# Patient Record
Sex: FEMALE | Race: WHITE | NOT HISPANIC OR LATINO | Employment: OTHER | URBAN - METROPOLITAN AREA
[De-identification: names, ages, dates, MRNs, and addresses within clinical notes are randomized per-mention and may not be internally consistent; named-entity substitution may affect disease eponyms.]

---

## 2017-03-16 ENCOUNTER — OFFICE VISIT (OUTPATIENT)
Dept: INTERNAL MEDICINE | Facility: CLINIC | Age: 57
End: 2017-03-16

## 2017-03-16 VITALS
OXYGEN SATURATION: 97 % | BODY MASS INDEX: 29.81 KG/M2 | HEART RATE: 77 BPM | DIASTOLIC BLOOD PRESSURE: 71 MMHG | SYSTOLIC BLOOD PRESSURE: 120 MMHG | WEIGHT: 162 LBS | HEIGHT: 62 IN | TEMPERATURE: 97.7 F

## 2017-03-16 DIAGNOSIS — I10 ESSENTIAL HYPERTENSION: ICD-10-CM

## 2017-03-16 DIAGNOSIS — R07.89 CHEST TIGHTNESS OR PRESSURE: ICD-10-CM

## 2017-03-16 DIAGNOSIS — J30.1 NON-SEASONAL ALLERGIC RHINITIS DUE TO POLLEN: ICD-10-CM

## 2017-03-16 DIAGNOSIS — E78.49 OTHER HYPERLIPIDEMIA: ICD-10-CM

## 2017-03-16 DIAGNOSIS — R07.89 OTHER CHEST PAIN: Primary | ICD-10-CM

## 2017-03-16 DIAGNOSIS — Z82.49 FAMILY HISTORY OF EARLY CAD: ICD-10-CM

## 2017-03-16 DIAGNOSIS — R94.31 EKG, ABNORMAL: ICD-10-CM

## 2017-03-16 DIAGNOSIS — M48.061 DEGENERATIVE LUMBAR SPINAL STENOSIS: ICD-10-CM

## 2017-03-16 PROCEDURE — 99214 OFFICE O/P EST MOD 30 MIN: CPT | Performed by: INTERNAL MEDICINE

## 2017-03-16 PROCEDURE — 93000 ELECTROCARDIOGRAM COMPLETE: CPT | Performed by: INTERNAL MEDICINE

## 2017-03-16 NOTE — PROGRESS NOTES
Procedure     ECG 12 Lead  Date/Time: 3/16/2017 5:39 PM  Performed by: MATTHEW BURNS  Authorized by: MATTHEW BURNS   Comparison: not compared with previous ECG   Previous ECG: no previous ECG available  Rhythm: sinus rhythm  Rate: normal  Conduction: conduction normal  ST Segments: ST segments normal  T depression: V5 and V6  QRS axis: normal  Other: no other findings  Clinical impression: abnormal ECG

## 2017-03-17 LAB
ALBUMIN SERPL-MCNC: 4.4 G/DL (ref 3.5–5.2)
ALBUMIN/GLOB SERPL: 1.5 G/DL
ALP SERPL-CCNC: 75 U/L (ref 39–117)
ALT SERPL-CCNC: 10 U/L (ref 1–33)
APPEARANCE UR: CLEAR
AST SERPL-CCNC: 13 U/L (ref 1–32)
BACTERIA #/AREA URNS HPF: NORMAL /HPF
BASOPHILS # BLD AUTO: 0.02 10*3/MM3 (ref 0–0.2)
BASOPHILS NFR BLD AUTO: 0.2 % (ref 0–1.5)
BILIRUB SERPL-MCNC: 0.3 MG/DL (ref 0.1–1.2)
BILIRUB UR QL STRIP: NEGATIVE
BUN SERPL-MCNC: 15 MG/DL (ref 6–20)
BUN/CREAT SERPL: 20.8 (ref 7–25)
CALCIUM SERPL-MCNC: 9.4 MG/DL (ref 8.6–10.5)
CASTS URNS MICRO: NORMAL
CHLORIDE SERPL-SCNC: 99 MMOL/L (ref 98–107)
CHOLEST SERPL-MCNC: 257 MG/DL (ref 0–200)
CO2 SERPL-SCNC: 29.1 MMOL/L (ref 22–29)
COLOR UR: YELLOW
CREAT SERPL-MCNC: 0.72 MG/DL (ref 0.57–1)
EOSINOPHIL # BLD AUTO: 0.24 10*3/MM3 (ref 0–0.7)
EOSINOPHIL NFR BLD AUTO: 2.8 % (ref 0.3–6.2)
EPI CELLS #/AREA URNS HPF: NORMAL /HPF
ERYTHROCYTE [DISTWIDTH] IN BLOOD BY AUTOMATED COUNT: 13.5 % (ref 11.7–13)
GLOBULIN SER CALC-MCNC: 3 GM/DL
GLUCOSE SERPL-MCNC: 108 MG/DL (ref 65–99)
GLUCOSE UR QL: NEGATIVE
HCT VFR BLD AUTO: 37.9 % (ref 35.6–45.5)
HDLC SERPL-MCNC: 48 MG/DL (ref 40–60)
HGB BLD-MCNC: 12.5 G/DL (ref 11.9–15.5)
HGB UR QL STRIP: NEGATIVE
IMM GRANULOCYTES # BLD: 0.04 10*3/MM3 (ref 0–0.03)
IMM GRANULOCYTES NFR BLD: 0.5 % (ref 0–0.5)
KETONES UR QL STRIP: NEGATIVE
LDLC SERPL CALC-MCNC: 179 MG/DL (ref 0–100)
LDLC/HDLC SERPL: 3.73 {RATIO}
LEUKOCYTE ESTERASE UR QL STRIP: NEGATIVE
LYMPHOCYTES # BLD AUTO: 2.61 10*3/MM3 (ref 0.9–4.8)
LYMPHOCYTES NFR BLD AUTO: 30.4 % (ref 19.6–45.3)
MCH RBC QN AUTO: 29.4 PG (ref 26.9–32)
MCHC RBC AUTO-ENTMCNC: 33 G/DL (ref 32.4–36.3)
MCV RBC AUTO: 89.2 FL (ref 80.5–98.2)
MONOCYTES # BLD AUTO: 0.4 10*3/MM3 (ref 0.2–1.2)
MONOCYTES NFR BLD AUTO: 4.7 % (ref 5–12)
NEUTROPHILS # BLD AUTO: 5.27 10*3/MM3 (ref 1.9–8.1)
NEUTROPHILS NFR BLD AUTO: 61.4 % (ref 42.7–76)
NITRITE UR QL STRIP: NEGATIVE
PH UR STRIP: 6.5 [PH] (ref 5–8)
PLATELET # BLD AUTO: 321 10*3/MM3 (ref 140–500)
POTASSIUM SERPL-SCNC: 4.2 MMOL/L (ref 3.5–5.2)
PROT SERPL-MCNC: 7.4 G/DL (ref 6–8.5)
PROT UR QL STRIP: NEGATIVE
RBC # BLD AUTO: 4.25 10*6/MM3 (ref 3.9–5.2)
RBC #/AREA URNS HPF: NORMAL /HPF
SODIUM SERPL-SCNC: 142 MMOL/L (ref 136–145)
SP GR UR: 1.02 (ref 1–1.03)
T4 FREE SERPL-MCNC: 1.04 NG/DL (ref 0.93–1.7)
TRIGL SERPL-MCNC: 151 MG/DL (ref 0–150)
TSH SERPL DL<=0.005 MIU/L-ACNC: 0.75 MIU/ML (ref 0.27–4.2)
UROBILINOGEN UR STRIP-MCNC: (no result) MG/DL
VLDLC SERPL CALC-MCNC: 30.2 MG/DL (ref 5–40)
WBC # BLD AUTO: 8.58 10*3/MM3 (ref 4.5–10.7)
WBC #/AREA URNS HPF: NORMAL /HPF

## 2017-03-22 ENCOUNTER — TELEPHONE (OUTPATIENT)
Dept: INTERNAL MEDICINE | Facility: CLINIC | Age: 57
End: 2017-03-22

## 2017-03-22 NOTE — TELEPHONE ENCOUNTER
SPOKE WITH SHE AND GAVE HER LAB RESULTS. I TOLD PT THAT DR BURNS WOULD LIKE HER TO START THE GENERIC LIPITOR 40MG. PT STATED SHE DID NOT WANT TO TAKE LIPITOR BECAUSE SHE HAS KNOWN SEVERAL PEOPLE WHO HAVE HAD COMPLICATIONS.  PT IS ASKING IF YOU WOULD BE WILLING TO SEND IN A DIFFERENT RX.  PLEASE ADVISE THANKS

## 2017-03-22 NOTE — TELEPHONE ENCOUNTER
----- Message from Ru Granger MD sent at 3/21/2017  8:27 PM EDT -----  Please call the patient regarding her abnormal result.  Notify patient her LDL cholesterol is back up at 179 and I strongly recommend she start on 40 mg of generic Lipitor daily as this will help prevent strokes and heart attacks

## 2017-03-26 NOTE — PROGRESS NOTES
Elizabeth Sheldon is a 57 y.o. female.   She is here today for chest pain along with hyperlipidemia hypertension allergic rhinitis lumbar DDD chest tightness family history of early CAD and abnormal EKG today as well  History of Present Illness   She is here today for chest pain along with hyper lipidemia hypertension allergic rhinitis lumbar DDD chest tightness family history of early CAD and abnormal EKG today as well  The following portions of the patient's history were reviewed and updated as appropriate: allergies, current medications, past family history, past medical history, past social history, past surgical history and problem list.    Review of Systems   Cardiovascular: Positive for chest pain.   All other systems reviewed and are negative.      Objective   Physical Exam   Constitutional: She is oriented to person, place, and time. She appears well-developed and well-nourished. She is cooperative.   HENT:   Head: Normocephalic and atraumatic.   Right Ear: Hearing, tympanic membrane, external ear and ear canal normal.   Left Ear: Hearing, tympanic membrane, external ear and ear canal normal.   Nose: Nose normal.   Mouth/Throat: Uvula is midline, oropharynx is clear and moist and mucous membranes are normal.   Eyes: Conjunctivae, EOM and lids are normal. Pupils are equal, round, and reactive to light.   Neck: Phonation normal. Neck supple. Carotid bruit is not present.   Cardiovascular: Normal rate, regular rhythm and normal heart sounds.  Exam reveals no gallop and no friction rub.    No murmur heard.  Pulmonary/Chest: Effort normal and breath sounds normal. No respiratory distress.   Abdominal: Soft. Bowel sounds are normal. She exhibits no distension and no mass. There is no hepatosplenomegaly. There is no tenderness. There is no rebound and no guarding. No hernia.   Musculoskeletal: She exhibits no edema.   Neurological: She is alert and oriented to person, place, and time. Coordination and  gait normal.   Skin: Skin is warm and dry.   Psychiatric: She has a normal mood and affect. Her speech is normal and behavior is normal. Judgment and thought content normal.   Nursing note and vitals reviewed.      Assessment/Plan   Diagnoses and all orders for this visit:    Other chest pain  -     Lipid Panel With LDL / HDL Ratio  -     Comprehensive Metabolic Panel  -     CBC & Differential  -     T4, Free  -     TSH  -     Urinalysis With Microscopic  -     ECG 12 Lead    Other hyperlipidemia  -     Lipid Panel With LDL / HDL Ratio  -     Comprehensive Metabolic Panel  -     CBC & Differential  -     T4, Free  -     TSH  -     Urinalysis With Microscopic    Essential hypertension  -     Lipid Panel With LDL / HDL Ratio  -     Comprehensive Metabolic Panel  -     CBC & Differential  -     T4, Free  -     TSH  -     Urinalysis With Microscopic    Non-seasonal allergic rhinitis due to pollen  -     Lipid Panel With LDL / HDL Ratio  -     Comprehensive Metabolic Panel  -     CBC & Differential  -     T4, Free  -     TSH  -     Urinalysis With Microscopic    Degenerative lumbar spinal stenosis  -     Lipid Panel With LDL / HDL Ratio  -     Comprehensive Metabolic Panel  -     CBC & Differential  -     T4, Free  -     TSH  -     Urinalysis With Microscopic    Chest tightness or pressure    Family history of early CAD  -     Stress Test With Myocardial Perfusion - One Day    EKG, abnormal  -     Stress Test With Myocardial Perfusion - One Day    Other orders  -     Microscopic Examination      Chest pain and chest tightness we will get myocardial perfusion stress test  Lumbar spinal stenosis physical therapy  Chest tightness or pressure noted above  Family history early CAD cardiac stress test  Abnormal EKG cardiac stress test  Allergic rhinitis supportive meds for this and do not use decongestions  Hypertension well-controlled on current medication  Hyper lipidemia keep LDL less than 70 with proper diet exercise  medication

## 2017-03-29 RX ORDER — PRAVASTATIN SODIUM 40 MG
40 TABLET ORAL DAILY
Qty: 90 TABLET | Refills: 0 | Status: SHIPPED | OUTPATIENT
Start: 2017-03-29 | End: 2017-12-20 | Stop reason: SINTOL

## 2017-04-10 ENCOUNTER — HOSPITAL ENCOUNTER (OUTPATIENT)
Dept: CARDIOLOGY | Facility: HOSPITAL | Age: 57
Discharge: HOME | End: 2017-04-10
Attending: INTERNAL MEDICINE

## 2017-04-10 VITALS — SYSTOLIC BLOOD PRESSURE: 121 MMHG | DIASTOLIC BLOOD PRESSURE: 70 MMHG | HEART RATE: 66 BPM

## 2017-04-10 PROCEDURE — 0 TECHNETIUM SESTAMIBI: Performed by: INTERNAL MEDICINE

## 2017-04-10 PROCEDURE — 93017 CV STRESS TEST TRACING ONLY: CPT

## 2017-04-10 PROCEDURE — 78452 HT MUSCLE IMAGE SPECT MULT: CPT | Performed by: INTERNAL MEDICINE

## 2017-04-10 PROCEDURE — 93018 CV STRESS TEST I&R ONLY: CPT | Performed by: INTERNAL MEDICINE

## 2017-04-10 PROCEDURE — 78452 HT MUSCLE IMAGE SPECT MULT: CPT

## 2017-04-10 PROCEDURE — A9500 TC99M SESTAMIBI: HCPCS | Performed by: INTERNAL MEDICINE

## 2017-04-10 PROCEDURE — 93016 CV STRESS TEST SUPVJ ONLY: CPT | Performed by: INTERNAL MEDICINE

## 2017-04-10 RX ADMIN — Medication 1 DOSE: at 07:53

## 2017-04-10 RX ADMIN — Medication 1 DOSE: at 10:15

## 2017-04-12 LAB
BH CV STRESS BP STAGE 1: NORMAL
BH CV STRESS BP STAGE 2: NORMAL
BH CV STRESS DURATION MIN STAGE 1: 3
BH CV STRESS DURATION MIN STAGE 2: 3
BH CV STRESS DURATION SEC STAGE 1: 0
BH CV STRESS DURATION SEC STAGE 2: 0
BH CV STRESS GRADE STAGE 1: 10
BH CV STRESS GRADE STAGE 2: 12
BH CV STRESS HR STAGE 1: 126
BH CV STRESS HR STAGE 2: 159
BH CV STRESS METS STAGE 1: 4.6
BH CV STRESS METS STAGE 2: 7.1
BH CV STRESS PROTOCOL 1: NORMAL
BH CV STRESS RECOVERY BP: NORMAL MMHG
BH CV STRESS RECOVERY HR: 92 BPM
BH CV STRESS SPEED STAGE 1: 1.7
BH CV STRESS SPEED STAGE 2: 2.5
BH CV STRESS STAGE 1: 1
BH CV STRESS STAGE 2: 2
LV EF NUC BP: 65 %
MAXIMAL PREDICTED HEART RATE: 164 BPM
PERCENT MAX PREDICTED HR: 96.95 %
STRESS BASELINE BP: NORMAL MMHG
STRESS BASELINE HR: 76 BPM
STRESS PERCENT HR: 114 %
STRESS POST ESTIMATED WORKLOAD: 7.1 METS
STRESS POST EXERCISE DUR MIN: 6 MIN
STRESS POST EXERCISE DUR SEC: 0 SEC
STRESS POST PEAK BP: NORMAL MMHG
STRESS POST PEAK HR: 159 BPM
STRESS TARGET HR: 139 BPM

## 2017-04-13 ENCOUNTER — TELEPHONE (OUTPATIENT)
Dept: INTERNAL MEDICINE | Facility: CLINIC | Age: 57
End: 2017-04-13

## 2017-08-07 ENCOUNTER — APPOINTMENT (OUTPATIENT)
Dept: WOMENS IMAGING | Facility: HOSPITAL | Age: 57
End: 2017-08-07

## 2017-08-07 PROCEDURE — G0202 SCR MAMMO BI INCL CAD: HCPCS | Performed by: RADIOLOGY

## 2017-08-07 PROCEDURE — 77067 SCR MAMMO BI INCL CAD: CPT | Performed by: RADIOLOGY

## 2017-08-14 RX ORDER — VALSARTAN AND HYDROCHLOROTHIAZIDE 320; 25 MG/1; MG/1
TABLET, FILM COATED ORAL
Qty: 90 TABLET | Refills: 0 | Status: SHIPPED | OUTPATIENT
Start: 2017-08-14 | End: 2017-12-20 | Stop reason: SDUPTHER

## 2017-12-20 ENCOUNTER — OFFICE VISIT (OUTPATIENT)
Dept: FAMILY MEDICINE CLINIC | Facility: CLINIC | Age: 57
End: 2017-12-20

## 2017-12-20 VITALS
WEIGHT: 164.1 LBS | RESPIRATION RATE: 18 BRPM | HEIGHT: 62 IN | SYSTOLIC BLOOD PRESSURE: 124 MMHG | DIASTOLIC BLOOD PRESSURE: 78 MMHG | BODY MASS INDEX: 30.2 KG/M2 | OXYGEN SATURATION: 98 % | TEMPERATURE: 98.3 F | HEART RATE: 70 BPM

## 2017-12-20 DIAGNOSIS — I34.0 MITRAL VALVE INSUFFICIENCY, UNSPECIFIED ETIOLOGY: ICD-10-CM

## 2017-12-20 DIAGNOSIS — I49.3 PVC (PREMATURE VENTRICULAR CONTRACTION): ICD-10-CM

## 2017-12-20 DIAGNOSIS — I10 ESSENTIAL HYPERTENSION: ICD-10-CM

## 2017-12-20 DIAGNOSIS — R73.01 ABNORMAL FASTING GLUCOSE: ICD-10-CM

## 2017-12-20 DIAGNOSIS — E78.49 OTHER HYPERLIPIDEMIA: Primary | ICD-10-CM

## 2017-12-20 PROCEDURE — 93000 ELECTROCARDIOGRAM COMPLETE: CPT | Performed by: INTERNAL MEDICINE

## 2017-12-20 PROCEDURE — 99214 OFFICE O/P EST MOD 30 MIN: CPT | Performed by: INTERNAL MEDICINE

## 2017-12-20 RX ORDER — VALSARTAN AND HYDROCHLOROTHIAZIDE 320; 25 MG/1; MG/1
1 TABLET, FILM COATED ORAL DAILY
Qty: 90 TABLET | Refills: 1 | Status: SHIPPED | OUTPATIENT
Start: 2017-12-20 | End: 2018-08-02 | Stop reason: ALTCHOICE

## 2017-12-20 RX ORDER — MELOXICAM 15 MG/1
15 TABLET ORAL DAILY
COMMUNITY
End: 2020-04-27

## 2017-12-20 NOTE — PROGRESS NOTES
Subjective   Re Sheldon is a 57 y.o. female who comes in today for   Chief Complaint   Patient presents with   • NEW PATIENT     GET ESTABLISHED    .    History of Present Illness   HERE AS A TRANSFER FROM DR CHO'S OFFICE.  WAS TOLD SHE HAS HL AND TRIED PRAVACHOL BUT MADE HER LEGS FEEL LIKE CEMENT.  WANTING FASTING LABS TO RECHECK TODAY.  NEEDS REFILL ON VALSARTAN;HCTZ THAT SHE TAKES FOR HTN.  RECENTLY HER ORTHO DR HU  PRESCRIBED MOBIC AND IT IS HELPING HER HIP ISSUE BUT ALSO HELPING HER PAIN FROM SPINAL STENOSIS IN HER BACK.  WONDERING IF CAN STAY ON IT LONG TERM.  JUST HAD CARPAL TUNNEL SURGERY BOTH HANDS IN November 2017 WITH DR. JAMISON.  ALSO SEES CARDIOLOGY AT [facility] TO FOLLOW A LEAKY VALVE.  LAST ECHO WAS THIS YEAR AND STABLE.  ALSO HAD NEG STRESS TEST.  H/O MYXOMATOUS MITRAL VALVE WITH THICKENING AND MR.  F/U WITH REPEAT ECHO IN 1 YEAR.  GOES EVERY 2 YEARS.      The following portions of the patient's history were reviewed and updated as appropriate: allergies, current medications, past family history, past medical history, past social history, past surgical history and problem list.    Review of Systems   Constitutional: Negative.    Respiratory:        [place] WITH STEPS AFTER A LOT OF STEPS   Cardiovascular: Positive for palpitations. Negative for chest pain and leg swelling.   Musculoskeletal: Positive for back pain (OFF AND ON DUE TO SPINAL STENOSIS. HAS SEEN 3 DIFFERENT BACK DOCTORS AND LAST ONE DIDN'T RECOMMEND SURGERY.  HAS DONE EPIDURALS).   All other systems reviewed and are negative.      Vitals:    12/20/17 1105   BP: 124/78   Pulse: 70   Resp: 18   Temp: 98.3 °F (36.8 °C)   SpO2: 98%       Objective   Physical Exam   Constitutional: She is oriented to person, place, and time. She appears well-developed and well-nourished.   HENT:   Head: Normocephalic and atraumatic.   Right Ear: External ear normal.   Left Ear: External ear normal.   Mouth/Throat: Oropharynx is clear and moist.   Eyes:  Conjunctivae are normal.   Neck: Neck supple.   Cardiovascular: Normal rate, regular rhythm and normal heart sounds.    No bruits   Pulmonary/Chest: Effort normal and breath sounds normal. No respiratory distress. She has no wheezes. She has no rales.   Abdominal: Soft. Bowel sounds are normal. She exhibits no distension and no mass. There is no tenderness.   Lymphadenopathy:     She has no cervical adenopathy.   Neurological: She is alert and oriented to person, place, and time.   Skin: Skin is warm.   Psychiatric: She has a normal mood and affect. Her behavior is normal. Judgment and thought content normal.   Nursing note and vitals reviewed.      Assessment/Plan   Re was seen today for new patient.    Diagnoses and all orders for this visit:    Other hyperlipidemia  -     Comprehensive Metabolic Panel  -     Lipid Panel With LDL / HDL Ratio  -     Hemoglobin A1c    Essential hypertension  -     Comprehensive Metabolic Panel  -     Lipid Panel With LDL / HDL Ratio  -     Hemoglobin A1c    Abnormal fasting glucose  -     Comprehensive Metabolic Panel  -     Lipid Panel With LDL / HDL Ratio  -     Hemoglobin A1c    PVC (premature ventricular contraction)  -     Ambulatory Referral to Cardiology    Mitral valve insufficiency, unspecified etiology  -     Ambulatory Referral to Cardiology    Other orders  -     valsartan-hydrochlorothiazide (DIOVAN-HCT) 320-25 MG per tablet; Take 1 tablet by mouth Daily.    I've reviewed previous notes and don't see a murmur mentioned, however, her echo shows MR and myxomatous valve.  Presume her murmur is from this.  Today she has a lot of PVC and EKG c/w with that. I have made a referral for her to see [facility] for further management.   She is drinking a lot of coffee and i've recommended that she stop caffeine and increase her water /day.  Also advised to start Mg++ which should help reduce the pvc's.  Refill her erin/hctz and get labs to reassess her cholesterol and a1c/glucose.     Weight loss and exercise discussed and recommended. For her chronic spinal stenosis, i've rec rehabilitative exercises and turmeric.           I have asked for the patient to return to clinic in 6month(s).

## 2017-12-20 NOTE — PROGRESS NOTES
Procedure     ECG 12 Lead  Date/Time: 12/20/2017 12:12 PM  Performed by: RENETTA CLARK.  Authorized by: RENETTA CLARK   Comparison: compared with previous ECG   Rhythm: sinus rhythm  Ectopy: frequent PVCs  Rate: normal  Conduction: conduction normal  Clinical impression: non-specific ECG  Comments: Non specific t wave changes.  Not new for her

## 2017-12-21 LAB
ALBUMIN SERPL-MCNC: 4.4 G/DL (ref 3.5–5.2)
ALBUMIN/GLOB SERPL: 1.5 G/DL
ALP SERPL-CCNC: 89 U/L (ref 39–117)
ALT SERPL-CCNC: 12 U/L (ref 1–33)
AST SERPL-CCNC: 11 U/L (ref 1–32)
BILIRUB SERPL-MCNC: 0.3 MG/DL (ref 0.1–1.2)
BUN SERPL-MCNC: 20 MG/DL (ref 6–20)
BUN/CREAT SERPL: 32.3 (ref 7–25)
CALCIUM SERPL-MCNC: 9.2 MG/DL (ref 8.6–10.5)
CHLORIDE SERPL-SCNC: 103 MMOL/L (ref 98–107)
CHOLEST SERPL-MCNC: 232 MG/DL (ref 0–200)
CO2 SERPL-SCNC: 27 MMOL/L (ref 22–29)
CREAT SERPL-MCNC: 0.62 MG/DL (ref 0.57–1)
GLOBULIN SER CALC-MCNC: 2.9 GM/DL
GLUCOSE SERPL-MCNC: 105 MG/DL (ref 65–99)
HBA1C MFR BLD: 5.8 % (ref 4.8–5.6)
HDLC SERPL-MCNC: 51 MG/DL (ref 40–60)
LDLC SERPL CALC-MCNC: 159 MG/DL (ref 0–100)
LDLC/HDLC SERPL: 3.11 {RATIO}
POTASSIUM SERPL-SCNC: 4.7 MMOL/L (ref 3.5–5.2)
PROT SERPL-MCNC: 7.3 G/DL (ref 6–8.5)
SODIUM SERPL-SCNC: 143 MMOL/L (ref 136–145)
TRIGL SERPL-MCNC: 112 MG/DL (ref 0–150)
VLDLC SERPL CALC-MCNC: 22.4 MG/DL (ref 5–40)

## 2017-12-22 RX ORDER — ROSUVASTATIN CALCIUM 10 MG/1
10 TABLET, COATED ORAL DAILY
Qty: 30 TABLET | Refills: 1 | Status: SHIPPED | OUTPATIENT
Start: 2017-12-22 | End: 2018-02-22 | Stop reason: SDUPTHER

## 2018-01-25 ENCOUNTER — OFFICE VISIT (OUTPATIENT)
Dept: CARDIOLOGY | Facility: CLINIC | Age: 58
End: 2018-01-25

## 2018-01-25 VITALS
SYSTOLIC BLOOD PRESSURE: 118 MMHG | WEIGHT: 159 LBS | HEIGHT: 63 IN | HEART RATE: 66 BPM | BODY MASS INDEX: 28.17 KG/M2 | DIASTOLIC BLOOD PRESSURE: 70 MMHG

## 2018-01-25 DIAGNOSIS — R01.1 SYSTOLIC MURMUR: ICD-10-CM

## 2018-01-25 DIAGNOSIS — I10 ESSENTIAL HYPERTENSION: ICD-10-CM

## 2018-01-25 DIAGNOSIS — R94.31 ABNORMAL EKG: ICD-10-CM

## 2018-01-25 DIAGNOSIS — I34.0 NON-RHEUMATIC MITRAL REGURGITATION: Primary | ICD-10-CM

## 2018-01-25 DIAGNOSIS — I49.1 PREMATURE ATRIAL CONTRACTIONS: ICD-10-CM

## 2018-01-25 PROCEDURE — 99204 OFFICE O/P NEW MOD 45 MIN: CPT | Performed by: INTERNAL MEDICINE

## 2018-01-25 PROCEDURE — 93000 ELECTROCARDIOGRAM COMPLETE: CPT | Performed by: INTERNAL MEDICINE

## 2018-01-25 NOTE — PROGRESS NOTES
Date of Office Visit: 2018  Encounter Provider: Callum Benedict MD  Place of Service: [facility] CARDIOLOGY  Patient Name: eR Sheldon  :1960    Chief complaint: Mitral regurgitation, systolic murmur, abnormal EKG.    History of Present Illness:    Dear Angelita:    I had the pleasure of seeing your patient in cardiology office on 2018.  As you   well know, she is a very pleasant 57 year-old white female with a past medical   history significant for mitral regurgitation, and abnormal EKG, and a systolic   murmur who presents for evaluation.  The patient has formerly seen Dr. Pena   in the [facility] system, last in .  She has seen him in the past for   hypertension, or murmur, and mitral regurgitation.  She has had an abnormal   EKG for years, and has inferolateral ST and T-wave changes at baseline.  She   has had multiple stress tests in the past, including an exercise Myoview stress   test on 4/10/2017 which was normal.  She last had an echocardiogram on   2016 which showed myxomatous-appearing changes of the mitral valve   and only mild mitral regurgitation.  She also has a prominent systolic murmur,   which she states has been present for years.  She was recently diagnosed   with prediabetes, and has lost 5 pounds in 3 weeks with diet changes.  She   does state that she has had issues with taking pravastatin previously which   made her legs feel heavy, although she is currently taking Crestor without   difficulty.  She denies any chest pain, shortness of breath, or other exertional   symptoms.  She is able to work out routinely without any difficulty.    Past Medical History:   Diagnosis Date   • Abnormal ECG     Inferolateral ST-T changes at baseline   • Hyperlipidemia    • Hypertension    • Mitral regurgitation    • Palpitations    • Premature atrial contractions    • Spinal stenosis    • Systolic murmur    • Tonsillitis        Past  Surgical History:   Procedure Laterality Date   • CARPAL TUNNEL RELEASE Bilateral    • COLONOSCOPY  2014    repeat 10 years , Dr. Karen Spear   • FOOT SURGERY     • HAND SURGERY     • TONSILLECTOMY AND ADENOIDECTOMY     • TUBAL ABDOMINAL LIGATION         Current Outpatient Prescriptions on File Prior to Visit   Medication Sig Dispense Refill   • fluticasone (FLONASE) 50 MCG/ACT nasal spray into each nostril.     • meloxicam (MOBIC) 15 MG tablet Take 15 mg by mouth Daily.     • rosuvastatin (CRESTOR) 10 MG tablet Take 1 tablet by mouth Daily. 30 tablet 1   • valsartan-hydrochlorothiazide (DIOVAN-HCT) 320-25 MG per tablet Take 1 tablet by mouth Daily. 90 tablet 1     No current facility-administered medications on file prior to visit.      Allergies as of 2018 - Emilio as Reviewed 2018   Allergen Reaction Noted   • Augmentin [amoxicillin-pot clavulanate] Diarrhea and Other (See Comments) 2016   • Bactrim [sulfamethoxazole-trimethoprim] Diarrhea and Other (See Comments) 2016   • Sulfa antibiotics  2016     Social History     Social History   • Marital status: Single     Spouse name: Kedar   • Number of children: 2   • Years of education: N/A     Occupational History   • cook/baker      Social History Main Topics   • Smoking status: Never Smoker   • Smokeless tobacco: Never Used   • Alcohol use No      Comment: Caffeine use 3-4 cups daily   • Drug use: No   • Sexual activity: Not on file     Other Topics Concern   • Not on file     Social History Narrative    .  RETIRED A YEAR AGO FROM [employer]     Family History   Problem Relation Age of Onset   • Heart attack Other    • Breast cancer Other    • Heart disease Other    • Heart disease Mother      Mother with MI at 63, and later  from CHF   • Heart disease Father      Father  from complications after valve replacement at 73   • Stroke Father    • Breast cancer Sister        Review of Systems  "  Constitution: Positive for weight loss.   All other systems reviewed and are negative.    Objective:     Vitals:    01/25/18 1018   BP: 118/70   BP Location: Left arm   Pulse: 66   Weight: 72.1 kg (159 lb)   Height: 160 cm (63\")     Body mass index is 28.17 kg/(m^2).    Physical Exam   Constitutional: She is oriented to person, place, and time. She appears well-developed and well-nourished.   HENT:   Head: Normocephalic and atraumatic.   Eyes: Conjunctivae are normal.   Neck: Neck supple.   Cardiovascular: Normal rate and regular rhythm.  Exam reveals no gallop and no friction rub.    Murmur heard.   Harsh systolic murmur is present with a grade of 2/6  at the upper right sternal border, upper left sternal border  Pulmonary/Chest: Effort normal and breath sounds normal.   Abdominal: Soft. There is no tenderness.   Musculoskeletal: She exhibits no edema.   Neurological: She is alert and oriented to person, place, and time.   Skin: Skin is warm.   Psychiatric: She has a normal mood and affect. Her behavior is normal.     Lab Review:     ECG 12 Lead  Date/Time: 1/25/2018 10:55 AM  Performed by: ORACIO TAYLOR  Authorized by: ORACIO TAYLOR   Comparison: compared with previous ECG from 12/20/2017  Similar to previous ECG  Rhythm: sinus rhythm  Ectopy: atrial premature contractions  Rate: normal  BPM: 66  Clinical impression: abnormal ECG  Comments: Inferolateral ST-T changes           Cardiac Procedures:  1.  Exercise Myoview stress test on 4/10/2017: There was no evidence of ischemia or   infarction.  2.  Echocardiogram on 12/21/2016: Ejection fraction was 69%.  There was moderate left   atrial enlargement by my read.  There was mild mitral regurgitation.  The mitral valve   leaflets were thickened and myxomatous.    Assessment:       Diagnosis Plan   1. Non-rheumatic mitral regurgitation  Adult Transthoracic Echo Complete W/ Cont if Necessary Per Protocol   2. Systolic murmur  Adult Transthoracic Echo " Complete W/ Cont if Necessary Per Protocol   3. Abnormal EKG  Adult Transthoracic Echo Complete W/ Cont if Necessary Per Protocol   4. Premature atrial contractions     5. Essential hypertension       Plan:       Again, the patient is completely asymptomatic at this point.  She is able to exert herself   and workout without any difficulty.  She has not had any chest pain.  She has an abnormal   baseline EKG with inferolateral ST and T-wave changes, although her stress test from   4/10/2017 was normal.  She has had multiple stress tests in the past which she states   were normal as well.  I reviewed her echocardiogram in detail as her murmur is   prominent.  I cannot visualize her aortic valve well, and this is the area where I heard the   murmur best.  I also felt that her left atrium was moderately dilated, although her mitral   regurgitation was only mild.  I am going to recheck an echocardiogram at this point   given the prominence of the murmur.  I would like to specifically concentrate on both   the mitral valve and the aortic valve.  She does have PACs on her EKG, although she   is asymptomatic with regards to these.  She does not require further ischemic testing   at this point.  I did advise her to start taking an aspirin 81 mg per day given her family   history of coronary artery disease.  She is taking Crestor without difficulty, although   she has had issues with pravastatin in the past.  Further plans will be made pending   the results the echocardiogram.  Otherwise, I will see her back in one year.

## 2018-01-29 ENCOUNTER — HOSPITAL ENCOUNTER (OUTPATIENT)
Dept: CARDIOLOGY | Facility: HOSPITAL | Age: 58
Discharge: HOME | End: 2018-01-29
Attending: INTERNAL MEDICINE | Admitting: INTERNAL MEDICINE

## 2018-01-29 VITALS
BODY MASS INDEX: 28.17 KG/M2 | HEART RATE: 67 BPM | OXYGEN SATURATION: 98 % | DIASTOLIC BLOOD PRESSURE: 78 MMHG | SYSTOLIC BLOOD PRESSURE: 116 MMHG | WEIGHT: 159 LBS | HEIGHT: 63 IN

## 2018-01-29 DIAGNOSIS — I34.0 NON-RHEUMATIC MITRAL REGURGITATION: ICD-10-CM

## 2018-01-29 DIAGNOSIS — R94.31 ABNORMAL EKG: ICD-10-CM

## 2018-01-29 DIAGNOSIS — R01.1 SYSTOLIC MURMUR: ICD-10-CM

## 2018-01-29 LAB
ASCENDING AORTA: 2.7 CM
BH CV ECHO MEAS - ACS: 1.6 CM
BH CV ECHO MEAS - AO MAX PG (FULL): 17.9 MMHG
BH CV ECHO MEAS - AO MAX PG: 23.8 MMHG
BH CV ECHO MEAS - AO MEAN PG (FULL): 11.3 MMHG
BH CV ECHO MEAS - AO MEAN PG: 14 MMHG
BH CV ECHO MEAS - AO ROOT AREA (BSA CORRECTED): 1.3
BH CV ECHO MEAS - AO ROOT AREA: 3.9 CM^2
BH CV ECHO MEAS - AO ROOT DIAM: 2.2 CM
BH CV ECHO MEAS - AO V2 MAX: 243.9 CM/SEC
BH CV ECHO MEAS - AO V2 MEAN: 179 CM/SEC
BH CV ECHO MEAS - AO V2 VTI: 56.7 CM
BH CV ECHO MEAS - AVA(I,A): 1.2 CM^2
BH CV ECHO MEAS - AVA(I,D): 1.2 CM^2
BH CV ECHO MEAS - AVA(V,A): 1.3 CM^2
BH CV ECHO MEAS - AVA(V,D): 1.3 CM^2
BH CV ECHO MEAS - BSA(HAYCOCK): 1.8 M^2
BH CV ECHO MEAS - BSA: 1.8 M^2
BH CV ECHO MEAS - BZI_BMI: 28.2 KILOGRAMS/M^2
BH CV ECHO MEAS - BZI_METRIC_HEIGHT: 160 CM
BH CV ECHO MEAS - BZI_METRIC_WEIGHT: 72.1 KG
BH CV ECHO MEAS - EDV(TEICH): 95.2 ML
BH CV ECHO MEAS - EF(CUBED): 83 %
BH CV ECHO MEAS - EF(TEICH): 76 %
BH CV ECHO MEAS - ESV(TEICH): 22.8 ML
BH CV ECHO MEAS - FS: 44.6 %
BH CV ECHO MEAS - IVS/LVPW: 1.1
BH CV ECHO MEAS - IVSD: 0.94 CM
BH CV ECHO MEAS - LAT PEAK E' VEL: 15 CM/SEC
BH CV ECHO MEAS - LV MASS(C)D: 134.6 GRAMS
BH CV ECHO MEAS - LV MASS(C)DI: 76.7 GRAMS/M^2
BH CV ECHO MEAS - LV MAX PG: 5.8 MMHG
BH CV ECHO MEAS - LV MEAN PG: 2.7 MMHG
BH CV ECHO MEAS - LV V1 MAX: 120.8 CM/SEC
BH CV ECHO MEAS - LV V1 MEAN: 74.3 CM/SEC
BH CV ECHO MEAS - LV V1 VTI: 26.4 CM
BH CV ECHO MEAS - LVIDD: 4.6 CM
BH CV ECHO MEAS - LVIDS: 2.5 CM
BH CV ECHO MEAS - LVOT AREA (M): 2.5 CM^2
BH CV ECHO MEAS - LVOT AREA: 2.7 CM^2
BH CV ECHO MEAS - LVOT DIAM: 1.8 CM
BH CV ECHO MEAS - LVPWD: 0.85 CM
BH CV ECHO MEAS - MED PEAK E' VEL: 13 CM/SEC
BH CV ECHO MEAS - MR MAX PG: 90.7 MMHG
BH CV ECHO MEAS - MR MAX VEL: 476.3 CM/SEC
BH CV ECHO MEAS - MV A DUR: 0.13 SEC
BH CV ECHO MEAS - MV A MAX VEL: 76.4 CM/SEC
BH CV ECHO MEAS - MV DEC SLOPE: 541.5 CM/SEC^2
BH CV ECHO MEAS - MV DEC TIME: 0.17 SEC
BH CV ECHO MEAS - MV E MAX VEL: 106 CM/SEC
BH CV ECHO MEAS - MV E/A: 1.4
BH CV ECHO MEAS - MV MAX PG: 3.8 MMHG
BH CV ECHO MEAS - MV MEAN PG: 1.6 MMHG
BH CV ECHO MEAS - MV P1/2T MAX VEL: 106.9 CM/SEC
BH CV ECHO MEAS - MV P1/2T: 57.8 MSEC
BH CV ECHO MEAS - MV V2 MAX: 97.7 CM/SEC
BH CV ECHO MEAS - MV V2 MEAN: 57.9 CM/SEC
BH CV ECHO MEAS - MV V2 VTI: 36.1 CM
BH CV ECHO MEAS - MVA P1/2T LCG: 2.1 CM^2
BH CV ECHO MEAS - MVA(P1/2T): 3.8 CM^2
BH CV ECHO MEAS - MVA(VTI): 1.9 CM^2
BH CV ECHO MEAS - PA ACC TIME: 0.11 SEC
BH CV ECHO MEAS - PA MAX PG (FULL): 2.5 MMHG
BH CV ECHO MEAS - PA MAX PG: 4.1 MMHG
BH CV ECHO MEAS - PA PR(ACCEL): 28.3 MMHG
BH CV ECHO MEAS - PA V2 MAX: 101.8 CM/SEC
BH CV ECHO MEAS - PULM A REVS DUR: 0.13 SEC
BH CV ECHO MEAS - PULM A REVS VEL: 24.8 CM/SEC
BH CV ECHO MEAS - PULM DIAS VEL: 57.4 CM/SEC
BH CV ECHO MEAS - PULM S/D: 0.93
BH CV ECHO MEAS - PULM SYS VEL: 53.3 CM/SEC
BH CV ECHO MEAS - PVA(V,A): 1.9 CM^2
BH CV ECHO MEAS - PVA(V,D): 1.9 CM^2
BH CV ECHO MEAS - QP/QS: 0.56
BH CV ECHO MEAS - RAP SYSTOLE: 3 MMHG
BH CV ECHO MEAS - RV MAX PG: 1.6 MMHG
BH CV ECHO MEAS - RV MEAN PG: 1 MMHG
BH CV ECHO MEAS - RV V1 MAX: 63.6 CM/SEC
BH CV ECHO MEAS - RV V1 MEAN: 45.5 CM/SEC
BH CV ECHO MEAS - RV V1 VTI: 12.6 CM
BH CV ECHO MEAS - RVOT AREA: 3.1 CM^2
BH CV ECHO MEAS - RVOT DIAM: 2 CM
BH CV ECHO MEAS - RVSP: 21 MMHG
BH CV ECHO MEAS - SI(AO): 127 ML/M^2
BH CV ECHO MEAS - SI(CUBED): 44.8 ML/M^2
BH CV ECHO MEAS - SI(LVOT): 40 ML/M^2
BH CV ECHO MEAS - SI(TEICH): 41.3 ML/M^2
BH CV ECHO MEAS - SUP REN AO DIAM: 1.7 CM
BH CV ECHO MEAS - SV(AO): 222.8 ML
BH CV ECHO MEAS - SV(CUBED): 78.6 ML
BH CV ECHO MEAS - SV(LVOT): 70.1 ML
BH CV ECHO MEAS - SV(RVOT): 39.1 ML
BH CV ECHO MEAS - SV(TEICH): 72.4 ML
BH CV ECHO MEAS - TAPSE (>1.6): 1.8 CM2
BH CV ECHO MEAS - TR MAX VEL: 214.2 CM/SEC
BH CV XLRA - RV BASE: 3 CM
BH CV XLRA - TDI S': 11 CM/SEC
E/E' RATIO: 14
LEFT ATRIUM VOLUME INDEX: 33 ML/M2
MAXIMAL PREDICTED HEART RATE: 164 BPM
SINUS: 2.7 CM
STJ: 2 CM
STRESS TARGET HR: 139 BPM

## 2018-01-29 PROCEDURE — 93306 TTE W/DOPPLER COMPLETE: CPT

## 2018-01-29 PROCEDURE — 93306 TTE W/DOPPLER COMPLETE: CPT | Performed by: INTERNAL MEDICINE

## 2018-01-29 PROCEDURE — 25010000002 PERFLUTREN (DEFINITY) 8.476 MG IN SODIUM CHLORIDE 0.9 % 10 ML INJECTION: Performed by: INTERNAL MEDICINE

## 2018-01-29 RX ADMIN — PERFLUTREN 1.5 ML: 6.52 INJECTION, SUSPENSION INTRAVENOUS at 10:24

## 2018-01-30 ENCOUNTER — TELEPHONE (OUTPATIENT)
Dept: CARDIOLOGY | Facility: CLINIC | Age: 58
End: 2018-01-30

## 2018-01-30 ENCOUNTER — OFFICE VISIT (OUTPATIENT)
Dept: RETAIL CLINIC | Facility: CLINIC | Age: 58
End: 2018-01-30

## 2018-01-30 DIAGNOSIS — Z23 NEED FOR VACCINATION: Primary | ICD-10-CM

## 2018-01-30 NOTE — PROGRESS NOTES
See scanned documents.   Adverse reactions reviewed with patient. Consent signed. Vaccine administered by John Beltrán CMA. Patient tolerated well.

## 2018-01-31 ENCOUNTER — TELEPHONE (OUTPATIENT)
Dept: CARDIOLOGY | Facility: CLINIC | Age: 58
End: 2018-01-31

## 2018-02-23 DIAGNOSIS — Z00.00 ANNUAL PHYSICAL EXAM: Primary | ICD-10-CM

## 2018-02-23 RX ORDER — ROSUVASTATIN CALCIUM 10 MG/1
TABLET, COATED ORAL
Qty: 30 TABLET | Refills: 4 | Status: SHIPPED | OUTPATIENT
Start: 2018-02-23 | End: 2018-09-12 | Stop reason: SDDI

## 2018-02-28 ENCOUNTER — LAB (OUTPATIENT)
Dept: FAMILY MEDICINE CLINIC | Facility: CLINIC | Age: 58
End: 2018-02-28

## 2018-03-01 LAB
25(OH)D3+25(OH)D2 SERPL-MCNC: 25.8 NG/ML (ref 30–100)
ALBUMIN SERPL-MCNC: 4.5 G/DL (ref 3.5–5.2)
ALBUMIN/GLOB SERPL: 1.6 G/DL
ALP SERPL-CCNC: 103 U/L (ref 39–117)
ALT SERPL-CCNC: 19 U/L (ref 1–33)
APPEARANCE UR: CLEAR
AST SERPL-CCNC: 15 U/L (ref 1–32)
BASOPHILS # BLD AUTO: 0.03 10*3/MM3 (ref 0–0.2)
BASOPHILS NFR BLD AUTO: 0.4 % (ref 0–1.5)
BILIRUB SERPL-MCNC: 0.4 MG/DL (ref 0.1–1.2)
BILIRUB UR QL STRIP: NEGATIVE
BUN SERPL-MCNC: 20 MG/DL (ref 6–20)
BUN/CREAT SERPL: 30.8 (ref 7–25)
CALCIUM SERPL-MCNC: 9.7 MG/DL (ref 8.6–10.5)
CHLORIDE SERPL-SCNC: 98 MMOL/L (ref 98–107)
CHOLEST SERPL-MCNC: 188 MG/DL (ref 0–200)
CO2 SERPL-SCNC: 29.6 MMOL/L (ref 22–29)
COLOR UR: YELLOW
CREAT SERPL-MCNC: 0.65 MG/DL (ref 0.57–1)
EOSINOPHIL # BLD AUTO: 0.27 10*3/MM3 (ref 0–0.7)
EOSINOPHIL NFR BLD AUTO: 3.6 % (ref 0.3–6.2)
ERYTHROCYTE [DISTWIDTH] IN BLOOD BY AUTOMATED COUNT: 13.3 % (ref 11.7–13)
GFR SERPLBLD CREATININE-BSD FMLA CKD-EPI: 114 ML/MIN/1.73
GFR SERPLBLD CREATININE-BSD FMLA CKD-EPI: 94 ML/MIN/1.73
GLOBULIN SER CALC-MCNC: 2.9 GM/DL
GLUCOSE SERPL-MCNC: 97 MG/DL (ref 65–99)
GLUCOSE UR QL: NEGATIVE
HBA1C MFR BLD: 5.58 % (ref 4.8–5.6)
HCT VFR BLD AUTO: 38.7 % (ref 35.6–45.5)
HDLC SERPL-MCNC: 53 MG/DL (ref 40–60)
HGB BLD-MCNC: 12 G/DL (ref 11.9–15.5)
HGB UR QL STRIP: NEGATIVE
IMM GRANULOCYTES # BLD: 0.02 10*3/MM3 (ref 0–0.03)
IMM GRANULOCYTES NFR BLD: 0.3 % (ref 0–0.5)
KETONES UR QL STRIP: NEGATIVE
LDLC SERPL CALC-MCNC: 111 MG/DL (ref 0–100)
LDLC/HDLC SERPL: 2.09 {RATIO}
LEUKOCYTE ESTERASE UR QL STRIP: NEGATIVE
LYMPHOCYTES # BLD AUTO: 2.24 10*3/MM3 (ref 0.9–4.8)
LYMPHOCYTES NFR BLD AUTO: 29.5 % (ref 19.6–45.3)
Lab: NORMAL
MCH RBC QN AUTO: 27.8 PG (ref 26.9–32)
MCHC RBC AUTO-ENTMCNC: 31 G/DL (ref 32.4–36.3)
MCV RBC AUTO: 89.6 FL (ref 80.5–98.2)
MONOCYTES # BLD AUTO: 0.46 10*3/MM3 (ref 0.2–1.2)
MONOCYTES NFR BLD AUTO: 6.1 % (ref 5–12)
NEUTROPHILS # BLD AUTO: 4.57 10*3/MM3 (ref 1.9–8.1)
NEUTROPHILS NFR BLD AUTO: 60.1 % (ref 42.7–76)
NITRITE UR QL STRIP: NEGATIVE
PH UR STRIP: 6 [PH] (ref 5–8)
PLATELET # BLD AUTO: 307 10*3/MM3 (ref 140–500)
POTASSIUM SERPL-SCNC: 4.2 MMOL/L (ref 3.5–5.2)
PROT SERPL-MCNC: 7.4 G/DL (ref 6–8.5)
PROT UR QL STRIP: NEGATIVE
RBC # BLD AUTO: 4.32 10*6/MM3 (ref 3.9–5.2)
SODIUM SERPL-SCNC: 141 MMOL/L (ref 136–145)
SP GR UR: 1.01 (ref 1–1.03)
T4 FREE SERPL-MCNC: 0.91 NG/DL (ref 0.93–1.7)
TRIGL SERPL-MCNC: 120 MG/DL (ref 0–150)
TSH SERPL DL<=0.005 MIU/L-ACNC: 1.1 MIU/ML (ref 0.27–4.2)
UROBILINOGEN UR STRIP-MCNC: NORMAL MG/DL
VLDLC SERPL CALC-MCNC: 24 MG/DL (ref 5–40)
WBC # BLD AUTO: 7.59 10*3/MM3 (ref 4.5–10.7)
WRITTEN AUTHORIZATION: NORMAL

## 2018-05-23 ENCOUNTER — OFFICE VISIT (OUTPATIENT)
Dept: FAMILY MEDICINE CLINIC | Facility: CLINIC | Age: 58
End: 2018-05-23

## 2018-05-23 VITALS
SYSTOLIC BLOOD PRESSURE: 112 MMHG | BODY MASS INDEX: 29.11 KG/M2 | TEMPERATURE: 98.3 F | WEIGHT: 164.3 LBS | HEIGHT: 63 IN | HEART RATE: 72 BPM | DIASTOLIC BLOOD PRESSURE: 66 MMHG | OXYGEN SATURATION: 98 %

## 2018-05-23 DIAGNOSIS — Z00.00 WELL ADULT EXAM: ICD-10-CM

## 2018-05-23 DIAGNOSIS — I10 ESSENTIAL HYPERTENSION: ICD-10-CM

## 2018-05-23 DIAGNOSIS — E55.9 VITAMIN D DEFICIENCY: ICD-10-CM

## 2018-05-23 DIAGNOSIS — E78.49 OTHER HYPERLIPIDEMIA: ICD-10-CM

## 2018-05-23 DIAGNOSIS — H61.22 IMPACTED CERUMEN OF LEFT EAR: ICD-10-CM

## 2018-05-23 DIAGNOSIS — R73.01 ABNORMAL FASTING GLUCOSE: Primary | ICD-10-CM

## 2018-05-23 PROCEDURE — 99386 PREV VISIT NEW AGE 40-64: CPT | Performed by: INTERNAL MEDICINE

## 2018-05-23 NOTE — PATIENT INSTRUCTIONS
MyPlate from [employer]  The general, healthful diet is based on the 2010 Dietary Guidelines for Americans. The amount of food you need to eat from each food group depends on your age, sex, and level of physical activity and can be individualized by a dietitian. Go to ChooseMyPlate.gov for more information.  What do I need to know about the MyPlate plan?  · Enjoy your food, but eat less.  · Avoid oversized portions.  ¨ ½ of your plate should include fruits and vegetables.  ¨ ¼ of your plate should be grains.  ¨ ¼ of your plate should be protein.  Grains   · Make at least half of your grains whole grains.  · For a 2,000 calorie daily food plan, eat 6 oz every day.  · 1 oz is about 1 slice bread, 1 cup cereal, or ½ cup cooked rice, cereal, or pasta.  Vegetables   · Make half your plate fruits and vegetables.  · For a 2,000 calorie daily food plan, eat 2½ cups every day.  · 1 cup is about 1 cup raw or cooked vegetables or vegetable juice or 2 cups raw leafy greens.  Fruits   · Make half your plate fruits and vegetables.  · For a 2,000 calorie daily food plan, eat 2 cups every day.  · 1 cup is about 1 cup fruit or 100% fruit juice or ½ cup dried fruit.  Protein   · For a 2,000 calorie daily food plan, eat 5½ oz every day.  · 1 oz is about 1 oz meat, poultry, or fish, ¼ cup cooked beans, 1 egg, 1 Tbsp peanut butter, or ½ oz nuts or seeds.  Dairy   · Switch to fat-free or low-fat (1%) milk.  · For a 2,000 calorie daily food plan, eat 3 cups every day.  · 1 cup is about 1 cup milk or yogurt or soy milk (soy beverage), 1½ oz natural cheese, or 2 oz processed cheese.  Fats, Oils, and Empty Calories   · Only small amounts of oils are recommended.  · Empty calories are calories from solid fats or added sugars.  · Compare sodium in foods like soup, bread, and frozen meals. Choose the foods with lower numbers.  · Drink water instead of sugary drinks.  What foods can I eat?  Grains   Whole grains such as whole wheat, quinoa,  millet, and bulgur. Bread, rolls, and pasta made from whole grains. Brown or wild rice. Hot or cold cereals made from whole grains and without added sugar.  Vegetables   All fresh vegetables, especially fresh red, dark green, or orange vegetables. Peas and beans. Low-sodium frozen or canned vegetables prepared without added salt. Low-sodium vegetable juices.  Fruits   All fresh, frozen, and dried fruits. Canned fruit packed in water or fruit juice without added sugar. Fruit juices without added sugar.  Meats and Other Protein Sources   Boiled, baked, or grilled lean meat trimmed of fat. Skinless poultry. Fresh seafood and shellfish. Canned seafood packed in water. Unsalted nuts and unsalted nut butters. Tofu. Dried beans and pea. Eggs.  Dairy   Low-fat or fat-free milk, yogurt, and cheeses.  Sweets and Desserts   Frozen desserts made from low-fat milk.  Fats and Oils   Olive, peanut, and canola oils and margarine. Salad dressing and mayonnaise made from these oils.  Other   Soups and casseroles made from allowed ingredients and without added fat or salt.  The items listed above may not be a complete list of recommended foods or beverages. Contact your dietitian for more options.   What foods are not recommended?  Grains   Sweetened, low-fiber cereals. Packaged baked goods. Snack crackers and chips. Cheese crackers, butter crackers, and biscuits. Frozen waffles, sweet breads, doughnuts, pastries, packaged baking mixes, pancakes, cakes, and cookies.  Vegetables   Regular canned or frozen vegetables or vegetables prepared with salt. Canned tomatoes. Canned tomato sauce. Fried vegetables. Vegetables in cream sauce or cheese sauce.  Fruits   Fruits packed in syrup or made with added sugar.  Meats and Other Protein Sources   Marbled or fatty meats such as ribs. Poultry with skin. Fried meats, poultry, eggs, or fish. Sausages, hot dogs, and deli meats such as pastrami, bologna, or salami.  Dairy   Whole milk, cream,  cheeses made from whole milk, sour cream. Ice cream or yogurt made from whole milk or with added sugar.  Beverages   For adults, no more than one alcoholic drink per day. Regular soft drinks or other sugary beverages. Juice drinks.  Sweets and Desserts   Sugary or fatty desserts, candy, and other sweets.  Fats and Oils   Solid shortening or partially hydrogenated oils. Solid margarine. Margarine that contains trans fats. Butter.  The items listed above may not be a complete list of foods and beverages to avoid. Contact your dietitian for more information.   This information is not intended to replace advice given to you by your health care provider. Make sure you discuss any questions you have with your health care provider.  Document Released: 01/06/2009 Document Revised: 05/25/2017 Document Reviewed: 11/26/2014  [employer] Interactive Patient Education © 2017 [employer]

## 2018-05-23 NOTE — PROGRESS NOTES
Subjective   Re Sheldon is a 58 y.o. female who comes in today for   Chief Complaint   Patient presents with   • Annual Exam   .    History of Present Illness   Here for CPE without pap.  Wanting hep A if insurance covers it.  She has HTN and HL and VDD.  She is concerned about vit d constipating her.  She has gained 5 pounds.  Stopped exercising and feeling fatigued.  Pulled her left groin muscle 3 weeks ago somehow and it is better with mobic and ice and heat and a TENS unit.  She is babying her leg and therefore not being active at this time.  Saw Dr. Leblanc for the murmur and he is seeing her in a few months for f/u.  No syncope.  No cp and no ROWLAND unless she climbs a large flight of steps and that is only on occasion. Stress test was 3/2017 and neg.  Has palpitations that are her baseline and on occasion.      The following portions of the patient's history were reviewed and updated as appropriate: allergies, current medications, past family history, past medical history, past social history, past surgical history and problem list.    Review of Systems   Constitutional: Positive for fatigue.   Respiratory: Negative.    Cardiovascular: Negative.    Musculoskeletal: Positive for myalgias.   Psychiatric/Behavioral: Negative.  Negative for dysphoric mood and sleep disturbance. The patient is not nervous/anxious.        Vitals:    05/23/18 1307   BP: 112/66   Pulse: 72   Temp: 98.3 °F (36.8 °C)   SpO2: 98%       Objective   Physical Exam   Constitutional: She is oriented to person, place, and time. She appears well-developed and well-nourished. No distress.   HENT:   Head: Normocephalic and atraumatic. Hair is normal.   Right Ear: Hearing, tympanic membrane, external ear and ear canal normal. No drainage. No decreased hearing is noted.   Left Ear: Hearing, tympanic membrane, external ear and ear canal normal. No decreased hearing is noted.   Nose: No nasal deformity.   Mouth/Throat: Oropharynx is clear and  moist.   Eyes: Conjunctivae, EOM and lids are normal. Pupils are equal, round, and reactive to light. Lids are everted and swept, no foreign bodies found. Right eye exhibits no discharge. Left eye exhibits no discharge.   Fundoscopic exam:       The right eye shows red reflex.        The left eye shows red reflex.   Neck: Normal range of motion. Neck supple. No JVD present. No tracheal deviation present. No thyromegaly present.   Cardiovascular: Normal rate, regular rhythm, normal heart sounds, intact distal pulses and normal pulses.  Exam reveals no gallop and no friction rub.    No murmur heard.  Pulmonary/Chest: Effort normal and breath sounds normal. No respiratory distress. She has no wheezes. She has no rales. She exhibits no tenderness.   Abdominal: Soft. Bowel sounds are normal. She exhibits no distension and no mass. There is no tenderness. There is no rebound and no guarding. No hernia.   Musculoskeletal: Normal range of motion. She exhibits no edema, tenderness or deformity.   Lymphadenopathy:     She has no cervical adenopathy.        Right: No inguinal adenopathy present.        Left: No inguinal adenopathy present.   Neurological: She is alert and oriented to person, place, and time. She has normal reflexes. She displays normal reflexes. No cranial nerve deficit. She exhibits normal muscle tone. Coordination normal.   Skin: Skin is warm and dry. No rash noted. She is not diaphoretic. No erythema.   Psychiatric: She has a normal mood and affect. Her behavior is normal. Judgment and thought content normal.   Nursing note and vitals reviewed.      Assessment/Plan   Re was seen today for annual exam.    Diagnoses and all orders for this visit:    Abnormal fasting glucose  -     Lipid Panel With LDL / HDL Ratio; Future  -     Vitamin D 25 Hydroxy; Future  -     Comprehensive Metabolic Panel; Future  -     Hemoglobin A1c; Future    Other hyperlipidemia  -     Lipid Panel With LDL / HDL Ratio; Future  -      Vitamin D 25 Hydroxy; Future  -     Comprehensive Metabolic Panel; Future  -     Hemoglobin A1c; Future    Essential hypertension  -     Lipid Panel With LDL / HDL Ratio; Future  -     Vitamin D 25 Hydroxy; Future  -     Comprehensive Metabolic Panel; Future  -     Hemoglobin A1c; Future    Vitamin D deficiency  -     Lipid Panel With LDL / HDL Ratio; Future  -     Vitamin D 25 Hydroxy; Future  -     Comprehensive Metabolic Panel; Future  -     Hemoglobin A1c; Future    Well adult exam    can get the hep A once she clears it with insurance  Labs ordered for sept.   up to date  Gyn does her mammo and pap.                  I have asked for the patient to return to clinic in 6month(s).

## 2018-07-16 ENCOUNTER — OFFICE VISIT (OUTPATIENT)
Dept: FAMILY MEDICINE CLINIC | Facility: CLINIC | Age: 58
End: 2018-07-16

## 2018-07-16 VITALS
SYSTOLIC BLOOD PRESSURE: 118 MMHG | TEMPERATURE: 98.2 F | OXYGEN SATURATION: 97 % | DIASTOLIC BLOOD PRESSURE: 70 MMHG | HEART RATE: 64 BPM | HEIGHT: 63 IN | WEIGHT: 167 LBS | BODY MASS INDEX: 29.59 KG/M2

## 2018-07-16 DIAGNOSIS — M43.6 TORTICOLLIS, ACUTE: Primary | ICD-10-CM

## 2018-07-16 PROCEDURE — 99214 OFFICE O/P EST MOD 30 MIN: CPT | Performed by: NURSE PRACTITIONER

## 2018-07-16 RX ORDER — CHLORZOXAZONE 500 MG/1
TABLET ORAL
Qty: 30 TABLET | Refills: 1 | Status: SHIPPED | OUTPATIENT
Start: 2018-07-16 | End: 2018-09-12

## 2018-07-16 NOTE — PATIENT INSTRUCTIONS
Discharge instructions    Heat  Gentle massage  Gentle range of motion exercises stretching as tolerated  Muscle relaxers as needed with caution possible sedation  Do not drive immediately after taking a new muscle relaxer  Or if sedation  If not improved in one week  X-rays and physical therapy  Please call or return office    If weakness  Severe headache emergency room  Or difficulty swallowing    Thank You,      James Epley,  NP

## 2018-07-16 NOTE — PROGRESS NOTES
Elizabeth Sheldon is a 58 y.o. female.     Patient complains waking up this morning  Stiffness left sided neck  No paresthesias no arm pain  No cervical pain no headache dizziness  No painful swallowing  Wants to make sure there is no infection or whenever on her neck causing the stiffness  No nausea vomiting no chills  No night sweats feels good otherwise  Symptoms moderate  Discomfort with muscle spasm no severe pain    better with gentle massage      Past medical history  Carpal tunnel surgery last year better      Knee problem getting ready to take the steroid will hold anti-inflammatory     no known injury                   The following portions of the patient's history were reviewed and updated as appropriate: allergies, current medications, past family history, past medical history, past surgical history and problem list.    Review of Systems   Musculoskeletal: Positive for arthralgias and neck stiffness.   All other systems reviewed and are negative.      Objective   Physical Exam   Constitutional: She is oriented to person, place, and time. She appears well-developed and well-nourished. No distress.   HENT:   Head: Normocephalic.   TMs clear turbinates and explain her  Neck without mass  Normal swallowing speech clear   Eyes: Conjunctivae are normal. Pupils are equal, round, and reactive to light.   Neck: Neck supple. No thyromegaly present.   Cardiovascular: Exam reveals no friction rub.    No murmur heard.  Pulmonary/Chest: Effort normal. No respiratory distress. She has no wheezes.   Musculoskeletal: She exhibits tenderness. She exhibits no edema.   No cervical point tenderness  No lymphadenopathy no neck masses  Sternocleidomastoid spasm apparent  Left-sided left sided leaning neck consistent with torticollis  Muscle stiff spasms  Right side normal  Trapezoid upper extremities normal neurovascular intact   Lymphadenopathy:     She has no cervical adenopathy.   Neurological: She is alert and  oriented to person, place, and time. She displays normal reflexes. No cranial nerve deficit.   Skin: Skin is warm and dry.   Psychiatric: She has a normal mood and affect. Her behavior is normal. Judgment and thought content normal.   Vitals reviewed.        Assessment/Plan   Re was seen today for left side of neck is swollen.    Diagnoses and all orders for this visit:    Torticollis, acute    Other orders  -     chlorzoxazone (PARAFON FORTE DSC) 500 MG tablet; 1/2 to 1 tab three times day as needed muscle spasm                  Recommend massage  Discharge instructions    Heat  Gentle massage  Gentle range of motion exercises stretching as tolerated  Muscle relaxers as needed with caution possible sedation  Do not drive immediately after taking a new muscle relaxer  Or if sedation  If not improved in one week  X-rays and physical therapy  Please call or return office    If weakness  Severe headache emergency room  Or difficulty swallowing    Thank You,      James Epley, NP

## 2018-08-01 NOTE — TELEPHONE ENCOUNTER
Valsartan is on recall . Is it ok to change to losartan hctz and if ok, where does she want it sent?

## 2018-08-02 RX ORDER — VALSARTAN AND HYDROCHLOROTHIAZIDE 320; 25 MG/1; MG/1
1 TABLET, FILM COATED ORAL DAILY
Qty: 90 TABLET | Refills: 0 | OUTPATIENT
Start: 2018-08-02

## 2018-08-02 RX ORDER — LOSARTAN POTASSIUM AND HYDROCHLOROTHIAZIDE 25; 100 MG/1; MG/1
1 TABLET ORAL DAILY
Qty: 90 TABLET | Refills: 1 | Status: SHIPPED | OUTPATIENT
Start: 2018-08-02 | End: 2019-02-17 | Stop reason: SDUPTHER

## 2018-08-14 ENCOUNTER — APPOINTMENT (OUTPATIENT)
Dept: WOMENS IMAGING | Facility: HOSPITAL | Age: 58
End: 2018-08-14

## 2018-08-14 PROCEDURE — 77067 SCR MAMMO BI INCL CAD: CPT | Performed by: RADIOLOGY

## 2018-09-12 ENCOUNTER — OFFICE VISIT (OUTPATIENT)
Dept: FAMILY MEDICINE CLINIC | Facility: CLINIC | Age: 58
End: 2018-09-12

## 2018-09-12 VITALS
TEMPERATURE: 97.7 F | BODY MASS INDEX: 30 KG/M2 | RESPIRATION RATE: 16 BRPM | WEIGHT: 169.3 LBS | DIASTOLIC BLOOD PRESSURE: 68 MMHG | HEIGHT: 63 IN | SYSTOLIC BLOOD PRESSURE: 128 MMHG | HEART RATE: 64 BPM | OXYGEN SATURATION: 98 %

## 2018-09-12 DIAGNOSIS — E78.5 HYPERLIPIDEMIA, UNSPECIFIED HYPERLIPIDEMIA TYPE: Primary | ICD-10-CM

## 2018-09-12 DIAGNOSIS — Z23 ENCOUNTER FOR IMMUNIZATION: ICD-10-CM

## 2018-09-12 PROCEDURE — 99213 OFFICE O/P EST LOW 20 MIN: CPT | Performed by: INTERNAL MEDICINE

## 2018-09-12 PROCEDURE — 90632 HEPA VACCINE ADULT IM: CPT | Performed by: INTERNAL MEDICINE

## 2018-09-12 PROCEDURE — 90471 IMMUNIZATION ADMIN: CPT | Performed by: INTERNAL MEDICINE

## 2018-09-12 RX ORDER — EZETIMIBE 10 MG/1
10 TABLET ORAL DAILY
Qty: 30 TABLET | Refills: 5 | Status: SHIPPED | OUTPATIENT
Start: 2018-09-12 | End: 2019-04-22 | Stop reason: SDUPTHER

## 2018-09-12 NOTE — PROGRESS NOTES
Subjective   Re Sheldon is a 58 y.o. female who comes in today for   Chief Complaint   Patient presents with   • Hyperlipidemia     routine visit no labs prior    .    History of Present Illness   She is here for a pulled muscle in her leg/groin on the left side.  Saw ortho and he has her on PT and mobic.  Pain was behind her buttock and in groin.  Hurt to walk, ride on mower, exercise, even sit on toilet.  She is better.  Around the same time she had started crestor for HL.  She is menopausal and has had some stress with loss of father in law.  A lot of fatigue.  Her leg is improved and thinks she can now walk agtgain on TM.  Her question is that she knows LDL is bad 190 but crestor made her legs hurt too much and won't take lipitor b/c she is scared of it.  pravachol also made her legs hurt as well. TG were 167.  Does eat poorly with a lot of cheese and chick gabriela a      The following portions of the patient's history were reviewed and updated as appropriate: allergies, current medications, past family history, past medical history, past social history, past surgical history and problem list.    Review of Systems   Constitutional: Positive for unexpected weight change.   Musculoskeletal: Positive for myalgias.       Vitals:    09/12/18 0917   BP: 128/68   Pulse: 64   Resp: 16   Temp: 97.7 °F (36.5 °C)   SpO2: 98%       Objective   Physical Exam   Constitutional: She is oriented to person, place, and time. She appears well-developed and well-nourished.   HENT:   Head: Normocephalic and atraumatic.   Right Ear: External ear normal.   Left Ear: External ear normal.   Mouth/Throat: Oropharynx is clear and moist.   Eyes: Conjunctivae are normal.   Neck: Neck supple.   Cardiovascular: Normal rate, regular rhythm and normal heart sounds.    No bruits   Pulmonary/Chest: Effort normal and breath sounds normal. No respiratory distress. She has no wheezes. She has no rales.   Abdominal: Soft. Bowel sounds are normal. She  exhibits no distension and no mass. There is no tenderness.   Lymphadenopathy:     She has no cervical adenopathy.   Neurological: She is alert and oriented to person, place, and time.   Skin: Skin is warm.   Psychiatric: She has a normal mood and affect. Her behavior is normal. Judgment and thought content normal.   Nursing note and vitals reviewed.      Assessment/Plan   Re was seen today for hyperlipidemia.    Diagnoses and all orders for this visit:    Hyperlipidemia, unspecified hyperlipidemia type  -     Comprehensive Metabolic Panel  -     Lipid Panel With LDL / HDL Ratio    Encounter for immunization  -     Hepatitis A Vaccine Adult IM    Other orders  -     ezetimibe (ZETIA) 10 MG tablet; Take 1 tablet by mouth Daily.      Start zetia for HL and recheck cmp and FLP in 8 weeks  Hep A #1  Get fu shot in 2 months               I have asked for the patient to return to clinic in 6month(s).

## 2018-10-17 ENCOUNTER — OFFICE VISIT (OUTPATIENT)
Dept: FAMILY MEDICINE CLINIC | Facility: CLINIC | Age: 58
End: 2018-10-17

## 2018-10-17 VITALS
OXYGEN SATURATION: 98 % | SYSTOLIC BLOOD PRESSURE: 126 MMHG | RESPIRATION RATE: 18 BRPM | BODY MASS INDEX: 29.8 KG/M2 | HEIGHT: 63 IN | TEMPERATURE: 98 F | DIASTOLIC BLOOD PRESSURE: 80 MMHG | HEART RATE: 81 BPM | WEIGHT: 168.2 LBS

## 2018-10-17 DIAGNOSIS — J06.9 VIRAL URI WITH COUGH: Primary | ICD-10-CM

## 2018-10-17 PROCEDURE — 99213 OFFICE O/P EST LOW 20 MIN: CPT | Performed by: FAMILY MEDICINE

## 2018-10-17 RX ORDER — CETIRIZINE HYDROCHLORIDE 10 MG/1
TABLET ORAL
Start: 2018-10-17

## 2018-10-17 RX ORDER — BENZONATATE 200 MG/1
200 CAPSULE ORAL 3 TIMES DAILY PRN
Qty: 30 CAPSULE | Refills: 1 | Status: SHIPPED | OUTPATIENT
Start: 2018-10-17 | End: 2019-01-25

## 2018-10-17 NOTE — PROGRESS NOTES
Subjective   Re Sheldon is a 58 y.o. female.     Her developing problems with a dry tickly cough about 1014 following attending an outdoor bonfire the night before.  Cough has continued and worsened somewhat.  Has developed head and sinus congestion with mild postnasal drainage.  No fever.  Uses Zyrtec for chronic allergies which is not helping this.  GI or  symptoms.      Headache    Associated symptoms include coughing and sinus pressure. Pertinent negatives include no abdominal pain, fever, nausea, numbness, rhinorrhea, sore throat, vomiting or weakness.        The following portions of the patient's history were reviewed and updated as appropriate: allergies, current medications, past family history, past medical history, past social history, past surgical history and problem list.    Review of Systems   Constitutional: Negative for chills and fever.   HENT: Positive for congestion, postnasal drip and sinus pressure. Negative for rhinorrhea and sore throat.    Eyes: Negative for discharge and visual disturbance.   Respiratory: Positive for cough. Negative for shortness of breath.    Cardiovascular: Negative for chest pain.   Gastrointestinal: Negative for abdominal pain, constipation, diarrhea, nausea and vomiting.   Endocrine: Negative for polydipsia.   Genitourinary: Negative for dysuria and hematuria.   Musculoskeletal: Negative for arthralgias and myalgias.   Skin: Negative for rash.   Allergic/Immunologic: Negative.    Neurological: Negative for weakness, numbness and headaches.   Hematological: Does not bruise/bleed easily.   Psychiatric/Behavioral: Negative for dysphoric mood. The patient is not nervous/anxious.    All other systems reviewed and are negative.      Objective   Physical Exam   Constitutional: She is oriented to person, place, and time. She appears well-developed and well-nourished.  Non-toxic appearance.   HENT:   Head: Normocephalic and atraumatic.   Right Ear: Tympanic membrane and  external ear normal.   Left Ear: Tympanic membrane and external ear normal.   Nose: Mucosal edema present.   Mouth/Throat: Posterior oropharyngeal erythema present. No oropharyngeal exudate.   Sounds congested   Eyes: Pupils are equal, round, and reactive to light. Conjunctivae, EOM and lids are normal. Right eye exhibits no discharge. Left eye exhibits no discharge. No scleral icterus.   Neck: Trachea normal, normal range of motion and phonation normal. Neck supple. No thyromegaly present.   Cardiovascular: Normal rate, regular rhythm and normal heart sounds.  Exam reveals no gallop and no friction rub.    No murmur heard.  Pulmonary/Chest: Effort normal and breath sounds normal. No stridor. She has no wheezes. She has no rales.   Abdominal: Soft. She exhibits no distension. There is no tenderness.   Musculoskeletal: Normal range of motion. She exhibits no edema.   Lymphadenopathy:     She has no cervical adenopathy.   Neurological: She is alert and oriented to person, place, and time. She has normal strength. No cranial nerve deficit.   Skin: Skin is warm, dry and intact. No petechiae and no rash noted. No cyanosis. Nails show no clubbing.   Psychiatric: She has a normal mood and affect. Her speech is normal and behavior is normal. Judgment and thought content normal. Cognition and memory are normal.   Nursing note and vitals reviewed.    Viral +/- allergic component    Assessment/Plan   Re was seen today for cough and headache.    Diagnoses and all orders for this visit:    Viral URI with cough    Other orders  -     cetirizine (zyrTEC) 10 MG tablet; 1 OTC tab po QDay for allergies  -     benzonatate (TESSALON) 200 MG capsule; Take 1 capsule by mouth 3 (Three) Times a Day As Needed for Cough.      Patient Instructions   Vaporizer/humidifier to provide enough humidity in sleeping room that you can awaken in the morning without having dry mouth or nose.  Continue this until the air conditioning comes on in the  spring.  Will need to keep clean to avoid mold.  Ultrasonic cool mist vaporizers are very effective and inexpensive.    You may use BOTH acetaminophen/Tylenol (650 mg every 4 hours - maximum 4000 mg/24 hours) AND ibuprofen/Advil/Motrin (600 mg every 6 hours).  These may be taken at the same time.     You may use EITHER OTC Dimetapp Childrens Cold & Allergy (for drainage and congestion) OR Dimetapp Childrens Cold & Cough (for drainage, congestion and cough).  Store/generic brand is as good as brand name product.     Cough prescription as needed    Recheck for persistence or for worsening, especially after initial improvement                EMR Dragon/Transcription disclaimer:   Much of this encounter note is an electronic transcription/translation of spoken language to printed text. The electronic translation of spoken language may permit erroneous, or at times, nonsensical words or phrases to be inadvertently transcribed; Although I have reviewed the note for such errors, some may still exist. Please contact me with any questions or concerns about the conduct of this encounter note.

## 2019-01-25 ENCOUNTER — OFFICE VISIT (OUTPATIENT)
Dept: CARDIOLOGY | Facility: CLINIC | Age: 59
End: 2019-01-25

## 2019-01-25 VITALS
OXYGEN SATURATION: 99 % | SYSTOLIC BLOOD PRESSURE: 122 MMHG | HEART RATE: 77 BPM | DIASTOLIC BLOOD PRESSURE: 82 MMHG | WEIGHT: 168 LBS | BODY MASS INDEX: 30.91 KG/M2 | HEIGHT: 62 IN

## 2019-01-25 DIAGNOSIS — R00.0 TACHYCARDIA: ICD-10-CM

## 2019-01-25 DIAGNOSIS — I10 ESSENTIAL HYPERTENSION: ICD-10-CM

## 2019-01-25 DIAGNOSIS — I34.0 MITRAL VALVE INSUFFICIENCY, UNSPECIFIED ETIOLOGY: Primary | ICD-10-CM

## 2019-01-25 DIAGNOSIS — E78.49 OTHER HYPERLIPIDEMIA: ICD-10-CM

## 2019-01-25 PROCEDURE — 99214 OFFICE O/P EST MOD 30 MIN: CPT | Performed by: NURSE PRACTITIONER

## 2019-01-25 PROCEDURE — 93000 ELECTROCARDIOGRAM COMPLETE: CPT | Performed by: NURSE PRACTITIONER

## 2019-01-25 NOTE — PROGRESS NOTES
Patient Name: Re Sheldon  :1960  Age: 58 y.o.  Primary Cardiologist: Bola Benedict MD  Encounter Provider:  JONATHAN Suarez      Chief Complaint:   Chief Complaint   Patient presents with   • Follow-up     1 year         HPI  Re Sheldon is a 58 y.o. female with a history significant for hyperlipidemia, hypertension, PVCs, mitral valve insufficiency.  Patient presents today for annual follow-up.  Patient is new to me but I have reviewed the prior medical records.  Patient reports that overall she is done well over the past year.  She does report that she is going through menopause and has gained 5 pounds because of not eating properly.  She also notes that her cholesterol medicine has been changed from a statin to use that he has secondary to myalgias.  Patient reports fatigue secondary to weight gain.  Patient does report one episode of tachycardia at 2 weeks ago that lasted approximately 30 minutes.  She has not had any further episodes.  Patient denies any episodes of chest pain, shortness of breath, palpitations, lightheadedness, swelling.    The following portions of the patient's history were reviewed and updated as appropriate: allergies, current medications, past family history, past medical history, past social history, past surgical history and problem list.    Current Outpatient Medications on File Prior to Visit   Medication Sig   • cetirizine (zyrTEC) 10 MG tablet 1 OTC tab po QDay for allergies   • ezetimibe (ZETIA) 10 MG tablet Take 1 tablet by mouth Daily.   • fluticasone (FLONASE) 50 MCG/ACT nasal spray into each nostril.   • losartan-hydrochlorothiazide (HYZAAR) 100-25 MG per tablet Take 1 tablet by mouth Daily.   • meloxicam (MOBIC) 15 MG tablet Take 15 mg by mouth Daily.   • [DISCONTINUED] benzonatate (TESSALON) 200 MG capsule Take 1 capsule by mouth 3 (Three) Times a Day As Needed for Cough.     No current facility-administered medications on file prior to visit.   "        Review of Systems   Constitution: Positive for malaise/fatigue and weight gain.   Cardiovascular: Positive for palpitations. Negative for chest pain and leg swelling.   Respiratory: Negative for shortness of breath.    Neurological: Negative for light-headedness.   All other systems reviewed and are negative.      OBJECTIVE:   Vital Signs  Vitals:    01/25/19 1027   BP: 122/82   Pulse: 77   SpO2: 99%     Estimated body mass index is 30.73 kg/m² as calculated from the following:    Height as of this encounter: 157.5 cm (62\").    Weight as of this encounter: 76.2 kg (168 lb).    Physical Exam   Constitutional: She is oriented to person, place, and time. Vital signs are normal. She appears well-developed and well-nourished. She is active.   Eyes: Conjunctivae are normal.   Neck: Carotid bruit is not present.   Cardiovascular: Normal rate, regular rhythm and normal heart sounds.   Pulmonary/Chest: Breath sounds normal.   Abdominal: Normal appearance.   Musculoskeletal:   No cyanosis, clubbing, edema  Normal ROM   Neurological: She is alert and oriented to person, place, and time. GCS eye subscore is 4. GCS verbal subscore is 5. GCS motor subscore is 6.   Skin: Skin is warm, dry and intact.   Psychiatric: She has a normal mood and affect. Her speech is normal and behavior is normal. Judgment and thought content normal. Cognition and memory are normal.         ECG 12 Lead  Date/Time: 1/25/2019 10:39 AM  Performed by: Yuliana Blair APRN  Authorized by: Yuliana Blair APRN   Comparison: compared with previous ECG from 1/25/2018  Similar to previous ECG  Rhythm: sinus rhythm  Ectopy: atrial premature contractions  Rate: normal  BPM: 67  Conduction: conduction normal  QRS axis: normal  Clinical impression: non-specific ECG  Comments: Nonspecific ST T wave changes            Cardiac Procedures:  1. Exercise Myoview stress test 4/10/17: No evidence of ischemia or infarction.  2. Echocardiogram 12/21/16: EF 69%. "  Moderate left atrial enlargement.  Mild mitral regurgitation.  Mitral valve leaflets were thickened and myxomatous.  3. Echocardiogram 1/29/18: EF 56-60 percent.  All left ventricular wall segments contract normally.  Left ventricular diastolic function is normal.  Left atrial cavity size is mildly dilated.  Mild to moderate mitral valve regurgitation.        ASSESSMENT:      Diagnosis Plan   1. Mitral valve insufficiency, unspecified etiology     2. Essential hypertension  ECG 12 Lead   3. Other hyperlipidemia     4. Tachycardia           PLAN OF CARE:     1. Mitral valve insufficiency: Patient had echocardiogram one year ago which revealed mild to moderate mitral valve regurgitation.  Patient states that she has felt well and denies any shortness of breath, swelling.    2. Hypertension: Patient reports that blood pressure has been stable at home.  Blood pressure is 122/82 in the office.  Continue losartan hydrochlorothiazide 100/25 mg daily.  3. Hyperlipidemia: Managed by primary care physician.  Patient is taking study a.  She reports that she stopped statins secondary to myalgias.  4. Tachycardia: Patient reports that 2 weeks ago she had a 30 minute episode of tachycardia.  She reports that she felt mildly short of breath during the episode.  States that episodes then resolved.  I informed patient that if she had any further episodes to please notify the office so that we could discuss possible monitor placement.  5. Follow-up with Dr. Benedict in one year.  Sooner with any problems.          Thank you for allowing me to participate in the care of your patient,      Sincerely,   Yuliana Blair APRDOMONIQUE  [facility]  01/25/19  11:02 AM    **Ivon Disclaimer:**  Much of this encounter note is an electronic transcription/translation of spoken language to printed text. The electronic translation of spoken language may permit erroneous, or at times, nonsensical words or phrases to be inadvertently  transcribed. Although I have reviewed the note for such errors, some may still exist.

## 2019-02-18 RX ORDER — LOSARTAN POTASSIUM AND HYDROCHLOROTHIAZIDE 25; 100 MG/1; MG/1
1 TABLET ORAL DAILY
Qty: 90 TABLET | Refills: 0 | Status: SHIPPED | OUTPATIENT
Start: 2019-02-18 | End: 2019-04-30 | Stop reason: SDUPTHER

## 2019-02-27 ENCOUNTER — OFFICE VISIT (OUTPATIENT)
Dept: FAMILY MEDICINE CLINIC | Facility: CLINIC | Age: 59
End: 2019-02-27

## 2019-02-27 VITALS
OXYGEN SATURATION: 96 % | BODY MASS INDEX: 30 KG/M2 | SYSTOLIC BLOOD PRESSURE: 110 MMHG | HEART RATE: 82 BPM | HEIGHT: 62 IN | WEIGHT: 163 LBS | DIASTOLIC BLOOD PRESSURE: 74 MMHG | TEMPERATURE: 98.5 F

## 2019-02-27 DIAGNOSIS — J01.90 ACUTE SINUSITIS, RECURRENCE NOT SPECIFIED, UNSPECIFIED LOCATION: Primary | ICD-10-CM

## 2019-02-27 PROCEDURE — 99213 OFFICE O/P EST LOW 20 MIN: CPT | Performed by: INTERNAL MEDICINE

## 2019-02-27 RX ORDER — AMOXICILLIN 875 MG/1
875 TABLET, COATED ORAL 2 TIMES DAILY
Qty: 20 TABLET | Refills: 0 | Status: SHIPPED | OUTPATIENT
Start: 2019-02-27 | End: 2019-10-16

## 2019-02-27 NOTE — PROGRESS NOTES
Elizabeth Sheldon is a 58 y.o. female who comes in today for   Chief Complaint   Patient presents with   • Earache     Started sat.    • URI   • Cough   • Swollen Glands   .    History of Present Illness   Sinus congestion and st, ha and ears clogged since Saturday.  Left ear and left neck hurting and swollen since yesterday.  ST resolved a few days ago.  No fever.  RN is copious and clear to yellow.  Not much sneezing.  Coughing started yesterday and is dry.  BP runs great at home  Usually 110/63.    The following portions of the patient's history were reviewed and updated as appropriate: allergies, current medications, past family history, past medical history, past social history, past surgical history and problem list.    Review of Systems   Constitutional: Negative for fever.   HENT: Positive for rhinorrhea, sinus pressure and sinus pain.    Respiratory: Positive for cough.        Vitals:    02/27/19 1009   BP: 110/74   Pulse: 82   Temp: 98.5 °F (36.9 °C)   SpO2: 96%       Objective   Physical Exam   Constitutional: She is oriented to person, place, and time. She appears well-developed and well-nourished.   HENT:   Head: Normocephalic and atraumatic.   Right Ear: External ear normal.   Left Ear: External ear normal.   Mouth/Throat: Oropharynx is clear and moist.   Eyes: Conjunctivae are normal.   Neck: Neck supple.   Cardiovascular: Normal rate, regular rhythm and normal heart sounds.   No bruits   Pulmonary/Chest: Effort normal and breath sounds normal. No respiratory distress. She has no wheezes. She has no rales.   Lymphadenopathy:     She has no cervical adenopathy.   Neurological: She is alert and oriented to person, place, and time.   Skin: Skin is warm.   Psychiatric: She has a normal mood and affect. Her behavior is normal. Judgment and thought content normal.   Nursing note and vitals reviewed.        Current Outpatient Medications:   •  cetirizine (zyrTEC) 10 MG tablet, 1 OTC tab po QDay for  allergies, Disp: , Rfl:   •  ezetimibe (ZETIA) 10 MG tablet, Take 1 tablet by mouth Daily., Disp: 30 tablet, Rfl: 5  •  fluticasone (FLONASE) 50 MCG/ACT nasal spray, into each nostril., Disp: , Rfl:   •  losartan-hydrochlorothiazide (HYZAAR) 100-25 MG per tablet, TAKE 1 TABLET BY MOUTH DAILY, Disp: 90 tablet, Rfl: 0  •  amoxicillin (AMOXIL) 875 MG tablet, Take 1 tablet by mouth 2 (Two) Times a Day., Disp: 20 tablet, Rfl: 0  •  meloxicam (MOBIC) 15 MG tablet, Take 15 mg by mouth Daily., Disp: , Rfl:     Assessment/Plan   Re was seen today for earache, uri, cough and swollen glands.    Diagnoses and all orders for this visit:    Acute sinusitis, recurrence not specified, unspecified location    Other orders  -     amoxicillin (AMOXIL) 875 MG tablet; Take 1 tablet by mouth 2 (Two) Times a Day.      Continue flonase  Start amoxil for 10 days (no PCN allergy--she has GI side effects from augmentin)  mucinex D 12 hr once in am only for 2 days             I have asked for the patient to return to clinic in 6month(s).

## 2019-03-12 ENCOUNTER — CLINICAL SUPPORT (OUTPATIENT)
Dept: FAMILY MEDICINE CLINIC | Facility: CLINIC | Age: 59
End: 2019-03-12

## 2019-03-12 PROCEDURE — 90471 IMMUNIZATION ADMIN: CPT | Performed by: INTERNAL MEDICINE

## 2019-03-12 PROCEDURE — 90632 HEPA VACCINE ADULT IM: CPT | Performed by: INTERNAL MEDICINE

## 2019-03-13 LAB
ALBUMIN SERPL-MCNC: 4.4 G/DL (ref 3.5–5.2)
ALBUMIN/GLOB SERPL: 1.6 G/DL
ALP SERPL-CCNC: 78 U/L (ref 39–117)
ALT SERPL-CCNC: 15 U/L (ref 1–33)
AST SERPL-CCNC: 12 U/L (ref 1–32)
BILIRUB SERPL-MCNC: 0.3 MG/DL (ref 0.1–1.2)
BUN SERPL-MCNC: 19 MG/DL (ref 6–20)
BUN/CREAT SERPL: 27.9 (ref 7–25)
CALCIUM SERPL-MCNC: 9.8 MG/DL (ref 8.6–10.5)
CHLORIDE SERPL-SCNC: 103 MMOL/L (ref 98–107)
CHOLEST SERPL-MCNC: 199 MG/DL (ref 0–200)
CO2 SERPL-SCNC: 30.4 MMOL/L (ref 22–29)
CREAT SERPL-MCNC: 0.68 MG/DL (ref 0.57–1)
GLOBULIN SER CALC-MCNC: 2.7 GM/DL
GLUCOSE SERPL-MCNC: 100 MG/DL (ref 65–99)
HDLC SERPL-MCNC: 50 MG/DL (ref 40–60)
LDLC SERPL CALC-MCNC: 121 MG/DL (ref 0–100)
LDLC/HDLC SERPL: 2.42 {RATIO}
POTASSIUM SERPL-SCNC: 4.5 MMOL/L (ref 3.5–5.2)
PROT SERPL-MCNC: 7.1 G/DL (ref 6–8.5)
SODIUM SERPL-SCNC: 144 MMOL/L (ref 136–145)
TRIGL SERPL-MCNC: 139 MG/DL (ref 0–150)
VLDLC SERPL CALC-MCNC: 27.8 MG/DL (ref 5–40)

## 2019-04-23 RX ORDER — EZETIMIBE 10 MG/1
10 TABLET ORAL DAILY
Qty: 30 TABLET | Refills: 0 | Status: SHIPPED | OUTPATIENT
Start: 2019-04-23 | End: 2019-04-30 | Stop reason: SDUPTHER

## 2019-04-30 RX ORDER — EZETIMIBE 10 MG/1
10 TABLET ORAL DAILY
Qty: 30 TABLET | Refills: 0 | Status: SHIPPED | OUTPATIENT
Start: 2019-04-30 | End: 2019-05-06 | Stop reason: SDUPTHER

## 2019-04-30 RX ORDER — LOSARTAN POTASSIUM AND HYDROCHLOROTHIAZIDE 25; 100 MG/1; MG/1
1 TABLET ORAL DAILY
Qty: 90 TABLET | Refills: 0 | Status: SHIPPED | OUTPATIENT
Start: 2019-04-30 | End: 2019-07-27 | Stop reason: SDUPTHER

## 2019-05-06 RX ORDER — EZETIMIBE 10 MG/1
10 TABLET ORAL DAILY
Qty: 90 TABLET | Refills: 0 | Status: SHIPPED | OUTPATIENT
Start: 2019-05-06 | End: 2019-08-17 | Stop reason: SDUPTHER

## 2019-07-16 ENCOUNTER — TELEPHONE (OUTPATIENT)
Dept: FAMILY MEDICINE CLINIC | Facility: CLINIC | Age: 59
End: 2019-07-16

## 2019-07-29 RX ORDER — LOSARTAN POTASSIUM AND HYDROCHLOROTHIAZIDE 25; 100 MG/1; MG/1
TABLET ORAL
Qty: 90 TABLET | Refills: 0 | Status: SHIPPED | OUTPATIENT
Start: 2019-07-29 | End: 2019-10-25 | Stop reason: SDUPTHER

## 2019-08-15 ENCOUNTER — APPOINTMENT (OUTPATIENT)
Dept: WOMENS IMAGING | Facility: HOSPITAL | Age: 59
End: 2019-08-15

## 2019-08-15 PROCEDURE — 77067 SCR MAMMO BI INCL CAD: CPT | Performed by: RADIOLOGY

## 2019-08-19 RX ORDER — EZETIMIBE 10 MG/1
TABLET ORAL
Qty: 90 TABLET | Refills: 0 | Status: SHIPPED | OUTPATIENT
Start: 2019-08-19 | End: 2019-10-16

## 2019-10-09 ENCOUNTER — TELEPHONE (OUTPATIENT)
Dept: FAMILY MEDICINE CLINIC | Facility: CLINIC | Age: 59
End: 2019-10-09

## 2019-10-10 DIAGNOSIS — E78.49 OTHER HYPERLIPIDEMIA: Primary | ICD-10-CM

## 2019-10-10 DIAGNOSIS — Z00.00 WELL ADULT EXAM: ICD-10-CM

## 2019-10-10 DIAGNOSIS — E55.9 VITAMIN D DEFICIENCY: ICD-10-CM

## 2019-10-14 LAB
25(OH)D3+25(OH)D2 SERPL-MCNC: 25.6 NG/ML (ref 30–100)
ALBUMIN SERPL-MCNC: 4.4 G/DL (ref 3.5–5.2)
ALBUMIN/GLOB SERPL: 1.6 G/DL
ALP SERPL-CCNC: 98 U/L (ref 39–117)
ALT SERPL-CCNC: 14 U/L (ref 1–33)
AST SERPL-CCNC: 12 U/L (ref 1–32)
BASOPHILS # BLD AUTO: 0.02 10*3/MM3 (ref 0–0.2)
BASOPHILS NFR BLD AUTO: 0.3 % (ref 0–1.5)
BILIRUB SERPL-MCNC: 0.3 MG/DL (ref 0.2–1.2)
BUN SERPL-MCNC: 20 MG/DL (ref 6–20)
BUN/CREAT SERPL: 29.4 (ref 7–25)
CALCIUM SERPL-MCNC: 9.1 MG/DL (ref 8.6–10.5)
CHLORIDE SERPL-SCNC: 99 MMOL/L (ref 98–107)
CHOLEST SERPL-MCNC: 243 MG/DL (ref 0–200)
CO2 SERPL-SCNC: 30 MMOL/L (ref 22–29)
CREAT SERPL-MCNC: 0.68 MG/DL (ref 0.57–1)
EOSINOPHIL # BLD AUTO: 0.19 10*3/MM3 (ref 0–0.4)
EOSINOPHIL NFR BLD AUTO: 3.1 % (ref 0.3–6.2)
ERYTHROCYTE [DISTWIDTH] IN BLOOD BY AUTOMATED COUNT: 13.2 % (ref 12.3–15.4)
GLOBULIN SER CALC-MCNC: 2.7 GM/DL
GLUCOSE SERPL-MCNC: 96 MG/DL (ref 65–99)
HBA1C MFR BLD: 5.7 % (ref 4.8–5.6)
HCT VFR BLD AUTO: 36 % (ref 34–46.6)
HDLC SERPL-MCNC: 53 MG/DL (ref 40–60)
HGB BLD-MCNC: 11.8 G/DL (ref 12–15.9)
IMM GRANULOCYTES # BLD AUTO: 0.03 10*3/MM3 (ref 0–0.05)
IMM GRANULOCYTES NFR BLD AUTO: 0.5 % (ref 0–0.5)
LDLC SERPL CALC-MCNC: 161 MG/DL (ref 0–100)
LDLC/HDLC SERPL: 3.03 {RATIO}
LYMPHOCYTES # BLD AUTO: 1.63 10*3/MM3 (ref 0.7–3.1)
LYMPHOCYTES NFR BLD AUTO: 26.2 % (ref 19.6–45.3)
MCH RBC QN AUTO: 28.3 PG (ref 26.6–33)
MCHC RBC AUTO-ENTMCNC: 32.8 G/DL (ref 31.5–35.7)
MCV RBC AUTO: 86.3 FL (ref 79–97)
MONOCYTES # BLD AUTO: 0.38 10*3/MM3 (ref 0.1–0.9)
MONOCYTES NFR BLD AUTO: 6.1 % (ref 5–12)
NEUTROPHILS # BLD AUTO: 3.96 10*3/MM3 (ref 1.7–7)
NEUTROPHILS NFR BLD AUTO: 63.8 % (ref 42.7–76)
NRBC BLD AUTO-RTO: 0 /100 WBC (ref 0–0.2)
PLATELET # BLD AUTO: 295 10*3/MM3 (ref 140–450)
POTASSIUM SERPL-SCNC: 4.1 MMOL/L (ref 3.5–5.2)
PROT SERPL-MCNC: 7.1 G/DL (ref 6–8.5)
RBC # BLD AUTO: 4.17 10*6/MM3 (ref 3.77–5.28)
SODIUM SERPL-SCNC: 142 MMOL/L (ref 136–145)
T4 FREE SERPL-MCNC: 1.12 NG/DL (ref 0.93–1.7)
TRIGL SERPL-MCNC: 146 MG/DL (ref 0–150)
TSH SERPL DL<=0.005 MIU/L-ACNC: 1.83 UIU/ML (ref 0.27–4.2)
VLDLC SERPL CALC-MCNC: 29.2 MG/DL
WBC # BLD AUTO: 6.21 10*3/MM3 (ref 3.4–10.8)

## 2019-10-15 ENCOUNTER — RESULTS ENCOUNTER (OUTPATIENT)
Dept: FAMILY MEDICINE CLINIC | Facility: CLINIC | Age: 59
End: 2019-10-15

## 2019-10-15 DIAGNOSIS — D64.9 ANEMIA, UNSPECIFIED TYPE: Primary | ICD-10-CM

## 2019-10-15 DIAGNOSIS — Z00.00 WELL ADULT EXAM: ICD-10-CM

## 2019-10-16 ENCOUNTER — OFFICE VISIT (OUTPATIENT)
Dept: FAMILY MEDICINE CLINIC | Facility: CLINIC | Age: 59
End: 2019-10-16

## 2019-10-16 VITALS
HEIGHT: 62 IN | SYSTOLIC BLOOD PRESSURE: 122 MMHG | BODY MASS INDEX: 31.04 KG/M2 | DIASTOLIC BLOOD PRESSURE: 70 MMHG | HEART RATE: 69 BPM | WEIGHT: 168.7 LBS | OXYGEN SATURATION: 97 %

## 2019-10-16 DIAGNOSIS — E78.49 OTHER HYPERLIPIDEMIA: ICD-10-CM

## 2019-10-16 DIAGNOSIS — I10 ESSENTIAL HYPERTENSION: Primary | ICD-10-CM

## 2019-10-16 DIAGNOSIS — R73.01 ABNORMAL FASTING GLUCOSE: ICD-10-CM

## 2019-10-16 DIAGNOSIS — Z00.00 WELL ADULT EXAM: ICD-10-CM

## 2019-10-16 DIAGNOSIS — E55.9 VITAMIN D DEFICIENCY: ICD-10-CM

## 2019-10-16 DIAGNOSIS — Z12.11 COLON CANCER SCREENING: ICD-10-CM

## 2019-10-16 DIAGNOSIS — Z23 NEED FOR IMMUNIZATION AGAINST INFLUENZA: ICD-10-CM

## 2019-10-16 LAB
FERRITIN SERPL-MCNC: 124 NG/ML (ref 13–150)
IRON SERPL-MCNC: 72 MCG/DL (ref 37–145)
Lab: NORMAL
WRITTEN AUTHORIZATION: NORMAL

## 2019-10-16 PROCEDURE — 90471 IMMUNIZATION ADMIN: CPT | Performed by: INTERNAL MEDICINE

## 2019-10-16 PROCEDURE — 90674 CCIIV4 VAC NO PRSV 0.5 ML IM: CPT | Performed by: INTERNAL MEDICINE

## 2019-10-16 PROCEDURE — 99396 PREV VISIT EST AGE 40-64: CPT | Performed by: INTERNAL MEDICINE

## 2019-10-16 NOTE — PATIENT INSTRUCTIONS
"Fat and Cholesterol Restricted Eating Plan  Getting too much fat and cholesterol in your diet may cause health problems. Choosing the right foods helps keep your fat and cholesterol at normal levels. This can keep you from getting certain diseases.  Your doctor may recommend an eating plan that includes:  · Total fat: ______% or less of total calories a day.  · Saturated fat: ______% or less of total calories a day.  · Cholesterol: less than _________mg a day.  · Fiber: ______g a day.  What are tips for following this plan?  General tips    · Work with your doctor to lose weight if you need to.  · Avoid:  ? Foods with added sugar.  ? Fried foods.  ? Foods with partially hydrogenated oils.  · Limit alcohol intake to no more than 1 drink a day for nonpregnant women and 2 drinks a day for men. One drink equals 12 oz of beer, 5 oz of wine, or 1½ oz of hard liquor.  Reading food labels  · Check food labels for:  ? Trans fats.  ? Partially hydrogenated oils.  ? Saturated fat (g) in each serving.  ? Cholesterol (mg) in each serving.  ? Fiber (g) in each serving.  · Choose foods with healthy fats, such as:  ? Monounsaturated fats.  ? Polyunsaturated fats.  ? Omega-3 fats.  · Choose grain products that have whole grains. Look for the word \"whole\" as the first word in the ingredient list.  Cooking  · Cook foods using low-fat methods. These include baking, boiling, grilling, and broiling.  · Eat more home-cooked foods. Eat at restaurants and buffets less often.  · Avoid cooking using saturated fats, such as butter, cream, palm oil, palm kernel oil, and coconut oil.  Meal planning    · At meals, divide your plate into four equal parts:  ? Fill one-half of your plate with vegetables and green salads.  ? Fill one-fourth of your plate with whole grains.  ? Fill one-fourth of your plate with low-fat (lean) protein foods.  · Eat fish that is high in omega-3 fats at least two times a week. This includes mackerel, tuna, sardines, and " salmon.  · Eat foods that are high in fiber, such as whole grains, beans, apples, broccoli, carrots, peas, and barley.  Recommended foods  Grains  · Whole grains, such as whole wheat or whole grain breads, crackers, cereals, and pasta. Unsweetened oatmeal, bulgur, barley, quinoa, or brown rice. Corn or whole wheat flour tortillas.  Vegetables  · Fresh or frozen vegetables (raw, steamed, roasted, or grilled). Green salads.  Fruits  · All fresh, canned (in natural juice), or frozen fruits.  Meats and other protein foods  · Ground beef (85% or leaner), grass-fed beef, or beef trimmed of fat. Skinless chicken or turkey. Ground chicken or turkey. Pork trimmed of fat. All fish and seafood. Egg whites. Dried beans, peas, or lentils. Unsalted nuts or seeds. Unsalted canned beans. Nut butters without added sugar or oil.  Dairy  · Low-fat or nonfat dairy products, such as skim or 1% milk, 2% or reduced-fat cheeses, low-fat and fat-free ricotta or cottage cheese, or plain low-fat and nonfat yogurt.  Fats and oils  · Tub margarine without trans fats. Light or reduced-fat mayonnaise and salad dressings. Avocado. Olive, canola, sesame, or safflower oils.  The items listed above may not be a complete list of recommended foods or beverages. Contact your dietitian for more options.  The items listed above may not be a complete list of foods and beverages [you/your child] can eat. Contact a dietitian for more information.  Foods to avoid  Grains  · White bread. White pasta. White rice. Cornbread. Bagels, pastries, and croissants. Crackers and snack foods that contain trans fat and hydrogenated oils.  Vegetables  · Vegetables cooked in cheese, cream, or butter sauce. Fried vegetables.  Fruits  · Canned fruit in heavy syrup. Fruit in cream or butter sauce. Fried fruit.  Meats and other protein foods  · Fatty cuts of meat. Ribs, chicken wings, nagel, sausage, bologna, salami, chitterlings, fatback, hot dogs, bratwurst, and packaged  lunch meats. Liver and organ meats. Whole eggs and egg yolks. Chicken and turkey with skin. Fried meat.  Dairy  · Whole or 2% milk, cream, half-and-half, and cream cheese. Whole milk cheeses. Whole-fat or sweetened yogurt. Full-fat cheeses. Nondairy creamers and whipped toppings. Processed cheese, cheese spreads, and cheese curds.  Beverages  · Alcohol. Sugar-sweetened drinks such as sodas, lemonade, and fruit drinks.  Fats and oils  · Butter, stick margarine, lard, shortening, ghee, or nagel fat. Coconut, palm kernel, and palm oils.  Sweets and desserts  · Corn syrup, sugars, honey, and molasses. Candy. Jam and jelly. Syrup. Sweetened cereals. Cookies, pies, cakes, donuts, muffins, and ice cream.  The items listed above may not be a complete list of foods and beverages to avoid. Contact your dietitian for more information.  The items listed above may not be a complete list of foods and beverages [you/your child] should avoid. Contact a dietitian for more information.  Summary  · Choosing the right foods helps keep your fat and cholesterol at normal levels. This can keep you from getting certain diseases.  · At meals, fill one-half of your plate with vegetables and green salads.  · Eat high-fiber foods, like whole grains, beans, apples, carrots, peas, and barley.  · Limit added sugar, saturated fats, alcohol, and fried foods.  This information is not intended to replace advice given to you by your health care provider. Make sure you discuss any questions you have with your health care provider.  Document Released: 06/18/2013 Document Revised: 09/04/2018 Document Reviewed: 09/04/2018  [employer] Interactive Patient Education © 2019 [employer]    Hyperglycemia  Hyperglycemia is when the sugar (glucose) level in your blood is too high. It may not cause symptoms. If you do have symptoms, they may include warning signs, such as:  · Feeling more thirsty than normal.  · Hunger.  · Feeling tired.  · Needing to pee  (urinate) more than normal.  · Blurry eyesight (vision).  You may get other symptoms as it gets worse, such as:  · Dry mouth.  · Not being hungry (loss of appetite).  · Fruity-smelling breath.  · Weakness.  · Weight gain or loss that is not planned. Weight loss may be fast.  · A tingling or numb feeling in your hands or feet.  · Headache.  · Skin that does not bounce back quickly when it is lightly pinched and released (poor skin turgor).  · Pain in your belly (abdomen).  · Cuts or bruises that heal slowly.  High blood sugar can happen to people who do or do not have diabetes. High blood sugar can happen slowly or quickly, and it can be an emergency.  Follow these instructions at home:  General instructions  · Take over-the-counter and prescription medicines only as told by your doctor.  · Do not use products that contain nicotine or tobacco, such as cigarettes and e-cigarettes. If you need help quitting, ask your doctor.  · Limit alcohol intake to no more than 1 drink per day for nonpregnant women and 2 drinks per day for men. One drink equals 12 oz of beer, 5 oz of wine, or 1½ oz of hard liquor.  · Manage stress. If you need help with this, ask your doctor.  · Keep all follow-up visits as told by your doctor. This is important.  Eating and drinking    · Stay at a healthy weight.  · Exercise regularly, as told by your doctor.  · Drink enough fluid, especially when you:  ? Exercise.  ? Get sick.  ? Are in hot temperatures.  · Eat healthy foods, such as:  ? Low-fat (lean) proteins.  ? Complex carbs (complex carbohydrates), such as whole wheat bread or brown rice.  ? Fresh fruits and vegetables.  ? Low-fat dairy products.  ? Healthy fats.  · Drink enough fluid to keep your pee (urine) clear or pale yellow.  If you have diabetes:    · Make sure you know the symptoms of hyperglycemia.  · Follow your diabetes management plan, as told by your doctor. Make sure you:  ? Take insulin and medicines as told.  ? Follow your  exercise plan.  ? Follow your meal plan. Eat on time. Do not skip meals.  ? Check your blood sugar as often as told. Make sure to check before and after exercise. If you exercise longer or in a different way than you normally do, check your blood sugar more often.  ? Follow your sick day plan whenever you cannot eat or drink normally. Make this plan ahead of time with your doctor.  · Share your diabetes management plan with people in your workplace, school, and household.  · Check your urine for ketones when you are ill and as told by your doctor.  · Carry a card or wear jewelry that says that you have diabetes.  Contact a doctor if:  · Your blood sugar level is higher than 240 mg/dL (13.3 mmol/L) for 2 days in a row.  · You have problems keeping your blood sugar in your target range.  · High blood sugar happens often for you.  Get help right away if:  · You have trouble breathing.  · You have a change in how you think, feel, or act (mental status).  · You feel sick to your stomach (nauseous), and that feeling does not go away.  · You cannot stop throwing up (vomiting).  These symptoms may be an emergency. Do not wait to see if the symptoms will go away. Get medical help right away. Call your local emergency services (911 in the [place] Do not drive yourself to the hospital.  Summary  · Hyperglycemia is when the sugar (glucose) level in your blood is too high.  · High blood sugar can happen to people who do or do not have diabetes.  · Make sure you drink enough fluids, eat healthy foods, and exercise regularly.  · Contact your doctor if you have problems keeping your blood sugar in your target range.  This information is not intended to replace advice given to you by your health care provider. Make sure you discuss any questions you have with your health care provider.  Document Released: 10/15/2010 Document Revised: 09/04/2017 Document Reviewed: 09/04/2017  [employer] Interactive Patient Education © 2019 [employer]    Prediabetes  Prediabetes is the condition of having a blood sugar (blood glucose) level that is higher than it should be, but not high enough for you to be diagnosed with type 2 diabetes. Having prediabetes puts you at risk for developing type 2 diabetes (type 2 diabetes mellitus). Prediabetes may be called impaired glucose tolerance or impaired fasting glucose.  Prediabetes usually does not cause symptoms. Your health care provider can diagnose this condition with blood tests. You may be tested for prediabetes if you are overweight and if you have at least one other risk factor for prediabetes.  What is blood glucose, and how is it measured?  Blood glucose refers to the amount of glucose in your bloodstream. Glucose comes from eating foods that contain sugars and starches (carbohydrates), which the body breaks down into glucose. Your blood glucose level may be measured in mg/dL (milligrams per deciliter) or mmol/L (millimoles per liter). Your blood glucose may be checked with one or more of the following blood tests:  · A fasting blood glucose (FBG) test. You will not be allowed to eat (you will fast) for 8 hours or longer before a blood sample is taken.  ? A normal range for FBG is  mg/dl (3.9-5.6 mmol/L).  · An A1c (hemoglobin A1c) blood test. This test provides information about blood glucose control over the previous 2?3 months.  · An oral glucose tolerance test (OGTT). This test measures your blood glucose at two times:  ? After fasting. This is your baseline level.  ? Two hours after you drink a beverage that contains glucose.  You may be diagnosed with prediabetes:  · If your FBG is 100?125 mg/dL (5.6-6.9 mmol/L).  · If your A1c level is 5.7?6.4%.  · If your OGTT result is 140?199 mg/dL (7.8-11 mmol/L).  These blood tests may be repeated to confirm your diagnosis.  How can this condition affect me?  The pancreas produces a hormone (insulin) that helps to move glucose from the bloodstream into  cells. When cells in the body do not respond properly to insulin that the body makes (insulin resistance), excess glucose builds up in the blood instead of going into cells. As a result, high blood glucose (hyperglycemia) can develop, which can cause many complications. Hyperglycemia is a symptom of prediabetes.  Having high blood glucose for a long time is dangerous. Too much glucose in your blood can damage your nerves and blood vessels. Long-term damage can lead to complications from diabetes, which may include:  · Heart disease.  · Stroke.  · Blindness.  · Kidney disease.  · Depression.  · Poor circulation in the feet and legs, which could lead to surgical removal (amputation) in severe cases.  What can increase my risk?  Risk factors for prediabetes include:  · Having a family member with type 2 diabetes.  · Being overweight or obese.  · Being older than age 45.  · Being of , -American, /, or / descent.  · Having an inactive (sedentary) lifestyle.  · Having a history of heart disease.  · History of gestational diabetes or polycystic ovary syndrome (PCOS), in women.  · Having low levels of good cholesterol (HDL-C) or high levels of blood fats (triglycerides).  · Having high blood pressure.  What actions can I take to prevent diabetes?    · Be physically active.  ? Do moderate-intensity physical activity for 30 or more minutes on 5 or more days of the week, or as much as told by your health care provider. This could be brisk walking, biking, or water aerobics.  ? Ask your health care provider what activities are safe for you. A mix of physical activities may be best, such as walking, swimming, cycling, and strength training.  · Lose weight as told by your health care provider.  ? Losing 5-7% of your body weight can reverse insulin resistance.  ? Your health care provider can determine how much weight loss is best for you and can help you lose weight  safely.  · Follow a healthy meal plan. This includes eating lean proteins, complex carbohydrates, fresh fruits and vegetables, low-fat dairy products, and healthy fats.  ? Follow instructions from your health care provider about eating or drinking restrictions.  ? Make an appointment to see a diet and nutrition specialist (registered dietitian) to help you create a healthy eating plan that is right for you.  · Do not smoke or use any tobacco products, such as cigarettes, chewing tobacco, and e-cigarettes. If you need help quitting, ask your health care provider.  · Take over-the-counter and prescription medicines as told by your health care provider. You may be prescribed medicines that help lower the risk of type 2 diabetes.  · Keep all follow-up visits as told by your health care provider. This is important.  Summary  · Prediabetes is the condition of having a blood sugar (blood glucose) level that is higher than it should be, but not high enough for you to be diagnosed with type 2 diabetes.  · Having prediabetes puts you at risk for developing type 2 diabetes (type 2 diabetes mellitus).  · To help prevent type 2 diabetes, make lifestyle changes such as being physically active and eating a healthy diet. Lose weight as told by your health care provider.  This information is not intended to replace advice given to you by your health care provider. Make sure you discuss any questions you have with your health care provider.  Document Released: 04/10/2017 Document Revised: 08/07/2018 Document Reviewed: 02/07/2017  [employer] Interactive Patient Education © 2019 [employer]

## 2019-10-16 NOTE — PROGRESS NOTES
Subjective   Re Sheldon is a 59 y.o. female who comes in today for   Chief Complaint   Patient presents with   • Annual Exam     Pt had labs   .    History of Present Illness   Here for CPE without pap or MMG.  She sees gyn and MMG was in the summer and pap is coming up.  Thinks her CN was 8/2015 and was done here at [facility] with Dr. Spear.  Quit taking statin in July 2019 b/c of pain in her muscles and hands.  Has h/o HL.  Was taking zetia and had also failed with crestor and pravachol.  Also was very fatigued and thought it was related to zetia.  Once she was off the zetia, her energy level is better.  She is menopausal and since 2016, she has gained 18 pounds.  Has hand OA and seeing Dr. Lopez for this and is planning to get steroid injections and eventually surgery in a year or two.  Has  A TM in her basement but isn't exercising at the time.  Wanting to lose weight and then repeat FLP to see what her numbers.  Seeing Dr. Benedict in Jan 2020 to f/u on her valvular insufficiency (MVR)    The following portions of the patient's history were reviewed and updated as appropriate: allergies, current medications, past family history, past medical history, past social history, past surgical history and problem list.    Review of Systems   Respiratory: Negative.    Cardiovascular: Negative.    Gastrointestinal: Negative.  Negative for abdominal pain, anal bleeding, blood in stool, constipation and vomiting.       Vitals:    10/16/19 1208   BP: 122/70   Pulse: 69   SpO2: 97%       STEADI Fall Risk Assessment has not been completed.    PHQ-2/PHQ-9 Depression Screening 10/17/2018   Little interest or pleasure in doing things 0   Feeling down, depressed, or hopeless 0   Trouble falling or staying asleep, or sleeping too much -   Feeling tired or having little energy -   Poor appetite or overeating -   Feeling bad about yourself - or that you are a failure or have let yourself or your family down -   Trouble  concentrating on things, such as reading the newspaper or watching television -   Moving or speaking so slowly that other people could have noticed. Or the opposite - being so fidgety or restless that you have been moving around a lot more than usual -   Thoughts that you would be better off dead, or of hurting yourself in some way -   Total Score 0   If you checked off any problems, how difficult have these problems made it for you to do your work, take care of things at home, or get along with other people? -       Objective   Physical Exam   Constitutional: She is oriented to person, place, and time. She appears well-developed and well-nourished.   HENT:   Head: Normocephalic and atraumatic.   Right Ear: External ear normal.   Left Ear: External ear normal.   Mouth/Throat: Oropharynx is clear and moist.   Eyes: Conjunctivae are normal.   Neck: Neck supple.   Cardiovascular: Normal rate, regular rhythm, normal heart sounds and intact distal pulses.   No bruits   Pulmonary/Chest: Effort normal and breath sounds normal. No respiratory distress. She has no wheezes. She has no rales.   Abdominal: Soft. Bowel sounds are normal. She exhibits no distension and no mass. There is no tenderness.   Lymphadenopathy:     She has no cervical adenopathy.   Neurological: She is alert and oriented to person, place, and time.   Skin: Skin is warm.   Psychiatric: She has a normal mood and affect. Her behavior is normal. Judgment and thought content normal.   Nursing note and vitals reviewed.        Current Outpatient Medications:   •  cetirizine (zyrTEC) 10 MG tablet, 1 OTC tab po QDay for allergies, Disp: , Rfl:   •  fluticasone (FLONASE) 50 MCG/ACT nasal spray, into each nostril., Disp: , Rfl:   •  losartan-hydrochlorothiazide (HYZAAR) 100-25 MG per tablet, TAKE 1 TABLET DAILY, Disp: 90 tablet, Rfl: 0  •  meloxicam (MOBIC) 15 MG tablet, Take 15 mg by mouth Daily., Disp: , Rfl:     Assessment/Plan   Re was seen today for annual  exam.    Diagnoses and all orders for this visit:    Essential hypertension    Other hyperlipidemia    Vitamin D deficiency    Abnormal fasting glucose    Well adult exam    Colon cancer screening  -     Ambulatory Referral to Gastroenterology    Need for immunization against influenza  -     Flucelvax Quad=>4Years (PFS); Standing  -     Flucelvax Quad=>4Years (PFS)      Start 2000 iu/day vit d3 for VDD  Borderline anemia without iron def.  She is overdue for CN and will set this appt up for her with [facility]  Labs reviewed with patient  HL discussed and she wants to avoid statins at this time and work on diet.  Handouts have been given and discussed with her at length.   Continue losartan hyzaar for HTN.  Well controlled BP  OA--change to prn mobic and not daily to decrease risk of NSAID side effects  Preventive counseling performed  Flu shot today             I have asked for the patient to return to clinic in 6month(s).

## 2019-10-25 RX ORDER — LOSARTAN POTASSIUM AND HYDROCHLOROTHIAZIDE 25; 100 MG/1; MG/1
TABLET ORAL
Qty: 90 TABLET | Refills: 0 | Status: SHIPPED | OUTPATIENT
Start: 2019-10-25 | End: 2020-01-23

## 2019-11-08 ENCOUNTER — TELEPHONE (OUTPATIENT)
Dept: CARDIOLOGY | Facility: CLINIC | Age: 59
End: 2019-11-08

## 2019-11-08 DIAGNOSIS — I34.0 NONRHEUMATIC MITRAL VALVE REGURGITATION: Primary | ICD-10-CM

## 2019-11-08 DIAGNOSIS — R06.02 SHORTNESS OF BREATH: ICD-10-CM

## 2019-11-08 NOTE — TELEPHONE ENCOUNTER
I am going to order an echo on her.  Can you please have her see Yue on 11/15/2019?  I already put a PATRICA on for the 13th.    Yue,    Can you schedule the echo on the same day as the appointment?  Thanks     GM

## 2019-11-08 NOTE — TELEPHONE ENCOUNTER
Called patient and informed her that the office would be calling to schedule her an ECHO.  Scheduled her appt with Yue on 11/15    Did not see order for ECHO or PATRICA though ?    Thanks  Ximena

## 2019-11-08 NOTE — TELEPHONE ENCOUNTER
11/08/19  1:46 PM  Re Sheldon  1960  Home Phone [phone]   Mobile [phone]       Re Sheldon is a patient of Dr Benedict who was last seen in the office 1/25/2019. Patient with history of MV insuffiencey/PVC's/HTN. Patient called office today to get an earlier appt to see Dr Benedict or NP for noted increased SOA with minimal exertion that has been happening for past 2 weeks. Patient denies any chest pain/lightheadedness/chest pain/dizziness/diphoresis.  States that her last ECHOwas back on 1/2018 and didn't know if she may need one to follow up.   Patient BP last week at PCP annual physical was 116/68. Patient only medication is Hyzaar 100-25mg daily.    Yue has a 9:30 on 11/15 and then you have Same day appt available 11/13    Please advise on how to proceed    Thank you  Ximena Hamilton , RN  [facility] Triage Nurse

## 2019-11-15 ENCOUNTER — OFFICE VISIT (OUTPATIENT)
Dept: CARDIOLOGY | Facility: CLINIC | Age: 59
End: 2019-11-15

## 2019-11-15 ENCOUNTER — HOSPITAL ENCOUNTER (OUTPATIENT)
Dept: CARDIOLOGY | Facility: HOSPITAL | Age: 59
Discharge: HOME | End: 2019-11-15
Admitting: INTERNAL MEDICINE

## 2019-11-15 VITALS
HEIGHT: 62 IN | WEIGHT: 168 LBS | OXYGEN SATURATION: 99 % | SYSTOLIC BLOOD PRESSURE: 140 MMHG | DIASTOLIC BLOOD PRESSURE: 80 MMHG | HEART RATE: 67 BPM | BODY MASS INDEX: 30.91 KG/M2

## 2019-11-15 VITALS
HEART RATE: 64 BPM | HEIGHT: 62 IN | OXYGEN SATURATION: 98 % | WEIGHT: 168 LBS | BODY MASS INDEX: 30.91 KG/M2 | DIASTOLIC BLOOD PRESSURE: 80 MMHG | SYSTOLIC BLOOD PRESSURE: 140 MMHG

## 2019-11-15 DIAGNOSIS — I34.0 MITRAL VALVE INSUFFICIENCY, UNSPECIFIED ETIOLOGY: ICD-10-CM

## 2019-11-15 DIAGNOSIS — I34.0 NONRHEUMATIC MITRAL VALVE REGURGITATION: ICD-10-CM

## 2019-11-15 DIAGNOSIS — R53.83 FATIGUE, UNSPECIFIED TYPE: ICD-10-CM

## 2019-11-15 DIAGNOSIS — R06.02 SHORTNESS OF BREATH: ICD-10-CM

## 2019-11-15 DIAGNOSIS — R06.09 DOE (DYSPNEA ON EXERTION): ICD-10-CM

## 2019-11-15 DIAGNOSIS — I10 ESSENTIAL HYPERTENSION: Primary | ICD-10-CM

## 2019-11-15 PROCEDURE — 93306 TTE W/DOPPLER COMPLETE: CPT

## 2019-11-15 PROCEDURE — 93306 TTE W/DOPPLER COMPLETE: CPT | Performed by: INTERNAL MEDICINE

## 2019-11-15 PROCEDURE — 25010000002 PERFLUTREN (DEFINITY) 8.476 MG IN SODIUM CHLORIDE 0.9 % 10 ML INJECTION: Performed by: INTERNAL MEDICINE

## 2019-11-15 PROCEDURE — 99214 OFFICE O/P EST MOD 30 MIN: CPT | Performed by: NURSE PRACTITIONER

## 2019-11-15 RX ORDER — EZETIMIBE 10 MG/1
TABLET ORAL
Qty: 90 TABLET | Refills: 0 | Status: SHIPPED | OUTPATIENT
Start: 2019-11-15 | End: 2020-01-27

## 2019-11-15 RX ORDER — POTASSIUM CHLORIDE 750 MG/1
10 TABLET, FILM COATED, EXTENDED RELEASE ORAL DAILY
Qty: 7 TABLET | Refills: 0 | Status: SHIPPED | OUTPATIENT
Start: 2019-11-15 | End: 2020-04-10

## 2019-11-15 RX ORDER — ASPIRIN 81 MG/1
81 TABLET, CHEWABLE ORAL DAILY
COMMUNITY
End: 2020-04-27

## 2019-11-15 RX ORDER — MELATONIN
1000 2 TIMES DAILY
COMMUNITY

## 2019-11-15 RX ORDER — FUROSEMIDE 20 MG/1
20 TABLET ORAL DAILY
Qty: 7 TABLET | Refills: 0 | Status: SHIPPED | OUTPATIENT
Start: 2019-11-15 | End: 2020-04-10

## 2019-11-15 RX ADMIN — PERFLUTREN 1.5 ML: 6.52 INJECTION, SUSPENSION INTRAVENOUS at 09:41

## 2019-11-15 NOTE — PROGRESS NOTES
[facility] CARDIOLOGY  [address]  [facility] [address]  Phone: [phone]      Patient Name: Re Sheldon  :1960  Age: 59 y.o.  Primary Cardiologist: Bola Benedict MD  Encounter Provider:  JONATHAN Suarez      Chief Complaint:   Chief Complaint   Patient presents with   • Follow-up     SOA         HPI  Re Sheldon is a 59 y.o. female who presents today for evaluation of shortness of breath.     Pt has a  history significant for hyperlipidemia, hypertension, PVCs, mitral valve insufficiency.    Patient called the office on  stating that she was having worsening shortness of breath with minimal exertion for the past 2 weeks.  Patient had echocardiogram prior to office appointment today but results are not yet available.    Patient reports that over the past few weeks especially the past week she has had increased shortness of breath with exertion.  Reports that last year when she was walking to her car she became short of breath.  She states that she has approximately 16 steps in her home and walking up the steps has caused fatigue and shortness of breath.  She does note that over the past several years she has gained weight and does wonder if the symptoms could be from weight gain.  She also noted swelling to the bilateral hands that is been ongoing for 1 year and she had attributed to her carpal tunnel syndrome.  She denies any chest pain, palpitations, lightheadedness, lower extremity edema.    The following portions of the patient's history were reviewed and updated as appropriate: allergies, current medications, past family history, past medical history, past social history, past surgical history and problem list.      Review of Systems   Constitution: Positive for malaise/fatigue.   Cardiovascular: Positive for dyspnea on exertion. Negative for chest pain, leg swelling and orthopnea.   Respiratory: Positive for shortness of breath.   "  Neurological: Negative for light-headedness.   All other systems reviewed and are negative.      OBJECTIVE:     Vitals:    11/15/19 0940   BP: 140/80   BP Location: Right arm   Patient Position: Sitting   Pulse: 67   SpO2: 99%   Weight: 76.2 kg (168 lb)   Height: 157.5 cm (62\")     Body mass index is 30.73 kg/m².    Physical Exam   Constitutional: She is oriented to person, place, and time. Vital signs are normal. She appears well-developed and well-nourished. She is active.   Eyes: Conjunctivae are normal.   Neck: Carotid bruit is not present.   Cardiovascular: Normal rate, regular rhythm and normal heart sounds.   Pulmonary/Chest: Breath sounds normal.   Abdominal: Normal appearance.   Musculoskeletal:   Swelling to the bilateral hand   Neurological: She is alert and oriented to person, place, and time. GCS eye subscore is 4. GCS verbal subscore is 5. GCS motor subscore is 6.   Skin: Skin is warm, dry and intact.   Psychiatric: She has a normal mood and affect. Her speech is normal and behavior is normal. Judgment and thought content normal. Cognition and memory are normal.       Procedures    Cardiac Procedures:  1. Exercise Myoview stress test 4/10/17: No evidence of ischemia or infarction.  2. Echocardiogram 12/21/16: EF 69%.  Moderate left atrial enlargement.  Mild mitral regurgitation.  Mitral valve leaflets were thickened and myxomatous.  3. Echocardiogram 1/29/18: EF 56-60 percent.  All left ventricular wall segments contract normally.  Left ventricular diastolic function is normal.  Left atrial cavity size is mildly dilated.  Mild to moderate mitral valve regurgitation.        ASSESSMENT:      Diagnosis Plan   1. Essential hypertension  Basic Metabolic Panel   2. ROWLAND (dyspnea on exertion)  furosemide (LASIX) 20 MG tablet    potassium chloride (K-DUR) 10 MEQ CR tablet         PLAN OF CARE:     1. Dyspnea with exertion.  As noted above patient having increased dyspnea and fatigue with minimal to moderate " exertion.  She has been weight over the past 4 years but still stays active and exercises routinely.  She does have some swelling of bilateral hands.  She had echocardiogram performed today but results are not available yet for interpretation.  I would like to try patient on Lasix 20 mg daily for 7 days to see if this improves her symptoms.  She will also be prescribed potassium limitation.  Creatinine 0.68 on 10/14/2019.  2. Fatigue.  As above plan.  3. Hypertension.  Patient's blood pressure elevated today at 140/80.  She reports that at home it was 116/65.  We will continue to monitor blood pressure readings.  4. History of mitral valve insufficiency.  Echocardiogram performed today.  Results are available for interpretation.  5. Follow-up with me in 3-4 weeks.  Will review patient's symptoms with her when I call BMP results that will be done in 1 week.             Discharge Medications           Accurate as of 11/15/19 12:34 PM. If you have any questions, ask your nurse or doctor.               New Medications      Instructions Start Date   furosemide 20 MG tablet  Commonly known as:  LASIX  Started by:  Yuliana Blair APRN   20 mg, Oral, Daily      potassium chloride 10 MEQ CR tablet  Commonly known as:  K-DUR  Started by:  Yuliana Blair APRN   10 mEq, Oral, Daily         Continue These Medications      Instructions Start Date   aspirin 81 MG chewable tablet   81 mg, Oral, Daily      cetirizine 10 MG tablet  Commonly known as:  zyrTEC   1 OTC tab po QDay for allergies      cholecalciferol 25 MCG (1000 UT) tablet  Commonly known as:  VITAMIN D3   1,000 Units, Oral, Daily      fluticasone 50 MCG/ACT nasal spray  Commonly known as:  FLONASE   Nasal      losartan-hydrochlorothiazide 100-25 MG per tablet  Commonly known as:  HYZAAR   TAKE 1 TABLET DAILY      meloxicam 15 MG tablet  Commonly known as:  MOBIC   15 mg, Oral, Daily             Thank you for allowing me to participate in the care of your  patient,         Yuliana CARRINGTON JONATHAN Blair  [facility]  11/15/19  12:34 PM    **Ivon Disclaimer:**  Much of this encounter note is an electronic transcription/translation of spoken language to printed text. The electronic translation of spoken language may permit erroneous, or at times, nonsensical words or phrases to be inadvertently transcribed. Although I have reviewed the note for such errors, some may still exist.

## 2019-11-18 LAB
AORTIC DIMENSIONLESS INDEX: 0.6 (DI)
AORTIC ROOT ANNULUS: 2 CM
ASCENDING AORTA: 3.1 CM
BH CV ECHO MEAS - ACS: 1.1 CM
BH CV ECHO MEAS - AO MAX PG (FULL): 13.8 MMHG
BH CV ECHO MEAS - AO MAX PG: 19.7 MMHG
BH CV ECHO MEAS - AO MEAN PG (FULL): 9.8 MMHG
BH CV ECHO MEAS - AO MEAN PG: 13 MMHG
BH CV ECHO MEAS - AO V2 MAX: 221.8 CM/SEC
BH CV ECHO MEAS - AO V2 MEAN: 173.1 CM/SEC
BH CV ECHO MEAS - AO V2 VTI: 46.9 CM
BH CV ECHO MEAS - ASC AORTA: 3.1 CM
BH CV ECHO MEAS - AVA(I,A): 1.5 CM^2
BH CV ECHO MEAS - AVA(I,D): 1.5 CM^2
BH CV ECHO MEAS - AVA(V,A): 1.4 CM^2
BH CV ECHO MEAS - AVA(V,D): 1.4 CM^2
BH CV ECHO MEAS - BSA(HAYCOCK): 1.9 M^2
BH CV ECHO MEAS - BSA: 1.8 M^2
BH CV ECHO MEAS - BZI_BMI: 30.7 KILOGRAMS/M^2
BH CV ECHO MEAS - BZI_METRIC_HEIGHT: 157.5 CM
BH CV ECHO MEAS - BZI_METRIC_WEIGHT: 76.2 KG
BH CV ECHO MEAS - EDV(MOD-SP2): 91 ML
BH CV ECHO MEAS - EDV(MOD-SP4): 82 ML
BH CV ECHO MEAS - EDV(TEICH): 89.7 ML
BH CV ECHO MEAS - EF(CUBED): 91.6 %
BH CV ECHO MEAS - EF(MOD-BP): 66 %
BH CV ECHO MEAS - EF(MOD-SP2): 67 %
BH CV ECHO MEAS - EF(MOD-SP4): 63.4 %
BH CV ECHO MEAS - EF(TEICH): 86.8 %
BH CV ECHO MEAS - ESV(MOD-SP2): 30 ML
BH CV ECHO MEAS - ESV(MOD-SP4): 30 ML
BH CV ECHO MEAS - ESV(TEICH): 11.8 ML
BH CV ECHO MEAS - FS: 56.3 %
BH CV ECHO MEAS - IVS/LVPW: 1.1
BH CV ECHO MEAS - IVSD: 1.2 CM
BH CV ECHO MEAS - LAT PEAK E' VEL: 8 CM/SEC
BH CV ECHO MEAS - LV DIASTOLIC VOL/BSA (35-75): 46.2 ML/M^2
BH CV ECHO MEAS - LV MASS(C)D: 195.4 GRAMS
BH CV ECHO MEAS - LV MASS(C)DI: 110.1 GRAMS/M^2
BH CV ECHO MEAS - LV MAX PG: 5.8 MMHG
BH CV ECHO MEAS - LV MEAN PG: 3.3 MMHG
BH CV ECHO MEAS - LV SYSTOLIC VOL/BSA (12-30): 16.9 ML/M^2
BH CV ECHO MEAS - LV V1 MAX: 120.8 CM/SEC
BH CV ECHO MEAS - LV V1 MEAN: 84.2 CM/SEC
BH CV ECHO MEAS - LV V1 VTI: 28.2 CM
BH CV ECHO MEAS - LVIDD: 4.4 CM
BH CV ECHO MEAS - LVIDS: 1.9 CM
BH CV ECHO MEAS - LVLD AP2: 7.1 CM
BH CV ECHO MEAS - LVLD AP4: 7.2 CM
BH CV ECHO MEAS - LVLS AP2: 5.8 CM
BH CV ECHO MEAS - LVLS AP4: 6.1 CM
BH CV ECHO MEAS - LVOT AREA (M): 2.5 CM^2
BH CV ECHO MEAS - LVOT AREA: 2.5 CM^2
BH CV ECHO MEAS - LVOT DIAM: 1.8 CM
BH CV ECHO MEAS - LVPWD: 1.2 CM
BH CV ECHO MEAS - MED PEAK E' VEL: 9 CM/SEC
BH CV ECHO MEAS - MR MAX PG: 120.4 MMHG
BH CV ECHO MEAS - MR MAX VEL: 548.7 CM/SEC
BH CV ECHO MEAS - MV A DUR: 0.13 SEC
BH CV ECHO MEAS - MV A MAX VEL: 67.9 CM/SEC
BH CV ECHO MEAS - MV DEC SLOPE: 500.7 CM/SEC^2
BH CV ECHO MEAS - MV DEC TIME: 0.23 SEC
BH CV ECHO MEAS - MV E MAX VEL: 112.5 CM/SEC
BH CV ECHO MEAS - MV E/A: 1.7
BH CV ECHO MEAS - MV MAX PG: 10.2 MMHG
BH CV ECHO MEAS - MV MEAN PG: 2.7 MMHG
BH CV ECHO MEAS - MV P1/2T MAX VEL: 113.5 CM/SEC
BH CV ECHO MEAS - MV P1/2T: 66.4 MSEC
BH CV ECHO MEAS - MV V2 MAX: 159.6 CM/SEC
BH CV ECHO MEAS - MV V2 MEAN: 73.2 CM/SEC
BH CV ECHO MEAS - MV V2 VTI: 36.6 CM
BH CV ECHO MEAS - MVA P1/2T LCG: 1.9 CM^2
BH CV ECHO MEAS - MVA(P1/2T): 3.3 CM^2
BH CV ECHO MEAS - MVA(VTI): 1.9 CM^2
BH CV ECHO MEAS - PA ACC TIME: 0.15 SEC
BH CV ECHO MEAS - PA MAX PG (FULL): 2.3 MMHG
BH CV ECHO MEAS - PA MAX PG: 3.9 MMHG
BH CV ECHO MEAS - PA PR(ACCEL): 9.3 MMHG
BH CV ECHO MEAS - PA V2 MAX: 99 CM/SEC
BH CV ECHO MEAS - PULM A REVS DUR: 0.13 SEC
BH CV ECHO MEAS - PULM A REVS VEL: 30.1 CM/SEC
BH CV ECHO MEAS - PULM DIAS VEL: 71.8 CM/SEC
BH CV ECHO MEAS - PULM S/D: 0.8
BH CV ECHO MEAS - PULM SYS VEL: 57.2 CM/SEC
BH CV ECHO MEAS - PVA(V,A): 2.1 CM^2
BH CV ECHO MEAS - PVA(V,D): 2.1 CM^2
BH CV ECHO MEAS - QP/QS: 0.63
BH CV ECHO MEAS - RAP SYSTOLE: 8 MMHG
BH CV ECHO MEAS - RV MAX PG: 1.6 MMHG
BH CV ECHO MEAS - RV MEAN PG: 0.99 MMHG
BH CV ECHO MEAS - RV V1 MAX: 64 CM/SEC
BH CV ECHO MEAS - RV V1 MEAN: 47 CM/SEC
BH CV ECHO MEAS - RV V1 VTI: 13.4 CM
BH CV ECHO MEAS - RVOT AREA: 3.3 CM^2
BH CV ECHO MEAS - RVOT DIAM: 2.1 CM
BH CV ECHO MEAS - RVSP: 41 MMHG
BH CV ECHO MEAS - SI(CUBED): 45.2 ML/M^2
BH CV ECHO MEAS - SI(LVOT): 39.5 ML/M^2
BH CV ECHO MEAS - SI(MOD-SP2): 34.4 ML/M^2
BH CV ECHO MEAS - SI(MOD-SP4): 29.3 ML/M^2
BH CV ECHO MEAS - SI(TEICH): 43.8 ML/M^2
BH CV ECHO MEAS - SUP REN AO DIAM: 2.2 CM
BH CV ECHO MEAS - SV(CUBED): 80.3 ML
BH CV ECHO MEAS - SV(LVOT): 70 ML
BH CV ECHO MEAS - SV(MOD-SP2): 61 ML
BH CV ECHO MEAS - SV(MOD-SP4): 52 ML
BH CV ECHO MEAS - SV(RVOT): 44.3 ML
BH CV ECHO MEAS - SV(TEICH): 77.8 ML
BH CV ECHO MEAS - TAPSE (>1.6): 2.4 CM2
BH CV ECHO MEAS - TR MAX VEL: 288.1 CM/SEC
BH CV ECHO MEASUREMENTS AVERAGE E/E' RATIO: 13.24
BH CV XLRA - RV BASE: 2.6 CM
BH CV XLRA - RV LENGTH: 7.4 CM
BH CV XLRA - RV MID: 2 CM
BH CV XLRA - TDI S': 13 CM/SEC
LEFT ATRIUM VOLUME INDEX: 38 ML/M2
MAXIMAL PREDICTED HEART RATE: 162 BPM
SINUS: 3 CM
STJ: 2.2 CM
STRESS TARGET HR: 138 BPM

## 2019-11-20 ENCOUNTER — RESULTS ENCOUNTER (OUTPATIENT)
Dept: CARDIOLOGY | Facility: CLINIC | Age: 59
End: 2019-11-20

## 2019-11-20 DIAGNOSIS — I10 ESSENTIAL HYPERTENSION: ICD-10-CM

## 2020-01-23 RX ORDER — LOSARTAN POTASSIUM AND HYDROCHLOROTHIAZIDE 25; 100 MG/1; MG/1
TABLET ORAL
Qty: 90 TABLET | Refills: 1 | Status: SHIPPED | OUTPATIENT
Start: 2020-01-23 | End: 2020-04-27 | Stop reason: ALTCHOICE

## 2020-01-27 ENCOUNTER — OFFICE VISIT (OUTPATIENT)
Dept: CARDIOLOGY | Facility: CLINIC | Age: 60
End: 2020-01-27

## 2020-01-27 VITALS
BODY MASS INDEX: 29.63 KG/M2 | HEIGHT: 62 IN | HEART RATE: 75 BPM | SYSTOLIC BLOOD PRESSURE: 148 MMHG | WEIGHT: 161 LBS | OXYGEN SATURATION: 99 % | DIASTOLIC BLOOD PRESSURE: 88 MMHG

## 2020-01-27 DIAGNOSIS — I10 ESSENTIAL HYPERTENSION: ICD-10-CM

## 2020-01-27 DIAGNOSIS — R06.09 DOE (DYSPNEA ON EXERTION): Primary | ICD-10-CM

## 2020-01-27 DIAGNOSIS — I34.0 MITRAL VALVE INSUFFICIENCY, UNSPECIFIED ETIOLOGY: ICD-10-CM

## 2020-01-27 DIAGNOSIS — R53.83 FATIGUE, UNSPECIFIED TYPE: ICD-10-CM

## 2020-01-27 PROCEDURE — 99214 OFFICE O/P EST MOD 30 MIN: CPT | Performed by: NURSE PRACTITIONER

## 2020-01-27 PROCEDURE — 93000 ELECTROCARDIOGRAM COMPLETE: CPT | Performed by: NURSE PRACTITIONER

## 2020-01-27 NOTE — PROGRESS NOTES
"      [facility] CARDIOLOGY  [address]  [facility] [address]  Phone: [phone]      Patient Name: Re Sheldon  :1960  Age: 59 y.o.  Primary Cardiologist: Bola Benedict MD  Encounter Provider:  JONATHAN Suarez      Chief Complaint:   Chief Complaint   Patient presents with   • Follow-up     2 months         HPI  Re Sheldon is a 59 y.o. female who presents today for routine reevaluation    Pt has a  history significant for hyperlipidemia, hypertension, PVCs, mitral valve insufficiency.    Patient reports that she has done well over the past several months.  She reports that she has made several lifestyle changes and is currently down approximately 7 pounds since last visit.  She reports that she is much more conscious of the foods that she is eating and has eliminated artificial sugars and sweeteners.  She states that she is also cutting out refined sugar.  She states that she is exercising routinely.  Reports that symptoms of shortness of breath with exertion as well as fatigue have completely resolved with the weight loss.  She is currently asymptomatic and denies any chest pain, shortness of breath, palpitations, lightheadedness, lower extremity edema, fatigue.        The following portions of the patient's history were reviewed and updated as appropriate: allergies, current medications, past family history, past medical history, past social history, past surgical history and problem list.      Review of Systems   Constitution: Negative for malaise/fatigue.   Cardiovascular: Negative for chest pain and leg swelling.   Respiratory: Negative for shortness of breath.    Neurological: Negative for light-headedness.   All other systems reviewed and are negative.      OBJECTIVE:     Vitals:    20 1133   BP: 148/88   BP Location: Right arm   Patient Position: Sitting   Pulse: 75   SpO2: 99%   Weight: 73 kg (161 lb)   Height: 157.5 cm (62\")     "     Body mass index is 29.45 kg/m².    Physical Exam   Constitutional: She is oriented to person, place, and time. Vital signs are normal. She appears well-developed and well-nourished. She is active.   Eyes: Conjunctivae are normal.   Neck: Carotid bruit is not present.   Cardiovascular: Normal rate, regular rhythm and normal heart sounds.   Pulmonary/Chest: Breath sounds normal.   Abdominal: Normal appearance.   Musculoskeletal:   Swelling to the bilateral hand   Neurological: She is alert and oriented to person, place, and time. GCS eye subscore is 4. GCS verbal subscore is 5. GCS motor subscore is 6.   Skin: Skin is warm, dry and intact.   Psychiatric: She has a normal mood and affect. Her speech is normal and behavior is normal. Judgment and thought content normal. Cognition and memory are normal.         ECG 12 Lead  Date/Time: 1/27/2020 11:47 AM  Performed by: Yuliana Blair APRN  Authorized by: Yuliana Blair APRN   Comparison: compared with previous ECG from 1/25/2019  Rhythm: sinus rhythm  Rate: normal  BPM: 74  Conduction: conduction normal  T inversion: all  QRS axis: normal    Clinical impression: abnormal EKG            Cardiac Procedures:  1. Exercise Myoview stress test 4/10/17: No evidence of ischemia or infarction.  2. Echocardiogram 12/21/16: EF 69%.  Moderate left atrial enlargement.  Mild mitral regurgitation.  Mitral valve leaflets were thickened and myxomatous.  3. Echocardiogram 1/29/18: EF 56-60 percent.  All left ventricular wall segments contract normally.  Left ventricular diastolic function is normal.  Left atrial cavity size is mildly dilated.  Mild to moderate mitral valve regurgitation.  4. Echocardiogram 11/15/2019.  LVEF 66%.  Mild mitral valve regurgitation.  Mild tricuspid valve regurgitation.  Calculated RVSP 41 mmHg.        ASSESSMENT:      Diagnosis Plan   1. ROWLAND (dyspnea on exertion)     2. Fatigue, unspecified type     3. Essential hypertension     4. Mitral valve  insufficiency, unspecified etiology           PLAN OF CARE:     1. Dyspnea with exertion.  Completely resolved with dietary and lifestyle changes.  2. Fatigue.  Resolved with dietary and lifestyle changes.  3. Hypertension.  Patient's blood pressure elevated today at 148/88.  She reports that at home it was 116/65.  We will continue to monitor blood pressure readings.  4. History of mitral valve insufficiency.  Mild on echocardiogram 11/2019.  5. Follow-up with Dr. Benedict in 1 year.  Sooner for problems or complications.           Discharge Medications           Accurate as of January 27, 2020 11:52 AM. If you have any questions, ask your nurse or doctor.               Continue These Medications      Instructions Start Date   aspirin 81 MG chewable tablet   81 mg, Oral, Daily      cetirizine 10 MG tablet  Commonly known as:  zyrTEC   1 OTC tab po QDay for allergies      cholecalciferol 25 MCG (1000 UT) tablet  Commonly known as:  VITAMIN D3   1,000 Units, Oral, Daily      fluticasone 50 MCG/ACT nasal spray  Commonly known as:  FLONASE   Nasal      furosemide 20 MG tablet  Commonly known as:  LASIX   20 mg, Oral, Daily      losartan-hydrochlorothiazide 100-25 MG per tablet  Commonly known as:  HYZAAR   TAKE 1 TABLET DAILY      meloxicam 15 MG tablet  Commonly known as:  MOBIC   15 mg, Oral, Daily      potassium chloride 10 MEQ CR tablet  Commonly known as:  K-DUR   10 mEq, Oral, Daily         Stop These Medications    ezetimibe 10 MG tablet  Commonly known as:  ZETIA  Stopped by:  JONATHAN Suarez            Thank you for allowing me to participate in the care of your patient,         JONATHAN Suarez  [facility]  01/27/20  11:52 AM    **Dragon Disclaimer:**  Much of this encounter note is an electronic transcription/translation of spoken language to printed text. The electronic translation of spoken language may permit erroneous, or at times, nonsensical words or phrases to be  inadvertently transcribed. Although I have reviewed the note for such errors, some may still exist.

## 2020-04-10 ENCOUNTER — OFFICE VISIT (OUTPATIENT)
Dept: FAMILY MEDICINE CLINIC | Facility: CLINIC | Age: 60
End: 2020-04-10

## 2020-04-10 VITALS — HEART RATE: 84 BPM | DIASTOLIC BLOOD PRESSURE: 70 MMHG | SYSTOLIC BLOOD PRESSURE: 135 MMHG

## 2020-04-10 DIAGNOSIS — D64.9 ANEMIA, UNSPECIFIED TYPE: ICD-10-CM

## 2020-04-10 DIAGNOSIS — E78.49 OTHER HYPERLIPIDEMIA: Primary | ICD-10-CM

## 2020-04-10 DIAGNOSIS — E55.9 VITAMIN D DEFICIENCY: ICD-10-CM

## 2020-04-10 DIAGNOSIS — I10 ESSENTIAL HYPERTENSION: ICD-10-CM

## 2020-04-10 DIAGNOSIS — R73.01 ABNORMAL FASTING GLUCOSE: ICD-10-CM

## 2020-04-10 PROCEDURE — 99423 OL DIG E/M SVC 21+ MIN: CPT | Performed by: INTERNAL MEDICINE

## 2020-04-10 NOTE — PROGRESS NOTES
Subjective   Re Sheldon is a 60 y.o. female who comes in today for   Chief Complaint   Patient presents with   • Hypertension   • Hyperlipidemia   .    History of Present Illness   You have chosen to receive care through a telephone visit today. Do you consent to use a telephone visit for your medical care today? Yes  In Oct. 2019 she weighed 169 pounds and she has lost 20 pounds with changing her eating by decreasing carbs and avoiding fried foods and no fast foods.  She is walking 5 miles/day and rides a bike.  She went off her cholesterol medication in Oct and wanted to do dietary changes.  Her HR runs 58-60 until last week and then last week while sitting HR was around 48 on her fitbit.    No dizziness.    In mid Feb, she stopped her BP medication for 2 weeks and noticed that her BP spiked back upward.  148/78/142/72/145/80 without med.  With the med 126/62.  Is taking losartan hctz 100/25 mg qd.    Nov had resting echo with her cardiologist due to ROWLAND. Took 7 days of lasix and KCL due to extra fluid per cardiologist.   Now that she has lost weight and exercised , her energy is excellent and she is able to walk without ROWLAND.  No CP.  No edema.    In Oct, she received paperwork for CN due to mild anemia and she was due for CN anyway.  She lost paperwork for a few months.  Then when she found the paperwork, she decided she wanted to switch to Dr. Hairston's group and now his office called and postponed CN due to Covid 19.  She hasn't had CN and knows that she needs it.  Her iron studies were normal.  No obvious blood loss and no stomach pains or problems.  Moving her bowels daily and no blood in it.    The following portions of the patient's history were reviewed and updated as appropriate: allergies, current medications, past family history, past medical history, past social history, past surgical history and problem list.    Review of Systems   Constitutional:        Intentional weight loss.   Respiratory:  Negative.    Cardiovascular: Negative.    Gastrointestinal: Negative.        /70   Pulse 84     STEADI Fall Risk Assessment has not been completed.    PHQ-2/PHQ-9 Depression Screening 10/17/2018   Little interest or pleasure in doing things 0   Feeling down, depressed, or hopeless 0   Trouble falling or staying asleep, or sleeping too much -   Feeling tired or having little energy -   Poor appetite or overeating -   Feeling bad about yourself - or that you are a failure or have let yourself or your family down -   Trouble concentrating on things, such as reading the newspaper or watching television -   Moving or speaking so slowly that other people could have noticed. Or the opposite - being so fidgety or restless that you have been moving around a lot more than usual -   Thoughts that you would be better off dead, or of hurting yourself in some way -   Total Score 0   If you checked off any problems, how difficult have these problems made it for you to do your work, take care of things at home, or get along with other people? -       Objective   Physical Exam   Constitutional: She is oriented to person, place, and time.   Neurological: She is alert and oriented to person, place, and time.   Psychiatric: She has a normal mood and affect. Her behavior is normal. Judgment and thought content normal.         Current Outpatient Medications:   •  aspirin 81 MG chewable tablet, Chew 81 mg Daily., Disp: , Rfl:   •  cetirizine (zyrTEC) 10 MG tablet, 1 OTC tab po QDay for allergies, Disp: , Rfl:   •  cholecalciferol (VITAMIN D3) 25 MCG (1000 UT) tablet, Take 1,000 Units by mouth Daily., Disp: , Rfl:   •  fluticasone (FLONASE) 50 MCG/ACT nasal spray, into each nostril., Disp: , Rfl:   •  losartan-hydrochlorothiazide (HYZAAR) 100-25 MG per tablet, TAKE 1 TABLET DAILY, Disp: 90 tablet, Rfl: 1  •  meloxicam (MOBIC) 15 MG tablet, Take 15 mg by mouth Daily., Disp: , Rfl:     Assessment/Plan   Re was seen today for  hypertension and hyperlipidemia.    Diagnoses and all orders for this visit:    Other hyperlipidemia  -     Comprehensive Metabolic Panel; Future  -     T4, Free; Future  -     TSH; Future  -     Lipid Panel With LDL / HDL Ratio; Future    Essential hypertension  -     Comprehensive Metabolic Panel; Future  -     Lipid Panel With LDL / HDL Ratio; Future    Vitamin D deficiency  -     Vitamin D 25 Hydroxy; Future    Abnormal fasting glucose  -     Hemoglobin A1c; Future    Anemia, unspecified type  -     CBC & Differential; Future  -     Iron; Future  -     Ferritin; Future      VDD--continue Vit D 1000 iu/day  Mild anemia--f/u endoscopy with Dr Hairston and pt will contact his office for rescheduling purposes  abnl glucose-check a1c  H/o HL--check FLP and CMP once covid restrictions lift  Continue dietary changes and exercise.  Anxious to see what her readings show since the dietary changes  Continue losartan hctz 100/25 mg qd for HTN   Consider stopping asa/day if ok'd by cardiology.  Has fhx cad and personal h/o PVC and mitral valve insufficiency.    Total time CC 25 min                  I have asked for the patient to return to clinic in 6month(s).

## 2020-04-25 ENCOUNTER — HOSPITAL ENCOUNTER (EMERGENCY)
Facility: HOSPITAL | Age: 60
Discharge: HOME | End: 2020-04-25
Attending: EMERGENCY MEDICINE | Admitting: EMERGENCY MEDICINE

## 2020-04-25 ENCOUNTER — APPOINTMENT (OUTPATIENT)
Dept: GENERAL RADIOLOGY | Facility: HOSPITAL | Age: 60
End: 2020-04-25

## 2020-04-25 VITALS
HEIGHT: 63 IN | SYSTOLIC BLOOD PRESSURE: 147 MMHG | TEMPERATURE: 98.1 F | DIASTOLIC BLOOD PRESSURE: 80 MMHG | RESPIRATION RATE: 18 BRPM | WEIGHT: 155 LBS | HEART RATE: 98 BPM | BODY MASS INDEX: 27.46 KG/M2 | OXYGEN SATURATION: 98 %

## 2020-04-25 DIAGNOSIS — I48.91 ATRIAL FIBRILLATION WITH RAPID VENTRICULAR RESPONSE (HCC): Primary | ICD-10-CM

## 2020-04-25 LAB
ALBUMIN SERPL-MCNC: 4.6 G/DL (ref 3.5–5.2)
ALBUMIN/GLOB SERPL: 1.4 G/DL
ALP SERPL-CCNC: 90 U/L (ref 39–117)
ALT SERPL W P-5'-P-CCNC: 15 U/L (ref 1–33)
ANION GAP SERPL CALCULATED.3IONS-SCNC: 14.6 MMOL/L (ref 5–15)
AST SERPL-CCNC: 15 U/L (ref 1–32)
BASOPHILS # BLD AUTO: 0.04 10*3/MM3 (ref 0–0.2)
BASOPHILS NFR BLD AUTO: 0.4 % (ref 0–1.5)
BILIRUB SERPL-MCNC: 0.2 MG/DL (ref 0.2–1.2)
BUN BLD-MCNC: 16 MG/DL (ref 6–20)
BUN/CREAT SERPL: 21.9 (ref 7–25)
CALCIUM SPEC-SCNC: 9.3 MG/DL (ref 8.6–10.5)
CHLORIDE SERPL-SCNC: 100 MMOL/L (ref 98–107)
CO2 SERPL-SCNC: 27.4 MMOL/L (ref 22–29)
CREAT BLD-MCNC: 0.73 MG/DL (ref 0.57–1)
DEPRECATED RDW RBC AUTO: 43.3 FL (ref 37–54)
EOSINOPHIL # BLD AUTO: 0.23 10*3/MM3 (ref 0–0.4)
EOSINOPHIL NFR BLD AUTO: 2.3 % (ref 0.3–6.2)
ERYTHROCYTE [DISTWIDTH] IN BLOOD BY AUTOMATED COUNT: 13.4 % (ref 12.3–15.4)
GFR SERPL CREATININE-BSD FRML MDRD: 82 ML/MIN/1.73
GLOBULIN UR ELPH-MCNC: 3.3 GM/DL
GLUCOSE BLD-MCNC: 101 MG/DL (ref 65–99)
HCT VFR BLD AUTO: 36.1 % (ref 34–46.6)
HGB BLD-MCNC: 11.7 G/DL (ref 12–15.9)
IMM GRANULOCYTES # BLD AUTO: 0.04 10*3/MM3 (ref 0–0.05)
IMM GRANULOCYTES NFR BLD AUTO: 0.4 % (ref 0–0.5)
LYMPHOCYTES # BLD AUTO: 2.88 10*3/MM3 (ref 0.7–3.1)
LYMPHOCYTES NFR BLD AUTO: 29.3 % (ref 19.6–45.3)
MAGNESIUM SERPL-MCNC: 2.3 MG/DL (ref 1.6–2.6)
MCH RBC QN AUTO: 28.3 PG (ref 26.6–33)
MCHC RBC AUTO-ENTMCNC: 32.4 G/DL (ref 31.5–35.7)
MCV RBC AUTO: 87.2 FL (ref 79–97)
MONOCYTES # BLD AUTO: 0.55 10*3/MM3 (ref 0.1–0.9)
MONOCYTES NFR BLD AUTO: 5.6 % (ref 5–12)
NEUTROPHILS # BLD AUTO: 6.1 10*3/MM3 (ref 1.7–7)
NEUTROPHILS NFR BLD AUTO: 62 % (ref 42.7–76)
NRBC BLD AUTO-RTO: 0 /100 WBC (ref 0–0.2)
PLATELET # BLD AUTO: 301 10*3/MM3 (ref 140–450)
PMV BLD AUTO: 10.2 FL (ref 6–12)
POTASSIUM BLD-SCNC: 3.3 MMOL/L (ref 3.5–5.2)
PROT SERPL-MCNC: 7.9 G/DL (ref 6–8.5)
RBC # BLD AUTO: 4.14 10*6/MM3 (ref 3.77–5.28)
SODIUM BLD-SCNC: 142 MMOL/L (ref 136–145)
TROPONIN T SERPL-MCNC: <0.01 NG/ML (ref 0–0.03)
TSH SERPL DL<=0.05 MIU/L-ACNC: 1.31 UIU/ML (ref 0.27–4.2)
WBC NRBC COR # BLD: 9.84 10*3/MM3 (ref 3.4–10.8)

## 2020-04-25 PROCEDURE — 93005 ELECTROCARDIOGRAM TRACING: CPT | Performed by: EMERGENCY MEDICINE

## 2020-04-25 PROCEDURE — 80053 COMPREHEN METABOLIC PANEL: CPT | Performed by: EMERGENCY MEDICINE

## 2020-04-25 PROCEDURE — 71045 X-RAY EXAM CHEST 1 VIEW: CPT

## 2020-04-25 PROCEDURE — 83735 ASSAY OF MAGNESIUM: CPT | Performed by: EMERGENCY MEDICINE

## 2020-04-25 PROCEDURE — 93010 ELECTROCARDIOGRAM REPORT: CPT | Performed by: INTERNAL MEDICINE

## 2020-04-25 PROCEDURE — 96374 THER/PROPH/DIAG INJ IV PUSH: CPT

## 2020-04-25 PROCEDURE — 84443 ASSAY THYROID STIM HORMONE: CPT | Performed by: EMERGENCY MEDICINE

## 2020-04-25 PROCEDURE — 84484 ASSAY OF TROPONIN QUANT: CPT | Performed by: EMERGENCY MEDICINE

## 2020-04-25 PROCEDURE — 85025 COMPLETE CBC W/AUTO DIFF WBC: CPT | Performed by: EMERGENCY MEDICINE

## 2020-04-25 PROCEDURE — 99284 EMERGENCY DEPT VISIT MOD MDM: CPT

## 2020-04-25 RX ORDER — METOPROLOL TARTRATE 50 MG/1
50 TABLET, FILM COATED ORAL 2 TIMES DAILY
Qty: 60 TABLET | Refills: 0 | Status: SHIPPED | OUTPATIENT
Start: 2020-04-25 | End: 2020-04-27 | Stop reason: SDUPTHER

## 2020-04-25 RX ORDER — SODIUM CHLORIDE 0.9 % (FLUSH) 0.9 %
10 SYRINGE (ML) INJECTION AS NEEDED
Status: DISCONTINUED | OUTPATIENT
Start: 2020-04-25 | End: 2020-04-26 | Stop reason: HOSPADM

## 2020-04-25 RX ADMIN — APIXABAN 5 MG: 5 TABLET, FILM COATED ORAL at 21:45

## 2020-04-25 RX ADMIN — METOPROLOL TARTRATE 25 MG: 25 TABLET ORAL at 21:38

## 2020-04-25 RX ADMIN — METOPROLOL TARTRATE 5 MG: 5 INJECTION, SOLUTION INTRAVENOUS at 21:39

## 2020-04-26 NOTE — ED PROVIDER NOTES
" EMERGENCY DEPARTMENT ENCOUNTER    Room Number:  14/14  Date of encounter:  4/25/2020  PCP: Angelita Vital MD  Historian: Patient      HPI:  Chief Complaint: Palpitations and irregular heart rate  A complete HPI/ROS/PMH/PSH/SH/FH are unobtainable due to: Nothing    Context: Re Sheldon is a 60 y.o. female who presents to the ED c/o frequent palpitations, irregular heart rate with sometimes her heart really \"racing and pounding\".  She said she is noticed it certainly throughout the course of the day today, but would not doubt if it is been going on longer than that.  She has no real chest pain, but when her heart rates up she does feel somewhat short of breath, dizzy and lightheaded.    Patient has no history of coronary artery disease, and she seen Dr. Benedict in the past to evaluate what she describes as a \"abnormal heart valve\".  To her knowledge she is not ever had atrial fibrillation or any other arrhythmia, however her sister does have atrial fibrillation.      PAST MEDICAL HISTORY  Active Ambulatory Problems     Diagnosis Date Noted   • Abdominal pain 07/29/2016   • Acute bronchitis 07/29/2016   • Lumbar radiculopathy 07/29/2016   • Leg pain 07/29/2016   • Acute antritis 07/29/2016   • Bulging of lumbar intervertebral disc 07/29/2016   • Chest pain 07/29/2016   • Chronic left-sided low back pain with left-sided sciatica 07/29/2016   • DDD (degenerative disc disease), lumbar 07/29/2016   • Dysfunction of eustachian tube 07/29/2016   • Fatigue 07/29/2016   • Hyperlipidemia 07/29/2016   • BP (high blood pressure) 07/29/2016   • Abnormal fasting glucose 07/29/2016   • Gonalgia 07/29/2016   • Maxillary antritis 07/29/2016   • Neck swelling 07/29/2016   • Health care maintenance 07/29/2016   • Allergic rhinitis 07/29/2016   • Bilateral carpal tunnel syndrome 12/15/2016   • Degenerative lumbar spinal stenosis 12/15/2016   • Valvular insufficiency 12/15/2016   • Chest tightness or pressure 03/16/2017   • " Family history of early CAD 2017   • EKG, abnormal 2017   • PVC (premature ventricular contraction) 2017   • Mitral valve insufficiency 2017   • Vitamin D deficiency 2018     Resolved Ambulatory Problems     Diagnosis Date Noted   • No Resolved Ambulatory Problems     Past Medical History:   Diagnosis Date   • Abnormal ECG    • Hypertension    • Mitral regurgitation    • Palpitations    • Premature atrial contractions    • Spinal stenosis    • Systolic murmur    • Tonsillitis          PAST SURGICAL HISTORY  Past Surgical History:   Procedure Laterality Date   • ADENOIDECTOMY     • CARPAL TUNNEL RELEASE Bilateral    • COLONOSCOPY  2014    repeat 10 years , Dr. Karen Spear   • FOOT SURGERY     • HAND SURGERY     • TONSILLECTOMY     • TONSILLECTOMY AND ADENOIDECTOMY     • TUBAL ABDOMINAL LIGATION           FAMILY HISTORY  Family History   Problem Relation Age of Onset   • Heart attack Other    • Breast cancer Other    • Heart disease Other    • Heart disease Mother         Mother with MI at 63, and later  from CHF   • Heart disease Father         Father  from complications after valve replacement at 73   • Stroke Father    • Breast cancer Sister          SOCIAL HISTORY  Social History     Socioeconomic History   • Marital status:      Spouse name: Kedar   • Number of children: 2   • Years of education: Not on file   • Highest education level: Not on file   Occupational History   • Occupation: cook/baker   Tobacco Use   • Smoking status: Never Smoker   • Smokeless tobacco: Never Used   Substance and Sexual Activity   • Alcohol use: No     Comment: Caffeine use 3-4 cups daily   • Drug use: No   Social History Narrative    .  RETIRED A YEAR AGO FROM [employer]         ALLERGIES  Augmentin [amoxicillin-pot clavulanate]; Bactrim [sulfamethoxazole-trimethoprim]; and Sulfa antibiotics        REVIEW OF SYSTEMS  Review of Systems     All systems  reviewed and negative except for those discussed in HPI.       PHYSICAL EXAM    I have reviewed the triage vital signs and nursing notes.    ED Triage Vitals [04/25/20 2016]   Temp Heart Rate Resp BP SpO2   -- 89 -- -- 90 %      Temp src Heart Rate Source Patient Position BP Location FiO2 (%)   -- -- -- -- --       Physical Exam  GENERAL: Awake and alert  HENT: nares patent  EYES: no scleral icterus  CV: Irregularly irregular, with episodes of sinus rhythm with PACs as well as some episodes of narrow complex tachycardia with rates as high as 150.  RESPIRATORY: normal effort  ABDOMEN: soft  MUSCULOSKELETAL: no deformity, no calf tenderness pedal edema  NEURO: alert, moves all extremities, follows commands  SKIN: warm, dry        LAB RESULTS  Recent Results (from the past 24 hour(s))   Comprehensive Metabolic Panel    Collection Time: 04/25/20  8:51 PM   Result Value Ref Range    Glucose 101 (H) 65 - 99 mg/dL    BUN 16 6 - 20 mg/dL    Creatinine 0.73 0.57 - 1.00 mg/dL    Sodium 142 136 - 145 mmol/L    Potassium 3.3 (L) 3.5 - 5.2 mmol/L    Chloride 100 98 - 107 mmol/L    CO2 27.4 22.0 - 29.0 mmol/L    Calcium 9.3 8.6 - 10.5 mg/dL    Total Protein 7.9 6.0 - 8.5 g/dL    Albumin 4.60 3.50 - 5.20 g/dL    ALT (SGPT) 15 1 - 33 U/L    AST (SGOT) 15 1 - 32 U/L    Alkaline Phosphatase 90 39 - 117 U/L    Total Bilirubin 0.2 0.2 - 1.2 mg/dL    eGFR Non African Amer 82 >60 mL/min/1.73    Globulin 3.3 gm/dL    A/G Ratio 1.4 g/dL    BUN/Creatinine Ratio 21.9 7.0 - 25.0    Anion Gap 14.6 5.0 - 15.0 mmol/L   Troponin    Collection Time: 04/25/20  8:51 PM   Result Value Ref Range    Troponin T <0.010 0.000 - 0.030 ng/mL   Magnesium    Collection Time: 04/25/20  8:51 PM   Result Value Ref Range    Magnesium 2.3 1.6 - 2.6 mg/dL   CBC Auto Differential    Collection Time: 04/25/20  8:51 PM   Result Value Ref Range    WBC 9.84 3.40 - 10.80 10*3/mm3    RBC 4.14 3.77 - 5.28 10*6/mm3    Hemoglobin 11.7 (L) 12.0 - 15.9 g/dL    Hematocrit 36.1  34.0 - 46.6 %    MCV 87.2 79.0 - 97.0 fL    MCH 28.3 26.6 - 33.0 pg    MCHC 32.4 31.5 - 35.7 g/dL    RDW 13.4 12.3 - 15.4 %    RDW-SD 43.3 37.0 - 54.0 fl    MPV 10.2 6.0 - 12.0 fL    Platelets 301 140 - 450 10*3/mm3    Neutrophil % 62.0 42.7 - 76.0 %    Lymphocyte % 29.3 19.6 - 45.3 %    Monocyte % 5.6 5.0 - 12.0 %    Eosinophil % 2.3 0.3 - 6.2 %    Basophil % 0.4 0.0 - 1.5 %    Immature Grans % 0.4 0.0 - 0.5 %    Neutrophils, Absolute 6.10 1.70 - 7.00 10*3/mm3    Lymphocytes, Absolute 2.88 0.70 - 3.10 10*3/mm3    Monocytes, Absolute 0.55 0.10 - 0.90 10*3/mm3    Eosinophils, Absolute 0.23 0.00 - 0.40 10*3/mm3    Basophils, Absolute 0.04 0.00 - 0.20 10*3/mm3    Immature Grans, Absolute 0.04 0.00 - 0.05 10*3/mm3    nRBC 0.0 0.0 - 0.2 /100 WBC   TSH    Collection Time: 04/25/20  8:51 PM   Result Value Ref Range    TSH 1.310 0.270 - 4.200 uIU/mL       Ordered the above labs and independently reviewed the results.        RADIOLOGY  Xr Chest 1 View    Result Date: 4/25/2020  PORTABLE CHEST 04/25/2020 AT 9:01 AM  CLINICAL HISTORY: Palpitations  The lungs are well-expanded and appear free of focal infiltrates. There are no pleural effusions. The cardiomediastinal silhouette is unremarkable. The pulmonary vasculature appears within normal limits.  IMPRESSIONS: Normal portable chest x-ray.  This report was finalized on 4/25/2020 9:34 PM by Dr. Nestor Cabrajal M.D.        I ordered the above noted radiological studies. Reviewed by me and discussed with radiologist.  See dictation for official radiology interpretation.      PROCEDURES    Procedures      MEDICATIONS GIVEN IN ER    Medications   sodium chloride 0.9 % flush 10 mL (has no administration in time range)   metoprolol tartrate (LOPRESSOR) injection 5 mg (5 mg Intravenous Given 4/25/20 2139)   metoprolol tartrate (LOPRESSOR) tablet 25 mg (25 mg Oral Given 4/25/20 2138)   apixaban (ELIQUIS) tablet 5 mg (5 mg Oral Given 4/25/20 2145)         PROGRESS, DATA ANALYSIS,  CONSULTS, AND MEDICAL DECISION MAKING    All labs have been independently reviewed by me.  All radiology studies have been reviewed by me and discussed with radiologist dictating the report.   EKG's independently viewed and interpreted by me.  Discussion below represents my analysis of pertinent findings related to patient's condition, differential diagnosis, treatment plan and final disposition.        ED Course as of Apr 25 2333   Sat Apr 25, 2020 2327 EKG at 2022  Sinus rhythm at 74 with frequent PACs  Normal [place], QRS and QT  Nonspecific ST segment abnormalities which are consistent with an EKG from January 27, 2020    [DP]   2328 CBC, chemistry, mag, troponin and TSH are all unremarkable except for potassium of 3.3.    [DP]   2328 While she was here, I did notice that her heart rate would often go up into the 140s and low 150s.  When I was there it was narrow complex and irregular most consistent with atrial fib.    [DP]   2328 She responded nicely to a dose of IV Lopressor, and she was also given a dose of p.o. Lopressor along with Eliquis.    [DP]   2328 Discussed case with Dr. Peterson.  Patient has a Chadds-Vasc score of 2 due to hypertension and being female.    From review of her echocardiogram from last year, she has mild to moderate MR but otherwise unremarkable.  This is not a contraindication to novel anticoagulation, and Dr. Wade agrees with being discharged home on p.o. Lopressor and Eliquis until she has a chance to see Dr. Benedict in the outpatient setting.    [DP]      ED Course User Index  [DP] Elian Contreras MD               AS OF 23:33 VITALS:    BP - 147/80  HR - 98  TEMP - 98.1 °F (36.7 °C) (Oral)  O2 SATS - 98%        DIAGNOSIS  Final diagnoses:   Atrial fibrillation with rapid ventricular response (CMS/HCC)         DISPOSITION  Discharge           Elian Contreras MD  04/25/20 9208

## 2020-04-26 NOTE — ED NOTES
Pt reports that doctor Bola fletcher is her cardiologist. Pt reports that he has already called to informed that she is coming. Pt reports that she started having irregular HR around 1850. Pt reports having HA and dizziness but denies CP. Pt reports that her HR will increase and then go back down. Pt states she has been spring cleaning and up and down.     Abiola Dwyer RN  04/25/20 2018

## 2020-04-26 NOTE — ED NOTES
PIV ATTEMPT PER ANISHA SCHWARTZ THAT WAS UNSUCCESSFUL. ABLE TO DRAW BLOOD BUT INFILTRATED WITH FLUSHING.     Aaliyah Azul RN  04/25/20 2034

## 2020-04-27 ENCOUNTER — TELEMEDICINE (OUTPATIENT)
Dept: CARDIOLOGY | Facility: CLINIC | Age: 60
End: 2020-04-27

## 2020-04-27 ENCOUNTER — TELEPHONE (OUTPATIENT)
Dept: CARDIOLOGY | Facility: CLINIC | Age: 60
End: 2020-04-27

## 2020-04-27 VITALS
WEIGHT: 149 LBS | HEART RATE: 52 BPM | SYSTOLIC BLOOD PRESSURE: 105 MMHG | BODY MASS INDEX: 26.4 KG/M2 | DIASTOLIC BLOOD PRESSURE: 54 MMHG | HEIGHT: 63 IN

## 2020-04-27 DIAGNOSIS — I48.0 PAROXYSMAL ATRIAL FIBRILLATION (HCC): Primary | ICD-10-CM

## 2020-04-27 DIAGNOSIS — I10 ESSENTIAL HYPERTENSION: ICD-10-CM

## 2020-04-27 DIAGNOSIS — I34.0 MITRAL VALVE INSUFFICIENCY, UNSPECIFIED ETIOLOGY: ICD-10-CM

## 2020-04-27 PROCEDURE — 99214 OFFICE O/P EST MOD 30 MIN: CPT | Performed by: NURSE PRACTITIONER

## 2020-04-27 RX ORDER — METOPROLOL TARTRATE 50 MG/1
50 TABLET, FILM COATED ORAL 2 TIMES DAILY
Qty: 60 TABLET | Refills: 11 | Status: SHIPPED | OUTPATIENT
Start: 2020-04-27 | End: 2020-05-22 | Stop reason: SDUPTHER

## 2020-04-27 NOTE — PROGRESS NOTES
[facility] CARDIOLOGY  [address]  [facility] [address]  Phone: [phone]      Patient Name: eR Sheldon  :1960  Age: 60 y.o.  Primary Cardiologist: Bola Benedict MD  Encounter Provider:  JONATHAN Suarez      Chief Complaint:   Chief Complaint   Patient presents with   • Atrial Fibrillation     Video visit         HPI  Re Sheldon is a 60 y.o. female who presents today via video visit secondary to COVID pandemic. Patient presents today for emergency department reevaluation.     Pt has a  history significant for hyperlipidemia, hypertension, PVCs, mitral valve insufficiency.    Patient was seen in the emergency department on 2020 for racing and irregular heartbeat.  During emergency department evaluation patient's ECG revealed normal sinus rhythm with frequent PACs.  While on the heart monitor it was noticed that patient's heart rate would often go up into the 140s-low 150s.  It was thought that this rhythm was most consistent with atrial fibrillation.  Patient responded well to IV Lopressor.  She was given p.o. Lopressor as well as apixaban during the emergency department.  Patient was discharged with oral Lopressor as well as apixaban.    Patient reports that on the night of  she experienced episodes of heart racing and shortness of breath.  She was seen in the ER and diagnosed with PAF.  She was started on Lopressor and apixiban.  She had not started the Lopressor yet because of not being able to get it from the pharmacy. She then had another episode and was advised to take the lopressor, but to monitor her BP with the new medication.   She has been holding her losartan since BP has been controlled.  She has taken the lopressor 50mg BID.  Last night she had an episode where HR elevated about 2 hours before her next scheduled dose, she had elevated HR and took the dose early.  Since that time patient's heart rate has been more  "controlled.  She is concerned because she is unsure if she should be exercising.  She also has questions about whether she should continue her aspirin regimen.    The following portions of the patient's history were reviewed and updated as appropriate: allergies, current medications, past family history, past medical history, past social history, past surgical history and problem list.      Review of Systems   Constitution: Negative for malaise/fatigue.   Cardiovascular: Positive for irregular heartbeat. Negative for chest pain and leg swelling.   Respiratory: Negative for shortness of breath.    Neurological: Negative for light-headedness.   All other systems reviewed and are negative.      OBJECTIVE:     Vitals:    04/27/20 1456   BP: 105/54   Pulse: 52   Weight: 67.6 kg (149 lb)   Height: 160 cm (63\")     Body mass index is 26.39 kg/m².    Physical Exam   Constitutional: She is oriented to person, place, and time. She appears well-developed.   HENT:   Head: Normocephalic and atraumatic.   Eyes: Conjunctivae are normal.   Pulmonary/Chest: No respiratory distress.   Neurological: She is alert and oriented to person, place, and time. GCS eye subscore is 4. GCS verbal subscore is 5. GCS motor subscore is 6.   Psychiatric: She has a normal mood and affect. Her speech is normal and behavior is normal. Judgment and thought content normal. Cognition and memory are normal.         Procedures    Cardiac Procedures:  1. Exercise Myoview stress test 4/10/17: No evidence of ischemia or infarction.  2. Echocardiogram 12/21/16: EF 69%.  Moderate left atrial enlargement.  Mild mitral regurgitation.  Mitral valve leaflets were thickened and myxomatous.  3. Echocardiogram 1/29/18: EF 56-60 percent.  All left ventricular wall segments contract normally.  Left ventricular diastolic function is normal.  Left atrial cavity size is mildly dilated.  Mild to moderate mitral valve regurgitation.  4. Echocardiogram 11/15/2019.  LVEF 66%.  " Mild mitral valve regurgitation.  Mild tricuspid valve regurgitation.  Calculated RVSP 41 mmHg.    ASSESSMENT:      Diagnosis Plan   1. Paroxysmal atrial fibrillation (CMS/HCC)     2. Essential hypertension     3. Mitral valve insufficiency, unspecified etiology           PLAN OF CARE:     1. Paroxysmal atrial fibrillation.  Patient with newly diagnosed atrial fibrillation from emergency department visit.  She was started on metoprolol tartrate 50 mg twice daily and apixaban 5 mg twice daily.  She has not had any bleeding complications with apixaban.  I did recommend that we stop patient's aspirin which she was taking for preventative secondary to history of heart disease.  Patient's blood pressure has been averaging in the low 100s with addition of metoprolol tartrate and this is with holding losartan HCTZ.  I recommended that we stop losartan HCTZ as her blood pressure is well controlled with metoprolol.  Patient was advised that alcohol consumption and obstructive sleep apnea are risk factors for A. fib.  She states that she used to snore and thought she had sleep apnea, however she has lost a significant amount of weight and no longer snores.  If she decides that she would like to do a home sleep study she will call the office so that we can make arrangements for this.  2. HTN.  Blood pressure controlled.  Stop losartan HCTZ and continue metoprolol tartrate.  3. History of mitral valve insufficiency.  Last assessed on echocardiogram 11/2019.  4. Follow-up with Dr. Benedict in 4 months.  Sooner for problems or complications.    This patient has consented to a telehealth visit via video. I spent 36 minutes in total including but not limited to the direct conversation with this patient. All vitals recorded within this visit are reported by the patient.         Discharge Medications           Accurate as of April 27, 2020  3:28 PM. If you have any questions, ask your nurse or doctor.               Changes to  Medications      Instructions Start Date   apixaban 5 MG tablet tablet  Commonly known as:  ELIQUIS  What changed:  when to take this  Changed by:  JONATHAN Suarez   5 mg, Oral, Every 12 Hours Scheduled         Continue These Medications      Instructions Start Date   cetirizine 10 MG tablet  Commonly known as:  zyrTEC   1 OTC tab po QDay for allergies      cholecalciferol 25 MCG (1000 UT) tablet  Commonly known as:  VITAMIN D3   1,000 Units, Oral, Daily      fluticasone 50 MCG/ACT nasal spray  Commonly known as:  FLONASE   Nasal      metoprolol tartrate 50 MG tablet  Commonly known as:  LOPRESSOR   50 mg, Oral, 2 Times Daily         Stop These Medications    aspirin 81 MG chewable tablet  Stopped by:  JONATHAN Suarez     losartan-hydrochlorothiazide 100-25 MG per tablet  Commonly known as:  HYZAAR  Stopped by:  JONATHAN Suarez     meloxicam 15 MG tablet  Commonly known as:  MOBIC  Stopped by:  JONATHAN Suarez            Thank you for allowing me to participate in the care of your patient,         JONATHAN Suarez  [facility]  04/27/20  3:02 PM      **Ivon Disclaimer:**  Much of this encounter note is an electronic transcription/translation of spoken language to printed text. The electronic translation of spoken language may permit erroneous, or at times, nonsensical words or phrases to be inadvertently transcribed. Although I have reviewed the note for such errors, some may still exist.

## 2020-04-27 NOTE — TELEPHONE ENCOUNTER
Can she does be added on for a video visit?  Tomorrow would be best of but if I need to add her on today I can.

## 2020-04-27 NOTE — TELEPHONE ENCOUNTER
04/27/2020@1:34PM  Pt was seen in ER for A.fib.   She was started on Metoprolol and Eliquis.   She has lots of questions about medications.   She is having break through A.Fib before her next dose of Metoprolol.   She states she has not taken her regular b/p medication because concerned if b/p is too low.   She states she has lost about 20 lbs since seen in November and didn't know what medication to take.   She would like to speak to you about this .   She is aware you are doing phone and video visits and that Dr Benedict is out of office.   Pt's call back # [phone]   Thanks John ASENCIO

## 2020-05-17 ENCOUNTER — HOSPITAL ENCOUNTER (EMERGENCY)
Facility: HOSPITAL | Age: 60
Discharge: HOME | End: 2020-05-18
Attending: EMERGENCY MEDICINE | Admitting: EMERGENCY MEDICINE

## 2020-05-17 ENCOUNTER — APPOINTMENT (OUTPATIENT)
Dept: CT IMAGING | Facility: HOSPITAL | Age: 60
End: 2020-05-17

## 2020-05-17 VITALS
WEIGHT: 156.6 LBS | HEART RATE: 64 BPM | SYSTOLIC BLOOD PRESSURE: 163 MMHG | RESPIRATION RATE: 16 BRPM | TEMPERATURE: 98.6 F | DIASTOLIC BLOOD PRESSURE: 86 MMHG | OXYGEN SATURATION: 99 % | HEIGHT: 63 IN | BODY MASS INDEX: 27.75 KG/M2

## 2020-05-17 DIAGNOSIS — G89.29 ACUTE EXACERBATION OF CHRONIC LOW BACK PAIN: ICD-10-CM

## 2020-05-17 DIAGNOSIS — M54.50 ACUTE EXACERBATION OF CHRONIC LOW BACK PAIN: ICD-10-CM

## 2020-05-17 DIAGNOSIS — M54.16 LUMBAR BACK PAIN WITH RADICULOPATHY AFFECTING RIGHT LOWER EXTREMITY: Primary | ICD-10-CM

## 2020-05-17 DIAGNOSIS — M51.36 BULGING LUMBAR DISC: ICD-10-CM

## 2020-05-17 PROCEDURE — 25010000002 MORPHINE PER 10 MG: Performed by: NURSE PRACTITIONER

## 2020-05-17 PROCEDURE — 96372 THER/PROPH/DIAG INJ SC/IM: CPT

## 2020-05-17 PROCEDURE — 72131 CT LUMBAR SPINE W/O DYE: CPT

## 2020-05-17 PROCEDURE — 63710000001 ONDANSETRON ODT 4 MG TABLET DISPERSIBLE: Performed by: NURSE PRACTITIONER

## 2020-05-17 PROCEDURE — 99283 EMERGENCY DEPT VISIT LOW MDM: CPT

## 2020-05-17 RX ORDER — METHYLPREDNISOLONE 4 MG/1
TABLET ORAL
Qty: 1 EACH | Refills: 0 | Status: SHIPPED | OUTPATIENT
Start: 2020-05-17 | End: 2020-06-25

## 2020-05-17 RX ORDER — HYDROCODONE BITARTRATE AND ACETAMINOPHEN 5; 325 MG/1; MG/1
1 TABLET ORAL EVERY 6 HOURS PRN
Qty: 12 TABLET | Refills: 0 | Status: SHIPPED | OUTPATIENT
Start: 2020-05-17 | End: 2020-06-25

## 2020-05-17 RX ORDER — MORPHINE SULFATE 2 MG/ML
4 INJECTION, SOLUTION INTRAMUSCULAR; INTRAVENOUS ONCE
Status: COMPLETED | OUTPATIENT
Start: 2020-05-17 | End: 2020-05-17

## 2020-05-17 RX ORDER — HYDROCODONE BITARTRATE AND ACETAMINOPHEN 5; 325 MG/1; MG/1
1 TABLET ORAL ONCE
Status: DISCONTINUED | OUTPATIENT
Start: 2020-05-17 | End: 2020-05-17

## 2020-05-17 RX ORDER — ONDANSETRON 4 MG/1
4 TABLET, ORALLY DISINTEGRATING ORAL ONCE
Status: COMPLETED | OUTPATIENT
Start: 2020-05-17 | End: 2020-05-17

## 2020-05-17 RX ADMIN — ONDANSETRON 4 MG: 4 TABLET, ORALLY DISINTEGRATING ORAL at 22:50

## 2020-05-17 RX ADMIN — MORPHINE SULFATE 4 MG: 2 INJECTION, SOLUTION INTRAMUSCULAR; INTRAVENOUS at 22:49

## 2020-05-18 NOTE — ED PROVIDER NOTES
I supervised care provided by the midlevel provider.    We have discussed this patient's history, physical exam, and treatment plan.   I have reviewed the note and personally saw and examined the patient and agree with the plan of care.  See attached attending note.  My personal findings are below:    Patient complains of low back pain for the past 2 days.  She denies any injury.  Pain occasionally radiates down into the right thigh and she is also had some intermittent tingling in her toes.  She denies saddle anesthesia or loss of bowel/bladder control.  She denies any prior history of back surgery.    On exam: Patient is awake and alert.  Heart is regular.  Lungs are clear.  Abdomen is soft and nontender.  There is mild tenderness over the lumbar spine.  There is also tenderness over the right lumbar paraspinal muscle.  There is normal strength and sensation in both lower extremities and feet.  Straight leg raises are negative bilaterally.  No saddle anesthesia.    Plan is to treat the patient's pain and to obtain a CT of the lumbar spine.     Miguelito Huang MD  05/17/20 8731

## 2020-05-18 NOTE — ED NOTES
Pt reports low back pain radiating down right leg numbness and tingling reported that comes and goes. Hx of arthritis in low back, o/w no hx, no acute injury.     Dari Benton, RN  05/17/20 2122

## 2020-05-18 NOTE — ED PROVIDER NOTES
EMERGENCY DEPARTMENT ENCOUNTER    Room Number:  01/01  Date of encounter:  5/17/2020  PCP: Angelita Vital MD  Historian: patient   Full history not obtainable due to: none     HPI:  Chief Complaint: Back pain    Context: Re Sheldon is a 60 y.o. female who presents to the ED c/o low back pain onset Friday morning upon waking. Reports constant sharp pain in the low back now radiating to the anterior RLE abetting at the toes. Walking and long periods of sitting make it worse. She denies hx of back problems but had some old muscle relaxers in her medicine cabinet and took them several times without any relief. No incontinence. No saddle anesthesia. No fever. No fall or injury or exertional activity to precipitate the pain.     Reports she had an MRI of spine and saw Dr Chaudhry in 2017 for spinal stenosis and arthritis of the spine.   Anticoagulated Eliquis secondary to atrial fibrillation.     MEDICAL RECORD REVIEW: Reviewed Dr Chaudhry OV note from 2017. He saw her for osteoarthritis. He noted MRI results in 2015 as below:   IMAGING STUDIES: .The lumbar MRI scan performed in 2015 reveals evidence of disc herniation minor dics bulges at L4-5 moder formainal stenosis at L4-5 on the right I personally and independently reviewed these films along with the radiologist report and discussed them in detail with the patient.      PAST MEDICAL HISTORY    Active Ambulatory Problems     Diagnosis Date Noted   • Abdominal pain 07/29/2016   • Acute bronchitis 07/29/2016   • Lumbar radiculopathy 07/29/2016   • Leg pain 07/29/2016   • Acute antritis 07/29/2016   • Bulging of lumbar intervertebral disc 07/29/2016   • Chest pain 07/29/2016   • Chronic left-sided low back pain with left-sided sciatica 07/29/2016   • DDD (degenerative disc disease), lumbar 07/29/2016   • Dysfunction of eustachian tube 07/29/2016   • Fatigue 07/29/2016   • Hyperlipidemia 07/29/2016   • BP (high blood pressure) 07/29/2016   • Abnormal fasting  glucose 2016   • Gonalgia 2016   • Maxillary antritis 2016   • Neck swelling 2016   • Health care maintenance 2016   • Allergic rhinitis 2016   • Bilateral carpal tunnel syndrome 12/15/2016   • Degenerative lumbar spinal stenosis 12/15/2016   • Valvular insufficiency 12/15/2016   • Chest tightness or pressure 2017   • Family history of early CAD 2017   • EKG, abnormal 2017   • PVC (premature ventricular contraction) 2017   • Mitral valve insufficiency 2017   • Vitamin D deficiency 2018     Resolved Ambulatory Problems     Diagnosis Date Noted   • No Resolved Ambulatory Problems     Past Medical History:   Diagnosis Date   • Abnormal ECG    • Atrial fibrillation (CMS/HCC)    • Hypertension    • Mitral regurgitation    • Palpitations    • Premature atrial contractions    • Spinal stenosis    • Systolic murmur    • Tonsillitis          PAST SURGICAL HISTORY  Past Surgical History:   Procedure Laterality Date   • ADENOIDECTOMY     • CARPAL TUNNEL RELEASE Bilateral    • COLONOSCOPY  2014    repeat 10 years , Dr. Karen Spear   • FOOT SURGERY     • HAND SURGERY     • TONSILLECTOMY     • TONSILLECTOMY AND ADENOIDECTOMY     • TUBAL ABDOMINAL LIGATION           FAMILY HISTORY  Family History   Problem Relation Age of Onset   • Heart attack Other    • Breast cancer Other    • Heart disease Other    • Heart disease Mother         Mother with MI at 63, and later  from CHF   • Heart disease Father         Father  from complications after valve replacement at 73   • Stroke Father    • Breast cancer Sister          SOCIAL HISTORY  Social History     Socioeconomic History   • Marital status:      Spouse name: Kedar   • Number of children: 2   • Years of education: Not on file   • Highest education level: Not on file   Occupational History   • Occupation: cook/baker   Tobacco Use   • Smoking status: Never Smoker   • Smokeless tobacco:  Never Used   Substance and Sexual Activity   • Alcohol use: No     Comment: Caffeine use 3-4 cups daily   • Drug use: No   Social History Narrative    .  RETIRED A YEAR AGO FROM [employer]         ALLERGIES  Augmentin [amoxicillin-pot clavulanate]; Bactrim [sulfamethoxazole-trimethoprim]; and Sulfa antibiotics        REVIEW OF SYSTEMS  Review of Systems   All systems reviewed and marked as negative except as listed in HPI       PHYSICAL EXAM    I have reviewed the triage vital signs and nursing notes.    ED Triage Vitals   Temp Heart Rate Resp BP SpO2   05/17/20 2118 05/17/20 2118 05/17/20 2118 05/17/20 2133 05/17/20 2118   98.7 °F (37.1 °C) 72 16 165/77 97 %      Temp src Heart Rate Source Patient Position BP Location FiO2 (%)   05/17/20 2118 05/17/20 2118 05/17/20 2133 05/17/20 2133 --   Tympanic Monitor Lying Right arm        GENERAL: alert well developed, well nourished in no distress  HENT: NCAT, neck supple, trachea midline  EYES: no scleral icterus, PERRL, normal conjunctiva  CV: regular rhythm, regular rate, no murmur  RESPIRATORY: unlabored effort, CTAB  ABDOMEN: soft, non-tender, non-distended, bowel sounds present  MUSCULOSKELETAL: no gross deformity. No lower extremity weakness. No foot drop. Negative SLR bilaterally. Tenderness of L4-L5 vertebral body without stepoff and tenderness of the right SI joint. No rash noted.   NEURO: alert,  sensory and motor function of extremities grossly intact, speech clear, mental status normal/baseline  SKIN: warm, dry, no rash  PSYCH:  Appropriate mood and affect    Vital signs and nursing notes reviewed.          RADIOLOGY  Ct Lumbar Spine Without Contrast    Result Date: 5/17/2020  CT OF THE LUMBAR SPINE WITHOUT CONTRAST  HISTORY: Low back pain. This radiates into the right lower extremity.  COMPARISON: None available.  TECHNIQUE: Axial CT imaging was obtained through the lumbar spine. Coronal and sagittal reformatted images were obtained.   FINDINGS: No acute fracture or subluxation of the lumbar spine is seen. There is mild anterolisthesis of L4 on L5.  T12-L1: There is no canal stenosis or neural foraminal narrowing. L1-L2: There is no canal stenosis or neural foraminal narrowing. L2-L3: There is broad-based disc bulge. This results in some flattening the thecal sac. There is no neural foraminal narrowing. L3-L4: Broad-based disc bulge, associated with some hypertrophy of the ligamentum flavum does result in canal narrowing. There is bilateral neural foraminal narrowing, right greater than left, secondary to disc bulge and facet hypertrophy. L4-L5: Asymmetric disc bulge is present, more significant on the right. There is also some hypertrophy of the ligamentum flavum. This results in canal at the stenosis. There is bilateral neural foraminal narrowing. This appears to be more significant on the right. L5-S1: There is no significant canal stenosis. There does appear to be some narrowing of the lateral recess on the right.  The patient has a retroaortic left renal vein. There is some calcification of the aorta. There is colonic diverticulosis. There is a somewhat prominent appearance to the left ovary. This is incompletely assessed on this examination. This would be better assessed with pelvic ultrasound on a nonemergent outpatient basis.       1. Degenerative changes, as noted above, most significant at L4-L5. 2. Somewhat prominent appearance to the left ovary, incompletely assessed on this exam. Further evaluation with pelvic ultrasound on the nonemergent basis to exclude underlying lesion is recommended.  Radiation dose reduction techniques were utilized, including automated exposure control and exposure modulation based on body size.  This report was finalized on 5/17/2020 11:22 PM by Dr. Daija Inman M.D.        I ordered the above noted radiological studies. Independently reviewed by me and discussed with radiologist.  See dictation above  for official radiology interpretation.      PROCEDURES    Procedures        MEDICATIONS GIVEN IN ER    Medications   ondansetron ODT (ZOFRAN-ODT) disintegrating tablet 4 mg (4 mg Oral Given 5/17/20 2250)   morphine injection 4 mg (4 mg Intramuscular Given 5/17/20 2249)         PROGRESS, DATA ANALYSIS, CONSULTS, AND MEDICAL DECISION MAKING    All radiology studies have been reviewed by me.  Discussion below represents my analysis of pertinent findings related to patient's condition, differential diagnosis, treatment plan and final disposition.    DIFFERENTIAL DIAGNOSIS INCLUDE BUT NOT LIMITED TO: Lumbar go, lumbar radiculopathy, hematoma, epidural abscess, AAA, herniated disc, vertebral fracture, osteomyelitis, cancer, kidney stone, renal infarct, renal colic    ED Course as of May 17 2345   Sun May 17, 2020   2245 I viewed the CT of the lumbar spine on PACS system.  My findings are no fracture    [JS]   2336 Reviewed [facility] report. #[id number]. No encounters found in report.     [JS]   2337 Patient CT scan demonstrates a disc bulge at L4-L5 and some neuroforaminal narrowing which is greater on the right.  This is consistent with her symptoms of right lower extremity radiculopathy.  I will plan to send her home with a short course of oral steroids in addition to some narcotic pain medication and she has been given explicit warnings in regards to addiction, drowsiness, falls and avoiding operating heavy machinery.  She can see Dr. Smith and follow-up as she has seen in the past.  We discussed she may be a candidate for nonsurgical therapies such as physical therapy, dry needling etc.  She would need to discuss this with Dr. Smith after his evaluation.  She is agreeable as planned.  We discussed return precautions including fever, loss of bowel or bladder control, leg weakness, saddle anesthesia or severe pain.    [JS]      ED Course User Index  [JS] Mirian Dwyer, APRN       AS OF 23:45 VITALS:    BP -  165/77  HR - 72  TEMP - 98.7 °F (37.1 °C) (Tympanic)  02 SATS - 97%        DIAGNOSIS  Final diagnoses:   Lumbar back pain with radiculopathy affecting right lower extremity   Acute exacerbation of chronic low back pain   Bulging lumbar disc       DISPOSITION  Discharge        Mirian Dwyer, JONATHAN  05/17/20 0306       Mirian Dwyer, JONATHAN  05/17/20 0089

## 2020-05-18 NOTE — ED NOTES
Pt here for low back pain R>L that started Friday while pt was in [place]. Pain exacerbated during 12+hr drive back to [place].  Pt states pain is not getting better, despite muscle relaxers and heat/cold alternating. Pt placed in comfort position with knees elevated on stretcher. Pt masked, staff masked while in room.      Jennifer Mack RN  05/17/20 0055

## 2020-05-22 RX ORDER — METOPROLOL TARTRATE 50 MG/1
50 TABLET, FILM COATED ORAL 2 TIMES DAILY
Qty: 60 TABLET | Refills: 4 | Status: SHIPPED | OUTPATIENT
Start: 2020-05-22 | End: 2020-09-22

## 2020-06-02 ENCOUNTER — TELEPHONE (OUTPATIENT)
Dept: CARDIOLOGY | Facility: CLINIC | Age: 60
End: 2020-06-02

## 2020-06-02 NOTE — TELEPHONE ENCOUNTER
The patient called and said she had a question to ask I've tried to call her back 3 times and she has not answered.    Pt # [phone]    Thank you  Krystyna

## 2020-06-09 ENCOUNTER — RESULTS ENCOUNTER (OUTPATIENT)
Dept: FAMILY MEDICINE CLINIC | Facility: CLINIC | Age: 60
End: 2020-06-09

## 2020-06-09 DIAGNOSIS — E55.9 VITAMIN D DEFICIENCY: ICD-10-CM

## 2020-06-09 DIAGNOSIS — D64.9 ANEMIA, UNSPECIFIED TYPE: ICD-10-CM

## 2020-06-09 DIAGNOSIS — E78.49 OTHER HYPERLIPIDEMIA: ICD-10-CM

## 2020-06-09 DIAGNOSIS — R73.01 ABNORMAL FASTING GLUCOSE: ICD-10-CM

## 2020-06-09 DIAGNOSIS — I10 ESSENTIAL HYPERTENSION: ICD-10-CM

## 2020-06-25 ENCOUNTER — TELEMEDICINE (OUTPATIENT)
Dept: CARDIOLOGY | Facility: CLINIC | Age: 60
End: 2020-06-25

## 2020-06-25 VITALS
WEIGHT: 153 LBS | BODY MASS INDEX: 27.11 KG/M2 | HEIGHT: 63 IN | DIASTOLIC BLOOD PRESSURE: 66 MMHG | SYSTOLIC BLOOD PRESSURE: 121 MMHG

## 2020-06-25 DIAGNOSIS — I34.0 MITRAL VALVE INSUFFICIENCY, UNSPECIFIED ETIOLOGY: ICD-10-CM

## 2020-06-25 DIAGNOSIS — I10 ESSENTIAL HYPERTENSION: ICD-10-CM

## 2020-06-25 DIAGNOSIS — I48.0 PAROXYSMAL ATRIAL FIBRILLATION (HCC): Primary | ICD-10-CM

## 2020-06-25 PROCEDURE — 99214 OFFICE O/P EST MOD 30 MIN: CPT | Performed by: NURSE PRACTITIONER

## 2020-06-25 NOTE — PROGRESS NOTES
"    [facility] CARDIOLOGY  [address]  [facility] [address]  Phone: [phone]      Patient Name: Re Sheldon  :1960  Age: 60 y.o.  Primary Cardiologist: Bola Benedict MD  Encounter Provider:  JONATHAN Suarez      Chief Complaint:   Chief Complaint   Patient presents with   • Follow-up     video         HPI    Re Sheldon is a 60 y.o. female who presents today via video visit secondary to COVID pandemic. Patient presents today for reevaluation.     Pt has a  history significant for hyperlipidemia, hypertension, PVCs, mitral valve insufficiency.    Patient states that 1 week ago she experienced an episode of PAF.  She states that she could tell the episode was coming on.  Her a-fib went into rapid rate of 160s, she took her evening pills early and HR dropped to the 110s then elevated to the 160s again.  She then took an extra dose of metoprolol tartrate and HR eventually resolved after 3 hours.  She did feel short of breath with the episode.  She has not had any further episodes.  She is compliant with Eliquis.  She is asking if we should re-visit wearing a heart monitor.          The following portions of the patient's history were reviewed and updated as appropriate: allergies, current medications, past family history, past medical history, past social history, past surgical history and problem list.      Review of Systems   Constitution: Negative for malaise/fatigue.   Cardiovascular: Positive for irregular heartbeat. Negative for chest pain and leg swelling.   Respiratory: Negative for shortness of breath.    Neurological: Negative for light-headedness.   All other systems reviewed and are negative.      OBJECTIVE:     Vitals:    20 1050   BP: 121/66   Weight: 69.4 kg (153 lb)   Height: 160 cm (63\")     Body mass index is 27.1 kg/m².    Physical Exam   Constitutional: She is oriented to person, place, and time. She appears " well-developed.   HENT:   Head: Normocephalic and atraumatic.   Eyes: Conjunctivae are normal.   Pulmonary/Chest: No respiratory distress.   Neurological: She is alert and oriented to person, place, and time. GCS eye subscore is 4. GCS verbal subscore is 5. GCS motor subscore is 6.   Psychiatric: She has a normal mood and affect. Her speech is normal and behavior is normal. Judgment and thought content normal. Cognition and memory are normal.         Procedures      Cardiac Procedures:  1. Exercise Myoview stress test 4/10/17: No evidence of ischemia or infarction.  2. Echocardiogram 12/21/16: EF 69%.  Moderate left atrial enlargement.  Mild mitral regurgitation.  Mitral valve leaflets were thickened and myxomatous.  3. Echocardiogram 1/29/18: EF 56-60 percent.  All left ventricular wall segments contract normally.  Left ventricular diastolic function is normal.  Left atrial cavity size is mildly dilated.  Mild to moderate mitral valve regurgitation.  4. Echocardiogram 11/15/2019.  LVEF 66%.  Mild mitral valve regurgitation.  Mild tricuspid valve regurgitation.  Calculated RVSP 41 mmHg.    ASSESSMENT:      Diagnosis Plan   1. Paroxysmal atrial fibrillation (CMS/HCC)  Holter Monitor - 72 Hour Up To 21 Days   2. Essential hypertension     3. Mitral valve insufficiency, unspecified etiology           PLAN OF CARE:     1. Paroxysmal atrial fibrillation.  As noted above, patient had a episode of PAF with RVR last week.  She was able to control HR with an extra dose of metoprolol tartrate.  I advised her that she can continue this practice, but if the extra dose does not control HR that she should call our office.  She is compliant with Eliquis.  Will place ZIO to assess frequency of PAF.  Patient was advised that alcohol consumption and obstructive sleep apnea are risk factors for A. fib.  She states that she used to snore and thought she had sleep apnea, however she has lost a significant amount of weight and no longer  snores.  If she decides that she would like to do a home sleep study she will call the office so that we can make arrangements for this.  2. HTN.  Blood pressure controlled.  Stop losartan HCTZ and continue metoprolol tartrate.  3. History of mitral valve insufficiency.  Last assessed on echocardiogram 11/2019.  4. Follow-up with Dr. Benedict in August.  Sooner for problems or complications.    This patient has consented to a telehealth visit via video. I spent 17 minutes in total including but not limited to the direct conversation with this patient. All vitals recorded within this visit are reported by the patient.         Discharge Medications           Accurate as of June 25, 2020 11:20 AM. If you have any questions, ask your nurse or doctor.               Continue These Medications      Instructions Start Date   apixaban 5 MG tablet tablet  Commonly known as:  ELIQUIS   5 mg, Oral, Every 12 Hours Scheduled      cetirizine 10 MG tablet  Commonly known as:  zyrTEC   1 OTC tab po QDay for allergies      cholecalciferol 25 MCG (1000 UT) tablet  Commonly known as:  VITAMIN D3   1,000 Units, Oral, Daily      fluticasone 50 MCG/ACT nasal spray  Commonly known as:  FLONASE   Nasal      metoprolol tartrate 50 MG tablet  Commonly known as:  LOPRESSOR   50 mg, Oral, 2 Times Daily         Stop These Medications    HYDROcodone-acetaminophen 5-325 MG per tablet  Commonly known as:  NORCO  Stopped by:  JONATHAN Suarez     methylPREDNISolone 4 MG tablet  Commonly known as:  MEDROL (GINGER)  Stopped by:  JONATHAN Suarez            Thank you for allowing me to participate in the care of your patient,         JONATHAN Suarez  [facility]  06/25/20  3:02 PM      **Ivon Disclaimer:**  Much of this encounter note is an electronic transcription/translation of spoken language to printed text. The electronic translation of spoken language may permit erroneous, or at times, nonsensical words or  phrases to be inadvertently transcribed. Although I have reviewed the note for such errors, some may still exist.

## 2020-08-20 ENCOUNTER — OFFICE VISIT (OUTPATIENT)
Dept: CARDIOLOGY | Facility: CLINIC | Age: 60
End: 2020-08-20

## 2020-08-20 VITALS
HEART RATE: 54 BPM | BODY MASS INDEX: 28.49 KG/M2 | DIASTOLIC BLOOD PRESSURE: 78 MMHG | OXYGEN SATURATION: 98 % | HEIGHT: 63 IN | SYSTOLIC BLOOD PRESSURE: 128 MMHG | WEIGHT: 160.8 LBS

## 2020-08-20 DIAGNOSIS — R01.1 SYSTOLIC MURMUR: ICD-10-CM

## 2020-08-20 DIAGNOSIS — I48.0 PAROXYSMAL ATRIAL FIBRILLATION (HCC): Primary | ICD-10-CM

## 2020-08-20 DIAGNOSIS — R94.31 ABNORMAL EKG: ICD-10-CM

## 2020-08-20 DIAGNOSIS — I34.0 NONRHEUMATIC MITRAL VALVE REGURGITATION: ICD-10-CM

## 2020-08-20 PROCEDURE — 99215 OFFICE O/P EST HI 40 MIN: CPT | Performed by: INTERNAL MEDICINE

## 2020-08-21 ENCOUNTER — APPOINTMENT (OUTPATIENT)
Dept: WOMENS IMAGING | Facility: HOSPITAL | Age: 60
End: 2020-08-21

## 2020-08-21 PROCEDURE — 77063 BREAST TOMOSYNTHESIS BI: CPT | Performed by: RADIOLOGY

## 2020-08-21 PROCEDURE — 77067 SCR MAMMO BI INCL CAD: CPT | Performed by: RADIOLOGY

## 2020-08-22 NOTE — PROGRESS NOTES
Date of Office Visit: 2020  Encounter Provider: Callum Benedict MD  Place of Service: [facility] CARDIOLOGY  Patient Name: Re Sheldon  :1960    Chief complaint: Follow-up for paroxysmal atrial fibrillation, mitral regurgitation, systolic murmur, abnormal EKG.    History of Present Illness:    I again had the pleasure of seeing the patient in cardiology office on 2020.  She is a very pleasant 60 year-old female with a history of mitral regurgitation, paroxysmal atrial fibrillation, and a systolic murmur who presents for follow-up.  The patient had formerly seen Dr. Pnea in the [facility] system, but switched care to me on 2018.    She has a history of an abnormal EKG for years with inferolateral ST and T wave changes at baseline.  She had multiple stress test in the past because of this, and these were normal.  She also has a history of a thickened and myxomatous mitral valve with mitral regurgitation.  She experienced paroxysmal atrial fibrillation with rapid response on 2020.  She was very symptomatic at the time, and felt her heart racing.  She was started on metoprolol and Eliquis afterwards.  She also has a longstanding history of a harsh systolic murmur.  She did have an echocardiogram on 11/15/2019 which showed an ejection fraction of 66%.  The mitral regurgitation was read as mild, although on my review, this was highly eccentric and posteriorly directed, and it was more likely moderate.  She also appeared to have significant turbulence at her aortic valve, consistent with either a subaortic membrane or true aortic stenosis.  Her gradients across the aortic valve showed a peak of 20 mmHg and a mean of 13 mmHg.    The patient presents today for follow-up.  She has had only 3 episodes of atrial fibrillation since starting the metoprolol.  The metoprolol was increased to 50 mg twice a day, and her baseline heart rate typically runs in the  50s.  She does have fatigue which was worse initially after starting the metoprolol.  This does seem to be getting better in her opinion.  She has not had any chest pain or shortness of breath.  She has had no difficulty taking the Eliquis.    Past Medical History:   Diagnosis Date   • Abnormal ECG     Inferolateral ST-T changes at baseline   • Anemia    • Atrial fibrillation (CMS/HCC)    • Hyperlipidemia    • Hypertension    • Mitral regurgitation    • PAF (paroxysmal atrial fibrillation) (CMS/HCC)    • Palpitations    • Premature atrial contractions    • Spinal stenosis    • Systolic murmur    • Tonsillitis    • Vitamin D deficiency        Past Surgical History:   Procedure Laterality Date   • ADENOIDECTOMY     • CARPAL TUNNEL RELEASE Bilateral    • COLONOSCOPY  08/04/2014    repeat 10 years , Dr. Karen Spear   • FOOT SURGERY     • HAND SURGERY     • TONSILLECTOMY     • TONSILLECTOMY AND ADENOIDECTOMY     • TUBAL ABDOMINAL LIGATION         Current Outpatient Medications on File Prior to Visit   Medication Sig Dispense Refill   • apixaban (ELIQUIS) 5 MG tablet tablet Take 1 tablet by mouth Every 12 (Twelve) Hours. 60 tablet 4   • cetirizine (zyrTEC) 10 MG tablet 1 OTC tab po QDay for allergies     • cholecalciferol (VITAMIN D3) 25 MCG (1000 UT) tablet Take 1,000 Units by mouth Daily.     • fluticasone (FLONASE) 50 MCG/ACT nasal spray into each nostril.     • metoprolol tartrate (LOPRESSOR) 50 MG tablet Take 1 tablet by mouth 2 (Two) Times a Day. 60 tablet 4     No current facility-administered medications on file prior to visit.      Allergies as of 08/20/2020 - Reviewed 06/25/2020   Allergen Reaction Noted   • Augmentin [amoxicillin-pot clavulanate] Diarrhea and Other (See Comments) 07/25/2016   • Bactrim [sulfamethoxazole-trimethoprim] Diarrhea and Other (See Comments) 07/25/2016   • Sulfa antibiotics Diarrhea 07/25/2016     Social History     Socioeconomic History   • Marital status:      Spouse name:  "Kedar   • Number of children: 2   • Years of education: Not on file   • Highest education level: Not on file   Occupational History   • Occupation: cook/baker   Tobacco Use   • Smoking status: Never Smoker   • Smokeless tobacco: Never Used   Substance and Sexual Activity   • Alcohol use: No     Comment: Caffeine use 3-4 cups daily   • Drug use: No   Social History Narrative    .  RETIRED A YEAR AGO FROM [employer]     Family History   Problem Relation Age of Onset   • Heart attack Other    • Breast cancer Other    • Heart disease Other    • Heart disease Mother         Mother with MI at 63, and later  from CHF   • Heart disease Father         Father  from complications after valve replacement at 73   • Stroke Father    • Breast cancer Sister        Review of Systems   Constitution: Positive for malaise/fatigue.   Cardiovascular: Positive for palpitations.   All other systems reviewed and are negative.     Objective:     Vitals:    20 1507   BP: 128/78   Pulse: 54   SpO2: 98%   Weight: 72.9 kg (160 lb 12.8 oz)   Height: 160 cm (62.99\")     Body mass index is 28.49 kg/m².    Physical Exam   Constitutional: She is oriented to person, place, and time. She appears well-developed and well-nourished.   HENT:   Head: Normocephalic and atraumatic.   Eyes: Conjunctivae are normal.   Neck: Neck supple.   Cardiovascular: Normal rate and regular rhythm. Exam reveals no gallop and no friction rub.   Murmur heard.   Harsh systolic murmur is present with a grade of 3/6 at the upper right sternal border and upper left sternal border.  Pulmonary/Chest: Effort normal and breath sounds normal.   Abdominal: Soft. There is no tenderness.   Musculoskeletal: She exhibits no edema.   Neurological: She is alert and oriented to person, place, and time.   Skin: Skin is warm.   Psychiatric: She has a normal mood and affect. Her behavior is normal.     Lab Review:   Procedures      Assessment:       " Diagnosis Plan   1. Paroxysmal atrial fibrillation (CMS/[facility])     2. Nonrheumatic mitral valve regurgitation     3. Systolic murmur     4. Abnormal EKG       Plan:       With regards to the atrial fibrillation, I had a long talk with her today.  She is fatigued, although it seems to be getting slightly better.  I did tell her that we could decrease the metoprolol dose and adjust her medications to see if this would help.  However, she prefers to stay on metoprolol at 50 mg twice a day as she states that it seems to be working with regards to the atrial fibrillation.  She has had only 3 known episodes since she was originally diagnosed on 4/25/2020.  She has had no issues taking the Eliquis, and she will continue on 5 mg twice a day.  She does not want to get a sleep study at this point as she has a high deductible, and states that it would be very expensive.    With regards to her murmur, it is very prominent, harsh, and really consistent with aortic stenosis.  I reviewed her echocardiogram images from 11/15/2019.  She does have turbulence of the aortic valve, and does have some degree of stenosis as well.  Her peak gradient was 20 mmHg and her mean gradient was 13 mmHg.  I could not see the valve itself very well, and there was a questionable subaortic membrane as well.  I am going to recommend that she get a PATRICA in the future as I do feel it is important to see if she has a bicuspid valve versus a subaortic membrane.  I also felt that her mitral regurgitation was worse than what it was called on the echocardiogram.  It is highly eccentric and posteriorly directed.  I would also like to reevaluate this as well during the PATRICA.  She is not having any symptoms where she would need any type of valve surgery at this point.    I will see her back in the office in 4 months.  I reviewed her echocardiogram after she left.  I am going to call her to discuss the PATRICA and potentially setting this up in the future.    I spent 45  minutes in the care of the patient, including review of her old echocardiogram images.  Greater than 50% of this time was spent face-to-face counseling with the patient regarding her paroxysmal atrial fibrillation, systolic murmur, and mitral regurgitation management.

## 2020-08-24 DIAGNOSIS — I35.0 NONRHEUMATIC AORTIC VALVE STENOSIS: Primary | ICD-10-CM

## 2020-08-25 ENCOUNTER — TRANSCRIBE ORDERS (OUTPATIENT)
Dept: CARDIOLOGY | Facility: CLINIC | Age: 60
End: 2020-08-25

## 2020-08-25 DIAGNOSIS — Z13.6 SCREENING FOR CARDIOVASCULAR CONDITION: Primary | ICD-10-CM

## 2020-08-25 DIAGNOSIS — Z01.810 PREPROCEDURAL CARDIOVASCULAR EXAMINATION: ICD-10-CM

## 2020-08-26 ENCOUNTER — TRANSCRIBE ORDERS (OUTPATIENT)
Dept: SLEEP MEDICINE | Facility: HOSPITAL | Age: 60
End: 2020-08-26

## 2020-08-26 DIAGNOSIS — Z01.818 OTHER SPECIFIED PRE-OPERATIVE EXAMINATION: Primary | ICD-10-CM

## 2020-08-28 ENCOUNTER — LAB (OUTPATIENT)
Dept: LAB | Facility: HOSPITAL | Age: 60
End: 2020-08-28

## 2020-08-28 DIAGNOSIS — Z13.6 SCREENING FOR CARDIOVASCULAR CONDITION: ICD-10-CM

## 2020-08-28 DIAGNOSIS — Z01.818 OTHER SPECIFIED PRE-OPERATIVE EXAMINATION: ICD-10-CM

## 2020-08-28 DIAGNOSIS — Z01.810 PREPROCEDURAL CARDIOVASCULAR EXAMINATION: ICD-10-CM

## 2020-08-28 LAB
ANION GAP SERPL CALCULATED.3IONS-SCNC: 10.2 MMOL/L (ref 5–15)
BASOPHILS # BLD AUTO: 0.05 10*3/MM3 (ref 0–0.2)
BASOPHILS NFR BLD AUTO: 0.7 % (ref 0–1.5)
BUN SERPL-MCNC: 16 MG/DL (ref 6–20)
BUN/CREAT SERPL: 22.5 (ref 7–25)
CALCIUM SPEC-SCNC: 9.6 MG/DL (ref 8.6–10.5)
CHLORIDE SERPL-SCNC: 103 MMOL/L (ref 98–107)
CO2 SERPL-SCNC: 27.8 MMOL/L (ref 22–29)
CREAT SERPL-MCNC: 0.71 MG/DL (ref 0.57–1)
DEPRECATED RDW RBC AUTO: 42.5 FL (ref 37–54)
EOSINOPHIL # BLD AUTO: 0.24 10*3/MM3 (ref 0–0.4)
EOSINOPHIL NFR BLD AUTO: 3.6 % (ref 0.3–6.2)
ERYTHROCYTE [DISTWIDTH] IN BLOOD BY AUTOMATED COUNT: 14.2 % (ref 12.3–15.4)
GFR SERPL CREATININE-BSD FRML MDRD: 84 ML/MIN/1.73
GLUCOSE SERPL-MCNC: 89 MG/DL (ref 65–99)
HCT VFR BLD AUTO: 37.1 % (ref 34–46.6)
HGB BLD-MCNC: 12.1 G/DL (ref 12–15.9)
IMM GRANULOCYTES # BLD AUTO: 0.04 10*3/MM3 (ref 0–0.05)
IMM GRANULOCYTES NFR BLD AUTO: 0.6 % (ref 0–0.5)
LYMPHOCYTES # BLD AUTO: 1.75 10*3/MM3 (ref 0.7–3.1)
LYMPHOCYTES NFR BLD AUTO: 25.9 % (ref 19.6–45.3)
MCH RBC QN AUTO: 27 PG (ref 26.6–33)
MCHC RBC AUTO-ENTMCNC: 32.6 G/DL (ref 31.5–35.7)
MCV RBC AUTO: 82.8 FL (ref 79–97)
MONOCYTES # BLD AUTO: 0.58 10*3/MM3 (ref 0.1–0.9)
MONOCYTES NFR BLD AUTO: 8.6 % (ref 5–12)
NEUTROPHILS NFR BLD AUTO: 4.09 10*3/MM3 (ref 1.7–7)
NEUTROPHILS NFR BLD AUTO: 60.6 % (ref 42.7–76)
NRBC BLD AUTO-RTO: 0 /100 WBC (ref 0–0.2)
PLATELET # BLD AUTO: 258 10*3/MM3 (ref 140–450)
PMV BLD AUTO: 9.8 FL (ref 6–12)
POTASSIUM SERPL-SCNC: 4.4 MMOL/L (ref 3.5–5.2)
RBC # BLD AUTO: 4.48 10*6/MM3 (ref 3.77–5.28)
SODIUM SERPL-SCNC: 141 MMOL/L (ref 136–145)
WBC # BLD AUTO: 6.75 10*3/MM3 (ref 3.4–10.8)

## 2020-08-28 PROCEDURE — 36415 COLL VENOUS BLD VENIPUNCTURE: CPT

## 2020-08-28 PROCEDURE — C9803 HOPD COVID-19 SPEC COLLECT: HCPCS

## 2020-08-28 PROCEDURE — 80048 BASIC METABOLIC PNL TOTAL CA: CPT

## 2020-08-28 PROCEDURE — U0004 COV-19 TEST NON-CDC HGH THRU: HCPCS

## 2020-08-28 PROCEDURE — 85025 COMPLETE CBC W/AUTO DIFF WBC: CPT

## 2020-08-29 LAB
REF LAB TEST METHOD: NORMAL
SARS-COV-2 RNA RESP QL NAA+PROBE: NOT DETECTED

## 2020-08-31 ENCOUNTER — HOSPITAL ENCOUNTER (OUTPATIENT)
Dept: CARDIOLOGY | Facility: HOSPITAL | Age: 60
Discharge: HOME | End: 2020-08-31
Admitting: INTERNAL MEDICINE

## 2020-08-31 VITALS
DIASTOLIC BLOOD PRESSURE: 57 MMHG | OXYGEN SATURATION: 96 % | HEART RATE: 61 BPM | SYSTOLIC BLOOD PRESSURE: 124 MMHG | RESPIRATION RATE: 16 BRPM

## 2020-08-31 DIAGNOSIS — I35.0 NONRHEUMATIC AORTIC VALVE STENOSIS: ICD-10-CM

## 2020-08-31 LAB
BH CV ECHO MEAS - AO MAX PG (FULL): 18.5 MMHG
BH CV ECHO MEAS - AO MAX PG: 25.2 MMHG
BH CV ECHO MEAS - AO MEAN PG (FULL): 10 MMHG
BH CV ECHO MEAS - AO MEAN PG: 14 MMHG
BH CV ECHO MEAS - AO V2 MAX: 251 CM/SEC
BH CV ECHO MEAS - AO V2 MEAN: 173 CM/SEC
BH CV ECHO MEAS - AO V2 VTI: 58.1 CM
BH CV ECHO MEAS - AVA(I,A): 1.5 CM^2
BH CV ECHO MEAS - AVA(I,D): 1.5 CM^2
BH CV ECHO MEAS - AVA(V,A): 1.3 CM^2
BH CV ECHO MEAS - AVA(V,D): 1.3 CM^2
BH CV ECHO MEAS - BSA(HAYCOCK): 1.8 M^2
BH CV ECHO MEAS - BSA: 1.8 M^2
BH CV ECHO MEAS - BZI_BMI: 28.3 KILOGRAMS/M^2
BH CV ECHO MEAS - BZI_METRIC_HEIGHT: 160 CM
BH CV ECHO MEAS - BZI_METRIC_WEIGHT: 72.6 KG
BH CV ECHO MEAS - LV MAX PG: 6.7 MMHG
BH CV ECHO MEAS - LV MEAN PG: 4 MMHG
BH CV ECHO MEAS - LV V1 MAX: 129 CM/SEC
BH CV ECHO MEAS - LV V1 MEAN: 98.1 CM/SEC
BH CV ECHO MEAS - LV V1 VTI: 33.4 CM
BH CV ECHO MEAS - LVOT AREA (M): 2.5 CM^2
BH CV ECHO MEAS - LVOT AREA: 2.5 CM^2
BH CV ECHO MEAS - LVOT DIAM: 1.8 CM
BH CV ECHO MEAS - MV A DUR: 0.17 SEC
BH CV ECHO MEAS - MV A MAX VEL: 46.9 CM/SEC
BH CV ECHO MEAS - MV DEC TIME: 0.17 SEC
BH CV ECHO MEAS - MV E MAX VEL: 125 CM/SEC
BH CV ECHO MEAS - MV E/A: 2.7
BH CV ECHO MEAS - RAP SYSTOLE: 3 MMHG
BH CV ECHO MEAS - RVSP: 57 MMHG
BH CV ECHO MEAS - SI(LVOT): 48.3 ML/M^2
BH CV ECHO MEAS - SV(LVOT): 85 ML
LV EF 2D ECHO EST: 60 %

## 2020-08-31 PROCEDURE — 25010000002 MIDAZOLAM PER 1 MG: Performed by: INTERNAL MEDICINE

## 2020-08-31 PROCEDURE — 25010000002 HYDRALAZINE PER 20 MG: Performed by: INTERNAL MEDICINE

## 2020-08-31 PROCEDURE — 93312 ECHO TRANSESOPHAGEAL: CPT

## 2020-08-31 PROCEDURE — 99152 MOD SED SAME PHYS/QHP 5/>YRS: CPT

## 2020-08-31 PROCEDURE — 93320 DOPPLER ECHO COMPLETE: CPT | Performed by: INTERNAL MEDICINE

## 2020-08-31 PROCEDURE — 25010000002 FENTANYL CITRATE (PF) 100 MCG/2ML SOLUTION: Performed by: INTERNAL MEDICINE

## 2020-08-31 PROCEDURE — 93325 DOPPLER ECHO COLOR FLOW MAPG: CPT

## 2020-08-31 PROCEDURE — 99153 MOD SED SAME PHYS/QHP EA: CPT

## 2020-08-31 PROCEDURE — 93320 DOPPLER ECHO COMPLETE: CPT

## 2020-08-31 PROCEDURE — 93325 DOPPLER ECHO COLOR FLOW MAPG: CPT | Performed by: INTERNAL MEDICINE

## 2020-08-31 PROCEDURE — 93312 ECHO TRANSESOPHAGEAL: CPT | Performed by: INTERNAL MEDICINE

## 2020-08-31 RX ORDER — HYDRALAZINE HYDROCHLORIDE 20 MG/ML
INJECTION INTRAMUSCULAR; INTRAVENOUS
Status: COMPLETED | OUTPATIENT
Start: 2020-08-31 | End: 2020-08-31

## 2020-08-31 RX ORDER — FENTANYL CITRATE 50 UG/ML
INJECTION, SOLUTION INTRAMUSCULAR; INTRAVENOUS
Status: COMPLETED | OUTPATIENT
Start: 2020-08-31 | End: 2020-08-31

## 2020-08-31 RX ORDER — MIDAZOLAM HYDROCHLORIDE 1 MG/ML
INJECTION INTRAMUSCULAR; INTRAVENOUS
Status: COMPLETED | OUTPATIENT
Start: 2020-08-31 | End: 2020-08-31

## 2020-08-31 RX ORDER — LIDOCAINE HYDROCHLORIDE 20 MG/ML
SOLUTION OROPHARYNGEAL
Status: COMPLETED | OUTPATIENT
Start: 2020-08-31 | End: 2020-08-31

## 2020-08-31 RX ORDER — SODIUM CHLORIDE 9 MG/ML
INJECTION, SOLUTION INTRAVENOUS
Status: COMPLETED | OUTPATIENT
Start: 2020-08-31 | End: 2020-08-31

## 2020-08-31 RX ADMIN — MIDAZOLAM HYDROCHLORIDE 2 MG: 1 INJECTION, SOLUTION INTRAMUSCULAR; INTRAVENOUS at 08:14

## 2020-08-31 RX ADMIN — FENTANYL CITRATE 25 MCG: 50 INJECTION, SOLUTION INTRAMUSCULAR; INTRAVENOUS at 08:14

## 2020-08-31 RX ADMIN — MIDAZOLAM HYDROCHLORIDE 1 MG: 1 INJECTION, SOLUTION INTRAMUSCULAR; INTRAVENOUS at 08:19

## 2020-08-31 RX ADMIN — MIDAZOLAM HYDROCHLORIDE 1 MG: 1 INJECTION, SOLUTION INTRAMUSCULAR; INTRAVENOUS at 08:17

## 2020-08-31 RX ADMIN — LIDOCAINE HYDROCHLORIDE 10 ML: 20 SOLUTION ORAL; TOPICAL at 08:03

## 2020-08-31 RX ADMIN — FENTANYL CITRATE 25 MCG: 50 INJECTION, SOLUTION INTRAMUSCULAR; INTRAVENOUS at 08:21

## 2020-08-31 RX ADMIN — SODIUM CHLORIDE 50 ML/HR: 9 INJECTION, SOLUTION INTRAVENOUS at 08:01

## 2020-08-31 RX ADMIN — HYDRALAZINE HYDROCHLORIDE 10 MG: 20 INJECTION INTRAMUSCULAR; INTRAVENOUS at 08:36

## 2020-09-01 DIAGNOSIS — I70.0 ATHEROSCLEROSIS OF AORTA (HCC): Primary | ICD-10-CM

## 2020-09-01 DIAGNOSIS — Z82.49 FAMILY HISTORY OF CORONARY ARTERY DISEASE: ICD-10-CM

## 2020-09-01 NOTE — PROGRESS NOTES
I called and went over the results with her.  She has valvular disease which neither of us were expecting.  She is going to need another echocardiogram in 6 months to reevaluate, mainly the mitral regurgitation.  The aortic valve is tricuspid, which is somewhat surprising at her age that it is calcified.  She did have a significant amount of plaque in her aorta.  I have also ordered a coronary calcium score via CT scan.  Further plans will be made pending the results of this.  I did tell her that she can decrease her metoprolol to 25 mg twice a day and watch her blood pressure.  The 50 mg twice a day dose was causing excessive fatigue.  However, I did also tell her that with her left atrium being significantly enlarged, recurrent atrial fibrillation may be a possibility.    GM English

## 2020-09-14 ENCOUNTER — HOSPITAL ENCOUNTER (OUTPATIENT)
Dept: CT IMAGING | Facility: HOSPITAL | Age: 60
Discharge: HOME | End: 2020-09-14
Admitting: INTERNAL MEDICINE

## 2020-09-14 DIAGNOSIS — I70.0 ATHEROSCLEROSIS OF AORTA (HCC): ICD-10-CM

## 2020-09-14 DIAGNOSIS — Z82.49 FAMILY HISTORY OF CORONARY ARTERY DISEASE: ICD-10-CM

## 2020-09-14 PROCEDURE — 75571 CT HRT W/O DYE W/CA TEST: CPT

## 2020-09-18 NOTE — PROGRESS NOTES
I called and spoke with the patient. Likelihood of CAD moderately high.  She has not tolerated statins, including severe issues with Crestor and Pravachol.  Not sure she is a good candidate for statins.  Other options include Praluent or Repatha.  I am not going to start ASA as she is already on Eliquis.    GM

## 2020-09-21 ENCOUNTER — TELEPHONE (OUTPATIENT)
Dept: CARDIOLOGY | Facility: CLINIC | Age: 60
End: 2020-09-21

## 2020-09-21 NOTE — TELEPHONE ENCOUNTER
Pt left msg saying she spoke to you last week and you said she needed to be started on a statin and was going to give her samples of something but she hasn't heard anything.    Please advise.    Thanks,  Santa    Pt #[phone]                 Signed         09/18/20    I called and spoke with the patient. Likelihood of CAD moderately high.  She has not tolerated statins, including severe issues with Crestor and Pravachol.  Not sure she is a good candidate for statins.  Other options include Praluent or Repatha.  I am not going to start ASA as she is already on Eliquis.     GM

## 2020-09-21 NOTE — TELEPHONE ENCOUNTER
Santa,    There are samples of Livalo for her at the .  Can you please let her know?  Thanks     GM

## 2020-09-22 ENCOUNTER — TELEPHONE (OUTPATIENT)
Dept: NEUROSURGERY | Facility: CLINIC | Age: 60
End: 2020-09-22

## 2020-09-22 RX ORDER — METOPROLOL TARTRATE 50 MG/1
TABLET, FILM COATED ORAL
Qty: 180 TABLET | Refills: 1 | Status: SHIPPED | OUTPATIENT
Start: 2020-09-22 | End: 2021-04-14

## 2020-09-23 NOTE — TELEPHONE ENCOUNTER
Dr Benedict,  Will you please place lab order for pt to have labs drawn after she starts Livalo.   Thanks John ASENCIO

## 2020-09-23 NOTE — TELEPHONE ENCOUNTER
Spoke with pt and she has picked up Livalo.   She was wanting to know about labs and where and when to get them drawn. I gave her instructions on this and I let her know I would have Dr Benedict place order for labs.   John ASENCIO

## 2020-09-25 ENCOUNTER — TELEPHONE (OUTPATIENT)
Dept: NEUROSURGERY | Facility: CLINIC | Age: 60
End: 2020-09-25

## 2020-09-25 NOTE — TELEPHONE ENCOUNTER
Patient self referring, can you look at her lumbar CT report at [facility] and tell me how soon she needs to be seen ?

## 2020-10-06 ENCOUNTER — TELEPHONE (OUTPATIENT)
Dept: GASTROENTEROLOGY | Facility: CLINIC | Age: 60
End: 2020-10-06

## 2020-10-06 NOTE — TELEPHONE ENCOUNTER
Spoke with patient and informed her that Dr Hairston reviewed her OA paperwork and previous c/s report.  He said that unless she is having new GI issues he did not think that she needs a repeat c/s until 2024.  Dr Hairston said that if she would like to discuss this she can make an appt to see him in the office.

## 2020-10-07 NOTE — PROGRESS NOTES
Subjective   History of Present Illness: Re Sheldon is a 60 y.o. female is being seen for consultation today at the request of Self Referring for 2nd opinion for low back pain.  Patient had CT scan at [facility] 5.17.20    Patient saw Dr Renny Chaudhry 7.20.17 for low back pain and was released prn.    Patient states that she is has intermittent severe left leg pain. She states that it is in the thigh area and it feels like someone is stabbing her. She says that this pain has been intermittent for the past 20 years, but in the last 4 weeks it has become severe. Patient states that if she's weight bearing  for a long period of time, the pain will come.    Patient takes Tylenol OTC for the pain.    The patient is here for my opinion. She has had chronic low back pain for 20-odd years, mainly on the right side. A few months ago when she was jet skiing in [place] she had a severe flareup that sent her to the emergency room here where she got a CT scan which did show some facet disease and some mild to moderate spinal stenosis at L4-L5 with a mild retrolisthesis. That settled down, so she is back at her baseline in terms of the low back pain. She has had intermittent episodes of numbness and tingling and pain in the distribution of the lateral femoral cutaneous nerve on the left consistent with meralgia paresthetica. She has a sensory exam consistent with that diagnosis. She said this has been going on intermittently for the last 20 or so years. She even looked up on [facility] and diagnosed herself and I told her she was correct about this. In the past anti-inflammatories have helped her, but she has recently been diagnosed with atrial fibrillation and is now on Eliquis and her cardiologist told her not to take anti-inflammatories. She says this happens at least once a day and has been flared up over the last few months as well. We talked about gabapentin which can be used for this, but she was reluctant to take it because of  known side effects. Injections to the lateral femoral cutaneous nerve can be considered, but frankly she is not actually having pain at this moment and we decided to just keep an eye on her. Will do a televisit in 4 months and if it flares up in the left leg she will let us know. The low back pain on the right side is manageable. She does not want to go therapy because of the large copays, but I encouraged her to do the exercises that she already knows how to do. Will do the televisit in 4 months.         Back Pain  The pain is present in the lumbar spine. The pain is at a severity of 0/10. The patient is experiencing no pain. The symptoms are aggravated by bending, position and standing. Stiffness is present in the morning. Associated symptoms include leg pain, numbness, tingling and weakness. Pertinent negatives include no bladder incontinence, bowel incontinence, dysuria or fever.   Leg Pain   The pain is present in the left thigh. The quality of the pain is described as stabbing and burning. The pain is at a severity of 0/10. The pain has been intermittent since onset. Associated symptoms include numbness and tingling. The symptoms are aggravated by movement, palpation and weight bearing.       The following portions of the patient's history were reviewed and updated as appropriate: allergies, current medications, past family history, past medical history, past social history, past surgical history and problem list.    Review of Systems   Constitutional: Negative for chills and fever.   HENT: Negative for congestion.    Gastrointestinal: Negative for bowel incontinence.   Genitourinary: Negative for bladder incontinence, difficulty urinating and dysuria.   Musculoskeletal: Positive for back pain. Negative for gait problem and joint swelling.   Neurological: Positive for tingling, weakness and numbness.   All other systems reviewed and are negative.          Objective     Vitals:    10/26/20 1025   BP: 144/74  "  Cuff Size: Adult   Pulse: 59   Temp: 97.5 °F (36.4 °C)   Weight: 72.6 kg (160 lb)   Height: 160 cm (62.99\")     Body mass index is 28.35 kg/m².      Physical Exam  Constitutional:       Appearance: She is well-developed.   HENT:      Head: Normocephalic and atraumatic.   Eyes:      Extraocular Movements: EOM normal.      Conjunctiva/sclera: Conjunctivae normal.      Pupils: Pupils are equal, round, and reactive to light.      Funduscopic exam:     Right eye: No papilledema.         Left eye: No papilledema.   Neck:      Vascular: No carotid bruit.   Neurological:      Mental Status: She is oriented to person, place, and time.      Coordination: Finger-Nose-Finger Test and Heel to Shin Test normal.      Gait: Gait is intact.      Deep Tendon Reflexes:      Reflex Scores:       Tricep reflexes are 2+ on the right side and 2+ on the left side.       Bicep reflexes are 2+ on the right side and 2+ on the left side.       Brachioradialis reflexes are 2+ on the right side and 2+ on the left side.       Patellar reflexes are 2+ on the right side and 2+ on the left side.       Achilles reflexes are 2+ on the right side and 2+ on the left side.  Psychiatric:         Speech: Speech normal.       Neurologic Exam     Mental Status   Oriented to person, place, and time.   Registration of memory: Good recent and remote memory.   Attention: normal. Concentration: normal.   Speech: speech is normal   Level of consciousness: alert  Knowledge: consistent with education.     Cranial Nerves     CN II   Visual fields full to confrontation.   Visual acuity: normal    CN III, IV, VI   Pupils are equal, round, and reactive to light.  Extraocular motions are normal.     CN V   Facial sensation intact.   Right corneal reflex: normal  Left corneal reflex: normal    CN VII   Facial expression full, symmetric.   Right facial weakness: none  Left facial weakness: none    CN VIII   Hearing: intact    CN IX, X   Palate: symmetric    CN XI   Right " sternocleidomastoid strength: normal  Left sternocleidomastoid strength: normal    CN XII   Tongue: not atrophic  Tongue deviation: none    Motor Exam   Muscle bulk: normal  Right arm tone: normal  Left arm tone: normal  Right leg tone: normal  Left leg tone: normal    Strength   Strength 5/5 except as noted.     Sensory Exam   Light touch normal.   Diminished sensation in the distribution of the lateral femoral cutaneous nerve     Gait, Coordination, and Reflexes     Gait  Gait: normal    Coordination   Finger to nose coordination: normal  Heel to shin coordination: normal    Reflexes   Right brachioradialis: 2+  Left brachioradialis: 2+  Right biceps: 2+  Left biceps: 2+  Right triceps: 2+  Left triceps: 2+  Right patellar: 2+  Left patellar: 2+  Right achilles: 2+  Left achilles: 2+  Right : 2+  Left : 2+          Assessment/Plan   Independent Review of Radiographic Studies:      I personally reviewed the images from the following studies.    I reviewed the CT scan done on 5/17/2020 which shows mild retrolisthesis at L4-L5 with some mild spinal stenosis and quite a bit of facet disease.  Agree with the report.        Medical Decision Making:      Again, the back pain is from the degenerative disc disease and the mild retrolisthesis.  The leg pain is from the meralgia paresthetica.  We will keep an eye on her.  She does not want to take gabapentin.  We will do a televisit in 4 months.  If the meralgia paresthetica ask and does not subside on its own, we can consider sending her to pain management for a lateral femoral cutaneous nerve block on the left.      Diagnoses and all orders for this visit:    1. DDD (degenerative disc disease), lumbar (Primary)    2. Meralgia paresthetica of left side    3. Chronic right-sided low back pain without sciatica      Return in about 4 months (around 2/26/2021) for As a televisit.

## 2020-10-21 ENCOUNTER — TELEPHONE (OUTPATIENT)
Dept: CARDIOLOGY | Facility: CLINIC | Age: 60
End: 2020-10-21

## 2020-10-21 DIAGNOSIS — I25.10 CORONARY ARTERY DISEASE INVOLVING NATIVE CORONARY ARTERY OF NATIVE HEART WITHOUT ANGINA PECTORIS: Primary | ICD-10-CM

## 2020-10-21 NOTE — TELEPHONE ENCOUNTER
Dr Benedict,  Pt is calling to let you know that she has  About 8 days of her Livalo samples left. She said she has tolerated it with out any adverse effects.    She is asking for a rx to be sent to her pharmacy, but it may need a PA.  I can provide her with samples if needed until we get it approved for her, if you let me know that dose she is on.  Alethea Cortez RN  Triage nurse

## 2020-10-21 NOTE — TELEPHONE ENCOUNTER
She is taking livalo 2mg per day she would like the rx to go to [facility]    She is asking about lab work you wanted done, I don't seen an order.  She would like to have it done at [facility]    I have pulled 4 boxes of samples for her  Lot: [id number]  Exp: 10/2022    Pt notified and will be by Monday to pick them up.  Thanks  Alethea Cortez RN  Triage nurse

## 2020-10-21 NOTE — TELEPHONE ENCOUNTER
Sure, this sounds good.  Can you please verify with her what dose she is currently taking, and I will send the prescription to her pharmacy?  If you could also get her samples, that would be great.  Thanks.

## 2020-10-21 NOTE — TELEPHONE ENCOUNTER
Alethea,    I sent the prescription in for the Livalo at 2 mg/day.  I will see if a prior authorization gets placed to me.  [facility].  She can go anytime convenient, although she will need to be fasting.  Can you please let her know?  Thanks.    AHSAN

## 2020-10-22 RX ORDER — APIXABAN 5 MG/1
TABLET, FILM COATED ORAL
Qty: 60 TABLET | Refills: 6 | Status: SHIPPED | OUTPATIENT
Start: 2020-10-22 | End: 2021-05-26

## 2020-10-22 RX ORDER — APIXABAN 5 MG/1
TABLET, FILM COATED ORAL
Qty: 60 TABLET | Refills: 6 | Status: SHIPPED | OUTPATIENT
Start: 2020-10-22 | End: 2020-12-16 | Stop reason: SDUPTHER

## 2020-10-26 ENCOUNTER — RESULTS ENCOUNTER (OUTPATIENT)
Dept: CARDIOLOGY | Facility: CLINIC | Age: 60
End: 2020-10-26

## 2020-10-26 ENCOUNTER — OFFICE VISIT (OUTPATIENT)
Dept: NEUROSURGERY | Facility: CLINIC | Age: 60
End: 2020-10-26

## 2020-10-26 VITALS
HEART RATE: 59 BPM | DIASTOLIC BLOOD PRESSURE: 74 MMHG | TEMPERATURE: 97.5 F | BODY MASS INDEX: 28.35 KG/M2 | SYSTOLIC BLOOD PRESSURE: 144 MMHG | WEIGHT: 160 LBS | HEIGHT: 63 IN

## 2020-10-26 DIAGNOSIS — G89.29 CHRONIC RIGHT-SIDED LOW BACK PAIN WITHOUT SCIATICA: ICD-10-CM

## 2020-10-26 DIAGNOSIS — G57.12 MERALGIA PARESTHETICA OF LEFT SIDE: ICD-10-CM

## 2020-10-26 DIAGNOSIS — M51.36 DDD (DEGENERATIVE DISC DISEASE), LUMBAR: Primary | ICD-10-CM

## 2020-10-26 DIAGNOSIS — M54.50 CHRONIC RIGHT-SIDED LOW BACK PAIN WITHOUT SCIATICA: ICD-10-CM

## 2020-10-26 DIAGNOSIS — I25.10 CORONARY ARTERY DISEASE INVOLVING NATIVE CORONARY ARTERY OF NATIVE HEART WITHOUT ANGINA PECTORIS: ICD-10-CM

## 2020-10-26 PROCEDURE — 99204 OFFICE O/P NEW MOD 45 MIN: CPT | Performed by: NEUROLOGICAL SURGERY

## 2020-10-27 ENCOUNTER — TELEPHONE (OUTPATIENT)
Dept: NEUROSURGERY | Facility: CLINIC | Age: 60
End: 2020-10-27

## 2020-10-27 DIAGNOSIS — M54.16 LUMBAR RADICULOPATHY: Primary | ICD-10-CM

## 2020-10-27 NOTE — TELEPHONE ENCOUNTER
Caller: PT    Relationship: SELF    Best call back number: [phone]    What orders are you requesting (i.e. lab or imaging): PHYSICAL THERAPY     In what timeframe would the patient need to come in: ASAP    Where will you receive your lab/imaging services: [facility] IN [place] [id number]  [phone]    Additional notes: PT STATES THAT HER DEDUCTIBLE WAS MET AND SHE WOULD LIKE TO GO AHEAD WITH PHYSICAL THERAPY NOW.     PLEASE ADVISE  THANK YOU

## 2020-10-27 NOTE — TELEPHONE ENCOUNTER
She told me she did not want to go to physical therapy because of the large co-pays.  If there is Dr. Santa Dwyer and we can asked him to renew it, that is lumbar PT, 2 times a week x6 weeks focusing on core strengthening and flexibility.  I think she has a follow-up visit in a few months.  Keep it that way.

## 2020-10-29 LAB
ALBUMIN SERPL-MCNC: 4.6 G/DL (ref 3.8–4.9)
ALP SERPL-CCNC: 99 IU/L (ref 39–117)
ALT SERPL-CCNC: 16 IU/L (ref 0–32)
AST SERPL-CCNC: 14 IU/L (ref 0–40)
BILIRUB DIRECT SERPL-MCNC: 0.1 MG/DL (ref 0–0.4)
BILIRUB SERPL-MCNC: 0.3 MG/DL (ref 0–1.2)
CHOLEST SERPL-MCNC: 205 MG/DL (ref 100–199)
HDLC SERPL-MCNC: 54 MG/DL
LDLC SERPL CALC-MCNC: 125 MG/DL (ref 0–99)
PROT SERPL-MCNC: 7.3 G/DL (ref 6–8.5)
TRIGL SERPL-MCNC: 147 MG/DL (ref 0–149)
VLDLC SERPL CALC-MCNC: 26 MG/DL (ref 5–40)

## 2020-11-19 ENCOUNTER — TELEPHONE (OUTPATIENT)
Dept: CARDIOLOGY | Facility: CLINIC | Age: 60
End: 2020-11-19

## 2020-11-19 NOTE — TELEPHONE ENCOUNTER
I called spoke with pt. I have samples she can  at .   I have not received a PA for Livalo. I let her now that I may have to submit the RX back to Pharmacy to get PA to be sent to me.   I have 4 boxes of Livalo 2mg   Lot # [id number] exp 10 /2022  And I have given her a copay card as well to get RX for $18.   She will call back if more samples are needed until PA is complete  John ASENCIO

## 2020-11-19 NOTE — TELEPHONE ENCOUNTER
Pt called and wanted to know if she was approved for Livalo and if the RX can be sent in.  She further stated that she is down to 6 tablets and if she was not approved will need samples.

## 2020-12-12 LAB
25(OH)D3+25(OH)D2 SERPL-MCNC: 26.9 NG/ML (ref 30–100)
ALBUMIN SERPL-MCNC: 4.4 G/DL (ref 3.8–4.9)
ALBUMIN/GLOB SERPL: 1.7 {RATIO} (ref 1.2–2.2)
ALP SERPL-CCNC: 87 IU/L (ref 39–117)
ALT SERPL-CCNC: 14 IU/L (ref 0–32)
AST SERPL-CCNC: 14 IU/L (ref 0–40)
BASOPHILS # BLD AUTO: 0 X10E3/UL (ref 0–0.2)
BASOPHILS NFR BLD AUTO: 1 %
BILIRUB SERPL-MCNC: 0.4 MG/DL (ref 0–1.2)
BUN SERPL-MCNC: 17 MG/DL (ref 6–24)
BUN/CREAT SERPL: 22 (ref 9–23)
CALCIUM SERPL-MCNC: 9.3 MG/DL (ref 8.7–10.2)
CHLORIDE SERPL-SCNC: 103 MMOL/L (ref 96–106)
CHOLEST SERPL-MCNC: 210 MG/DL (ref 100–199)
CO2 SERPL-SCNC: 27 MMOL/L (ref 20–29)
CREAT SERPL-MCNC: 0.79 MG/DL (ref 0.57–1)
EOSINOPHIL # BLD AUTO: 0.1 X10E3/UL (ref 0–0.4)
EOSINOPHIL NFR BLD AUTO: 2 %
ERYTHROCYTE [DISTWIDTH] IN BLOOD BY AUTOMATED COUNT: 13.5 % (ref 11.7–15.4)
FERRITIN SERPL-MCNC: 93 NG/ML (ref 15–150)
GLOBULIN SER CALC-MCNC: 2.6 G/DL (ref 1.5–4.5)
GLUCOSE SERPL-MCNC: 93 MG/DL (ref 65–99)
HBA1C MFR BLD: 5.7 % (ref 4.8–5.6)
HCT VFR BLD AUTO: 40 % (ref 34–46.6)
HDLC SERPL-MCNC: 71 MG/DL
HGB BLD-MCNC: 12.8 G/DL (ref 11.1–15.9)
IMM GRANULOCYTES # BLD AUTO: 0 X10E3/UL (ref 0–0.1)
IMM GRANULOCYTES NFR BLD AUTO: 0 %
IRON SERPL-MCNC: 68 UG/DL (ref 27–159)
LDLC SERPL CALC-MCNC: 125 MG/DL (ref 0–99)
LDLC/HDLC SERPL: 1.8 RATIO (ref 0–3.2)
LYMPHOCYTES # BLD AUTO: 1.9 X10E3/UL (ref 0.7–3.1)
LYMPHOCYTES NFR BLD AUTO: 29 %
MCH RBC QN AUTO: 27.6 PG (ref 26.6–33)
MCHC RBC AUTO-ENTMCNC: 32 G/DL (ref 31.5–35.7)
MCV RBC AUTO: 86 FL (ref 79–97)
MONOCYTES # BLD AUTO: 0.5 X10E3/UL (ref 0.1–0.9)
MONOCYTES NFR BLD AUTO: 7 %
NEUTROPHILS # BLD AUTO: 3.9 X10E3/UL (ref 1.4–7)
NEUTROPHILS NFR BLD AUTO: 61 %
PLATELET # BLD AUTO: 280 X10E3/UL (ref 150–450)
POTASSIUM SERPL-SCNC: 5 MMOL/L (ref 3.5–5.2)
PROT SERPL-MCNC: 7 G/DL (ref 6–8.5)
RBC # BLD AUTO: 4.64 X10E6/UL (ref 3.77–5.28)
SODIUM SERPL-SCNC: 143 MMOL/L (ref 134–144)
T4 FREE SERPL-MCNC: 1.08 NG/DL (ref 0.82–1.77)
TRIGL SERPL-MCNC: 78 MG/DL (ref 0–149)
TSH SERPL DL<=0.005 MIU/L-ACNC: 2.1 UIU/ML (ref 0.45–4.5)
VLDLC SERPL CALC-MCNC: 14 MG/DL (ref 5–40)
WBC # BLD AUTO: 6.4 X10E3/UL (ref 3.4–10.8)

## 2020-12-16 ENCOUNTER — OFFICE VISIT (OUTPATIENT)
Dept: FAMILY MEDICINE CLINIC | Facility: CLINIC | Age: 60
End: 2020-12-16

## 2020-12-16 VITALS
BODY MASS INDEX: 28.77 KG/M2 | SYSTOLIC BLOOD PRESSURE: 134 MMHG | HEART RATE: 62 BPM | HEIGHT: 63 IN | RESPIRATION RATE: 16 BRPM | WEIGHT: 162.4 LBS | OXYGEN SATURATION: 96 % | DIASTOLIC BLOOD PRESSURE: 78 MMHG | TEMPERATURE: 97.5 F

## 2020-12-16 DIAGNOSIS — I38 VALVULAR INSUFFICIENCY: ICD-10-CM

## 2020-12-16 DIAGNOSIS — E55.9 VITAMIN D DEFICIENCY: ICD-10-CM

## 2020-12-16 DIAGNOSIS — I25.10 CORONARY ARTERY DISEASE INVOLVING NATIVE HEART WITHOUT ANGINA PECTORIS, UNSPECIFIED VESSEL OR LESION TYPE: ICD-10-CM

## 2020-12-16 DIAGNOSIS — R73.01 ABNORMAL FASTING GLUCOSE: ICD-10-CM

## 2020-12-16 DIAGNOSIS — E78.49 OTHER HYPERLIPIDEMIA: Primary | ICD-10-CM

## 2020-12-16 PROCEDURE — 99396 PREV VISIT EST AGE 40-64: CPT | Performed by: INTERNAL MEDICINE

## 2020-12-16 NOTE — PROGRESS NOTES
Subjective   Re Sheldon is a 60 y.o. female who comes in today for   Chief Complaint   Patient presents with   • Annual Exam   .    History of Present Illness   Here for CPE. In mid April, started with rapid heart rate and was dx with AF.  HR was 164 at home and therefore went to ER.  Was started on eliquis 5 mg bid.  Mid summer hurt her back jet skiing and was relieved by muscle relaxers. Back is normal now.  Later saw her heart doctor, Dr. Benedict.   he wanted her to do PATRICA which showed that She has insufficient valves and aortic plaque /calcifications.  CT Calcium score was then done and was  high and she was dx with CAD.  That is when livalo 2 mg daily was started a few months ago and so far she is tolerating it.  Was having ha's and went to eye doctor and was dx with acute angle glaucoma.  She had to have bilateral procedures on her eyes which improved the pressure but she is still having ha's at times.  She is also asking about dietary changes to lower her risk of CAD.  Some constipation over the weekend which started after she limited her fiber intake.  No blood in stool  Stomach hurting off and on since being constipated.  Was suppose to get screening CN this year but got postponed due to covid and then Dr. Hairston was able to locate her last CN which was in 2014 and said her next is due in 2024.  However, she recently has had the change in stools and more importantly her niece was dx with CRC last week.    The following portions of the patient's history were reviewed and updated as appropriate: allergies, current medications, past family history, past medical history, past social history, past surgical history and problem list.    Review of Systems   Constitutional: Negative for unexpected weight change.   Gastrointestinal:        Got constipated over the weekend and her stomach then got upset for a few days.  Had loose stool for a day.  No blood in stool.  Niece just dx with CRC and that is worrying  "her.     Neurological: Positive for headaches.       /78 (BP Location: Left arm, Patient Position: Sitting, Cuff Size: Adult)   Pulse 62   Temp 97.5 °F (36.4 °C)   Resp 16   Ht 160 cm (63\")   Wt 73.7 kg (162 lb 6.4 oz)   SpO2 96%   BMI 28.77 kg/m²     STEADI Fall Risk Assessment has not been completed.    PHQ-2/PHQ-9 Depression Screening 10/17/2018   Little interest or pleasure in doing things 0   Feeling down, depressed, or hopeless 0   Trouble falling or staying asleep, or sleeping too much -   Feeling tired or having little energy -   Poor appetite or overeating -   Feeling bad about yourself - or that you are a failure or have let yourself or your family down -   Trouble concentrating on things, such as reading the newspaper or watching television -   Moving or speaking so slowly that other people could have noticed. Or the opposite - being so fidgety or restless that you have been moving around a lot more than usual -   Thoughts that you would be better off dead, or of hurting yourself in some way -   Total Score 0   If you checked off any problems, how difficult have these problems made it for you to do your work, take care of things at home, or get along with other people? -       Objective   Physical Exam  Vitals signs and nursing note reviewed.   Constitutional:       Appearance: Normal appearance. She is well-developed.   HENT:      Head: Normocephalic and atraumatic.      Right Ear: External ear normal.      Left Ear: External ear normal.   Eyes:      Extraocular Movements: Extraocular movements intact.      Conjunctiva/sclera: Conjunctivae normal.   Neck:      Musculoskeletal: Neck supple.      Vascular: No carotid bruit.   Cardiovascular:      Rate and Rhythm: Normal rate and regular rhythm.      Heart sounds: Normal heart sounds.      Comments: No bruits  Pulmonary:      Effort: Pulmonary effort is normal. No respiratory distress.      Breath sounds: Normal breath sounds. No wheezing or " rales.   Abdominal:      General: Bowel sounds are normal. There is no distension.      Palpations: Abdomen is soft. There is no mass.      Tenderness: There is no abdominal tenderness.   Lymphadenopathy:      Cervical: No cervical adenopathy.   Skin:     General: Skin is warm.   Neurological:      General: No focal deficit present.      Mental Status: She is alert and oriented to person, place, and time.   Psychiatric:         Mood and Affect: Mood normal.         Behavior: Behavior normal.         Thought Content: Thought content normal.         Judgment: Judgment normal.           Current Outpatient Medications:   •  cetirizine (zyrTEC) 10 MG tablet, 1 OTC tab po QDay for allergies, Disp: , Rfl:   •  cholecalciferol (VITAMIN D3) 25 MCG (1000 UT) tablet, Take 1,000 Units by mouth Daily., Disp: , Rfl:   •  Eliquis 5 MG tablet tablet, TAKE 1 TABLET BY MOUTH EVERY 12 HOURS, Disp: 60 tablet, Rfl: 6  •  fluticasone (FLONASE) 50 MCG/ACT nasal spray, into each nostril., Disp: , Rfl:   •  metoprolol tartrate (LOPRESSOR) 50 MG tablet, TAKE 1 TABLET BY MOUTH TWICE A DAY, Disp: 180 tablet, Rfl: 1  •  pitavastatin calcium (Livalo) 2 MG tablet tablet, Take 1 tablet by mouth Every Night., Disp: 90 tablet, Rfl: 3    Assessment/Plan   Diagnoses and all orders for this visit:    1. Other hyperlipidemia (Primary)  -     Ambulatory Referral to Nutrition Services    2. Valvular insufficiency    3. Vitamin D deficiency    4. Abnormal fasting glucose  -     Ambulatory Referral to Nutrition Services    5. Coronary artery disease involving native heart without angina pectoris, unspecified vessel or lesion type  -     Ambulatory Referral to Nutrition Services    increase  Vit D supplementation  Start psyllium for fiber to help with constipation and it may benefit her cholesterol too  Sent a note to Dr. Hairston about getting screening CN in 2021 due to +fhx of CRC  Will increase livalo to 4 mg qd and recheck FLP and CMP in 4 weeks  Reviewed  package insert on livalo and ha's weren't listed as a side effect.  Most likely livalo isn't the cause of her ha's    Will refer to nutritionist due to difficulty losing weight and keeping cholesterol in good control.  Also would be good to learn how to lower sugars and starches in her diet.                  I have asked for the patient to return to clinic in 12month(s).

## 2020-12-17 DIAGNOSIS — E78.49 OTHER HYPERLIPIDEMIA: Primary | ICD-10-CM

## 2020-12-20 DIAGNOSIS — Z80.0 FAMILY HISTORY OF COLON CANCER: Primary | ICD-10-CM

## 2020-12-22 ENCOUNTER — TELEPHONE (OUTPATIENT)
Dept: GASTROENTEROLOGY | Facility: CLINIC | Age: 60
End: 2020-12-22

## 2020-12-22 ENCOUNTER — PREP FOR SURGERY (OUTPATIENT)
Dept: OTHER | Facility: HOSPITAL | Age: 60
End: 2020-12-22

## 2020-12-22 DIAGNOSIS — Z80.0 FAMILY HISTORY OF COLON CANCER: Primary | ICD-10-CM

## 2020-12-30 ENCOUNTER — TELEPHONE (OUTPATIENT)
Dept: FAMILY MEDICINE CLINIC | Facility: CLINIC | Age: 60
End: 2020-12-30

## 2020-12-31 ENCOUNTER — HOSPITAL ENCOUNTER (OUTPATIENT)
Dept: DIABETES SERVICES | Facility: HOSPITAL | Age: 60
Discharge: HOME | End: 2020-12-31
Admitting: INTERNAL MEDICINE

## 2020-12-31 PROCEDURE — 97802 MEDICAL NUTRITION INDIV IN: CPT | Performed by: DIETITIAN, REGISTERED

## 2021-01-13 ENCOUNTER — TRANSCRIBE ORDERS (OUTPATIENT)
Dept: LAB | Facility: HOSPITAL | Age: 61
End: 2021-01-13

## 2021-01-13 DIAGNOSIS — Z01.818 OTHER SPECIFIED PRE-OPERATIVE EXAMINATION: Primary | ICD-10-CM

## 2021-01-18 ENCOUNTER — TELEPHONE (OUTPATIENT)
Dept: CARDIOLOGY | Facility: CLINIC | Age: 61
End: 2021-01-18

## 2021-01-18 NOTE — TELEPHONE ENCOUNTER
"Pt left msg saying she is having a colonoscopy 01/22/21 and has been asked to hold her Eliquis for 72 hrs prior.  Is this ok?  She also asked if it's ok for her to be put in a \"light sleep\" for this procedure.  Is this ok?    Pt # [phone]      Thanks,  Santa"

## 2021-01-18 NOTE — TELEPHONE ENCOUNTER
Spoke to pt who verbalized understanding of the msg.  She also asked for samples of Livalo 4 MG and I told her I would leave them at the ./prt    Livalo 4 MG   # 28    LOT # [id number]    EXP 03/2023

## 2021-01-18 NOTE — TELEPHONE ENCOUNTER
I am fine with her holding the Eliquis for 72 hours prior to the colonoscopy.  I am also okay with her getting light sedation for the procedure.  Can you please let her know?  Thanks.    GM

## 2021-01-20 ENCOUNTER — LAB (OUTPATIENT)
Dept: LAB | Facility: HOSPITAL | Age: 61
End: 2021-01-20

## 2021-01-20 DIAGNOSIS — Z01.818 OTHER SPECIFIED PRE-OPERATIVE EXAMINATION: ICD-10-CM

## 2021-01-21 ENCOUNTER — TRANSCRIBE ORDERS (OUTPATIENT)
Dept: SLEEP MEDICINE | Facility: HOSPITAL | Age: 61
End: 2021-01-21

## 2021-01-21 ENCOUNTER — LAB (OUTPATIENT)
Dept: LAB | Facility: HOSPITAL | Age: 61
End: 2021-01-21

## 2021-01-21 DIAGNOSIS — Z01.818 OTHER SPECIFIED PRE-OPERATIVE EXAMINATION: Primary | ICD-10-CM

## 2021-01-21 LAB — SARS-COV-2 ORF1AB RESP QL NAA+PROBE: NOT DETECTED

## 2021-01-21 PROCEDURE — C9803 HOPD COVID-19 SPEC COLLECT: HCPCS | Performed by: INTERNAL MEDICINE

## 2021-01-21 PROCEDURE — U0004 COV-19 TEST NON-CDC HGH THRU: HCPCS | Performed by: INTERNAL MEDICINE

## 2021-01-22 ENCOUNTER — ANESTHESIA EVENT (OUTPATIENT)
Dept: GASTROENTEROLOGY | Facility: HOSPITAL | Age: 61
End: 2021-01-22

## 2021-01-22 ENCOUNTER — ANESTHESIA (OUTPATIENT)
Dept: GASTROENTEROLOGY | Facility: HOSPITAL | Age: 61
End: 2021-01-22

## 2021-01-22 ENCOUNTER — HOSPITAL ENCOUNTER (OUTPATIENT)
Facility: HOSPITAL | Age: 61
Setting detail: HOSPITAL OUTPATIENT SURGERY
Discharge: HOME | End: 2021-01-22
Attending: INTERNAL MEDICINE | Admitting: INTERNAL MEDICINE

## 2021-01-22 VITALS
TEMPERATURE: 98.4 F | RESPIRATION RATE: 16 BRPM | WEIGHT: 163.19 LBS | SYSTOLIC BLOOD PRESSURE: 156 MMHG | HEIGHT: 63 IN | OXYGEN SATURATION: 99 % | BODY MASS INDEX: 28.91 KG/M2 | HEART RATE: 61 BPM | DIASTOLIC BLOOD PRESSURE: 78 MMHG

## 2021-01-22 DIAGNOSIS — Z80.0 FAMILY HISTORY OF COLON CANCER: ICD-10-CM

## 2021-01-22 PROCEDURE — 25010000002 PROPOFOL 10 MG/ML EMULSION: Performed by: REGISTERED NURSE

## 2021-01-22 PROCEDURE — 45380 COLONOSCOPY AND BIOPSY: CPT | Performed by: INTERNAL MEDICINE

## 2021-01-22 PROCEDURE — 88305 TISSUE EXAM BY PATHOLOGIST: CPT | Performed by: INTERNAL MEDICINE

## 2021-01-22 RX ORDER — PROPOFOL 10 MG/ML
VIAL (ML) INTRAVENOUS AS NEEDED
Status: DISCONTINUED | OUTPATIENT
Start: 2021-01-22 | End: 2021-01-22 | Stop reason: SURG

## 2021-01-22 RX ORDER — PROPOFOL 10 MG/ML
VIAL (ML) INTRAVENOUS CONTINUOUS PRN
Status: DISCONTINUED | OUTPATIENT
Start: 2021-01-22 | End: 2021-01-22 | Stop reason: SURG

## 2021-01-22 RX ORDER — SODIUM CHLORIDE, SODIUM LACTATE, POTASSIUM CHLORIDE, CALCIUM CHLORIDE 600; 310; 30; 20 MG/100ML; MG/100ML; MG/100ML; MG/100ML
1000 INJECTION, SOLUTION INTRAVENOUS CONTINUOUS
Status: DISCONTINUED | OUTPATIENT
Start: 2021-01-22 | End: 2021-01-22 | Stop reason: HOSPADM

## 2021-01-22 RX ORDER — SODIUM CHLORIDE 0.9 % (FLUSH) 0.9 %
10 SYRINGE (ML) INJECTION AS NEEDED
Status: DISCONTINUED | OUTPATIENT
Start: 2021-01-22 | End: 2021-01-22 | Stop reason: HOSPADM

## 2021-01-22 RX ORDER — LIDOCAINE HYDROCHLORIDE 10 MG/ML
0.5 INJECTION, SOLUTION INFILTRATION; PERINEURAL ONCE AS NEEDED
Status: DISCONTINUED | OUTPATIENT
Start: 2021-01-22 | End: 2021-01-22 | Stop reason: HOSPADM

## 2021-01-22 RX ORDER — LIDOCAINE HYDROCHLORIDE 20 MG/ML
INJECTION, SOLUTION INFILTRATION; PERINEURAL AS NEEDED
Status: DISCONTINUED | OUTPATIENT
Start: 2021-01-22 | End: 2021-01-22 | Stop reason: SURG

## 2021-01-22 RX ADMIN — LIDOCAINE HYDROCHLORIDE 60 MG: 20 INJECTION, SOLUTION INFILTRATION; PERINEURAL at 15:00

## 2021-01-22 RX ADMIN — PROPOFOL 60 MG: 10 INJECTION, EMULSION INTRAVENOUS at 15:00

## 2021-01-22 RX ADMIN — SODIUM CHLORIDE, POTASSIUM CHLORIDE, SODIUM LACTATE AND CALCIUM CHLORIDE 1000 ML: 600; 310; 30; 20 INJECTION, SOLUTION INTRAVENOUS at 14:50

## 2021-01-22 RX ADMIN — PROPOFOL 160 MCG/KG/MIN: 10 INJECTION, EMULSION INTRAVENOUS at 15:00

## 2021-01-22 NOTE — ANESTHESIA POSTPROCEDURE EVALUATION
"Patient: Re Burrelllps    Procedure Summary     Date: 01/22/21 [facility]    Anesthesia Start: 1457 Anesthesia Stop: 1536    Procedure: COLONOSCOPY to cecum with cold biopsy polypectomy (N/A ) Diagnosis:       Family history of colon cancer      (Family history of colon cancer [Z80.0])    Surgeon: Ray Hairston MD Provider: Jennifer Olson MD    Anesthesia Type: MAC ASA Status: 3          Anesthesia Type: MAC    Vitals  Vitals Value Taken Time   /76 01/22/21 1533   Temp     Pulse 64 01/22/21 1533   Resp 16 01/22/21 1533   SpO2 100 % 01/22/21 1533           Post Anesthesia Care and Evaluation    Patient location during evaluation: PHASE II  Patient participation: complete - patient participated  Level of consciousness: awake and alert  Pain management: adequate  Airway patency: patent  Anesthetic complications: No anesthetic complications    Cardiovascular status: acceptable  Respiratory status: acceptable  Hydration status: acceptable    Comments: /76 (BP Location: Left arm, Patient Position: Lying)   Pulse 64   Temp 36.9 °C (98.4 °F) (Oral)   Resp 16   Ht 160 cm (63\")   Wt 74 kg (163 lb 3 oz)   SpO2 100%   BMI 28.91 kg/m²         "

## 2021-01-22 NOTE — ANESTHESIA PREPROCEDURE EVALUATION
Anesthesia Evaluation     Patient summary reviewed and Nursing notes reviewed   NPO Solid Status: > 8 hours  NPO Liquid Status: > 2 hours           Airway   Mallampati: I  TM distance: >3 FB  Neck ROM: full  No difficulty expected  Dental - normal exam     Pulmonary - negative pulmonary ROS and normal exam   Cardiovascular - normal exam    (+) hypertension, valvular problems/murmurs MR, CAD, dysrhythmias Atrial Fib, hyperlipidemia,     ROS comment: Small pfo    Neuro/Psych  (+) numbness,     GI/Hepatic/Renal/Endo - negative ROS     Musculoskeletal     Abdominal  - normal exam    Bowel sounds: normal.   Substance History - negative use     OB/GYN negative ob/gyn ROS         Other   arthritis,                    Anesthesia Plan    ASA 3     MAC       Anesthetic plan, all risks, benefits, and alternatives have been provided, discussed and informed consent has been obtained with: patient.

## 2021-01-22 NOTE — DISCHARGE INSTRUCTIONS

## 2021-01-22 NOTE — H&P
[facility]  Pre Procedure History & Physical    Chief Complaint:   Time for my colonoscopy    Subjective     HPI:   60 y.o. female who is here for a screening colonoscopy. Her niece had colon cancer. She last had a colonoscopy in . She has no GI c/o. She has been off of Eliquis since this past Monday (today is Friday). She is on Eliquis for a fib.     Past Medical History:   Past Medical History:   Diagnosis Date   • Abnormal ECG     Inferolateral ST-T changes at baseline   • Anemia    • Atrial fibrillation (CMS/HCC)    • Calcification of aortic valve 2020   • Coronary artery disease    • Hyperlipidemia    • Hypertension    • Low back pain    • Mitral regurgitation    • PAF (paroxysmal atrial fibrillation) (CMS/HCC)    • Palpitations    • Premature atrial contractions    • Spinal stenosis    • Systolic murmur    • Tonsillitis    • Vitamin D deficiency        Family History:  Family History   Problem Relation Age of Onset   • Heart attack Other    • Breast cancer Other    • Heart disease Other    • Heart disease Mother         Mother with MI at 63, and later  from CHF   • Heart disease Father         Father  from complications after valve replacement at 73   • Stroke Father    • Breast cancer Sister    • Malig Hyperthermia Neg Hx        Social History:   reports that she has never smoked. She has never used smokeless tobacco. She reports that she does not drink alcohol or use drugs.    Medications:   Medications Prior to Admission   Medication Sig Dispense Refill Last Dose   • cetirizine (zyrTEC) 10 MG tablet 1 OTC tab po QDay for allergies   2021 at Unknown time   • cholecalciferol (VITAMIN D3) 25 MCG (1000 UT) tablet Take 1,000 Units by mouth Daily.   2021 at Unknown time   • Eliquis 5 MG tablet tablet TAKE 1 TABLET BY MOUTH EVERY 12 HOURS (Patient taking differently: 5 mg Every 12 (Twelve) Hours. HOLDING FOR SCOPE) 60 tablet 6 2021   • fluticasone (FLONASE)  "50 MCG/ACT nasal spray into each nostril.   1/21/2021 at Unknown time   • metoprolol tartrate (LOPRESSOR) 50 MG tablet TAKE 1 TABLET BY MOUTH TWICE A  tablet 1 1/22/2021 at Unknown time   • Pitavastatin Calcium 4 MG tablet Take 1 tablet by mouth Every Night. 30 tablet 4 1/21/2021 at Unknown time       Allergies:  Augmentin [amoxicillin-pot clavulanate], Bactrim [sulfamethoxazole-trimethoprim], and Sulfa antibiotics    ROS:    Pertinent items are noted in HPI     Objective     Blood pressure 151/69, pulse 70, temperature 98.4 °F (36.9 °C), temperature source Oral, resp. rate 16, height 160 cm (63\"), weight 74 kg (163 lb 3 oz), SpO2 98 %.    Physical Exam   Constitutional: Pt is oriented to person, place, and time and well-developed, well-nourished, and in no distress.   HENT:   Mouth/Throat: Oropharynx is clear and moist.   Neck: Normal range of motion. Neck supple.   Cardiovascular: Normal rate, regular rhythm and normal heart sounds.    Pulmonary/Chest: Effort normal and breath sounds normal. No respiratory distress. No  wheezes.   Abdominal: Soft. Bowel sounds are normal.   Skin: Skin is warm and dry.   Psychiatric: Mood, memory, affect and judgment normal.     Assessment/Plan     Diagnosis:  60 y.o. female who is here for a screening colonoscopy. Her niece had colon cancer. She last had a colonoscopy in 2014. She has no GI c/o. She has been off of Eliquis since this past Monday (today is Friday). She is on Eliquis for a fib.     Anticipated Surgical Procedure:  Colonoscopy    The risks, benefits, and alternatives of this procedure have been discussed with the patient or the responsible party- the patient understands and agrees to proceed.    Ray Hairston M.D.  "

## 2021-01-25 LAB
CYTO UR: NORMAL
LAB AP CASE REPORT: NORMAL
PATH REPORT.FINAL DX SPEC: NORMAL
PATH REPORT.GROSS SPEC: NORMAL

## 2021-02-09 ENCOUNTER — TELEPHONE (OUTPATIENT)
Dept: CARDIOLOGY | Facility: CLINIC | Age: 61
End: 2021-02-09

## 2021-02-09 NOTE — TELEPHONE ENCOUNTER
Patient is scheduled for Foot Surgery 2/19 at [facility] and needs cardiac clearance. She will need to discontinue her Eliquis as well prior to Surgery and would like recommendations on when to stop/start back.     She was last seen in office 8/20/2020 for PAF, Systolic Murmur and Nonrheumatic Aortic valve stenosis, pt has a family h/o CAD

## 2021-02-09 NOTE — TELEPHONE ENCOUNTER
She is clear at low to moderate risk.  Can you please send a clearance letter?.  I would request that she hold her Eliquis for 3 days prior to the surgery.  She can resume the Eliquis when okay with the surgeon afterwards.  This is really more up to the surgeon in terms of bleeding safety.  Can you please let her know?  Thanks     GM

## 2021-02-17 ENCOUNTER — TELEPHONE (OUTPATIENT)
Dept: GASTROENTEROLOGY | Facility: CLINIC | Age: 61
End: 2021-02-17

## 2021-02-17 NOTE — TELEPHONE ENCOUNTER
----- Message from Ray Hairston MD sent at 2/7/2021  4:55 PM EST -----  02/07/21  Tell her that the colon polyp that was removed was not cancerous and not precancerous, which is good.  I recommend a repeat colonoscopy in 5 years.  Please fax a copy of this report to her PCP.  Thx. kjh

## 2021-02-17 NOTE — TELEPHONE ENCOUNTER
Call to pt.  Advise per DR Hairston note.  Verb understanding.     C/s for 1/22/26 placed in recall and HM.     Update to Dr Angelita Vital.

## 2021-02-25 ENCOUNTER — OFFICE VISIT (OUTPATIENT)
Dept: CARDIOLOGY | Facility: CLINIC | Age: 61
End: 2021-02-25

## 2021-02-25 VITALS
WEIGHT: 166.4 LBS | SYSTOLIC BLOOD PRESSURE: 120 MMHG | OXYGEN SATURATION: 98 % | HEIGHT: 63 IN | DIASTOLIC BLOOD PRESSURE: 74 MMHG | BODY MASS INDEX: 29.48 KG/M2 | HEART RATE: 74 BPM

## 2021-02-25 DIAGNOSIS — I48.0 PAROXYSMAL ATRIAL FIBRILLATION (HCC): ICD-10-CM

## 2021-02-25 DIAGNOSIS — I36.1 NONRHEUMATIC TRICUSPID VALVE REGURGITATION: ICD-10-CM

## 2021-02-25 DIAGNOSIS — I25.10 CORONARY ARTERY CALCIFICATION: ICD-10-CM

## 2021-02-25 DIAGNOSIS — I25.84 CORONARY ARTERY CALCIFICATION: ICD-10-CM

## 2021-02-25 DIAGNOSIS — I34.0 NONRHEUMATIC MITRAL VALVE REGURGITATION: ICD-10-CM

## 2021-02-25 DIAGNOSIS — I35.0 NONRHEUMATIC AORTIC VALVE STENOSIS: Primary | ICD-10-CM

## 2021-02-25 PROCEDURE — 99215 OFFICE O/P EST HI 40 MIN: CPT | Performed by: INTERNAL MEDICINE

## 2021-03-04 NOTE — PATIENT INSTRUCTIONS
Vaporizer/humidifier to provide enough humidity in sleeping room that you can awaken in the morning without having dry mouth or nose.  Continue this until the air conditioning comes on in the spring.  Will need to keep clean to avoid mold.  Ultrasonic cool mist vaporizers are very effective and inexpensive.    You may use BOTH acetaminophen/Tylenol (650 mg every 4 hours - maximum 4000 mg/24 hours) AND ibuprofen/Advil/Motrin (600 mg every 6 hours).  These may be taken at the same time.     You may use EITHER OTC Dimetapp Childrens Cold & Allergy (for drainage and congestion) OR Dimetapp Childrens Cold & Cough (for drainage, congestion and cough).  Store/generic brand is as good as brand name product.     Cough prescription as needed    Recheck for persistence or for worsening, especially after initial improvement            
No

## 2021-03-21 NOTE — PROGRESS NOTES
Date of Office Visit: 2021  Encounter Provider: Callum Benedict MD  Place of Service: [facility] CARDIOLOGY  Patient Name: Re Sheldon  :1960    Chief complaint: Follow-up for paroxysmal atrial fibrillation, coronary artery calcification, PFO, atherosclerosis of the aorta, aortic stenosis, mitral regurgitation, and tricuspid regurgitation.    History of Present Illness:    I again had the pleasure of seeing the patient in cardiology office on 2021.  She is a very pleasant 60 year-old female with a history of paroxysmal atrial fibrillation, coronary artery calcification, and valvular heart disease who presents for follow-up.  The patient had formerly seen Dr. Pena in the [facility] system, but switched care to me on 2018.    The patient has a history of an abnormal EKG for years with inferolateral ST and T wave changes at baseline.  She experienced paroxysmal atrial fibrillation with rapid response on 2020.  She was very symptomatic at the time, and felt her heart racing.  She was started on metoprolol and Eliquis afterwards.    She also has a longstanding history of a harsh systolic murmur, which was initially unclear in etiology.  A PATRICA was performed on 2020, which showed a heavily calcified and thickened aortic valve with mild aortic stenosis (peak gradient 25 mmHg and mean gradient 14 mmHg).  She also had a restricted posterior mitral valve leaflet, moderate to severe mitral regurgitation, and moderate tricuspid regurgitation.  Her ejection fraction was 60%, although she also had a moderate to severely enlarged left atrium.  The etiology for her valvular heart disease was not clear as she had not had prior radiation, and she did not have a bicuspid aortic valve.  She also had moderate plaques in the proximal descending aorta and severe plaques in the aortic arch on the PATRICA.  She did undergo a coronary calcium CT scan on 2020 which  showed a total score of 269, as well as diffuse calcification in all 3 major vessels.  She was initiated on Livalo afterwards as she had not tolerated Crestor and pravastatin in the past.    The patient presents today for follow-up.  She did have left foot surgery 6 days ago, and she is recovering from this.  She has stopped all red meat and fried foods.  She was tolerating Livalo at 2 mg/day, although she has developed significant myalgias since going up on the dose to 4 mg/day.  She has not had any bleeding issues thus far.  She is taking the Eliquis without any difficulty.  She has not felt any atrial fibrillation.    Past Medical History:   Diagnosis Date   • Abnormal ECG     Inferolateral ST-T changes at baseline   • Anemia    • Atrial fibrillation (CMS/HCC)    • Calcification of aortic valve 2020   • Coronary artery disease    • Hyperlipidemia    • Hypertension    • Low back pain    • Mitral regurgitation    • PAF (paroxysmal atrial fibrillation) (CMS/HCC)    • Palpitations    • Premature atrial contractions    • Spinal stenosis    • Systolic murmur    • Tonsillitis    • Vitamin D deficiency        Past Surgical History:   Procedure Laterality Date   • ADENOIDECTOMY     • CARPAL TUNNEL RELEASE Bilateral    • COLONOSCOPY  08/04/2014    repeat 10 years , Dr. Karen Spear   • COLONOSCOPY N/A 1/22/2021    Procedure: COLONOSCOPY to cecum with cold biopsy polypectomy;  Surgeon: Ray Hairston MD;  Location: [facility] ENDOSCOPY;  Service: Gastroenterology;  Laterality: N/A;  pre: screening  post: diverticulosis, polyp, internal hemorrhoids   • FOOT SURGERY     • HAND SURGERY     • TONSILLECTOMY     • TONSILLECTOMY AND ADENOIDECTOMY     • TUBAL ABDOMINAL LIGATION         Current Outpatient Medications on File Prior to Visit   Medication Sig Dispense Refill   • cetirizine (zyrTEC) 10 MG tablet 1 OTC tab po QDay for allergies     • cholecalciferol (VITAMIN D3) 25 MCG (1000 UT) tablet Take 1,000 Units by mouth Daily.    "  • Eliquis 5 MG tablet tablet TAKE 1 TABLET BY MOUTH EVERY 12 HOURS (Patient taking differently: 5 mg Every 12 (Twelve) Hours. HOLDING FOR SCOPE) 60 tablet 6   • fluticasone (FLONASE) 50 MCG/ACT nasal spray into each nostril.     • metoprolol tartrate (LOPRESSOR) 50 MG tablet TAKE 1 TABLET BY MOUTH TWICE A  tablet 1   • Pitavastatin Calcium 4 MG tablet Take 1 tablet by mouth Every Night. 30 tablet 4     No current facility-administered medications on file prior to visit.     Allergies as of 2021 - Reviewed 2021   Allergen Reaction Noted   • Augmentin [amoxicillin-pot clavulanate] Diarrhea and Other (See Comments) 2016   • Bactrim [sulfamethoxazole-trimethoprim] Diarrhea and Other (See Comments) 2016   • Sulfa antibiotics Diarrhea 2016     Social History     Socioeconomic History   • Marital status:      Spouse name: Kedar   • Number of children: 2   • Years of education: Not on file   • Highest education level: Not on file   Tobacco Use   • Smoking status: Never Smoker   • Smokeless tobacco: Never Used   Substance and Sexual Activity   • Alcohol use: No     Comment: Caffeine use 3-4 cups daily   • Drug use: No     Family History   Problem Relation Age of Onset   • Heart attack Other    • Breast cancer Other    • Heart disease Other    • Heart disease Mother         Mother with MI at 63, and later  from CHF   • Heart disease Father         Father  from complications after valve replacement at 73   • Stroke Father    • Breast cancer Sister    • Malig Hyperthermia Neg Hx        Review of Systems   Constitutional: Positive for malaise/fatigue.   Cardiovascular: Positive for leg swelling.   Musculoskeletal: Positive for myalgias.   All other systems reviewed and are negative.     Objective:     Vitals:    21 1009 21 1017   BP:  120/74   Pulse:  74   SpO2:  98%   Weight: 75.5 kg (166 lb 6.4 oz) 75.5 kg (166 lb 6.4 oz)   Height: 160 cm (62.99\") 160 cm " "(62.99\")     Body mass index is 29.48 kg/m².    Constitutional:       Appearance: Healthy appearance. Well-developed.   Eyes:      Conjunctiva/sclera: Conjunctivae normal.   HENT:      Head: Normocephalic and atraumatic.   Pulmonary:      Effort: Pulmonary effort is normal.      Breath sounds: Normal breath sounds.   Cardiovascular:      Normal rate. Regular rhythm.      Murmurs: There is a grade 3/6 harsh systolic murmur at the URSB and ULSB.      No gallop.   Edema:     Peripheral edema absent.   Abdominal:      Palpations: Abdomen is soft.      Tenderness: There is no abdominal tenderness.   Musculoskeletal:      Cervical back: Neck supple. Skin:     General: Skin is warm.   Neurological:      Mental Status: Alert and oriented to person, place, and time.   Psychiatric:         Behavior: Behavior normal.       Lab Review:   Procedures    Lipid Panel    Lipid Panel 10/28/20 12/11/20   Total Cholesterol 205 (A) 210 (A)   Triglycerides 147 78   HDL Cholesterol 54 71   VLDL Cholesterol 26 14   LDL Cholesterol  125 (A) 125 (A)   LDL/HDL Ratio  1.8   (A) Abnormal value       Comments are available for some flowsheets but are not being displayed.              Cardiac Procedures:  1.  PATRICA on 8/13/2020: Ejection fraction was 60%.  There was normal right ventricular function.  The left atrium was moderately to severely enlarged.  The right atrium was moderately enlarged.  There was a small PFO with left to right shunting on color flow only.  There was mild aortic stenosis with a peak gradient of 25 mmHg and a mean gradient of 14 mmHg.  The aortic valve leaflets were heavily calcified and thickened.  There was moderate to severe mitral regurgitation with a restricted posterior leaflet.  There was moderate tricuspid regurgitation.  The calculated RVSP was 57 mmHg.  There were moderate plaques in the proximal descending aorta.  There were severe plaques in the aortic arch.  2.  Coronary calcium CT scan on 9/14/2020: The total " score was 269.  The left main was 36.  The RCA was 71.  The LAD was 68.  The left circumflex was 94.    Assessment:       Diagnosis Plan   1. Nonrheumatic aortic valve stenosis  Adult Transthoracic Echo Complete w/ Color, Spectral and Contrast if Necessary Per Protocol   2. Nonrheumatic mitral valve regurgitation  Adult Transthoracic Echo Complete w/ Color, Spectral and Contrast if Necessary Per Protocol   3. Paroxysmal atrial fibrillation (CMS/HCC)     4. Coronary artery calcification     5. Nonrheumatic tricuspid valve regurgitation       Plan:       Again, she has multiple issues which are being addressed.  With regards to the atrial fibrillation, she has not had any atrial fibrillation recently, and she was highly symptomatic when this occurred in the past.  She is going to continue on the metoprolol at 50 mg twice a day, as well as the Eliquis at 5 mg twice a day.  Her left atrium was moderately to severely enlarged on the echocardiogram, and she is likely to have atrial fibrillation again in the future.    The etiology of her valvular heart disease is not entirely known.  She has not had radiation therapy in the past.  Her aortic valve is tricuspid.  However, it is heavily calcified and thickened.  She also has a restricted posterior mitral valve leaflet with moderate to severe mitral regurgitation.  I am going to recheck an echocardiogram at this time to reevaluate mainly the mitral regurgitation and her left ventricular size.    She does have coronary artery calcification and also has significant atherosclerosis in her descending aorta and aortic arch.  I am going to have her start on aspirin every other day in addition to the Eliquis to see if she can tolerate this.  I am going to decrease her Livalo dose back to 2 mg/day.  She tolerated this well, although she has developed myalgias with the 4 mg dose.  Obviously, any statin therapy is beneficial for her.  If her LDL does not meet goal in the future, she  would be a good candidate for the addition of Repatha or Praluent.    I will have her follow-up with me in 6 months.    I spent 45 minutes in the care of the patient.  Greater than 50% of this time was spent in face-to-face counseling with the patient regarding her coronary artery calcification, valvular heart disease, and atrial fibrillation management.

## 2021-03-23 ENCOUNTER — HOSPITAL ENCOUNTER (OUTPATIENT)
Dept: CARDIOLOGY | Facility: HOSPITAL | Age: 61
Discharge: HOME | End: 2021-03-23
Admitting: INTERNAL MEDICINE

## 2021-03-23 VITALS
HEART RATE: 59 BPM | DIASTOLIC BLOOD PRESSURE: 80 MMHG | SYSTOLIC BLOOD PRESSURE: 130 MMHG | HEIGHT: 63 IN | BODY MASS INDEX: 29.41 KG/M2 | WEIGHT: 166 LBS

## 2021-03-23 DIAGNOSIS — I34.0 NONRHEUMATIC MITRAL VALVE REGURGITATION: ICD-10-CM

## 2021-03-23 DIAGNOSIS — I35.0 NONRHEUMATIC AORTIC VALVE STENOSIS: ICD-10-CM

## 2021-03-23 PROCEDURE — 93306 TTE W/DOPPLER COMPLETE: CPT | Performed by: INTERNAL MEDICINE

## 2021-03-23 PROCEDURE — 25010000002 PERFLUTREN (DEFINITY) 8.476 MG IN SODIUM CHLORIDE (PF) 0.9 % 10 ML INJECTION: Performed by: INTERNAL MEDICINE

## 2021-03-23 PROCEDURE — 93306 TTE W/DOPPLER COMPLETE: CPT

## 2021-03-23 RX ADMIN — PERFLUTREN 2 ML: 6.52 INJECTION, SUSPENSION INTRAVENOUS at 11:48

## 2021-03-23 NOTE — PROGRESS NOTES
I called and gave her the results.  The aortic stenosis has gotten slightly worse.  This will continue to be monitored.  I do not think that the mitral regurgitation can be seen well, although the left ventricle is still normal in size.  She will need another echocardiogram in 6 to 8 months.  AHSAN

## 2021-03-24 LAB
AORTIC DIMENSIONLESS INDEX: 0.8 (DI)
ASCENDING AORTA: 3.1 CM
BH CV ECHO MEAS - ACS: 0.87 CM
BH CV ECHO MEAS - AO MAX PG (FULL): 30.7 MMHG
BH CV ECHO MEAS - AO MAX PG: 41 MMHG
BH CV ECHO MEAS - AO MEAN PG (FULL): 13.1 MMHG
BH CV ECHO MEAS - AO MEAN PG: 19 MMHG
BH CV ECHO MEAS - AO ROOT AREA (BSA CORRECTED): 1.7
BH CV ECHO MEAS - AO ROOT AREA: 7.4 CM^2
BH CV ECHO MEAS - AO ROOT DIAM: 3.1 CM
BH CV ECHO MEAS - AO V2 MAX: 321.1 CM/SEC
BH CV ECHO MEAS - AO V2 MEAN: 205.4 CM/SEC
BH CV ECHO MEAS - AO V2 VTI: 74.6 CM
BH CV ECHO MEAS - ASC AORTA: 3.1 CM
BH CV ECHO MEAS - AVA(I,A): 1.4 CM^2
BH CV ECHO MEAS - AVA(I,D): 1.4 CM^2
BH CV ECHO MEAS - AVA(V,A): 1.3 CM^2
BH CV ECHO MEAS - AVA(V,D): 1.3 CM^2
BH CV ECHO MEAS - BSA(HAYCOCK): 1.8 M^2
BH CV ECHO MEAS - BSA: 1.8 M^2
BH CV ECHO MEAS - BZI_BMI: 30.4 KILOGRAMS/M^2
BH CV ECHO MEAS - BZI_METRIC_HEIGHT: 157.5 CM
BH CV ECHO MEAS - BZI_METRIC_WEIGHT: 75.3 KG
BH CV ECHO MEAS - EDV(MOD-SP2): 110 ML
BH CV ECHO MEAS - EDV(MOD-SP4): 103 ML
BH CV ECHO MEAS - EDV(TEICH): 103 ML
BH CV ECHO MEAS - EF(CUBED): 74.7 %
BH CV ECHO MEAS - EF(MOD-BP): 67.1 %
BH CV ECHO MEAS - EF(MOD-SP2): 67.3 %
BH CV ECHO MEAS - EF(MOD-SP4): 67 %
BH CV ECHO MEAS - EF(TEICH): 66.5 %
BH CV ECHO MEAS - ESV(MOD-SP2): 36 ML
BH CV ECHO MEAS - ESV(MOD-SP4): 34 ML
BH CV ECHO MEAS - ESV(TEICH): 34.5 ML
BH CV ECHO MEAS - FS: 36.7 %
BH CV ECHO MEAS - IVS/LVPW: 1
BH CV ECHO MEAS - IVSD: 1.2 CM
BH CV ECHO MEAS - LAT PEAK E' VEL: 9.4 CM/SEC
BH CV ECHO MEAS - LV DIASTOLIC VOL/BSA (35-75): 58.3 ML/M^2
BH CV ECHO MEAS - LV MASS(C)D: 215.6 GRAMS
BH CV ECHO MEAS - LV MASS(C)DI: 122.1 GRAMS/M^2
BH CV ECHO MEAS - LV MAX PG: 10.5 MMHG
BH CV ECHO MEAS - LV MEAN PG: 5.5 MMHG
BH CV ECHO MEAS - LV SYSTOLIC VOL/BSA (12-30): 19.3 ML/M^2
BH CV ECHO MEAS - LV V1 MAX: 162 CM/SEC
BH CV ECHO MEAS - LV V1 MEAN: 108.6 CM/SEC
BH CV ECHO MEAS - LV V1 VTI: 41.3 CM
BH CV ECHO MEAS - LVIDD: 4.7 CM
BH CV ECHO MEAS - LVIDS: 3 CM
BH CV ECHO MEAS - LVLD AP2: 7.8 CM
BH CV ECHO MEAS - LVLD AP4: 7.8 CM
BH CV ECHO MEAS - LVLS AP2: 6.2 CM
BH CV ECHO MEAS - LVLS AP4: 6.3 CM
BH CV ECHO MEAS - LVOT AREA (M): 2.5 CM^2
BH CV ECHO MEAS - LVOT AREA: 2.5 CM^2
BH CV ECHO MEAS - LVOT DIAM: 1.8 CM
BH CV ECHO MEAS - LVPWD: 1.2 CM
BH CV ECHO MEAS - MED PEAK E' VEL: 7.6 CM/SEC
BH CV ECHO MEAS - MR MAX PG: 120.8 MMHG
BH CV ECHO MEAS - MR MAX VEL: 549.6 CM/SEC
BH CV ECHO MEAS - MV A DUR: 0.15 SEC
BH CV ECHO MEAS - MV A MAX VEL: 55.8 CM/SEC
BH CV ECHO MEAS - MV DEC SLOPE: 672.2 CM/SEC^2
BH CV ECHO MEAS - MV DEC TIME: 0.23 SEC
BH CV ECHO MEAS - MV E MAX VEL: 132 CM/SEC
BH CV ECHO MEAS - MV E/A: 2.4
BH CV ECHO MEAS - MV MAX PG: 6.8 MMHG
BH CV ECHO MEAS - MV MEAN PG: 2.4 MMHG
BH CV ECHO MEAS - MV P1/2T MAX VEL: 138.5 CM/SEC
BH CV ECHO MEAS - MV P1/2T: 60.3 MSEC
BH CV ECHO MEAS - MV V2 MAX: 130.3 CM/SEC
BH CV ECHO MEAS - MV V2 MEAN: 70.8 CM/SEC
BH CV ECHO MEAS - MV V2 VTI: 39.3 CM
BH CV ECHO MEAS - MVA P1/2T LCG: 1.6 CM^2
BH CV ECHO MEAS - MVA(P1/2T): 3.6 CM^2
BH CV ECHO MEAS - MVA(VTI): 2.6 CM^2
BH CV ECHO MEAS - PA ACC TIME: 0.21 SEC
BH CV ECHO MEAS - PA MAX PG (FULL): 0.81 MMHG
BH CV ECHO MEAS - PA MAX PG: 3.7 MMHG
BH CV ECHO MEAS - PA PR(ACCEL): -14.9 MMHG
BH CV ECHO MEAS - PA V2 MAX: 96.4 CM/SEC
BH CV ECHO MEAS - PULM A REVS DUR: 0.13 SEC
BH CV ECHO MEAS - PULM A REVS VEL: 24.8 CM/SEC
BH CV ECHO MEAS - PULM DIAS VEL: 79 CM/SEC
BH CV ECHO MEAS - PULM S/D: 0.56
BH CV ECHO MEAS - PULM SYS VEL: 44.1 CM/SEC
BH CV ECHO MEAS - PVA(V,A): 2.5 CM^2
BH CV ECHO MEAS - PVA(V,D): 2.5 CM^2
BH CV ECHO MEAS - QP/QS: 0.54
BH CV ECHO MEAS - RAP SYSTOLE: 3 MMHG
BH CV ECHO MEAS - RV MAX PG: 2.9 MMHG
BH CV ECHO MEAS - RV MEAN PG: 1.7 MMHG
BH CV ECHO MEAS - RV V1 MAX: 85.3 CM/SEC
BH CV ECHO MEAS - RV V1 MEAN: 61 CM/SEC
BH CV ECHO MEAS - RV V1 VTI: 19.5 CM
BH CV ECHO MEAS - RVOT AREA: 2.9 CM^2
BH CV ECHO MEAS - RVOT DIAM: 1.9 CM
BH CV ECHO MEAS - RVSP: 20 MMHG
BH CV ECHO MEAS - SI(AO): 310.8 ML/M^2
BH CV ECHO MEAS - SI(CUBED): 44.3 ML/M^2
BH CV ECHO MEAS - SI(LVOT): 58 ML/M^2
BH CV ECHO MEAS - SI(MOD-SP2): 41.9 ML/M^2
BH CV ECHO MEAS - SI(MOD-SP4): 39.1 ML/M^2
BH CV ECHO MEAS - SI(TEICH): 38.8 ML/M^2
BH CV ECHO MEAS - SUP REN AO DIAM: 1.7 CM
BH CV ECHO MEAS - SV(AO): 548.8 ML
BH CV ECHO MEAS - SV(CUBED): 78.2 ML
BH CV ECHO MEAS - SV(LVOT): 102.4 ML
BH CV ECHO MEAS - SV(MOD-SP2): 74 ML
BH CV ECHO MEAS - SV(MOD-SP4): 69 ML
BH CV ECHO MEAS - SV(RVOT): 55.7 ML
BH CV ECHO MEAS - SV(TEICH): 68.5 ML
BH CV ECHO MEAS - TAPSE (>1.6): 2.1 CM
BH CV ECHO MEAS - TR MAX VEL: 211.8 CM/SEC
BH CV ECHO MEASUREMENTS AVERAGE E/E' RATIO: 15.53
BH CV XLRA - RV BASE: 3.3 CM
BH CV XLRA - RV LENGTH: 6.6 CM
BH CV XLRA - RV MID: 2.3 CM
BH CV XLRA - TDI S': 8.9 CM/SEC
LEFT ATRIUM VOLUME INDEX: 32 ML/M2
MAXIMAL PREDICTED HEART RATE: 160 BPM
SINUS: 2.6 CM
STJ: 2 CM
STRESS TARGET HR: 136 BPM

## 2021-04-14 RX ORDER — METOPROLOL TARTRATE 50 MG/1
TABLET, FILM COATED ORAL
Qty: 180 TABLET | Refills: 1 | Status: SHIPPED | OUTPATIENT
Start: 2021-04-14 | End: 2021-12-29

## 2021-04-14 NOTE — TELEPHONE ENCOUNTER
Metoprolol Tartrate 50 MG  Last office visit 2/25/2021  Next office visit 9/14/2021  Echo done 2/25/2021  Last EKG 4/25/2020

## 2021-05-10 ENCOUNTER — TELEPHONE (OUTPATIENT)
Dept: CARDIOLOGY | Facility: CLINIC | Age: 61
End: 2021-05-10

## 2021-05-10 NOTE — TELEPHONE ENCOUNTER
05/10/21   Pt called requesting samples of Livalo 2mg or 4mg.   I have 4 boxes of 4 mg tablets that pt or pt's sister will  for her at  .  Lot # [id number] exp 6/2023   John AESNCIO

## 2021-05-26 RX ORDER — APIXABAN 5 MG/1
TABLET, FILM COATED ORAL
Qty: 60 TABLET | Refills: 6 | Status: SHIPPED | OUTPATIENT
Start: 2021-05-26 | End: 2021-12-20 | Stop reason: SDUPTHER

## 2021-05-26 NOTE — TELEPHONE ENCOUNTER
Pt requesting a refill  LOV 2/25/21  Next appt 9/14/21  ECHO 3/23/21  Labs 2/9/21
The mother chose not to exclusively breastfeed upon admission.

## 2021-06-16 ENCOUNTER — TELEPHONE (OUTPATIENT)
Dept: FAMILY MEDICINE CLINIC | Facility: CLINIC | Age: 61
End: 2021-06-16

## 2021-06-16 DIAGNOSIS — E78.49 OTHER HYPERLIPIDEMIA: ICD-10-CM

## 2021-06-16 DIAGNOSIS — R73.01 ABNORMAL FASTING GLUCOSE: ICD-10-CM

## 2021-06-16 DIAGNOSIS — E55.9 VITAMIN D DEFICIENCY: ICD-10-CM

## 2021-06-16 DIAGNOSIS — E78.49 OTHER HYPERLIPIDEMIA: Primary | ICD-10-CM

## 2021-06-16 DIAGNOSIS — R53.83 FATIGUE, UNSPECIFIED TYPE: ICD-10-CM

## 2021-06-16 NOTE — TELEPHONE ENCOUNTER
Caller: Re Sheldon    Relationship: Self    Best call back number: [phone]    What orders are you requesting (i.e. lab or imaging): LABS    In what timeframe would the patient need to come in: ASAP    Where will you receive your lab/imaging services: [facility] ON [address]    Additional notes: PATIENT STATES SHE WOULD LIKE THE ORDERS SENT TO THIS [facility] SO SHE CAN GO THERE TO GET THEM DONE.        PLEASE CALL AND ADVISE

## 2021-07-23 ENCOUNTER — TELEPHONE (OUTPATIENT)
Dept: CARDIOLOGY | Facility: CLINIC | Age: 61
End: 2021-07-23

## 2021-07-23 NOTE — TELEPHONE ENCOUNTER
Pt called requesting livalo samples. Called and informed her I have set aside some up front for her.

## 2021-07-28 LAB
25(OH)D3+25(OH)D2 SERPL-MCNC: 36.5 NG/ML (ref 30–100)
ALBUMIN SERPL-MCNC: 4.6 G/DL (ref 3.8–4.9)
ALBUMIN/GLOB SERPL: 1.7 {RATIO} (ref 1.2–2.2)
ALP SERPL-CCNC: 104 IU/L (ref 48–121)
ALT SERPL-CCNC: 12 IU/L (ref 0–32)
AST SERPL-CCNC: 12 IU/L (ref 0–40)
BILIRUB SERPL-MCNC: 0.2 MG/DL (ref 0–1.2)
BUN SERPL-MCNC: 16 MG/DL (ref 8–27)
BUN/CREAT SERPL: 22 (ref 12–28)
CALCIUM SERPL-MCNC: 9.3 MG/DL (ref 8.7–10.3)
CHLORIDE SERPL-SCNC: 103 MMOL/L (ref 96–106)
CHOLEST SERPL-MCNC: 208 MG/DL (ref 100–199)
CO2 SERPL-SCNC: 25 MMOL/L (ref 20–29)
CREAT SERPL-MCNC: 0.74 MG/DL (ref 0.57–1)
GLOBULIN SER CALC-MCNC: 2.7 G/DL (ref 1.5–4.5)
GLUCOSE SERPL-MCNC: 103 MG/DL (ref 65–99)
HBA1C MFR BLD: 5.8 % (ref 4.8–5.6)
HDLC SERPL-MCNC: 57 MG/DL
LDLC SERPL CALC-MCNC: 129 MG/DL (ref 0–99)
LDLC/HDLC SERPL: 2.3 RATIO (ref 0–3.2)
POTASSIUM SERPL-SCNC: 5 MMOL/L (ref 3.5–5.2)
PROT SERPL-MCNC: 7.3 G/DL (ref 6–8.5)
SODIUM SERPL-SCNC: 141 MMOL/L (ref 134–144)
T4 FREE SERPL-MCNC: 0.99 NG/DL (ref 0.82–1.77)
TRIGL SERPL-MCNC: 122 MG/DL (ref 0–149)
TSH SERPL DL<=0.005 MIU/L-ACNC: 3.05 UIU/ML (ref 0.45–4.5)
VLDLC SERPL CALC-MCNC: 22 MG/DL (ref 5–40)

## 2021-08-26 ENCOUNTER — TELEPHONE (OUTPATIENT)
Dept: FAMILY MEDICINE CLINIC | Facility: CLINIC | Age: 61
End: 2021-08-26

## 2021-08-26 NOTE — TELEPHONE ENCOUNTER
Caller: Re Sheldon    Relationship: Self    Best call back number: [phone]     What orders are you requesting (i.e. lab or imaging): LABS     In what timeframe would the patient need to come in: BEFORE APPOINTMENT ON 2/1/2021    Where will you receive your lab/imaging services: [facility]  [address] [place] [id number]  [phone]

## 2021-09-14 ENCOUNTER — OFFICE VISIT (OUTPATIENT)
Dept: CARDIOLOGY | Facility: CLINIC | Age: 61
End: 2021-09-14

## 2021-09-14 VITALS
OXYGEN SATURATION: 99 % | BODY MASS INDEX: 30.37 KG/M2 | DIASTOLIC BLOOD PRESSURE: 64 MMHG | SYSTOLIC BLOOD PRESSURE: 112 MMHG | HEIGHT: 63 IN | HEART RATE: 55 BPM | WEIGHT: 171.4 LBS

## 2021-09-14 DIAGNOSIS — I25.84 CORONARY ARTERY CALCIFICATION: ICD-10-CM

## 2021-09-14 DIAGNOSIS — I25.10 CORONARY ARTERY CALCIFICATION: ICD-10-CM

## 2021-09-14 DIAGNOSIS — I70.0 AORTIC ATHEROSCLEROSIS (HCC): ICD-10-CM

## 2021-09-14 DIAGNOSIS — I36.1 NONRHEUMATIC TRICUSPID VALVE REGURGITATION: ICD-10-CM

## 2021-09-14 DIAGNOSIS — I35.0 NONRHEUMATIC AORTIC VALVE STENOSIS: Primary | ICD-10-CM

## 2021-09-14 DIAGNOSIS — Q21.12 PFO (PATENT FORAMEN OVALE): ICD-10-CM

## 2021-09-14 DIAGNOSIS — I34.0 NONRHEUMATIC MITRAL VALVE REGURGITATION: ICD-10-CM

## 2021-09-14 DIAGNOSIS — I48.0 PAROXYSMAL ATRIAL FIBRILLATION (HCC): ICD-10-CM

## 2021-09-14 PROCEDURE — 99214 OFFICE O/P EST MOD 30 MIN: CPT | Performed by: INTERNAL MEDICINE

## 2021-09-15 NOTE — PROGRESS NOTES
Date of Office Visit: 2021  Encounter Provider: Callum Benedict MD  Place of Service: [facility] CARDIOLOGY  Patient Name: Re Sheldon  :1960    Chief complaint: Follow-up for paroxysmal atrial fibrillation, coronary artery calcification, PFO, atherosclerosis of the aorta, aortic stenosis, mitral regurgitation, and tricuspid regurgitation.    History of Present Illness:    I again had the pleasure of seeing the patient in cardiology office on 2021.  She is a very pleasant 60 year-old female with a history of paroxysmal atrial fibrillation, coronary artery calcification, and valvular heart disease who presents for follow-up.  The patient had formerly seen Dr. Pena in the [facility] system, but switched care to me on 2018.    The patient has a history of an abnormal EKG for years with inferolateral ST and T wave changes at baseline.  She experienced paroxysmal atrial fibrillation with rapid response on 2020.  She was very symptomatic at the time, and felt her heart racing.  She was started on metoprolol and Eliquis afterwards.    She also has a longstanding history of a harsh systolic murmur, which was initially unclear in etiology.  A PATRICA was performed on 2020, which showed a heavily calcified and thickened aortic valve with mild aortic stenosis (peak gradient 25 mmHg and mean gradient 14 mmHg).  She also had a restricted posterior mitral valve leaflet, moderate to severe mitral regurgitation, and moderate tricuspid regurgitation.  Her ejection fraction was 60%, although she also had a moderate to severely enlarged left atrium.  The etiology for her valvular heart disease was not clear as she had not had prior radiation, and she did not have a bicuspid aortic valve.  She also had moderate plaques in the proximal descending aorta and severe plaques in the aortic arch on the PATRICA.  She did undergo a coronary calcium CT scan on 2020 which  showed a total score of 269, as well as diffuse calcification in all 3 major vessels.  She was initiated on Livalo afterwards as she had not tolerated Crestor and pravastatin in the past.    The patient presents today for follow-up.  Unfortunately, her left foot surgery in February 2021 really set her back significantly.  She had a stress fracture, and was actually in a boot for months until several days ago.  She is very deconditioned and has not been able to exercise because of this.  She has gained weight.  She is slightly short of breath with stairs and steps, although this is not severe.  She is taking the Livalo 2 mg/day to avoid cramps.  She has not had any chest discomfort.    Past Medical History:   Diagnosis Date   • Abnormal ECG     Inferolateral ST-T changes at baseline   • Anemia    • Atrial fibrillation (CMS/HCC)    • Calcification of aortic valve 2020   • Coronary artery disease    • Hyperlipidemia    • Hypertension    • Low back pain    • Mitral regurgitation    • PAF (paroxysmal atrial fibrillation) (CMS/HCC)    • Palpitations    • Premature atrial contractions    • Spinal stenosis    • Systolic murmur    • Tonsillitis    • Vitamin D deficiency        Past Surgical History:   Procedure Laterality Date   • ADENOIDECTOMY     • CARPAL TUNNEL RELEASE Bilateral    • COLONOSCOPY  08/04/2014    repeat 10 years , Dr. Karen Spear   • COLONOSCOPY N/A 1/22/2021    Procedure: COLONOSCOPY to cecum with cold biopsy polypectomy;  Surgeon: Ray Hairston MD;  Location: [facility] ENDOSCOPY;  Service: Gastroenterology;  Laterality: N/A;  pre: screening  post: diverticulosis, polyp, internal hemorrhoids   • FOOT SURGERY     • HAND SURGERY     • TONSILLECTOMY     • TONSILLECTOMY AND ADENOIDECTOMY     • TUBAL ABDOMINAL LIGATION         Current Outpatient Medications on File Prior to Visit   Medication Sig Dispense Refill   • cetirizine (zyrTEC) 10 MG tablet 1 OTC tab po QDay for allergies     • cholecalciferol (VITAMIN  "D3) 25 MCG (1000 UT) tablet Take 1,000 Units by mouth Daily.     • Eliquis 5 MG tablet tablet TAKE 1 TABLET BY MOUTH EVERY 12 HOURS 60 tablet 6   • fluticasone (FLONASE) 50 MCG/ACT nasal spray into each nostril.     • metoprolol tartrate (LOPRESSOR) 50 MG tablet TAKE 1 TABLET BY MOUTH TWICE A  tablet 1   • Pitavastatin Calcium 4 MG tablet Take 1 tablet by mouth Every Night. 30 tablet 4     No current facility-administered medications on file prior to visit.     Allergies as of 2021 - Reviewed 2021   Allergen Reaction Noted   • Augmentin [amoxicillin-pot clavulanate] Diarrhea and Other (See Comments) 2016   • Bactrim [sulfamethoxazole-trimethoprim] Diarrhea and Other (See Comments) 2016   • Sulfa antibiotics Diarrhea 2016     Social History     Socioeconomic History   • Marital status:      Spouse name: Kedar   • Number of children: 2   • Years of education: Not on file   • Highest education level: Not on file   Tobacco Use   • Smoking status: Never Smoker   • Smokeless tobacco: Never Used   Substance and Sexual Activity   • Alcohol use: No     Comment: Caffeine use 3-4 cups daily   • Drug use: No     Family History   Problem Relation Age of Onset   • Heart attack Other    • Breast cancer Other    • Heart disease Other    • Heart disease Mother         Mother with MI at 63, and later  from CHF   • Heart disease Father         Father  from complications after valve replacement at 73   • Stroke Father    • Breast cancer Sister    • Malig Hyperthermia Neg Hx        Review of Systems   Constitutional: Positive for malaise/fatigue and weight gain.   Musculoskeletal: Positive for joint pain.   All other systems reviewed and are negative.     Objective:     Vitals:    21 1144   BP: 112/64   Pulse: 55   SpO2: 99%   Weight: 77.7 kg (171 lb 6.4 oz)   Height: 160 cm (62.99\")     Body mass index is 30.37 kg/m².    Constitutional:       Appearance: Healthy appearance. " Well-developed.   Eyes:      Conjunctiva/sclera: Conjunctivae normal.   HENT:      Head: Normocephalic and atraumatic.   Pulmonary:      Effort: Pulmonary effort is normal.      Breath sounds: Normal breath sounds.   Cardiovascular:      Normal rate. Regular rhythm.      Murmurs: There is a grade 3/6 harsh systolic murmur at the URSB and ULSB.      No gallop.   Edema:     Peripheral edema absent.   Abdominal:      Palpations: Abdomen is soft.      Tenderness: There is no abdominal tenderness.   Musculoskeletal:      Cervical back: Neck supple. Skin:     General: Skin is warm.   Neurological:      Mental Status: Alert and oriented to person, place, and time.   Psychiatric:         Behavior: Behavior normal.       Lab Review:   Procedures    Lipid Panel    Lipid Panel 10/28/20 12/11/20 7/27/21   Total Cholesterol 205 (A) 210 (A) 208 (A)   Triglycerides 147 78 122   HDL Cholesterol 54 71 57   VLDL Cholesterol 26 14 22   LDL Cholesterol  125 (A) 125 (A) 129 (A)   LDL/HDL Ratio  1.8 2.3   (A) Abnormal value       Comments are available for some flowsheets but are not being displayed.              Cardiac Procedures:  1.  PATRICA on 8/13/2020: Ejection fraction was 60%.  There was normal right ventricular function.  The left atrium was moderately to severely enlarged.  The right atrium was moderately enlarged.  There was a small PFO with left to right shunting on color flow only.  There was mild aortic stenosis with a peak gradient of 25 mmHg and a mean gradient of 14 mmHg.  The aortic valve leaflets were heavily calcified and thickened.  There was moderate to severe mitral regurgitation with a restricted posterior leaflet.  There was moderate tricuspid regurgitation.  The calculated RVSP was 57 mmHg.  There were moderate plaques in the proximal descending aorta.  There were severe plaques in the aortic arch.  2.  Coronary calcium CT scan on 9/14/2020: The total score was 269.  The left main was 36.  The RCA was 71.  The LAD  was 68.  The left circumflex was 94.  3.  Echocardiogram on 3/23/2021: The ejection fraction was 60 to 65%.  There was mild left ventricular hypertrophy.  Diastolic function was normal.  There was moderate biatrial enlargement.  The aortic valves were thickened and heavily calcified.  There was moderate aortic stenosis with a peak gradient of 41 mmHg and a mean gradient of 19 mmHg.  The posterior mitral valve leaflet was restricted, and there was moderate mitral regurgitation.  There was mild tricuspid regurgitation.    Assessment:       Diagnosis Plan   1. Nonrheumatic aortic valve stenosis  Adult Transthoracic Echo Complete w/ Color, Spectral and Contrast if Necessary Per Protocol   2. Nonrheumatic mitral valve regurgitation  Adult Transthoracic Echo Complete w/ Color, Spectral and Contrast if Necessary Per Protocol   3. Nonrheumatic tricuspid valve regurgitation  Adult Transthoracic Echo Complete w/ Color, Spectral and Contrast if Necessary Per Protocol   4. Paroxysmal atrial fibrillation (CMS/HCC)     5. Coronary artery calcification     6. PFO (patent foramen ovale)     7. Aortic atherosclerosis (CMS/HCC)       Plan:     Her main issue has been recovering from the left foot surgery months ago.  She really was setback from this, and was in a boot for quite some time.  She has been deconditioned from this, and is going to try to start increasing her activity.  She is also going to try to modify her diet to lose weight.      With regards to the atrial fibrillation, she has not had any atrial fibrillation recently, and she was highly symptomatic when this occurred in the past.  She is going to continue on the metoprolol at 50 mg twice a day, as well as the Eliquis at 5 mg twice a day.  She has moderate biatrial enlargement on her most recent echocardiogram, and she is likely to have atrial fibrillation again in the future.    The etiology of her valvular heart disease is not entirely known.  She has not had  radiation therapy in the past.  Her aortic valve is tricuspid.  However, it is heavily calcified and thickened.  She also has a restricted posterior mitral valve leaflet with moderate to severe mitral regurgitation on the PATRICA (and moderate regurgitation on the most recent echocardiogram).  I have ordered a repeat echocardiogram in 6 months when I see her back in the office on the same day.    She does have coronary artery calcification and also has significant atherosclerosis in her descending aorta and aortic arch.  I am treating her as if she has active coronary artery disease.  She is taking aspirin every other day, and is tolerating this with the Eliquis..  I am going to keep her on the Livalo at Livalo 2 mg/day.  She tolerates this well, although she has developed myalgias with the 4 mg dose.  Obviously, any statin therapy is beneficial for her.  If her LDL does not meet goal in the future, she would be a good candidate for the addition of Repatha or Praluent.    I will have her follow-up with me in 6 months.

## 2021-09-16 ENCOUNTER — TELEPHONE (OUTPATIENT)
Dept: FAMILY MEDICINE CLINIC | Facility: CLINIC | Age: 61
End: 2021-09-16

## 2021-09-16 DIAGNOSIS — Z00.00 WELL ADULT EXAM: ICD-10-CM

## 2021-09-16 DIAGNOSIS — E55.9 VITAMIN D DEFICIENCY: Primary | ICD-10-CM

## 2021-09-16 NOTE — TELEPHONE ENCOUNTER
PATIENT STATES: THAT SHE WOULD LIKE TO MAKE A LAB APPT CALLED 2X NO ANSWER PLEASE ADVISE      PATIENT CAN BE REACHED ON: [phone]

## 2021-09-17 ENCOUNTER — APPOINTMENT (OUTPATIENT)
Dept: WOMENS IMAGING | Facility: HOSPITAL | Age: 61
End: 2021-09-17

## 2021-09-17 PROCEDURE — 77067 SCR MAMMO BI INCL CAD: CPT | Performed by: RADIOLOGY

## 2021-09-17 PROCEDURE — 77063 BREAST TOMOSYNTHESIS BI: CPT | Performed by: RADIOLOGY

## 2021-10-23 NOTE — TELEPHONE ENCOUNTER
----- Message from Ray Hairston MD sent at 10/5/2020  9:01 AM EDT -----  Regarding: RE: OA PAPERWORK  She last had a screening colonoscopy in 8 of 2014.  We usually do screening colonoscopies every 10 years.  Have her come see me in the office and we can discuss why we need to do a colonoscopy sooner than in 10 years?  ----- Message -----  From: Justina Oliveira RN  Sent: 10/1/2020  12:55 PM EDT  To: Ray Hairston MD  Subject: OA PAPERWORK                                     Open Access/Recall paperwork scanned in under the media tab.  Please review and return to Justina.     PLEASE NOTE:  Do not think patient is due at this time but obtained 2014 c/s report and scanned in to the chart.       [Normal Appearance] : normal appearance [General Appearance - In No Acute Distress] : no acute distress [Abdomen Soft] : soft [Abdomen Tenderness] : non-tender [Urethral Meatus] : meatus normal [Penis Abnormality] : normal circumcised penis [Scrotum] : the scrotum was normal [Epididymis] : the epididymides were normal [Testes Tenderness] : no tenderness of the testes [Testes Mass (___cm)] : there were no testicular masses [Skin Color & Pigmentation] : normal skin color and pigmentation [FreeTextEntry1] : normal peripheral circulation  [] : no respiratory distress [Oriented To Time, Place, And Person] : oriented to person, place, and time [Normal Station and Gait] : the gait and station were normal for the patient's age

## 2021-11-18 ENCOUNTER — TELEPHONE (OUTPATIENT)
Dept: FAMILY MEDICINE CLINIC | Facility: CLINIC | Age: 61
End: 2021-11-18

## 2021-11-18 DIAGNOSIS — Z00.00 WELL ADULT EXAM: Primary | ICD-10-CM

## 2021-11-18 DIAGNOSIS — Z00.00 WELL ADULT EXAM: ICD-10-CM

## 2021-11-18 DIAGNOSIS — E55.9 VITAMIN D DEFICIENCY: ICD-10-CM

## 2021-11-18 NOTE — TELEPHONE ENCOUNTER
Caller: Re Sheldon    Relationship: Self    Best call back number: [phone]     What orders are you requesting (i.e. lab or imaging): LABS    In what timeframe would the patient need to come in: TOMORROW 11/19     Where will you receive your lab/imaging services: [facility] ON [address] IN [place]  FAX: [phone]    Additional notes: PATIENT IS GOING THERE TOMORROW AT 9:30 FOR AN APPOINTMENT AND NEEDS THE ORDER SENT THERE.

## 2021-11-20 LAB
25(OH)D3+25(OH)D2 SERPL-MCNC: 46.1 NG/ML (ref 30–100)
ALBUMIN SERPL-MCNC: 4.5 G/DL (ref 3.8–4.9)
ALBUMIN/GLOB SERPL: 1.7 {RATIO} (ref 1.2–2.2)
ALP SERPL-CCNC: 91 IU/L (ref 44–121)
ALT SERPL-CCNC: 12 IU/L (ref 0–32)
APPEARANCE UR: CLEAR
AST SERPL-CCNC: 16 IU/L (ref 0–40)
BACTERIA #/AREA URNS HPF: NORMAL /[HPF]
BASOPHILS # BLD AUTO: 0 X10E3/UL (ref 0–0.2)
BASOPHILS NFR BLD AUTO: 0 %
BILIRUB SERPL-MCNC: 0.2 MG/DL (ref 0–1.2)
BILIRUB UR QL STRIP: NEGATIVE
BUN SERPL-MCNC: 19 MG/DL (ref 8–27)
BUN/CREAT SERPL: 25 (ref 12–28)
CALCIUM SERPL-MCNC: 9.3 MG/DL (ref 8.7–10.3)
CASTS URNS QL MICRO: NORMAL /LPF
CHLORIDE SERPL-SCNC: 103 MMOL/L (ref 96–106)
CHOLEST SERPL-MCNC: 191 MG/DL (ref 100–199)
CO2 SERPL-SCNC: 24 MMOL/L (ref 20–29)
COLOR UR: YELLOW
CREAT SERPL-MCNC: 0.75 MG/DL (ref 0.57–1)
EOSINOPHIL # BLD AUTO: 0.3 X10E3/UL (ref 0–0.4)
EOSINOPHIL NFR BLD AUTO: 7 %
EPI CELLS #/AREA URNS HPF: NORMAL /HPF (ref 0–10)
ERYTHROCYTE [DISTWIDTH] IN BLOOD BY AUTOMATED COUNT: 13.2 % (ref 11.7–15.4)
GLOBULIN SER CALC-MCNC: 2.7 G/DL (ref 1.5–4.5)
GLUCOSE SERPL-MCNC: 108 MG/DL (ref 65–99)
GLUCOSE UR QL: NEGATIVE
HCT VFR BLD AUTO: 37.8 % (ref 34–46.6)
HDLC SERPL-MCNC: 49 MG/DL
HGB BLD-MCNC: 12.6 G/DL (ref 11.1–15.9)
HGB UR QL STRIP: ABNORMAL
IMM GRANULOCYTES # BLD AUTO: 0 X10E3/UL (ref 0–0.1)
IMM GRANULOCYTES NFR BLD AUTO: 0 %
KETONES UR QL STRIP: NEGATIVE
LDLC SERPL CALC-MCNC: 121 MG/DL (ref 0–99)
LDLC/HDLC SERPL: 2.5 RATIO (ref 0–3.2)
LEUKOCYTE ESTERASE UR QL STRIP: NEGATIVE
LYMPHOCYTES # BLD AUTO: 1.7 X10E3/UL (ref 0.7–3.1)
LYMPHOCYTES NFR BLD AUTO: 35 %
MCH RBC QN AUTO: 28.5 PG (ref 26.6–33)
MCHC RBC AUTO-ENTMCNC: 33.3 G/DL (ref 31.5–35.7)
MCV RBC AUTO: 86 FL (ref 79–97)
MICRO URNS: ABNORMAL
MONOCYTES # BLD AUTO: 0.6 X10E3/UL (ref 0.1–0.9)
MONOCYTES NFR BLD AUTO: 12 %
NEUTROPHILS # BLD AUTO: 2.1 X10E3/UL (ref 1.4–7)
NEUTROPHILS NFR BLD AUTO: 46 %
NITRITE UR QL STRIP: NEGATIVE
PH UR STRIP: 5.5 [PH] (ref 5–7.5)
PLATELET # BLD AUTO: 270 X10E3/UL (ref 150–450)
POTASSIUM SERPL-SCNC: 4.7 MMOL/L (ref 3.5–5.2)
PROT SERPL-MCNC: 7.2 G/DL (ref 6–8.5)
PROT UR QL STRIP: ABNORMAL
RBC # BLD AUTO: 4.42 X10E6/UL (ref 3.77–5.28)
RBC #/AREA URNS HPF: NORMAL /HPF (ref 0–2)
SODIUM SERPL-SCNC: 143 MMOL/L (ref 134–144)
SP GR UR: >=1.03 (ref 1–1.03)
T4 FREE SERPL-MCNC: 1.18 NG/DL (ref 0.82–1.77)
TRIGL SERPL-MCNC: 114 MG/DL (ref 0–149)
TSH SERPL DL<=0.005 MIU/L-ACNC: 2.4 UIU/ML (ref 0.45–4.5)
URINALYSIS REFLEX: ABNORMAL
UROBILINOGEN UR STRIP-MCNC: 0.2 MG/DL (ref 0.2–1)
VLDLC SERPL CALC-MCNC: 21 MG/DL (ref 5–40)
WBC # BLD AUTO: 4.7 X10E3/UL (ref 3.4–10.8)
WBC #/AREA URNS HPF: NORMAL /HPF (ref 0–5)

## 2021-12-17 ENCOUNTER — OFFICE VISIT (OUTPATIENT)
Dept: FAMILY MEDICINE CLINIC | Facility: CLINIC | Age: 61
End: 2021-12-17

## 2021-12-17 VITALS
WEIGHT: 171.7 LBS | TEMPERATURE: 96.8 F | BODY MASS INDEX: 30.42 KG/M2 | SYSTOLIC BLOOD PRESSURE: 136 MMHG | HEIGHT: 63 IN | HEART RATE: 72 BPM | OXYGEN SATURATION: 99 % | DIASTOLIC BLOOD PRESSURE: 86 MMHG | RESPIRATION RATE: 16 BRPM

## 2021-12-17 DIAGNOSIS — S76.211A INGUINAL STRAIN, RIGHT, INITIAL ENCOUNTER: ICD-10-CM

## 2021-12-17 DIAGNOSIS — R31.29 MICROSCOPIC HEMATURIA: Primary | ICD-10-CM

## 2021-12-17 DIAGNOSIS — Z00.00 ANNUAL PHYSICAL EXAM: Primary | ICD-10-CM

## 2021-12-17 DIAGNOSIS — R31.21 ASYMPTOMATIC MICROSCOPIC HEMATURIA: ICD-10-CM

## 2021-12-17 LAB
BILIRUB BLD-MCNC: NEGATIVE MG/DL
CLARITY, POC: CLEAR
COLOR UR: YELLOW
EXPIRATION DATE: ABNORMAL
GLUCOSE UR STRIP-MCNC: NEGATIVE MG/DL
KETONES UR QL: NEGATIVE
LEUKOCYTE EST, POC: ABNORMAL
Lab: ABNORMAL
NITRITE UR-MCNC: NEGATIVE MG/ML
PH UR: 6 [PH] (ref 5–8)
PROT UR STRIP-MCNC: NEGATIVE MG/DL
RBC # UR STRIP: ABNORMAL /UL
SP GR UR: 1.02 (ref 1–1.03)
UROBILINOGEN UR QL: NORMAL

## 2021-12-17 PROCEDURE — 99396 PREV VISIT EST AGE 40-64: CPT | Performed by: NURSE PRACTITIONER

## 2021-12-17 PROCEDURE — 81003 URINALYSIS AUTO W/O SCOPE: CPT | Performed by: NURSE PRACTITIONER

## 2021-12-17 RX ORDER — ASPIRIN 81 MG/1
81 TABLET ORAL EVERY OTHER DAY
COMMUNITY
End: 2022-08-01 | Stop reason: HOSPADM

## 2021-12-17 RX ORDER — METHYLPREDNISOLONE 4 MG/1
TABLET ORAL
Qty: 21 TABLET | Refills: 0 | Status: SHIPPED | OUTPATIENT
Start: 2021-12-17 | End: 2022-02-09
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
## 2021-12-29 RX ORDER — METOPROLOL TARTRATE 50 MG/1
TABLET, FILM COATED ORAL
Qty: 180 TABLET | Refills: 3 | Status: SHIPPED | OUTPATIENT
Start: 2021-12-29 | End: 2022-08-01 | Stop reason: HOSPADM

## 2022-01-25 ENCOUNTER — TELEPHONE (OUTPATIENT)
Dept: CARDIOLOGY | Facility: CLINIC | Age: 62
End: 2022-01-25

## 2022-01-25 NOTE — TELEPHONE ENCOUNTER
Called and spoke with patient about the recommendations.  She verbalized understanding and stated that her insurance will lapse about 3/12/22.  I have transferred her to the scheduling department and she will try to get in with NIRANJAN about a week sooner so that she can be seen.    Eusebio

## 2022-01-25 NOTE — TELEPHONE ENCOUNTER
01/25/22 @ 12:25pm   Patient called and stated that she has been taking pitavastatin 4 mg 1/2 tablet daily for a long time and for about the last 2-3 weeks.  She stated that her legs feel like they are like concrete.  She said that the pain is about a 10 and them slowly goes away.  Due to all of this, she has no energy.  She stopped taking the medication on Friday and would like to know if you would like to change her to something else?  She stated that you all talked about maybe trying Crestor.      Also, she stated that he  will lose their insurance at the end of March and would like to know if she needs to  her appointment up so that she can be seen before they lose insurance or could she wait until he gets a new job and insurance kicks in?  Please advise.    CB: [phone]    Thanks,  Eusebio

## 2022-01-25 NOTE — TELEPHONE ENCOUNTER
Eusebio,    I would like for her to hold the Livalo for about a total of 2 weeks and then let me know how she is feeling and if the leg discomfort is better. I can discuss lipid therapy with her at that point, although I want to make sure that her symptoms improve before prescribing something else.     She is scheduled to see Yue on March 16, 2022. I think it should be fine to keep this appointment. Thanks.    GM

## 2022-01-26 ENCOUNTER — TELEPHONE (OUTPATIENT)
Dept: CARDIOLOGY | Facility: CLINIC | Age: 62
End: 2022-01-26

## 2022-01-26 NOTE — TELEPHONE ENCOUNTER
I am on call. I S/w patient's  who called on patient's behalf. She has been in A fib with  /111 about an hour ago. She took 1/2 metoprolol and repeat BP is 130/95 Pulse is now  she took a whole metoprolol about 20 minutes ago.       confirms patient has been taking half tablet metoprolol twice daily for total of 25 mg twice daily.  Per last office visit note she was to be taking 50 mg twice daily.  She is to continue Eliquis as scheduled.    They are to check blood pressure again in 30 minutes and if her heart rate is still exceeding 120 take 1 additional metoprolol 50 mg providing systolic blood pressure greater than 115. He verbalized understanding.  He is to call me tonight with any additional questions or concerns.    Scheduling-please get her in sooner to see Yue

## 2022-01-27 NOTE — TELEPHONE ENCOUNTER
Called  She said she is feeling better this am.  It took about 3 hours before things settled down for her.  She said it all came on so quickly.  It started with a headache and then afib.    Asked about possible triggers for the afib.  She is in a lot of pain due to a pulled groin injury, for the last 8 weeks.  She was on steroids for this in December for 6 days.  She is having a lot of stress and pain.    Cardiac meds  apixaban 5mg Bid  Metoprolol tartrate 50mg BID- pt is taking 25mg BID can you clarify this dose, pt states she has never taken 50mg BID    Please advise  Thanks  Alethea Cortez RN  Triage nurse

## 2022-01-27 NOTE — TELEPHONE ENCOUNTER
Stress and pain may be a trigger.  Steroids can also increase heart rate but it appears that she is off of these at this time.  I would recommend the 50 mg dose twice daily.  If she has more frequent episodes that she needs to see me sooner.

## 2022-02-09 ENCOUNTER — TELEPHONE (OUTPATIENT)
Dept: CARDIOLOGY | Facility: CLINIC | Age: 62
End: 2022-02-09

## 2022-02-09 ENCOUNTER — OFFICE VISIT (OUTPATIENT)
Dept: CARDIOLOGY | Facility: CLINIC | Age: 62
End: 2022-02-09

## 2022-02-09 VITALS
WEIGHT: 171.6 LBS | BODY MASS INDEX: 30.41 KG/M2 | HEIGHT: 63 IN | DIASTOLIC BLOOD PRESSURE: 80 MMHG | HEART RATE: 58 BPM | SYSTOLIC BLOOD PRESSURE: 130 MMHG

## 2022-02-09 DIAGNOSIS — I48.0 PAROXYSMAL ATRIAL FIBRILLATION: ICD-10-CM

## 2022-02-09 DIAGNOSIS — I25.84 CORONARY ARTERY CALCIFICATION: ICD-10-CM

## 2022-02-09 DIAGNOSIS — I25.10 CORONARY ARTERY CALCIFICATION: ICD-10-CM

## 2022-02-09 DIAGNOSIS — I35.0 NONRHEUMATIC AORTIC VALVE STENOSIS: Primary | ICD-10-CM

## 2022-02-09 DIAGNOSIS — I70.0 AORTIC ATHEROSCLEROSIS: ICD-10-CM

## 2022-02-09 DIAGNOSIS — I34.0 NONRHEUMATIC MITRAL VALVE REGURGITATION: ICD-10-CM

## 2022-02-09 PROCEDURE — 99214 OFFICE O/P EST MOD 30 MIN: CPT | Performed by: NURSE PRACTITIONER

## 2022-02-09 PROCEDURE — 93000 ELECTROCARDIOGRAM COMPLETE: CPT | Performed by: NURSE PRACTITIONER

## 2022-02-09 NOTE — PROGRESS NOTES
CARDIOLOGY        Patient Name: Re Sheldon  :1960  Age: 61 y.o.  Primary Cardiologist: Bola Benedict MD  Encounter Provider:  JONATHAN Suarez    Date of Service: 22          CHIEF COMPLAINT / REASON FOR OFFICE VISIT     Follow-up and Atrial Fibrillation      HISTORY OF PRESENT ILLNESS       HPI  Re Sheldon is a 61 y.o. female who presents today for evaluation of atrial fibrillation.       Pt has a  history significant for hyperlipidemia, hypertension, PVCs, mitral valve insufficiency, PAF.    Patient states that a week and a half ago she had an episode of atrial fibrillation that lasted for 3 hours.  When she gets atrial fibrillation she is very symptomatic.  States that heart rate was in the 160s at the time.  She took 2-1/2 extra tablets of her Lopressor and eventually converted to sinus rhythm.  Patient also states that her insurance is not covering Livalo.  She states that this was previously stopped as it was questionable if that was causing some of her lower extremity edema and myalgias.  She has been off of this medication now for 3 weeks and is still experiencing radiculopathy from chronic low back pain.  She also experiences chronic hip pain and actually is scheduled to have a steroid injection with orthopedics today.  She was not advised to hold her apixaban.  She notes that a few weeks ago she had lower extremity weakness and pain but denies swelling.  Currently denies any chest pain, shortness of breath at rest.  She does report shortness of breath with exertion especially stairs and generalized fatigue.  She admits that she has not been exercising secondary to pain from her orthopedic issues.      The following portions of the patient's history were reviewed and updated as appropriate: allergies, current medications, past family history, past medical history, past social history, past surgical history and problem list.      VITAL SIGNS     Visit Vitals  /80 (BP  "Location: Left arm)   Pulse 58   Ht 160 cm (63\")   Wt 77.8 kg (171 lb 9.6 oz)   BMI 30.40 kg/m²         Wt Readings from Last 3 Encounters:   02/09/22 77.8 kg (171 lb 9.6 oz)   12/17/21 77.9 kg (171 lb 11.2 oz)   09/14/21 77.7 kg (171 lb 6.4 oz)     Body mass index is 30.4 kg/m².      REVIEW OF SYSTEMS   Review of Systems   Constitutional: Positive for malaise/fatigue. Negative for chills, fever, weight gain and weight loss.   Cardiovascular: Positive for leg swelling and palpitations.   Respiratory: Negative for cough, snoring and wheezing.    Hematologic/Lymphatic: Negative for bleeding problem. Does not bruise/bleed easily.   Skin: Negative for color change.   Musculoskeletal: Positive for back pain and joint pain. Negative for falls and myalgias.   Gastrointestinal: Negative for melena.   Genitourinary: Negative for hematuria.   Neurological: Negative for excessive daytime sleepiness.   Psychiatric/Behavioral: Negative for depression. The patient is not nervous/anxious.            PHYSICAL EXAMINATION     Vitals and nursing note reviewed.   Constitutional:       Appearance: Normal appearance. Well-developed.   Eyes:      Conjunctiva/sclera: Conjunctivae normal.   Neck:      Vascular: No carotid bruit.   Pulmonary:      Breath sounds: Normal breath sounds.   Cardiovascular:      Normal rate. Regular rhythm. Normal S1 with normal intensity. Normal S2 with normal intensity.      Murmurs: There is a grade 3/6 systolic murmur at the URSB and ULSB.      No gallop. No click. No rub.   Edema:     Peripheral edema absent.   Musculoskeletal: Normal range of motion. Skin:     General: Skin is warm and dry.   Neurological:      Mental Status: Alert and oriented to person, place, and time.      GCS: GCS eye subscore is 4. GCS verbal subscore is 5. GCS motor subscore is 6.   Psychiatric:         Speech: Speech normal.         Behavior: Behavior normal.         Thought Content: Thought content normal.         Judgment: " Judgment normal.           REVIEWED DATA       ECG 12 Lead    Date/Time: 2/9/2022 10:39 AM  Performed by: Yuliana Blair APRN  Authorized by: Yuliana Blair APRN   Comparison: compared with previous ECG from 4/25/2020  Rhythm: sinus rhythm  Rate: normal  BPM: 58  Conduction: conduction normal  ST Segments: ST segments normal  T Waves: T waves normal  QRS axis: normal  Other findings: non-specific ST-T wave changes    Clinical impression: non-specific ECG              Cardiac Procedures:  1. Exercise Myoview stress test 4/10/17: No evidence of ischemia or infarction.  2. Echocardiogram 12/21/16: EF 69%.  Moderate left atrial enlargement.  Mild mitral regurgitation.  Mitral valve leaflets were thickened and myxomatous.  3. Echocardiogram 1/29/18: EF 56-60 percent.  All left ventricular wall segments contract normally.  Left ventricular diastolic function is normal.  Left atrial cavity size is mildly dilated.  Mild to moderate mitral valve regurgitation.  4. Echocardiogram 11/15/2019.  LVEF 66%.  Mild mitral valve regurgitation.  Mild tricuspid valve regurgitation.  Calculated RVSP 41 mmHg.  5. ZIO monitor 7/27/2020.  PVCs were rare.  1 episode of bigeminy.  5 burst of nonsustained VT.  Occasional PACs measuring 3.9% of the beats.  Short burst of atrial tachycardia but no sustained arrhythmias.  Patient's heart rate was bradycardic 50% of time.  6. PATRICA 8/31/2020.  LVEF 60%.  Normal RV cavity size and systolic function.  Left atrial cavity size is moderate to severely dilated.  Small PFO noted by left to right color flow only.  No significant amount of bubbles across the atrial septum.  Right atrial cavity size is moderately dilated.  Mild aortic valve stenosis.  Maximum pressure gradient 25.2 mmHg.  Mean pressure gradient 14 mmHg.  Moderate to severe mitral valve regurgitation.  Posterior mitral valve leaflet appears to be restricted in motion.  Moderate tricuspid valve regurgitation.  Calculated RVSP 57 mmHg.   Moderate plaques in the proximal descending thoracic aorta.  Severe plaques in the aortic arch.  No evidence of pericardial effusion.  7. Echocardiogram 3/23/2021.  LVEF 61-65%.  Mild LVH.  Diastolic function normal.  Normal RV cavity size and systolic function.  Left atrial cavity is moderately dilated.  Right atrial cavity is moderately dilated.  Aortic valve leaflets are thickened and heavily calcified.  Moderate aortic valve stenosis.  Peak velocity of flow 321.1 cm/s.  Maximum pressure gradient 41 mmHg.  Mean pressure gradient 19 mmHg.  Aortic valve area is 1.4 cm².  Posterior mitral valve leaflet is restricted.  Moderate mitral valve regurgitation.  Mild cuspid valve regurgitation.  Calculated RVSP 20 mmHg.      Lipid Panel    Lipid Panel 7/27/21 11/19/21   Total Cholesterol 208 (A) 191   Triglycerides 122 114   HDL Cholesterol 57 49   VLDL Cholesterol 22 21   LDL Cholesterol  129 (A) 121 (A)   LDL/HDL Ratio 2.3 2.5   (A) Abnormal value       Comments are available for some flowsheets but are not being displayed.           BUN   Date Value Ref Range Status   11/19/2021 19 8 - 27 mg/dL Final   08/28/2020 16 6 - 20 mg/dL Final     Creatinine   Date Value Ref Range Status   11/19/2021 0.75 0.57 - 1.00 mg/dL Final   08/28/2020 0.71 0.57 - 1.00 mg/dL Final     Potassium   Date Value Ref Range Status   11/19/2021 4.7 3.5 - 5.2 mmol/L Final   08/28/2020 4.4 3.5 - 5.2 mmol/L Final     ALT (SGPT)   Date Value Ref Range Status   11/19/2021 12 0 - 32 IU/L Final   04/25/2020 15 1 - 33 U/L Final     AST (SGOT)   Date Value Ref Range Status   11/19/2021 16 0 - 40 IU/L Final   04/25/2020 15 1 - 32 U/L Final           ASSESSMENT & PLAN      Diagnosis Plan   1. Nonrheumatic aortic valve stenosis     2. Nonrheumatic mitral valve regurgitation     3. Paroxysmal atrial fibrillation (HCC)     4. Coronary artery calcification     5. Aortic atherosclerosis (HCC)           SUMMARY/DISCUSSION  1. Aortic valve stenosis.  Moderate on  most recent echocardiogram.  Patient is scheduled to get surveillance echocardiograms every 6 to 8 months.  2. Mitral valve regurgitation.  Patient scheduled for surveillance echocardiogram every 6 to 8 months.  3. PAF.  Patient had episode of atrial fibrillation a week and a half ago.  Patient is very symptomatic with these episodes.  Episode lasted an hour and a half and ultimately converted after she took an extra 2-1/2 tablets of metoprolol tartrate 50 mg tablet.  She states that her heart rate was in the 160s during the episode and she felt fatigued and lightheaded.  She has not had any further episodes and is in sinus rhythm on exam today.  Patient anticoagulated with apixaban.  No bleeding complications.  4. Coronary artery calcification and aortic atherosclerosis.  Noted plaques in the aorta on PATRICA.  Patient is being medically treated as if she has coronary disease.  Unfortunately she has intolerance to multiple statins in the past and also she has intolerance to pitavastatin 4 mg dosing.  She is able to tolerate 2 mg/day.  She had discontinued this for 3 weeks to see if it improved any of her lower extremity myalgias and she is still experiencing lumbar radiculopathy.  I explained to patient that with the amount of plaque in her aorta that she should really be on any statin that she can tolerate.  She is agreeable to resume pravastatin 2 mg/day.  She is also on aspirin and metoprolol tartrate.  5. Patient having multiple orthopedic issues which include lumbar radiculopathy, right hip pain.  She is actually scheduled to have a injection to her right hip today.  She has not held her apixaban.  I recommended that she discuss this is the fact that she is on apixaban with physician before receiving her steroid injection.  She verbalizes understanding.  It is my opinion that patient's fatigue is secondary to exercise intolerance because of pain from orthopedic issues.  6. Follow-up routinely with Dr. Benedict  in 6 months.  Sooner for problems or complications.        MEDICATIONS         Discharge Medications          Accurate as of February 9, 2022 11:59 AM. If you have any questions, ask your nurse or doctor.            Continue These Medications      Instructions Start Date   apixaban 5 MG tablet tablet  Commonly known as: Eliquis   5 mg, Oral, Every 12 Hours      aspirin 81 MG EC tablet   81 mg, Oral, Daily      cetirizine 10 MG tablet  Commonly known as: zyrTEC   1 OTC tab po QDay for allergies      cholecalciferol 25 MCG (1000 UT) tablet  Commonly known as: VITAMIN D3   1,000 Units, Oral, Daily      fluticasone 50 MCG/ACT nasal spray  Commonly known as: FLONASE   Nasal      metoprolol tartrate 50 MG tablet  Commonly known as: LOPRESSOR   TAKE 1 TABLET BY MOUTH TWICE A DAY      Pitavastatin Calcium 4 MG tablet   4 mg, Oral, Nightly, Taking 1/2 tab in the am         Stop These Medications    methylPREDNISolone 4 MG dose pack  Commonly known as: MEDROL  Stopped by: JONATHAN Suarez                **Dragon Disclaimer:   Much of this encounter note is an electronic transcription/translation of spoken language to printed text. The electronic translation of spoken language may permit erroneous, or at times, nonsensical words or phrases to be inadvertently transcribed. Although I have reviewed the note for such errors, some may still exist.

## 2022-02-09 NOTE — TELEPHONE ENCOUNTER
Rx Refill Note  Requested Prescriptions     Pending Prescriptions Disp Refills   • Pitavastatin Calcium 4 MG tablet 45 tablet 3     Sig: Take 1 tablet by mouth Every Night. Taking 1/2 tab in the am      Last office visit with prescribing clinician: 2/9/2022      Next office visit with prescribing clinician: Visit date not found            Lolis Cheema MA  02/09/22, 11:46 EST

## 2022-02-10 NOTE — TELEPHONE ENCOUNTER
02/10/22  08:56 EST    She has failed all other statin alternatives.  We will need to do a PA or an appeal to get this approved.      JONATHAN Hobbs  [facility]

## 2022-02-23 ENCOUNTER — APPOINTMENT (OUTPATIENT)
Dept: GENERAL RADIOLOGY | Facility: HOSPITAL | Age: 62
End: 2022-02-23

## 2022-02-23 ENCOUNTER — HOSPITAL ENCOUNTER (EMERGENCY)
Facility: HOSPITAL | Age: 62
Discharge: HOME | End: 2022-02-23
Attending: EMERGENCY MEDICINE | Admitting: EMERGENCY MEDICINE

## 2022-02-23 VITALS
RESPIRATION RATE: 16 BRPM | DIASTOLIC BLOOD PRESSURE: 58 MMHG | OXYGEN SATURATION: 98 % | WEIGHT: 172 LBS | TEMPERATURE: 98.2 F | BODY MASS INDEX: 30.48 KG/M2 | HEART RATE: 114 BPM | SYSTOLIC BLOOD PRESSURE: 115 MMHG | HEIGHT: 63 IN

## 2022-02-23 DIAGNOSIS — I48.92 ATRIAL FLUTTER WITH RAPID VENTRICULAR RESPONSE: Primary | ICD-10-CM

## 2022-02-23 LAB
ALBUMIN SERPL-MCNC: 3.8 G/DL (ref 3.5–5.2)
ALBUMIN/GLOB SERPL: 1.4 G/DL
ALP SERPL-CCNC: 73 U/L (ref 39–117)
ALT SERPL W P-5'-P-CCNC: 15 U/L (ref 1–33)
ANION GAP SERPL CALCULATED.3IONS-SCNC: 13.4 MMOL/L (ref 5–15)
AST SERPL-CCNC: 14 U/L (ref 1–32)
BASOPHILS # BLD AUTO: 0.04 10*3/MM3 (ref 0–0.2)
BASOPHILS NFR BLD AUTO: 0.3 % (ref 0–1.5)
BILIRUB SERPL-MCNC: <0.2 MG/DL (ref 0–1.2)
BUN SERPL-MCNC: 19 MG/DL (ref 8–23)
BUN/CREAT SERPL: 24.4 (ref 7–25)
CALCIUM SPEC-SCNC: 8.9 MG/DL (ref 8.6–10.5)
CHLORIDE SERPL-SCNC: 107 MMOL/L (ref 98–107)
CO2 SERPL-SCNC: 23.6 MMOL/L (ref 22–29)
CREAT SERPL-MCNC: 0.78 MG/DL (ref 0.57–1)
DEPRECATED RDW RBC AUTO: 45.7 FL (ref 37–54)
EOSINOPHIL # BLD AUTO: 0.15 10*3/MM3 (ref 0–0.4)
EOSINOPHIL NFR BLD AUTO: 1.3 % (ref 0.3–6.2)
ERYTHROCYTE [DISTWIDTH] IN BLOOD BY AUTOMATED COUNT: 14.4 % (ref 12.3–15.4)
GFR SERPL CREATININE-BSD FRML MDRD: 75 ML/MIN/1.73
GLOBULIN UR ELPH-MCNC: 2.8 GM/DL
GLUCOSE SERPL-MCNC: 140 MG/DL (ref 65–99)
HCT VFR BLD AUTO: 39.8 % (ref 34–46.6)
HGB BLD-MCNC: 12.6 G/DL (ref 12–15.9)
IMM GRANULOCYTES # BLD AUTO: 0.05 10*3/MM3 (ref 0–0.05)
IMM GRANULOCYTES NFR BLD AUTO: 0.4 % (ref 0–0.5)
LYMPHOCYTES # BLD AUTO: 2.65 10*3/MM3 (ref 0.7–3.1)
LYMPHOCYTES NFR BLD AUTO: 23 % (ref 19.6–45.3)
MAGNESIUM SERPL-MCNC: 2.1 MG/DL (ref 1.6–2.4)
MCH RBC QN AUTO: 27.6 PG (ref 26.6–33)
MCHC RBC AUTO-ENTMCNC: 31.7 G/DL (ref 31.5–35.7)
MCV RBC AUTO: 87.1 FL (ref 79–97)
MONOCYTES # BLD AUTO: 0.75 10*3/MM3 (ref 0.1–0.9)
MONOCYTES NFR BLD AUTO: 6.5 % (ref 5–12)
NEUTROPHILS NFR BLD AUTO: 68.5 % (ref 42.7–76)
NEUTROPHILS NFR BLD AUTO: 7.88 10*3/MM3 (ref 1.7–7)
NRBC BLD AUTO-RTO: 0 /100 WBC (ref 0–0.2)
NT-PROBNP SERPL-MCNC: 439 PG/ML (ref 0–900)
PLATELET # BLD AUTO: 285 10*3/MM3 (ref 140–450)
PMV BLD AUTO: 9.9 FL (ref 6–12)
POTASSIUM SERPL-SCNC: 4.4 MMOL/L (ref 3.5–5.2)
PROT SERPL-MCNC: 6.6 G/DL (ref 6–8.5)
RBC # BLD AUTO: 4.57 10*6/MM3 (ref 3.77–5.28)
SODIUM SERPL-SCNC: 144 MMOL/L (ref 136–145)
TROPONIN T SERPL-MCNC: <0.01 NG/ML (ref 0–0.03)
WBC NRBC COR # BLD: 11.52 10*3/MM3 (ref 3.4–10.8)

## 2022-02-23 PROCEDURE — 96374 THER/PROPH/DIAG INJ IV PUSH: CPT

## 2022-02-23 PROCEDURE — 85025 COMPLETE CBC W/AUTO DIFF WBC: CPT | Performed by: EMERGENCY MEDICINE

## 2022-02-23 PROCEDURE — 99283 EMERGENCY DEPT VISIT LOW MDM: CPT

## 2022-02-23 PROCEDURE — 83880 ASSAY OF NATRIURETIC PEPTIDE: CPT | Performed by: EMERGENCY MEDICINE

## 2022-02-23 PROCEDURE — 93010 ELECTROCARDIOGRAM REPORT: CPT | Performed by: INTERNAL MEDICINE

## 2022-02-23 PROCEDURE — 93005 ELECTROCARDIOGRAM TRACING: CPT | Performed by: EMERGENCY MEDICINE

## 2022-02-23 PROCEDURE — 93005 ELECTROCARDIOGRAM TRACING: CPT

## 2022-02-23 PROCEDURE — 80053 COMPREHEN METABOLIC PANEL: CPT | Performed by: EMERGENCY MEDICINE

## 2022-02-23 PROCEDURE — 25010000002 DIGOXIN PER 500 MCG: Performed by: EMERGENCY MEDICINE

## 2022-02-23 PROCEDURE — 71045 X-RAY EXAM CHEST 1 VIEW: CPT

## 2022-02-23 PROCEDURE — 99284 EMERGENCY DEPT VISIT MOD MDM: CPT

## 2022-02-23 PROCEDURE — 84484 ASSAY OF TROPONIN QUANT: CPT | Performed by: EMERGENCY MEDICINE

## 2022-02-23 PROCEDURE — 83735 ASSAY OF MAGNESIUM: CPT | Performed by: EMERGENCY MEDICINE

## 2022-02-23 RX ORDER — SODIUM CHLORIDE 0.9 % (FLUSH) 0.9 %
10 SYRINGE (ML) INJECTION AS NEEDED
Status: DISCONTINUED | OUTPATIENT
Start: 2022-02-23 | End: 2022-02-24 | Stop reason: HOSPADM

## 2022-02-23 RX ORDER — DIGOXIN 0.25 MG/ML
500 INJECTION INTRAMUSCULAR; INTRAVENOUS ONCE
Status: COMPLETED | OUTPATIENT
Start: 2022-02-23 | End: 2022-02-23

## 2022-02-23 RX ORDER — DIGOXIN 125 MCG
250 TABLET ORAL
Qty: 60 TABLET | Refills: 0 | Status: SHIPPED | OUTPATIENT
Start: 2022-02-23 | End: 2022-02-28

## 2022-02-23 RX ADMIN — Medication 10 ML: at 23:14

## 2022-02-23 RX ADMIN — DIGOXIN 500 MCG: 0.25 INJECTION INTRAMUSCULAR; INTRAVENOUS at 23:12

## 2022-02-24 LAB
QT INTERVAL: 296 MS
QT INTERVAL: 343 MS

## 2022-02-24 NOTE — ED TRIAGE NOTES
Pt ambulatory to triage from home with c/o palpitations that started @ 1630.  History of afib, pt took 4 additional doses of metoprolol with no relief of palpitations.  Pt wearing mask in triage. Triage personnel wore appropriate PPE

## 2022-02-24 NOTE — DISCHARGE INSTRUCTIONS
Continue your normal dose of metoprolol 50 mg twice daily.  Begin taking the digoxin tomorrow.  Follow-up with your cardiologist.

## 2022-02-24 NOTE — ED PROVIDER NOTES
EMERGENCY DEPARTMENT ENCOUNTER    Room Number:  14/14  Date seen:  2/23/2022  PCP: Angelita Vital MD  Historian: Patient      HPI:  Chief Complaint: Palpitations  A complete HPI/ROS/PMH/PSH/SH/FH are unobtainable due to: Nothing  Context: Re Sheldon is a 61 y.o. female who presents to the ED c/o palpitations and fatigue onset around 4:30 PM today.  She does have a history of atrial fibrillation.  She noticed that her heart rate was rapid, around 145 pulse rate.  She typically takes metoprolol 50 mg morning and night.  She took a 50 mg tablet at 4:30 PM when symptoms began.  She took another 50 mg tablet at 5:30 PM and again at 6:30 PM.  She then called and spoke to a nurse on call for her cardiologist around 8:30 PM who advised her to come to the emergency department as her heart rate had not yet slowed.  She denies chest pain.  She denies shortness of breath.  She denies leg pain or leg swelling.  She is anticoagulated on Eliquis however this has been held for the last 2 days due to a procedure today where she had a skin cancer removed.  She actually took her Eliquis around 5 PM tonight.  She denies any fever or chills.  No syncope or near syncope.  She reports feeling a little bit better now than she did at 4:30 PM.            PAST MEDICAL HISTORY  Active Ambulatory Problems     Diagnosis Date Noted   • Abdominal pain 07/29/2016   • Acute bronchitis 07/29/2016   • Lumbar radiculopathy 07/29/2016   • Leg pain 07/29/2016   • Acute antritis 07/29/2016   • Bulging of lumbar intervertebral disc 07/29/2016   • Chest pain 07/29/2016   • Chronic right-sided low back pain without sciatica 07/29/2016   • DDD (degenerative disc disease), lumbar 07/29/2016   • Dysfunction of eustachian tube 07/29/2016   • Fatigue 07/29/2016   • Hyperlipidemia 07/29/2016   • BP (high blood pressure) 07/29/2016   • Abnormal fasting glucose 07/29/2016   • Gonalgia 07/29/2016   • Maxillary antritis 07/29/2016   • Neck swelling 07/29/2016    • Health care maintenance 2016   • Allergic rhinitis 2016   • Bilateral carpal tunnel syndrome 12/15/2016   • Degenerative lumbar spinal stenosis 12/15/2016   • Valvular insufficiency 12/15/2016   • Chest tightness or pressure 2017   • Family history of early CAD 2017   • EKG, abnormal 2017   • PVC (premature ventricular contraction) 2017   • Mitral valve insufficiency 2017   • Vitamin D deficiency 2018   • Meralgia paresthetica of left side 10/26/2020   • Family history of colon cancer 2020     Resolved Ambulatory Problems     Diagnosis Date Noted   • No Resolved Ambulatory Problems     Past Medical History:   Diagnosis Date   • Abnormal ECG    • Anemia    • Atrial fibrillation (HCC)    • Calcification of aortic valve    • Coronary artery disease    • Hypertension    • Low back pain    • Mitral regurgitation    • PAF (paroxysmal atrial fibrillation) (HCC)    • Palpitations    • Premature atrial contractions    • Spinal stenosis    • Systolic murmur    • Tonsillitis          PAST SURGICAL HISTORY  Past Surgical History:   Procedure Laterality Date   • ADENOIDECTOMY     • CARPAL TUNNEL RELEASE Bilateral    • COLONOSCOPY  2014    repeat 10 years , Dr. Karen Spear   • COLONOSCOPY N/A 2021    Procedure: COLONOSCOPY to cecum with cold biopsy polypectomy;  Surgeon: Ray Hairston MD;  Location: [facility] ENDOSCOPY;  Service: Gastroenterology;  Laterality: N/A;  pre: screening  post: diverticulosis, polyp, internal hemorrhoids   • FOOT SURGERY     • HAND SURGERY     • TONSILLECTOMY     • TONSILLECTOMY AND ADENOIDECTOMY     • TUBAL ABDOMINAL LIGATION           FAMILY HISTORY  Family History   Problem Relation Age of Onset   • Heart attack Other    • Breast cancer Other    • Heart disease Other    • Heart disease Mother         Mother with MI at 63, and later  from CHF   • Heart disease Father         Father  from complications after valve  replacement at 73   • Stroke Father    • Breast cancer Sister    • Malig Hyperthermia Neg Hx          SOCIAL HISTORY  Social History     Socioeconomic History   • Marital status:      Spouse name: Kedar   • Number of children: 2   Tobacco Use   • Smoking status: Never Smoker   • Smokeless tobacco: Never Used   Vaping Use   • Vaping Use: Never used   Substance and Sexual Activity   • Alcohol use: No     Comment: Caffeine use 3-4 cups daily   • Drug use: No         ALLERGIES  Sulfa antibiotics, Augmentin [amoxicillin-pot clavulanate], and Bactrim [sulfamethoxazole-trimethoprim]        REVIEW OF SYSTEMS  Review of Systems   Review of all 14 systems is negative other than stated in the HPI above.      PHYSICAL EXAM  ED Triage Vitals   Temp Heart Rate Resp BP SpO2   02/23/22 2143 02/23/22 2109 02/23/22 2109 02/23/22 2109 02/23/22 2110   98.2 °F (36.8 °C) (!) 133 16 123/68 96 %      Temp src Heart Rate Source Patient Position BP Location FiO2 (%)   -- 02/23/22 2109 02/23/22 2109 02/23/22 2109 --    Monitor Lying Left arm          GENERAL: Awake and alert, no acute distress, pleasant  HENT: nares patent  EYES: no scleral icterus, EOMI, pupils 3 mm reactive bilaterally  CV: Irregular rhythm, tachycardic  RESPIRATORY: normal effort, lungs clear auscultation bilaterally  ABDOMEN: soft, nondistended, nontender throughout  MUSCULOSKELETAL: no deformity, no peripheral edema  NEURO: alert, moves all extremities, follows commands, cranial nerves II through XII grossly intact, speech fluent and clear  PSYCH:  calm, cooperative  SKIN: warm, dry, normal to inspection    Vital signs and nursing notes reviewed.          LAB RESULTS  Recent Results (from the past 24 hour(s))   ECG 12 Lead    Collection Time: 02/23/22  8:59 PM   Result Value Ref Range    QT Interval 296 ms   Comprehensive Metabolic Panel    Collection Time: 02/23/22  9:42 PM    Specimen: Blood   Result Value Ref Range    Glucose 140 (H) 65 - 99 mg/dL    BUN 19 8 -  23 mg/dL    Creatinine 0.78 0.57 - 1.00 mg/dL    Sodium 144 136 - 145 mmol/L    Potassium 4.4 3.5 - 5.2 mmol/L    Chloride 107 98 - 107 mmol/L    CO2 23.6 22.0 - 29.0 mmol/L    Calcium 8.9 8.6 - 10.5 mg/dL    Total Protein 6.6 6.0 - 8.5 g/dL    Albumin 3.80 3.50 - 5.20 g/dL    ALT (SGPT) 15 1 - 33 U/L    AST (SGOT) 14 1 - 32 U/L    Alkaline Phosphatase 73 39 - 117 U/L    Total Bilirubin <0.2 0.0 - 1.2 mg/dL    eGFR Non African Amer 75 >60 mL/min/1.73    Globulin 2.8 gm/dL    A/G Ratio 1.4 g/dL    BUN/Creatinine Ratio 24.4 7.0 - 25.0    Anion Gap 13.4 5.0 - 15.0 mmol/L   Troponin    Collection Time: 02/23/22  9:42 PM    Specimen: Blood   Result Value Ref Range    Troponin T <0.010 0.000 - 0.030 ng/mL   BNP    Collection Time: 02/23/22  9:42 PM    Specimen: Blood   Result Value Ref Range    proBNP 439.0 0.0 - 900.0 pg/mL   Magnesium    Collection Time: 02/23/22  9:42 PM    Specimen: Blood   Result Value Ref Range    Magnesium 2.1 1.6 - 2.4 mg/dL   CBC Auto Differential    Collection Time: 02/23/22  9:42 PM    Specimen: Blood   Result Value Ref Range    WBC 11.52 (H) 3.40 - 10.80 10*3/mm3    RBC 4.57 3.77 - 5.28 10*6/mm3    Hemoglobin 12.6 12.0 - 15.9 g/dL    Hematocrit 39.8 34.0 - 46.6 %    MCV 87.1 79.0 - 97.0 fL    MCH 27.6 26.6 - 33.0 pg    MCHC 31.7 31.5 - 35.7 g/dL    RDW 14.4 12.3 - 15.4 %    RDW-SD 45.7 37.0 - 54.0 fl    MPV 9.9 6.0 - 12.0 fL    Platelets 285 140 - 450 10*3/mm3    Neutrophil % 68.5 42.7 - 76.0 %    Lymphocyte % 23.0 19.6 - 45.3 %    Monocyte % 6.5 5.0 - 12.0 %    Eosinophil % 1.3 0.3 - 6.2 %    Basophil % 0.3 0.0 - 1.5 %    Immature Grans % 0.4 0.0 - 0.5 %    Neutrophils, Absolute 7.88 (H) 1.70 - 7.00 10*3/mm3    Lymphocytes, Absolute 2.65 0.70 - 3.10 10*3/mm3    Monocytes, Absolute 0.75 0.10 - 0.90 10*3/mm3    Eosinophils, Absolute 0.15 0.00 - 0.40 10*3/mm3    Basophils, Absolute 0.04 0.00 - 0.20 10*3/mm3    Immature Grans, Absolute 0.05 0.00 - 0.05 10*3/mm3    nRBC 0.0 0.0 - 0.2 /100 WBC    ECG 12 Lead    Collection Time: 02/23/22 10:48 PM   Result Value Ref Range    QT Interval 343 ms       Ordered the above labs and reviewed the results.        RADIOLOGY  XR Chest 1 View    Result Date: 2/23/2022  SINGLE VIEW OF THE CHEST  HISTORY: Atrial fibrillation  COMPARISON: 04/25/2020  FINDINGS: There is mild cardiomegaly. There is no vascular congestion. There is some calcification of the aorta. No pneumothorax or pleural effusion is seen. No acute infiltrates are identified.      No acute findings.  This report was finalized on 2/23/2022 9:43 PM by Dr. Daija Inman M.D.        Ordered the above noted radiological studies. Reviewed by me in PACS.            PROCEDURES  Procedures            MEDICATIONS GIVEN IN ER  Medications   sodium chloride 0.9 % flush 10 mL (has no administration in time range)   digoxin (LANOXIN) injection 500 mcg (500 mcg Intravenous Given 2/23/22 2312)                   MEDICAL DECISION MAKING, PROGRESS, and CONSULTS    All labs have been independently reviewed by me.  All radiology studies have been reviewed by me and discussed with radiologist dictating the report.   EKG's independently viewed and interpreted by me.  Discussion below represents my analysis of pertinent findings related to patient's condition, differential diagnosis, treatment plan and final disposition.      Differential diagnosis includes but is not limited to:  SVT  Atrial flutter  Atrial fibrillation      ED Course as of 02/23/22 2313 Wed Feb 23, 2022 2145 Chest x-ray dependently interpreted in PACS.  Lung fields are clear bilaterally, no pleural effusion, no infiltrate. [JR]   2146 EKG interpreted by me:  Time: 2059  Rate and rhythm: Atrial flutter with 2-1 AV block, rate 147  DE: N/A  QRS: Normal axis  ST and T waves: Inferior and lateral ST depression [JR]   2149 Medical record review: I reviewed prior EKG from 2/9/2022 that showed sinus rhythm, rate 58, normal DE, normal QRS, inferior T wave  inversions. [JR]   2152 Patient appears to be responding to the 150 mg of oral metoprolol that she took today between 430 and 6:30 PM.  Heart rate currently varying from 90s to 120s.  I recommend we continue to monitor heart rate as she may have further slowing without any further intervention. [JR]   2243 Patient reassessed.  Heart rate currently 80s to low 100s and she reports symptomatic improvement.  I discussed plan to consult with cardiology regarding titration of her home metoprolol dose. [JR]   2258 Discussed with Dr. Vela, cardiology, who recommended digoxin load 0.5 mg IV here and then prescription for 250 mcg p.o. daily, continue metoprolol, and that she will arrange for close follow-up in the office in the next 1 to 2 days. [JR]   2303 EKG          EKG time: 2248  Rhythm/Rate: Atrial flutter with variable conduction, rate 93  P waves and CO: N/A  QRS, axis: Normal axis  ST and T waves: Inferior and lateral T wave inversions    Interpreted Contemporaneously by me, independently viewed  Similar compared to prior earlier today however the rate has slowed       [JR]   2312 Patient and her  were updated on the treatment plan.  They are agreeable.  Patient is now ambulating to the bathroom independently without difficulty.  I discussed importance of close follow-up with cardiology. [JR]      ED Course User Index  [JR] Milad Woodall MD            I wore an N95 mask, face shield, and gloves during this patient encounter.  Patient also wearing a surgical mask.  Hand hygeine performed before and after seeing the patient.    DIAGNOSIS  Final diagnoses:   Atrial flutter with rapid ventricular response (HCC)         DISPOSITION  DISCHARGE    Patient discharged in stable condition.    Reviewed implications of results, diagnosis, meds, responsibility to follow up, warning signs and symptoms of possible worsening, potential complications and reasons to return to ER.    Patient/Family voiced understanding  of above instructions.    Discussed plan for discharge, as there is no emergent indication for admission. Patient referred to primary care provider for BP management due to today's BP. Pt/family is agreeable and understands need for follow up and repeat testing.  Pt is aware that discharge does not mean that nothing is wrong but it indicates no emergency is present that requires admission and they must continue care with follow-up as given below or physician of their choice.     FOLLOW-UP  Callum Benedict MD  [address] [id number] [phone]    Schedule an appointment as soon as possible for a visit            Medication List      New Prescriptions    digoxin 125 MCG tablet  Commonly known as: LANOXIN  Take 2 tablets by mouth Daily.           Where to Get Your Medications      These medications were sent to [facility]/pharmacy #[id number] - [place], [place] - [address] - [phone]  - [phone] FX  [address] [facility] [address]    Phone: [phone]   · digoxin 125 MCG tablet                   Latest Documented Vital Signs:  As of 23:13 EST  BP- 115/58 HR- 97 Temp- 98.2 °F (36.8 °C) O2 sat- 96%        --    Please note that portions of this were completed with a voice recognition program.          Milad Woodall MD  02/23/22 8314

## 2022-02-24 NOTE — ED NOTES
Pt in mask & this RN in appropriate PPE during care.   Hand hygiene was performed before and after care.       Connie Roy, ANISHA  02/23/22 4231

## 2022-02-28 ENCOUNTER — OFFICE VISIT (OUTPATIENT)
Dept: CARDIOLOGY | Facility: CLINIC | Age: 62
End: 2022-02-28

## 2022-02-28 ENCOUNTER — TELEPHONE (OUTPATIENT)
Dept: CARDIOLOGY | Facility: CLINIC | Age: 62
End: 2022-02-28

## 2022-02-28 VITALS
BODY MASS INDEX: 30.12 KG/M2 | SYSTOLIC BLOOD PRESSURE: 150 MMHG | WEIGHT: 170 LBS | DIASTOLIC BLOOD PRESSURE: 84 MMHG | RESPIRATION RATE: 16 BRPM | HEART RATE: 55 BPM | HEIGHT: 63 IN | OXYGEN SATURATION: 98 %

## 2022-02-28 DIAGNOSIS — I48.0 PAROXYSMAL ATRIAL FIBRILLATION: Primary | ICD-10-CM

## 2022-02-28 DIAGNOSIS — I48.92 PAROXYSMAL ATRIAL FLUTTER: ICD-10-CM

## 2022-02-28 DIAGNOSIS — I35.0 NONRHEUMATIC AORTIC VALVE STENOSIS: ICD-10-CM

## 2022-02-28 DIAGNOSIS — Q21.12 PFO (PATENT FORAMEN OVALE): ICD-10-CM

## 2022-02-28 DIAGNOSIS — I34.0 NONRHEUMATIC MITRAL VALVE REGURGITATION: ICD-10-CM

## 2022-02-28 PROCEDURE — 99214 OFFICE O/P EST MOD 30 MIN: CPT | Performed by: NURSE PRACTITIONER

## 2022-02-28 PROCEDURE — 93000 ELECTROCARDIOGRAM COMPLETE: CPT | Performed by: NURSE PRACTITIONER

## 2022-02-28 NOTE — PROGRESS NOTES
CARDIOLOGY        Patient Name: Re Sheldon  :1960  Age: 61 y.o.  Primary Cardiologist: Bola Benedict MD  Encounter Provider:  JONATHAN Suarez    Date of Service: 22          CHIEF COMPLAINT / REASON FOR OFFICE VISIT     Atrial Fibrillation      HISTORY OF PRESENT ILLNESS       Atrial Fibrillation  Past medical history includes atrial fibrillation.     Re Sheldon is a 61 y.o. female who presents today for ED follow-up.      Pt has a  history significant for hyperlipidemia, hypertension, PVCs, mitral valve insufficiency, PAF.    Review of medical records reveals patient was in the emergency department 2022.  Patient presented to the emergency department with A. joel with RVR.  She had taken an additional 150 mg of metoprolol tartrate but heart rate was not controlled.  She called the answering service and I was actually on call and advised that if heart rate did not normalize that she should be evaluated in the ED.  In the ED initial ECG which was personally reviewed by me revealed a flutter with rate 147.  Patient was given loading dose of IV digoxin 500 mcg x 1 and discharged with prescription for 250 mcg/day.  At time of discharge patient's heart rate was controlled at 93.    Patient states that she noted that the next morning she felt that her HR normalized and she felt that she was back in normal rhythm.  She notes that on the morning that she went into atrial fibrillation, she had experienced a very stressful and anxiety induced morning having some skin cancer removed.  She states that after receiving Digoxin in the ED she had some diarrhea and symptoms that she felt were from the medication, so she actually did not fill the Digoxin and has not taken it.  She states that she is feeling much better.  No further dyspnea or noted irregular heartbeats.      The following portions of the patient's history were reviewed and updated as appropriate: allergies, current medications,  "past family history, past medical history, past social history, past surgical history and problem list.      VITAL SIGNS     Visit Vitals  /84 (BP Location: Left arm, Patient Position: Sitting, Cuff Size: Adult)   Pulse 55   Resp 16   Ht 160 cm (62.99\")   Wt 77.1 kg (170 lb)   SpO2 98%   BMI 30.12 kg/m²         Wt Readings from Last 3 Encounters:   02/28/22 77.1 kg (170 lb)   02/23/22 78 kg (172 lb)   02/09/22 77.8 kg (171 lb 9.6 oz)     Body mass index is 30.12 kg/m².      REVIEW OF SYSTEMS   Review of Systems   Constitutional: Negative for chills, fever, weight gain and weight loss.   Cardiovascular: Positive for irregular heartbeat. Negative for leg swelling.   Respiratory: Negative for cough, snoring and wheezing.    Hematologic/Lymphatic: Negative for bleeding problem. Does not bruise/bleed easily.   Skin: Negative for color change.   Musculoskeletal: Negative for falls, joint pain and myalgias.   Gastrointestinal: Negative for melena.   Genitourinary: Negative for hematuria.   Neurological: Negative for excessive daytime sleepiness.   Psychiatric/Behavioral: Negative for depression. The patient is not nervous/anxious.            PHYSICAL EXAMINATION     Vitals and nursing note reviewed.   Constitutional:       Appearance: Normal appearance. Well-developed.   Eyes:      Conjunctiva/sclera: Conjunctivae normal.   Neck:      Vascular: No carotid bruit.   Pulmonary:      Breath sounds: Normal breath sounds.   Cardiovascular:      Normal rate. Regular rhythm. Normal S1 with normal intensity. Normal S2 with normal intensity.      Murmurs: There is a grade 3/6 systolic murmur at the URSB and ULSB.      No gallop. No click. No rub.   Edema:     Peripheral edema absent.   Musculoskeletal: Normal range of motion. Skin:     General: Skin is warm and dry.   Neurological:      Mental Status: Alert and oriented to person, place, and time.      GCS: GCS eye subscore is 4. GCS verbal subscore is 5. GCS motor subscore is " 6.   Psychiatric:         Speech: Speech normal.         Behavior: Behavior normal.         Thought Content: Thought content normal.         Judgment: Judgment normal.           REVIEWED DATA       ECG 12 Lead    Date/Time: 2/28/2022 2:02 PM  Performed by: Yuliana Blair APRN  Authorized by: Yuliana Blair APRN   Comparison: compared with previous ECG from 2/23/2021  Comparison to previous ECG: Patient now in sinus rhythm  Rhythm: sinus bradycardia  Rate: bradycardic  BPM: 56  Conduction: conduction normal  ST Segments: ST segments normal  T Waves: T waves normal  QRS axis: left  Other: no other findings    Clinical impression: normal ECG              Cardiac Procedures:  1. Exercise Myoview stress test 4/10/17: No evidence of ischemia or infarction.  2. Echocardiogram 12/21/16: EF 69%.  Moderate left atrial enlargement.  Mild mitral regurgitation.  Mitral valve leaflets were thickened and myxomatous.  3. Echocardiogram 1/29/18: EF 56-60 percent.  All left ventricular wall segments contract normally.  Left ventricular diastolic function is normal.  Left atrial cavity size is mildly dilated.  Mild to moderate mitral valve regurgitation.  4. Echocardiogram 11/15/2019.  LVEF 66%.  Mild mitral valve regurgitation.  Mild tricuspid valve regurgitation.  Calculated RVSP 41 mmHg.  5. ZIO monitor 7/27/2020.  PVCs were rare.  1 episode of bigeminy.  5 burst of nonsustained VT.  Occasional PACs measuring 3.9% of the beats.  Short burst of atrial tachycardia but no sustained arrhythmias.  Patient's heart rate was bradycardic 50% of time.  6. PATRICA 8/31/2020.  LVEF 60%.  Normal RV cavity size and systolic function.  Left atrial cavity size is moderate to severely dilated.  Small PFO noted by left to right color flow only.  No significant amount of bubbles across the atrial septum.  Right atrial cavity size is moderately dilated.  Mild aortic valve stenosis.  Maximum pressure gradient 25.2 mmHg.  Mean pressure gradient 14  mmHg.  Moderate to severe mitral valve regurgitation.  Posterior mitral valve leaflet appears to be restricted in motion.  Moderate tricuspid valve regurgitation.  Calculated RVSP 57 mmHg.  Moderate plaques in the proximal descending thoracic aorta.  Severe plaques in the aortic arch.  No evidence of pericardial effusion.  7. Echocardiogram 3/23/2021.  LVEF 61-65%.  Mild LVH.  Diastolic function normal.  Normal RV cavity size and systolic function.  Left atrial cavity is moderately dilated.  Right atrial cavity is moderately dilated.  Aortic valve leaflets are thickened and heavily calcified.  Moderate aortic valve stenosis.  Peak velocity of flow 321.1 cm/s.  Maximum pressure gradient 41 mmHg.  Mean pressure gradient 19 mmHg.  Aortic valve area is 1.4 cm².  Posterior mitral valve leaflet is restricted.  Moderate mitral valve regurgitation.  Mild cuspid valve regurgitation.  Calculated RVSP 20 mmHg.      Lipid Panel    Lipid Panel 7/27/21 11/19/21   Total Cholesterol 208 (A) 191   Triglycerides 122 114   HDL Cholesterol 57 49   VLDL Cholesterol 22 21   LDL Cholesterol  129 (A) 121 (A)   LDL/HDL Ratio 2.3 2.5   (A) Abnormal value       Comments are available for some flowsheets but are not being displayed.           BUN   Date Value Ref Range Status   02/23/2022 19 8 - 23 mg/dL Final     Creatinine   Date Value Ref Range Status   02/23/2022 0.78 0.57 - 1.00 mg/dL Final     Potassium   Date Value Ref Range Status   02/23/2022 4.4 3.5 - 5.2 mmol/L Final     Comment:     Slight hemolysis detected by analyzer. Results may be affected.     ALT (SGPT)   Date Value Ref Range Status   02/23/2022 15 1 - 33 U/L Final     AST (SGOT)   Date Value Ref Range Status   02/23/2022 14 1 - 32 U/L Final           ASSESSMENT & PLAN      Diagnosis Plan   1. Paroxysmal atrial fibrillation (HCC)  ECG 12 Lead   2. Paroxysmal atrial flutter (HCC)  ECG 12 Lead   3. Nonrheumatic aortic valve stenosis     4. Nonrheumatic mitral valve  regurgitation     5. PFO (patent foramen ovale)           SUMMARY/DISCUSSION  1. Paroxysmal atrial fibrillation/atrial flutter.  Patient with recent ED visit for atrial flutter with RVR.  Patient had taken 3 extra tablets of metoprolol tartrate 50 mg each and was unable to get heart rate controlled.  She was advised to go to the ED where she was found to be in atrial flutter.  Patient was given digoxin 500 mcg IV x1 loading dose.  She was initially prescribed p.o. digoxin to take at home however she did not start this as the following day she had diarrhea and upset stomach that she felt was from the digoxin.  She could tell that she had converted to normal rhythm the following day both based on her symptoms as well as ECG tracing on apple watch.  ECG in clinic today reveals sinus bradycardia.  I have educated the patient and  on the differences between atrial fibrillation and flutter.  I advised that if metoprolol tartrate does not bring down her rate in the future if she has rapid response that it is more than likely that atrial flutter is the culprit and she will need to call our office or go to the emergency department.  Patient is anticoagulated with apixaban and not having any bleeding complications.  No further changes at this time.  2. Aortic valve stenosis.  Moderate on most recent echocardiogram.  Patient is scheduled to get surveillance echocardiograms every 6 to 8 months.  3. Mitral valve regurgitation.  Patient scheduled for surveillance echocardiogram every 6 to 8 months.  4. Coronary artery calcification and aortic atherosclerosis.  Noted plaques in the aorta on PATRICA.  Patient is being medically treated as if she has coronary disease.  Unfortunately she has intolerance to multiple statins in the past and also she has intolerance to pitavastatin 4 mg dosing.  She is able to tolerate 2 mg/day.  5. Follow-up routinely with Dr. Benedict in 6 months.  Sooner for problems or  complications.        MEDICATIONS         Discharge Medications          Accurate as of February 28, 2022  2:23 PM. If you have any questions, ask your nurse or doctor.            Continue These Medications      Instructions Start Date   apixaban 5 MG tablet tablet  Commonly known as: Eliquis   5 mg, Oral, Every 12 Hours      aspirin 81 MG EC tablet   81 mg, Oral, Daily      cetirizine 10 MG tablet  Commonly known as: zyrTEC   1 OTC tab po QDay for allergies      cholecalciferol 25 MCG (1000 UT) tablet  Commonly known as: VITAMIN D3   1,000 Units, Oral, Daily      digoxin 125 MCG tablet  Commonly known as: LANOXIN   250 mcg, Oral, Daily Digoxin      fluticasone 50 MCG/ACT nasal spray  Commonly known as: FLONASE   Nasal      metoprolol tartrate 50 MG tablet  Commonly known as: LOPRESSOR   TAKE 1 TABLET BY MOUTH TWICE A DAY      Pitavastatin Calcium 4 MG tablet   4 mg, Oral, Nightly, Taking 1/2 tab in the am      VITAMIN C PO   No dose, route, or frequency recorded.                 **Dragon Disclaimer:   Much of this encounter note is an electronic transcription/translation of spoken language to printed text. The electronic translation of spoken language may permit erroneous, or at times, nonsensical words or phrases to be inadvertently transcribed. Although I have reviewed the note for such errors, some may still exist.

## 2022-04-11 NOTE — TELEPHONE ENCOUNTER
Pt called for a refill on Livalo 4mg to [facility] in [place].   John ASENCIO

## 2022-04-12 NOTE — TELEPHONE ENCOUNTER
1. Coronary artery calcification and aortic atherosclerosis.  Noted plaques in the aorta on PATRICA.  Patient is being medically treated as if she has coronary disease.  Unfortunately she has intolerance to multiple statins in the past and also she has intolerance to pitavastatin 4 mg dosing.  She is able to tolerate 2 mg/day.

## 2022-04-19 ENCOUNTER — TELEPHONE (OUTPATIENT)
Dept: CARDIOLOGY | Facility: CLINIC | Age: 62
End: 2022-04-19

## 2022-04-19 NOTE — TELEPHONE ENCOUNTER
1. Coronary artery calcification and aortic atherosclerosis.  Noted plaques in the aorta on PATRICA.  Patient is being medically treated as if she has coronary disease.  Unfortunately she has intolerance to multiple statins in the past and also she has intolerance to pitavastatin 4 mg dosing.  She is able to tolerate 2 mg/day.      Pt called stating that the same day that she was seen in our office she got an injection in her groin and since then to pain has subsided  Her insurance will not cover Livalo 4 mg  I informed her that we have samples of Livalo 4 mg that she can     Her insurance is switching in 2 weeks    I advised her that I will give her 2 weeks worth of samples and she will call us and le us know how shes doing on the Livalo 4 mg and if she is not having problems we will send in a refill on this under her new insurance and see what happens    Yue CASTILLO aware and is ok with this

## 2022-05-05 NOTE — TELEPHONE ENCOUNTER
05/05/22   Pt called stating she has not gotten Cholesterol med( LIVALO ) straightened out needs samples.   She is needing a 30 day supply and a 90 day supply to [facility].   I have 3 boxes of LIVALO 4MG  Lot # [id number]   EXP 5/2024    I gave he a copay card too   Pt will  at .   John ASENCIO

## 2022-07-06 ENCOUNTER — TELEPHONE (OUTPATIENT)
Dept: FAMILY MEDICINE CLINIC | Facility: CLINIC | Age: 62
End: 2022-07-06

## 2022-07-06 NOTE — TELEPHONE ENCOUNTER
I spoke with the patient and she does not know anything about. She was unaware that her  had called.

## 2022-07-06 NOTE — TELEPHONE ENCOUNTER
Caller: Alfred Sheldon    Relationship to patient: Emergency Contact    Best call back number: [phone]    Date of exposure: 07/01/22    Date of positive COVID19 test: HAS NOT TAKEN A TEST YET    COVID19 symptoms: HEADACHE, FEVER, COUGH, BODY ACHES    Additional information or concerns: PATIENT'S SPOUSE, ALFRED, CALLING STATING THAT HE TESTED POSITIVE ON 7/4/22, AND NOW THE PATIENT IS HAVING THE SAME SYMPTOMS HE HAD. ALFRED IS REQUESTING THAT PAXLOVID BE SENT TO THE PATIENT'S PHARMACY. THE PATIENT HAS A HOME COVID TEST, BUT AT THE TIME OF THE CALL, THE PATIENT HAD NOT TESTED YET.    ALFRED IS REQUESTING A CALL BACK TO ADVISE WHEN THE MEDICATION HAS BEEN SENT TO THE PATIENT'S PHARMACY SO HE CAN  THE MEDICATION AFTER LEAVING WORK.    What is the patients preferred pharmacy: [facility]/pharmacy #[id number] - [place], [place] - [address] - [phone] [facility] [phone]   [phone]

## 2022-07-11 ENCOUNTER — TELEPHONE (OUTPATIENT)
Dept: FAMILY MEDICINE CLINIC | Facility: CLINIC | Age: 62
End: 2022-07-11

## 2022-07-11 NOTE — TELEPHONE ENCOUNTER
On call Wednesday after hours  Positive COVID couple days  Test just came back positive no chest pain shortness of breath or high fever  Interested in antiviral    Advised on  Virtual visit Gissell Flores  Otherwise urgent care or visit in the morning    If any lethargy weakness high fever chest pain shortness of breath emergency room immediately

## 2022-07-28 ENCOUNTER — TELEPHONE (OUTPATIENT)
Dept: CARDIOLOGY | Facility: CLINIC | Age: 62
End: 2022-07-28

## 2022-07-29 ENCOUNTER — TELEPHONE (OUTPATIENT)
Dept: CARDIOLOGY | Facility: CLINIC | Age: 62
End: 2022-07-29

## 2022-07-29 ENCOUNTER — APPOINTMENT (OUTPATIENT)
Dept: GENERAL RADIOLOGY | Facility: HOSPITAL | Age: 62
End: 2022-07-29

## 2022-07-29 ENCOUNTER — APPOINTMENT (OUTPATIENT)
Dept: CT IMAGING | Facility: HOSPITAL | Age: 62
End: 2022-07-29

## 2022-07-29 ENCOUNTER — HOSPITAL ENCOUNTER (INPATIENT)
Facility: HOSPITAL | Age: 62
LOS: 3 days | Discharge: HOME | End: 2022-08-01
Attending: EMERGENCY MEDICINE | Admitting: INTERNAL MEDICINE

## 2022-07-29 DIAGNOSIS — H54.7 VISUAL IMPAIRMENT: ICD-10-CM

## 2022-07-29 DIAGNOSIS — I35.0 AORTIC VALVE STENOSIS, ETIOLOGY OF CARDIAC VALVE DISEASE UNSPECIFIED: ICD-10-CM

## 2022-07-29 DIAGNOSIS — D68.9 COAGULOPATHY: ICD-10-CM

## 2022-07-29 DIAGNOSIS — I48.91 ATRIAL FIBRILLATION WITH RAPID VENTRICULAR RESPONSE: Primary | ICD-10-CM

## 2022-07-29 LAB
ALBUMIN SERPL-MCNC: 4.4 G/DL (ref 3.5–5.2)
ALBUMIN/GLOB SERPL: 1.8 G/DL
ALP SERPL-CCNC: 87 U/L (ref 39–117)
ALT SERPL W P-5'-P-CCNC: 13 U/L (ref 1–33)
ANION GAP SERPL CALCULATED.3IONS-SCNC: 12.2 MMOL/L (ref 5–15)
AST SERPL-CCNC: 13 U/L (ref 1–32)
BASOPHILS # BLD AUTO: 0.05 10*3/MM3 (ref 0–0.2)
BASOPHILS NFR BLD AUTO: 0.5 % (ref 0–1.5)
BILIRUB SERPL-MCNC: 0.4 MG/DL (ref 0–1.2)
BUN SERPL-MCNC: 14 MG/DL (ref 8–23)
BUN/CREAT SERPL: 17.7 (ref 7–25)
CALCIUM SPEC-SCNC: 9.1 MG/DL (ref 8.6–10.5)
CHLORIDE SERPL-SCNC: 105 MMOL/L (ref 98–107)
CO2 SERPL-SCNC: 26.8 MMOL/L (ref 22–29)
CREAT SERPL-MCNC: 0.79 MG/DL (ref 0.57–1)
DEPRECATED RDW RBC AUTO: 40.5 FL (ref 37–54)
EGFRCR SERPLBLD CKD-EPI 2021: 85.2 ML/MIN/1.73
EOSINOPHIL # BLD AUTO: 0.36 10*3/MM3 (ref 0–0.4)
EOSINOPHIL NFR BLD AUTO: 3.8 % (ref 0.3–6.2)
ERYTHROCYTE [DISTWIDTH] IN BLOOD BY AUTOMATED COUNT: 13.3 % (ref 12.3–15.4)
GLOBULIN UR ELPH-MCNC: 2.5 GM/DL
GLUCOSE SERPL-MCNC: 119 MG/DL (ref 65–99)
HCT VFR BLD AUTO: 38.9 % (ref 34–46.6)
HGB BLD-MCNC: 12.8 G/DL (ref 12–15.9)
HOLD SPECIMEN: NORMAL
HOLD SPECIMEN: NORMAL
IMM GRANULOCYTES # BLD AUTO: 0.03 10*3/MM3 (ref 0–0.05)
IMM GRANULOCYTES NFR BLD AUTO: 0.3 % (ref 0–0.5)
LYMPHOCYTES # BLD AUTO: 2.33 10*3/MM3 (ref 0.7–3.1)
LYMPHOCYTES NFR BLD AUTO: 24.7 % (ref 19.6–45.3)
MCH RBC QN AUTO: 28.1 PG (ref 26.6–33)
MCHC RBC AUTO-ENTMCNC: 32.9 G/DL (ref 31.5–35.7)
MCV RBC AUTO: 85.5 FL (ref 79–97)
MONOCYTES # BLD AUTO: 0.67 10*3/MM3 (ref 0.1–0.9)
MONOCYTES NFR BLD AUTO: 7.1 % (ref 5–12)
NEUTROPHILS NFR BLD AUTO: 6.01 10*3/MM3 (ref 1.7–7)
NEUTROPHILS NFR BLD AUTO: 63.6 % (ref 42.7–76)
NRBC BLD AUTO-RTO: 0 /100 WBC (ref 0–0.2)
NT-PROBNP SERPL-MCNC: 1355 PG/ML (ref 0–900)
PLATELET # BLD AUTO: 321 10*3/MM3 (ref 140–450)
PMV BLD AUTO: 10.1 FL (ref 6–12)
POTASSIUM SERPL-SCNC: 4.5 MMOL/L (ref 3.5–5.2)
PROT SERPL-MCNC: 6.9 G/DL (ref 6–8.5)
QT INTERVAL: 316 MS
RBC # BLD AUTO: 4.55 10*6/MM3 (ref 3.77–5.28)
SODIUM SERPL-SCNC: 144 MMOL/L (ref 136–145)
TROPONIN T SERPL-MCNC: <0.01 NG/ML (ref 0–0.03)
WBC NRBC COR # BLD: 9.45 10*3/MM3 (ref 3.4–10.8)
WHOLE BLOOD HOLD COAG: NORMAL
WHOLE BLOOD HOLD SPECIMEN: NORMAL

## 2022-07-29 PROCEDURE — 83880 ASSAY OF NATRIURETIC PEPTIDE: CPT | Performed by: EMERGENCY MEDICINE

## 2022-07-29 PROCEDURE — 71046 X-RAY EXAM CHEST 2 VIEWS: CPT

## 2022-07-29 PROCEDURE — 99285 EMERGENCY DEPT VISIT HI MDM: CPT

## 2022-07-29 PROCEDURE — 25010000002 DIGOXIN PER 500 MCG: Performed by: EMERGENCY MEDICINE

## 2022-07-29 PROCEDURE — 84484 ASSAY OF TROPONIN QUANT: CPT | Performed by: EMERGENCY MEDICINE

## 2022-07-29 PROCEDURE — 93005 ELECTROCARDIOGRAM TRACING: CPT

## 2022-07-29 PROCEDURE — 99255 IP/OBS CONSLTJ NEW/EST HI 80: CPT | Performed by: INTERNAL MEDICINE

## 2022-07-29 PROCEDURE — 93005 ELECTROCARDIOGRAM TRACING: CPT | Performed by: EMERGENCY MEDICINE

## 2022-07-29 PROCEDURE — 25010000002 DIGOXIN PER 500 MCG: Performed by: INTERNAL MEDICINE

## 2022-07-29 PROCEDURE — 93010 ELECTROCARDIOGRAM REPORT: CPT | Performed by: INTERNAL MEDICINE

## 2022-07-29 PROCEDURE — U0004 COV-19 TEST NON-CDC HGH THRU: HCPCS | Performed by: EMERGENCY MEDICINE

## 2022-07-29 PROCEDURE — 80053 COMPREHEN METABOLIC PANEL: CPT | Performed by: EMERGENCY MEDICINE

## 2022-07-29 PROCEDURE — 85025 COMPLETE CBC W/AUTO DIFF WBC: CPT | Performed by: EMERGENCY MEDICINE

## 2022-07-29 PROCEDURE — 70450 CT HEAD/BRAIN W/O DYE: CPT

## 2022-07-29 RX ORDER — SODIUM CHLORIDE 0.9 % (FLUSH) 0.9 %
10 SYRINGE (ML) INJECTION AS NEEDED
Status: DISCONTINUED | OUTPATIENT
Start: 2022-07-29 | End: 2022-08-01 | Stop reason: HOSPADM

## 2022-07-29 RX ORDER — ACETAMINOPHEN 650 MG/1
650 SUPPOSITORY RECTAL EVERY 4 HOURS PRN
Status: DISCONTINUED | OUTPATIENT
Start: 2022-07-29 | End: 2022-08-01 | Stop reason: HOSPADM

## 2022-07-29 RX ORDER — NITROGLYCERIN 0.4 MG/1
0.4 TABLET SUBLINGUAL
Status: DISCONTINUED | OUTPATIENT
Start: 2022-07-29 | End: 2022-08-01 | Stop reason: HOSPADM

## 2022-07-29 RX ORDER — ATENOLOL 25 MG/1
25 TABLET ORAL EVERY 12 HOURS SCHEDULED
Status: DISCONTINUED | OUTPATIENT
Start: 2022-07-29 | End: 2022-08-01 | Stop reason: HOSPADM

## 2022-07-29 RX ORDER — ACETAMINOPHEN 325 MG/1
650 TABLET ORAL EVERY 4 HOURS PRN
Status: DISCONTINUED | OUTPATIENT
Start: 2022-07-29 | End: 2022-08-01 | Stop reason: HOSPADM

## 2022-07-29 RX ORDER — ASPIRIN 81 MG/1
81 TABLET ORAL EVERY OTHER DAY
Status: DISCONTINUED | OUTPATIENT
Start: 2022-07-30 | End: 2022-07-31

## 2022-07-29 RX ORDER — METOPROLOL TARTRATE 50 MG/1
50 TABLET, FILM COATED ORAL 2 TIMES DAILY
Status: DISCONTINUED | OUTPATIENT
Start: 2022-07-29 | End: 2022-07-30

## 2022-07-29 RX ORDER — CETIRIZINE HYDROCHLORIDE 10 MG/1
5 TABLET ORAL DAILY
Status: DISCONTINUED | OUTPATIENT
Start: 2022-07-30 | End: 2022-08-01 | Stop reason: HOSPADM

## 2022-07-29 RX ORDER — MELATONIN
1000 DAILY
Status: DISCONTINUED | OUTPATIENT
Start: 2022-07-30 | End: 2022-08-01 | Stop reason: HOSPADM

## 2022-07-29 RX ORDER — ATORVASTATIN CALCIUM 20 MG/1
20 TABLET, FILM COATED ORAL DAILY
Status: DISCONTINUED | OUTPATIENT
Start: 2022-07-30 | End: 2022-07-31

## 2022-07-29 RX ORDER — ONDANSETRON 2 MG/ML
4 INJECTION INTRAMUSCULAR; INTRAVENOUS EVERY 6 HOURS PRN
Status: DISCONTINUED | OUTPATIENT
Start: 2022-07-29 | End: 2022-08-01 | Stop reason: HOSPADM

## 2022-07-29 RX ORDER — DIGOXIN 0.25 MG/ML
250 INJECTION INTRAMUSCULAR; INTRAVENOUS ONCE
Status: COMPLETED | OUTPATIENT
Start: 2022-07-29 | End: 2022-07-29

## 2022-07-29 RX ORDER — SODIUM CHLORIDE 0.9 % (FLUSH) 0.9 %
10 SYRINGE (ML) INJECTION EVERY 12 HOURS SCHEDULED
Status: DISCONTINUED | OUTPATIENT
Start: 2022-07-30 | End: 2022-08-01 | Stop reason: HOSPADM

## 2022-07-29 RX ORDER — IPRATROPIUM BROMIDE AND ALBUTEROL SULFATE 2.5; .5 MG/3ML; MG/3ML
3 SOLUTION RESPIRATORY (INHALATION) ONCE
Status: COMPLETED | OUTPATIENT
Start: 2022-07-30 | End: 2022-07-30

## 2022-07-29 RX ORDER — SODIUM CHLORIDE 9 MG/ML
75 INJECTION, SOLUTION INTRAVENOUS CONTINUOUS
Status: DISCONTINUED | OUTPATIENT
Start: 2022-07-30 | End: 2022-08-01 | Stop reason: HOSPADM

## 2022-07-29 RX ORDER — SODIUM CHLORIDE 0.9 % (FLUSH) 0.9 %
10 SYRINGE (ML) INJECTION EVERY 12 HOURS SCHEDULED
Status: DISCONTINUED | OUTPATIENT
Start: 2022-07-29 | End: 2022-08-01 | Stop reason: HOSPADM

## 2022-07-29 RX ORDER — FLUTICASONE PROPIONATE 50 MCG
1 SPRAY, SUSPENSION (ML) NASAL DAILY PRN
Status: DISCONTINUED | OUTPATIENT
Start: 2022-07-29 | End: 2022-08-01 | Stop reason: HOSPADM

## 2022-07-29 RX ORDER — ACETAMINOPHEN 160 MG/5ML
650 SOLUTION ORAL EVERY 4 HOURS PRN
Status: DISCONTINUED | OUTPATIENT
Start: 2022-07-29 | End: 2022-08-01 | Stop reason: HOSPADM

## 2022-07-29 RX ADMIN — APIXABAN 5 MG: 5 TABLET, FILM COATED ORAL at 23:08

## 2022-07-29 RX ADMIN — DIGOXIN 250 MCG: 250 INJECTION, SOLUTION INTRAMUSCULAR; INTRAVENOUS; PARENTERAL at 18:26

## 2022-07-29 RX ADMIN — ATENOLOL 25 MG: 25 TABLET ORAL at 23:34

## 2022-07-29 RX ADMIN — DIGOXIN 250 MCG: 250 INJECTION, SOLUTION INTRAMUSCULAR; INTRAVENOUS at 23:05

## 2022-07-29 RX ADMIN — METOPROLOL TARTRATE 2.5 MG: 5 INJECTION INTRAVENOUS at 17:10

## 2022-07-29 RX ADMIN — Medication 10 ML: at 23:07

## 2022-07-29 RX ADMIN — SODIUM CHLORIDE 75 ML/HR: 9 INJECTION, SOLUTION INTRAVENOUS at 23:58

## 2022-07-29 RX ADMIN — METOPROLOL TARTRATE 25 MG: 25 TABLET, FILM COATED ORAL at 18:26

## 2022-07-29 NOTE — TELEPHONE ENCOUNTER
Spoke with LR and GM: Pt needs to proceed to ED for cardiac eval. Called and spoke with pt's  who stated pt was currently in the office since they were in the area. Went to lobby to talk to pt to instruct her to proceed to ED. She verbalized understanding, and also picked up her livalo samples. // HG

## 2022-07-29 NOTE — TELEPHONE ENCOUNTER
Pt's , Kedar, called and LVM stating the patient believes she has been going in and out of afib. 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     Pt reports palpitations, dyspnea on exertion, and chest discomfort. She states she has been getting numerous notifications from her apple watch the last few days that she is in atrial fibrillation. She has been monitoring her heart rate which has been anywhere from 108-132 (on her apple watch). She denies any edema, lightheadedness or dizziness, and any abnormal fatigue. She is very symptomatic with this and generally is just not feeling well. Her  does note that she has been going in and out of this at the very least 2-3 times per day, and that it has not been very responsive to the metoprolol and they are concerned that this could be atrial flutter like patient has had previously (ED visit 2/23/22). She has not missed any doses of her Eliquis or metoprolol. I did encourage pt and her  that if pt's condition worsens and this becomes urgent, ED eval would be recommended. They both verbalized understanding.     I did consult with patient and IF there is an EKG ordered they would be able to make an appt today asap. Please advise. // HG

## 2022-07-30 ENCOUNTER — APPOINTMENT (OUTPATIENT)
Dept: CT IMAGING | Facility: HOSPITAL | Age: 62
End: 2022-07-30

## 2022-07-30 ENCOUNTER — APPOINTMENT (OUTPATIENT)
Dept: CARDIOLOGY | Facility: HOSPITAL | Age: 62
End: 2022-07-30

## 2022-07-30 LAB
ALBUMIN SERPL-MCNC: 3.8 G/DL (ref 3.5–5.2)
ALBUMIN/GLOB SERPL: 1.7 G/DL
ALP SERPL-CCNC: 74 U/L (ref 39–117)
ALT SERPL W P-5'-P-CCNC: 11 U/L (ref 1–33)
ANION GAP SERPL CALCULATED.3IONS-SCNC: 6.9 MMOL/L (ref 5–15)
AORTIC ARCH: 2.6 CM
AORTIC DIMENSIONLESS INDEX: 0.4 (DI)
ASCENDING AORTA: 2.4 CM
AST SERPL-CCNC: 12 U/L (ref 1–32)
BASOPHILS # BLD AUTO: 0.03 10*3/MM3 (ref 0–0.2)
BASOPHILS NFR BLD AUTO: 0.3 % (ref 0–1.5)
BH CV ECHO MEAS - ACS: 1.06 CM
BH CV ECHO MEAS - AO MAX PG: 38.6 MMHG
BH CV ECHO MEAS - AO MEAN PG: 19.3 MMHG
BH CV ECHO MEAS - AO ROOT DIAM: 3.1 CM
BH CV ECHO MEAS - AO V2 MAX: 310.8 CM/SEC
BH CV ECHO MEAS - AO V2 VTI: 61.4 CM
BH CV ECHO MEAS - AVA(I,D): 0.9 CM2
BH CV ECHO MEAS - EDV(CUBED): 73.4 ML
BH CV ECHO MEAS - EDV(MOD-SP2): 37 ML
BH CV ECHO MEAS - EDV(MOD-SP4): 40 ML
BH CV ECHO MEAS - EF(MOD-BP): 54 %
BH CV ECHO MEAS - EF(MOD-SP2): 56.8 %
BH CV ECHO MEAS - EF(MOD-SP4): 47.5 %
BH CV ECHO MEAS - ESV(CUBED): 15.8 ML
BH CV ECHO MEAS - ESV(MOD-SP2): 16 ML
BH CV ECHO MEAS - ESV(MOD-SP4): 21 ML
BH CV ECHO MEAS - FS: 40 %
BH CV ECHO MEAS - IVS/LVPW: 1.02 CM
BH CV ECHO MEAS - IVSD: 1.02 CM
BH CV ECHO MEAS - LAT PEAK E' VEL: 8.3 CM/SEC
BH CV ECHO MEAS - LV DIASTOLIC VOL/BSA (35-75): 22.1 CM2
BH CV ECHO MEAS - LV MASS(C)D: 139.1 GRAMS
BH CV ECHO MEAS - LV MAX PG: 5.3 MMHG
BH CV ECHO MEAS - LV MEAN PG: 2.22 MMHG
BH CV ECHO MEAS - LV SYSTOLIC VOL/BSA (12-30): 11.6 CM2
BH CV ECHO MEAS - LV V1 MAX: 115.4 CM/SEC
BH CV ECHO MEAS - LV V1 VTI: 18.5 CM
BH CV ECHO MEAS - LVIDD: 4.2 CM
BH CV ECHO MEAS - LVIDS: 2.5 CM
BH CV ECHO MEAS - LVOT AREA: 3 CM2
BH CV ECHO MEAS - LVOT DIAM: 1.96 CM
BH CV ECHO MEAS - LVPWD: 1 CM
BH CV ECHO MEAS - MED PEAK E' VEL: 6.9 CM/SEC
BH CV ECHO MEAS - MV DEC SLOPE: 572.1 CM/SEC2
BH CV ECHO MEAS - MV DEC TIME: 0.26 MSEC
BH CV ECHO MEAS - MV E MAX VEL: 113 CM/SEC
BH CV ECHO MEAS - MV MAX PG: 7.8 MMHG
BH CV ECHO MEAS - MV MEAN PG: 2.24 MMHG
BH CV ECHO MEAS - MV P1/2T: 67.5 MSEC
BH CV ECHO MEAS - MV V2 VTI: 31.3 CM
BH CV ECHO MEAS - MVA(P1/2T): 3.3 CM2
BH CV ECHO MEAS - MVA(VTI): 1.77 CM2
BH CV ECHO MEAS - PA ACC TIME: 0.16 SEC
BH CV ECHO MEAS - PA PR(ACCEL): 5.3 MMHG
BH CV ECHO MEAS - PA V2 MAX: 78 CM/SEC
BH CV ECHO MEAS - RAP SYSTOLE: 3 MMHG
BH CV ECHO MEAS - RV MAX PG: 5.4 MMHG
BH CV ECHO MEAS - RV V1 MAX: 116.4 CM/SEC
BH CV ECHO MEAS - RV V1 VTI: 26.7 CM
BH CV ECHO MEAS - RVSP: 20 MMHG
BH CV ECHO MEAS - SI(MOD-SP2): 11.6 ML/M2
BH CV ECHO MEAS - SI(MOD-SP4): 10.5 ML/M2
BH CV ECHO MEAS - SUP REN AO DIAM: 1.5 CM
BH CV ECHO MEAS - SV(LVOT): 55.5 ML
BH CV ECHO MEAS - SV(MOD-SP2): 21 ML
BH CV ECHO MEAS - SV(MOD-SP4): 19 ML
BH CV ECHO MEAS - TAPSE (>1.6): 1.39 CM
BH CV ECHO MEAS - TR MAX PG: 17.5 MMHG
BH CV ECHO MEAS - TR MAX VEL: 209 CM/SEC
BH CV ECHO MEASUREMENTS AVERAGE E/E' RATIO: 14.87
BH CV XLRA - RV BASE: 2.34 CM
BH CV XLRA - RV LENGTH: 5.9 CM
BH CV XLRA - RV MID: 2.07 CM
BH CV XLRA - TDI S': 11.5 CM/SEC
BILIRUB SERPL-MCNC: 0.3 MG/DL (ref 0–1.2)
BUN SERPL-MCNC: 16 MG/DL (ref 8–23)
BUN/CREAT SERPL: 24.2 (ref 7–25)
CALCIUM SPEC-SCNC: 8.4 MG/DL (ref 8.6–10.5)
CHLORIDE SERPL-SCNC: 107 MMOL/L (ref 98–107)
CHOLEST SERPL-MCNC: 144 MG/DL (ref 0–200)
CO2 SERPL-SCNC: 28.1 MMOL/L (ref 22–29)
CREAT SERPL-MCNC: 0.66 MG/DL (ref 0.57–1)
DEPRECATED RDW RBC AUTO: 40.6 FL (ref 37–54)
EGFRCR SERPLBLD CKD-EPI 2021: 99.9 ML/MIN/1.73
EOSINOPHIL # BLD AUTO: 0.4 10*3/MM3 (ref 0–0.4)
EOSINOPHIL NFR BLD AUTO: 4.3 % (ref 0.3–6.2)
ERYTHROCYTE [DISTWIDTH] IN BLOOD BY AUTOMATED COUNT: 13.1 % (ref 12.3–15.4)
ERYTHROCYTE [SEDIMENTATION RATE] IN BLOOD: 39 MM/HR (ref 0–30)
GLOBULIN UR ELPH-MCNC: 2.3 GM/DL
GLUCOSE BLDC GLUCOMTR-MCNC: 106 MG/DL (ref 70–130)
GLUCOSE BLDC GLUCOMTR-MCNC: 116 MG/DL (ref 70–130)
GLUCOSE BLDC GLUCOMTR-MCNC: 97 MG/DL (ref 70–130)
GLUCOSE SERPL-MCNC: 101 MG/DL (ref 65–99)
HBA1C MFR BLD: 6.1 % (ref 4.8–5.6)
HCT VFR BLD AUTO: 35.2 % (ref 34–46.6)
HDLC SERPL-MCNC: 40 MG/DL (ref 40–60)
HGB BLD-MCNC: 11.5 G/DL (ref 12–15.9)
IMM GRANULOCYTES # BLD AUTO: 0.03 10*3/MM3 (ref 0–0.05)
IMM GRANULOCYTES NFR BLD AUTO: 0.3 % (ref 0–0.5)
LDLC SERPL CALC-MCNC: 82 MG/DL (ref 0–100)
LDLC/HDLC SERPL: 1.98 {RATIO}
LEFT ATRIUM VOLUME INDEX: 29.9 ML/M2
LYMPHOCYTES # BLD AUTO: 2.48 10*3/MM3 (ref 0.7–3.1)
LYMPHOCYTES NFR BLD AUTO: 26.4 % (ref 19.6–45.3)
MAXIMAL PREDICTED HEART RATE: 159 BPM
MCH RBC QN AUTO: 27.8 PG (ref 26.6–33)
MCHC RBC AUTO-ENTMCNC: 32.7 G/DL (ref 31.5–35.7)
MCV RBC AUTO: 85.2 FL (ref 79–97)
MONOCYTES # BLD AUTO: 0.82 10*3/MM3 (ref 0.1–0.9)
MONOCYTES NFR BLD AUTO: 8.7 % (ref 5–12)
NEUTROPHILS NFR BLD AUTO: 5.64 10*3/MM3 (ref 1.7–7)
NEUTROPHILS NFR BLD AUTO: 60 % (ref 42.7–76)
NRBC BLD AUTO-RTO: 0 /100 WBC (ref 0–0.2)
PLATELET # BLD AUTO: 276 10*3/MM3 (ref 140–450)
PMV BLD AUTO: 10.4 FL (ref 6–12)
POTASSIUM SERPL-SCNC: 4.5 MMOL/L (ref 3.5–5.2)
PROT SERPL-MCNC: 6.1 G/DL (ref 6–8.5)
RBC # BLD AUTO: 4.13 10*6/MM3 (ref 3.77–5.28)
SARS-COV-2 ORF1AB RESP QL NAA+PROBE: NOT DETECTED
SINUS: 2.6 CM
SODIUM SERPL-SCNC: 142 MMOL/L (ref 136–145)
STJ: 2.7 CM
STRESS TARGET HR: 135 BPM
TRIGL SERPL-MCNC: 124 MG/DL (ref 0–150)
TROPONIN T SERPL-MCNC: <0.01 NG/ML (ref 0–0.03)
TSH SERPL DL<=0.05 MIU/L-ACNC: 0.82 UIU/ML (ref 0.27–4.2)
VLDLC SERPL-MCNC: 22 MG/DL (ref 5–40)
WBC NRBC COR # BLD: 9.4 10*3/MM3 (ref 3.4–10.8)

## 2022-07-30 PROCEDURE — 84484 ASSAY OF TROPONIN QUANT: CPT | Performed by: INTERNAL MEDICINE

## 2022-07-30 PROCEDURE — 0 IOPAMIDOL PER 1 ML: Performed by: HOSPITALIST

## 2022-07-30 PROCEDURE — 94640 AIRWAY INHALATION TREATMENT: CPT

## 2022-07-30 PROCEDURE — 70498 CT ANGIOGRAPHY NECK: CPT

## 2022-07-30 PROCEDURE — 93306 TTE W/DOPPLER COMPLETE: CPT

## 2022-07-30 PROCEDURE — 84443 ASSAY THYROID STIM HORMONE: CPT | Performed by: INTERNAL MEDICINE

## 2022-07-30 PROCEDURE — 82962 GLUCOSE BLOOD TEST: CPT

## 2022-07-30 PROCEDURE — 25010000002 DIGOXIN PER 500 MCG: Performed by: INTERNAL MEDICINE

## 2022-07-30 PROCEDURE — 83036 HEMOGLOBIN GLYCOSYLATED A1C: CPT | Performed by: INTERNAL MEDICINE

## 2022-07-30 PROCEDURE — 85652 RBC SED RATE AUTOMATED: CPT | Performed by: PSYCHIATRY & NEUROLOGY

## 2022-07-30 PROCEDURE — 99233 SBSQ HOSP IP/OBS HIGH 50: CPT | Performed by: INTERNAL MEDICINE

## 2022-07-30 PROCEDURE — 80061 LIPID PANEL: CPT | Performed by: INTERNAL MEDICINE

## 2022-07-30 PROCEDURE — 94799 UNLISTED PULMONARY SVC/PX: CPT

## 2022-07-30 PROCEDURE — 70496 CT ANGIOGRAPHY HEAD: CPT

## 2022-07-30 PROCEDURE — 94760 N-INVAS EAR/PLS OXIMETRY 1: CPT

## 2022-07-30 PROCEDURE — 99222 1ST HOSP IP/OBS MODERATE 55: CPT | Performed by: PSYCHIATRY & NEUROLOGY

## 2022-07-30 PROCEDURE — 85025 COMPLETE CBC W/AUTO DIFF WBC: CPT | Performed by: INTERNAL MEDICINE

## 2022-07-30 PROCEDURE — 36415 COLL VENOUS BLD VENIPUNCTURE: CPT | Performed by: INTERNAL MEDICINE

## 2022-07-30 PROCEDURE — 93306 TTE W/DOPPLER COMPLETE: CPT | Performed by: INTERNAL MEDICINE

## 2022-07-30 PROCEDURE — 94761 N-INVAS EAR/PLS OXIMETRY MLT: CPT

## 2022-07-30 PROCEDURE — 80053 COMPREHEN METABOLIC PANEL: CPT | Performed by: INTERNAL MEDICINE

## 2022-07-30 RX ORDER — ENOXAPARIN SODIUM 100 MG/ML
80 INJECTION SUBCUTANEOUS EVERY 12 HOURS
Status: DISCONTINUED | OUTPATIENT
Start: 2022-07-31 | End: 2022-08-01

## 2022-07-30 RX ORDER — LORAZEPAM 2 MG/ML
1 INJECTION INTRAMUSCULAR ONCE
Status: DISCONTINUED | OUTPATIENT
Start: 2022-07-30 | End: 2022-08-01 | Stop reason: HOSPADM

## 2022-07-30 RX ORDER — DIGOXIN 0.25 MG/ML
250 INJECTION INTRAMUSCULAR; INTRAVENOUS ONCE
Status: COMPLETED | OUTPATIENT
Start: 2022-07-30 | End: 2022-07-30

## 2022-07-30 RX ADMIN — IOPAMIDOL 95 ML: 755 INJECTION, SOLUTION INTRAVENOUS at 10:35

## 2022-07-30 RX ADMIN — Medication 1000 UNITS: at 13:13

## 2022-07-30 RX ADMIN — Medication 1000 UNITS: at 13:09

## 2022-07-30 RX ADMIN — APIXABAN 5 MG: 5 TABLET, FILM COATED ORAL at 13:12

## 2022-07-30 RX ADMIN — ATENOLOL 25 MG: 25 TABLET ORAL at 21:24

## 2022-07-30 RX ADMIN — ASPIRIN 81 MG: 81 TABLET, COATED ORAL at 13:09

## 2022-07-30 RX ADMIN — Medication 10 ML: at 21:23

## 2022-07-30 RX ADMIN — DIGOXIN 250 MCG: 250 INJECTION, SOLUTION INTRAMUSCULAR; INTRAVENOUS at 16:39

## 2022-07-30 RX ADMIN — ATENOLOL 25 MG: 25 TABLET ORAL at 13:13

## 2022-07-30 RX ADMIN — IPRATROPIUM BROMIDE AND ALBUTEROL SULFATE 3 ML: .5; 3 SOLUTION RESPIRATORY (INHALATION) at 00:16

## 2022-07-31 ENCOUNTER — APPOINTMENT (OUTPATIENT)
Dept: CARDIOLOGY | Facility: HOSPITAL | Age: 62
End: 2022-07-31

## 2022-07-31 ENCOUNTER — APPOINTMENT (OUTPATIENT)
Dept: NUCLEAR MEDICINE | Facility: HOSPITAL | Age: 62
End: 2022-07-31

## 2022-07-31 LAB
ANION GAP SERPL CALCULATED.3IONS-SCNC: 12.3 MMOL/L (ref 5–15)
BH CV NUCLEAR PRIOR STUDY: 2
BH CV REST NUCLEAR ISOTOPE DOSE: 10.4 MCI
BH CV STRESS COMMENTS STAGE 1: NORMAL
BH CV STRESS DOSE REGADENOSON STAGE 1: 0.4
BH CV STRESS DURATION MIN STAGE 1: 0
BH CV STRESS DURATION SEC STAGE 1: 10
BH CV STRESS NUCLEAR ISOTOPE DOSE: 32.7 MCI
BH CV STRESS PROTOCOL 1: NORMAL
BH CV STRESS RECOVERY BP: NORMAL MMHG
BH CV STRESS RECOVERY HR: 106 BPM
BH CV STRESS STAGE 1: 1
BH CV XLRA MEAS LEFT DIST CCA EDV: 13 CM/SEC
BH CV XLRA MEAS LEFT DIST CCA PSV: 52.3 CM/SEC
BH CV XLRA MEAS LEFT DIST ICA EDV: -22.4 CM/SEC
BH CV XLRA MEAS LEFT DIST ICA PSV: -71.9 CM/SEC
BH CV XLRA MEAS LEFT ICA/CCA RATIO: -1.37
BH CV XLRA MEAS LEFT MID ICA EDV: -24 CM/SEC
BH CV XLRA MEAS LEFT MID ICA PSV: -58.9 CM/SEC
BH CV XLRA MEAS LEFT PROX CCA EDV: 17.3 CM/SEC
BH CV XLRA MEAS LEFT PROX CCA PSV: 74.3 CM/SEC
BH CV XLRA MEAS LEFT PROX ECA EDV: 14.5 CM/SEC
BH CV XLRA MEAS LEFT PROX ECA PSV: 75.4 CM/SEC
BH CV XLRA MEAS LEFT PROX ICA EDV: -12.9 CM/SEC
BH CV XLRA MEAS LEFT PROX ICA PSV: -70.4 CM/SEC
BH CV XLRA MEAS LEFT PROX SCLA PSV: 86.3 CM/SEC
BH CV XLRA MEAS LEFT VERTEBRAL A EDV: -12.1 CM/SEC
BH CV XLRA MEAS LEFT VERTEBRAL A PSV: -33.1 CM/SEC
BH CV XLRA MEAS RIGHT DIST CCA EDV: 14.5 CM/SEC
BH CV XLRA MEAS RIGHT DIST CCA PSV: 49.1 CM/SEC
BH CV XLRA MEAS RIGHT DIST ICA EDV: -22.4 CM/SEC
BH CV XLRA MEAS RIGHT DIST ICA PSV: -64.8 CM/SEC
BH CV XLRA MEAS RIGHT ICA/CCA RATIO: -1.74
BH CV XLRA MEAS RIGHT MID ICA EDV: -26.3 CM/SEC
BH CV XLRA MEAS RIGHT MID ICA PSV: -68 CM/SEC
BH CV XLRA MEAS RIGHT PROX CCA EDV: -13.8 CM/SEC
BH CV XLRA MEAS RIGHT PROX CCA PSV: -50.7 CM/SEC
BH CV XLRA MEAS RIGHT PROX ECA EDV: -19.3 CM/SEC
BH CV XLRA MEAS RIGHT PROX ECA PSV: -98.5 CM/SEC
BH CV XLRA MEAS RIGHT PROX ICA EDV: -27 CM/SEC
BH CV XLRA MEAS RIGHT PROX ICA PSV: -85.6 CM/SEC
BH CV XLRA MEAS RIGHT PROX SCLA PSV: 130 CM/SEC
BH CV XLRA MEAS RIGHT VERTEBRAL A EDV: -16.1 CM/SEC
BH CV XLRA MEAS RIGHT VERTEBRAL A PSV: -47.5 CM/SEC
BUN SERPL-MCNC: 14 MG/DL (ref 8–23)
BUN/CREAT SERPL: 22.2 (ref 7–25)
CALCIUM SPEC-SCNC: 8.6 MG/DL (ref 8.6–10.5)
CHLORIDE SERPL-SCNC: 107 MMOL/L (ref 98–107)
CO2 SERPL-SCNC: 23.7 MMOL/L (ref 22–29)
CREAT SERPL-MCNC: 0.63 MG/DL (ref 0.57–1)
DEPRECATED RDW RBC AUTO: 43.3 FL (ref 37–54)
EGFRCR SERPLBLD CKD-EPI 2021: 101.1 ML/MIN/1.73
ERYTHROCYTE [DISTWIDTH] IN BLOOD BY AUTOMATED COUNT: 13.5 % (ref 12.3–15.4)
GLUCOSE SERPL-MCNC: 106 MG/DL (ref 65–99)
HCT VFR BLD AUTO: 37.3 % (ref 34–46.6)
HGB BLD-MCNC: 11.9 G/DL (ref 12–15.9)
LV EF NUC BP: 65 %
MAXIMAL PREDICTED HEART RATE: 159 BPM
MAXIMAL PREDICTED HEART RATE: 159 BPM
MCH RBC QN AUTO: 27.9 PG (ref 26.6–33)
MCHC RBC AUTO-ENTMCNC: 31.9 G/DL (ref 31.5–35.7)
MCV RBC AUTO: 87.4 FL (ref 79–97)
PERCENT MAX PREDICTED HR: 85.53 %
PLATELET # BLD AUTO: 274 10*3/MM3 (ref 140–450)
PMV BLD AUTO: 10.2 FL (ref 6–12)
POTASSIUM SERPL-SCNC: 4 MMOL/L (ref 3.5–5.2)
RBC # BLD AUTO: 4.27 10*6/MM3 (ref 3.77–5.28)
SODIUM SERPL-SCNC: 143 MMOL/L (ref 136–145)
STRESS BASELINE BP: NORMAL MMHG
STRESS BASELINE HR: 66 BPM
STRESS PERCENT HR: 101 %
STRESS POST ESTIMATED WORKLOAD: 1 METS
STRESS POST EXERCISE DUR MIN: 4 MIN
STRESS POST PEAK BP: NORMAL MMHG
STRESS POST PEAK HR: 136 BPM
STRESS TARGET HR: 135 BPM
STRESS TARGET HR: 135 BPM
WBC NRBC COR # BLD: 8.8 10*3/MM3 (ref 3.4–10.8)

## 2022-07-31 PROCEDURE — 93017 CV STRESS TEST TRACING ONLY: CPT

## 2022-07-31 PROCEDURE — 25010000002 DIGOXIN PER 500 MCG: Performed by: INTERNAL MEDICINE

## 2022-07-31 PROCEDURE — B3181ZZ FLUOROSCOPY OF BILATERAL INTERNAL CAROTID ARTERIES USING LOW OSMOLAR CONTRAST: ICD-10-PCS | Performed by: RADIOLOGY

## 2022-07-31 PROCEDURE — 93016 CV STRESS TEST SUPVJ ONLY: CPT | Performed by: INTERNAL MEDICINE

## 2022-07-31 PROCEDURE — 25010000002 ENOXAPARIN PER 10 MG: Performed by: INTERNAL MEDICINE

## 2022-07-31 PROCEDURE — 25010000002 REGADENOSON 0.4 MG/5ML SOLUTION: Performed by: HOSPITALIST

## 2022-07-31 PROCEDURE — 99233 SBSQ HOSP IP/OBS HIGH 50: CPT | Performed by: NURSE PRACTITIONER

## 2022-07-31 PROCEDURE — 85027 COMPLETE CBC AUTOMATED: CPT | Performed by: INTERNAL MEDICINE

## 2022-07-31 PROCEDURE — 78452 HT MUSCLE IMAGE SPECT MULT: CPT | Performed by: INTERNAL MEDICINE

## 2022-07-31 PROCEDURE — 80048 BASIC METABOLIC PNL TOTAL CA: CPT | Performed by: INTERNAL MEDICINE

## 2022-07-31 PROCEDURE — 99233 SBSQ HOSP IP/OBS HIGH 50: CPT | Performed by: INTERNAL MEDICINE

## 2022-07-31 PROCEDURE — 93880 EXTRACRANIAL BILAT STUDY: CPT

## 2022-07-31 PROCEDURE — 0 TECHNETIUM SESTAMIBI: Performed by: HOSPITALIST

## 2022-07-31 PROCEDURE — A9500 TC99M SESTAMIBI: HCPCS | Performed by: HOSPITALIST

## 2022-07-31 PROCEDURE — B31F1ZZ FLUOROSCOPY OF LEFT VERTEBRAL ARTERY USING LOW OSMOLAR CONTRAST: ICD-10-PCS | Performed by: RADIOLOGY

## 2022-07-31 PROCEDURE — 78452 HT MUSCLE IMAGE SPECT MULT: CPT

## 2022-07-31 PROCEDURE — 93018 CV STRESS TEST I&R ONLY: CPT | Performed by: INTERNAL MEDICINE

## 2022-07-31 RX ORDER — DIGOXIN 0.25 MG/ML
250 INJECTION INTRAMUSCULAR; INTRAVENOUS ONCE
Status: COMPLETED | OUTPATIENT
Start: 2022-07-31 | End: 2022-07-31

## 2022-07-31 RX ORDER — CLOPIDOGREL BISULFATE 75 MG/1
75 TABLET ORAL DAILY
Status: DISCONTINUED | OUTPATIENT
Start: 2022-07-31 | End: 2022-08-01 | Stop reason: HOSPADM

## 2022-07-31 RX ORDER — DIGOXIN 125 MCG
125 TABLET ORAL
Status: DISCONTINUED | OUTPATIENT
Start: 2022-08-01 | End: 2022-08-01 | Stop reason: HOSPADM

## 2022-07-31 RX ORDER — BENZONATATE 100 MG/1
200 CAPSULE ORAL 3 TIMES DAILY PRN
Status: DISCONTINUED | OUTPATIENT
Start: 2022-07-31 | End: 2022-08-01 | Stop reason: HOSPADM

## 2022-07-31 RX ADMIN — TECHNETIUM TC 99M SESTAMIBI 1 DOSE: 1 INJECTION INTRAVENOUS at 08:50

## 2022-07-31 RX ADMIN — ATENOLOL 25 MG: 25 TABLET ORAL at 21:08

## 2022-07-31 RX ADMIN — Medication 10 ML: at 21:09

## 2022-07-31 RX ADMIN — TECHNETIUM TC 99M SESTAMIBI 1 DOSE: 1 INJECTION INTRAVENOUS at 11:00

## 2022-07-31 RX ADMIN — BENZONATATE 200 MG: 100 CAPSULE ORAL at 09:31

## 2022-07-31 RX ADMIN — PITAVASTATIN CALCIUM 2 MG: 1.04 TABLET, FILM COATED ORAL at 21:10

## 2022-07-31 RX ADMIN — ENOXAPARIN SODIUM 80 MG: 100 INJECTION SUBCUTANEOUS at 06:21

## 2022-07-31 RX ADMIN — REGADENOSON 0.4 MG: 0.08 INJECTION, SOLUTION INTRAVENOUS at 11:00

## 2022-07-31 RX ADMIN — DIGOXIN 250 MCG: 250 INJECTION, SOLUTION INTRAMUSCULAR; INTRAVENOUS at 21:08

## 2022-07-31 RX ADMIN — Medication 10 ML: at 14:10

## 2022-07-31 RX ADMIN — ENOXAPARIN SODIUM 80 MG: 100 INJECTION SUBCUTANEOUS at 18:10

## 2022-07-31 RX ADMIN — Medication 1000 UNITS: at 14:10

## 2022-07-31 RX ADMIN — CLOPIDOGREL 75 MG: 75 TABLET, FILM COATED ORAL at 18:10

## 2022-07-31 RX ADMIN — BENZONATATE 200 MG: 100 CAPSULE ORAL at 18:10

## 2022-07-31 RX ADMIN — ATENOLOL 25 MG: 25 TABLET ORAL at 14:10

## 2022-08-01 ENCOUNTER — READMISSION MANAGEMENT (OUTPATIENT)
Dept: CALL CENTER | Facility: HOSPITAL | Age: 62
End: 2022-08-01

## 2022-08-01 ENCOUNTER — NURSE TRIAGE (OUTPATIENT)
Dept: CALL CENTER | Facility: HOSPITAL | Age: 62
End: 2022-08-01

## 2022-08-01 VITALS
HEART RATE: 85 BPM | HEIGHT: 63 IN | WEIGHT: 172 LBS | RESPIRATION RATE: 17 BRPM | OXYGEN SATURATION: 93 % | SYSTOLIC BLOOD PRESSURE: 114 MMHG | DIASTOLIC BLOOD PRESSURE: 65 MMHG | TEMPERATURE: 97.5 F | BODY MASS INDEX: 30.48 KG/M2

## 2022-08-01 LAB
ANION GAP SERPL CALCULATED.3IONS-SCNC: 12 MMOL/L (ref 5–15)
BUN SERPL-MCNC: 15 MG/DL (ref 8–23)
BUN/CREAT SERPL: 24.2 (ref 7–25)
CALCIUM SPEC-SCNC: 9 MG/DL (ref 8.6–10.5)
CHLORIDE SERPL-SCNC: 106 MMOL/L (ref 98–107)
CO2 SERPL-SCNC: 23 MMOL/L (ref 22–29)
CREAT SERPL-MCNC: 0.62 MG/DL (ref 0.57–1)
DEPRECATED RDW RBC AUTO: 42.1 FL (ref 37–54)
EGFRCR SERPLBLD CKD-EPI 2021: 101.5 ML/MIN/1.73
ERYTHROCYTE [DISTWIDTH] IN BLOOD BY AUTOMATED COUNT: 13.1 % (ref 12.3–15.4)
GLUCOSE SERPL-MCNC: 107 MG/DL (ref 65–99)
HCT VFR BLD AUTO: 38.1 % (ref 34–46.6)
HGB BLD-MCNC: 12.3 G/DL (ref 12–15.9)
MCH RBC QN AUTO: 28.1 PG (ref 26.6–33)
MCHC RBC AUTO-ENTMCNC: 32.3 G/DL (ref 31.5–35.7)
MCV RBC AUTO: 87.2 FL (ref 79–97)
PLATELET # BLD AUTO: 274 10*3/MM3 (ref 140–450)
PMV BLD AUTO: 10.3 FL (ref 6–12)
POTASSIUM SERPL-SCNC: 4.2 MMOL/L (ref 3.5–5.2)
RBC # BLD AUTO: 4.37 10*6/MM3 (ref 3.77–5.28)
SODIUM SERPL-SCNC: 141 MMOL/L (ref 136–145)
WBC NRBC COR # BLD: 10.26 10*3/MM3 (ref 3.4–10.8)

## 2022-08-01 PROCEDURE — 25010000002 ENOXAPARIN PER 10 MG: Performed by: INTERNAL MEDICINE

## 2022-08-01 PROCEDURE — 99233 SBSQ HOSP IP/OBS HIGH 50: CPT | Performed by: NURSE PRACTITIONER

## 2022-08-01 PROCEDURE — 80048 BASIC METABOLIC PNL TOTAL CA: CPT | Performed by: INTERNAL MEDICINE

## 2022-08-01 PROCEDURE — 85027 COMPLETE CBC AUTOMATED: CPT | Performed by: INTERNAL MEDICINE

## 2022-08-01 RX ORDER — CLOPIDOGREL BISULFATE 75 MG/1
75 TABLET ORAL DAILY
Qty: 30 TABLET | Refills: 0 | Status: SHIPPED | OUTPATIENT
Start: 2022-08-02 | End: 2022-08-22 | Stop reason: SDUPTHER

## 2022-08-01 RX ORDER — BENZONATATE 200 MG/1
200 CAPSULE ORAL 3 TIMES DAILY PRN
Qty: 21 CAPSULE | Refills: 0 | Status: SHIPPED | OUTPATIENT
Start: 2022-08-01 | End: 2022-10-04

## 2022-08-01 RX ORDER — ATENOLOL 25 MG/1
25 TABLET ORAL EVERY 12 HOURS SCHEDULED
Qty: 60 TABLET | Refills: 0 | Status: SHIPPED | OUTPATIENT
Start: 2022-08-01 | End: 2022-09-02 | Stop reason: SDUPTHER

## 2022-08-01 RX ORDER — DIGOXIN 125 MCG
125 TABLET ORAL
Qty: 30 TABLET | Refills: 0 | Status: SHIPPED | OUTPATIENT
Start: 2022-08-02 | End: 2022-08-22 | Stop reason: SDUPTHER

## 2022-08-01 RX ADMIN — DIGOXIN 125 MCG: 125 TABLET ORAL at 12:00

## 2022-08-01 RX ADMIN — Medication 1000 UNITS: at 08:39

## 2022-08-01 RX ADMIN — ENOXAPARIN SODIUM 80 MG: 100 INJECTION SUBCUTANEOUS at 06:17

## 2022-08-01 RX ADMIN — ATENOLOL 25 MG: 25 TABLET ORAL at 08:39

## 2022-08-01 RX ADMIN — BENZONATATE 200 MG: 100 CAPSULE ORAL at 08:39

## 2022-08-01 RX ADMIN — CLOPIDOGREL 75 MG: 75 TABLET, FILM COATED ORAL at 08:39

## 2022-08-01 NOTE — CASE MANAGEMENT/SOCIAL WORK
Discharge Planning Assessment  [facility]     Patient Name: Re Sheldon  MRN: [id number]  Today's Date: 8/1/2022    Admit Date: 7/29/2022     Discharge Needs Assessment     Row Name 08/01/22 1441       Living Environment    People in Home spouse;child(ki), adult    Current Living Arrangements home    Primary Care Provided by self    Provides Primary Care For no one    Family Caregiver if Needed spouse    Quality of Family Relationships helpful;supportive    Able to Return to Prior Arrangements yes       Resource/Environmental Concerns    Resource/Environmental Concerns none       Transition Planning    Patient/Family Anticipates Transition to home with family    Patient/Family Anticipated Services at Transition none    Transportation Anticipated family or friend will provide       Discharge Needs Assessment    Equipment Currently Used at Home none    Concerns to be Addressed denies needs/concerns at this time;no discharge needs identified    Anticipated Changes Related to Illness none    Equipment Needed After Discharge none               Discharge Plan     Row Name 08/01/22 1442       Plan    Plan Return home, denies needs    Patient/Family in Agreement with Plan yes    Plan Comments CCP met with patient at bedside. Patient lives with her  Kedar and adult children. There are 3 steps to enter her home (there is a handrail) and her bedroom is on the first floor. At baseline patient drives and is IADL but states her family can also assist her if needed. Patient fills prescriptions at [facility] and is agreeable to meds to beds. She denies any AD/LW documents, DME, HH, or RUPERTO history. At discharge she plans to return home via her . She denies any discharge planning needs. Magnolia MORELAND RN/CCP              Continued Care and Services - Admitted Since 7/29/2022    Coordination has not been started for this encounter.          Demographic Summary     Row Name 08/01/22 1441       General Information     Admission Type inpatient    Arrived From home    Referral Source admission list    Reason for Consult discharge planning               Functional Status     Row Name 08/01/22 1441       Functional Status    Usual Activity Tolerance good    Current Activity Tolerance moderate       Functional Status, IADL    Medications independent    Meal Preparation independent    Housekeeping independent    Laundry independent    Shopping independent               Psychosocial    No documentation.                Abuse/Neglect    No documentation.                Legal    No documentation.                Substance Abuse    No documentation.                Patient Forms    No documentation.                   Bernice Johns

## 2022-08-01 NOTE — PROGRESS NOTES
Name: Re Sheldon ADMIT: 2022   : 1960  PCP: Angelita Vital MD    MRN: [id number] LOS: 3 days   AGE/SEX: 62 y.o. female  ROOM: [address]    [place], Subsequent Hospital Care    Chief Complaint   Patient presents with   • Shortness of Breath     CC: Right eye stroke  Subjective     62 y.o. female with right central retinal artery occlusion.  Concerns for sore still under investigation.  Currently duplex shows no significant blockage or thrombus load in the carotid bulb.  CT scan shows 50 to 60% lesion.  At this point in time I am not able to confirm that this is the source of stroke and the risk of surgery seems to outweigh the benefit given the low level of stenosis.  She has large bulky plaque throughout her a sending arch.  This seems to be the most likely source but I would like MRI to try to confirm that there have been subclinical injuries on both sides of the brain.  Discussion with cardiology and neurology.  Both agree with plans for outpatient MRI and the addition of Plavix to her Eliquis.    Review of Systems no new symptoms    Objective     Scheduled Medications:   apixaban, 5 mg, Oral, Q12H  atenolol, 25 mg, Oral, Q12H  cetirizine, 5 mg, Oral, Daily  cholecalciferol, 1,000 Units, Oral, Daily  clopidogrel, 75 mg, Oral, Daily  digoxin, 125 mcg, Oral, Daily  LORazepam, 1 mg, Intravenous, Once  pitavastatin calcium, 2 mg, Oral, Nightly  sodium chloride, 10 mL, Intravenous, Q12H  sodium chloride, 10 mL, Intravenous, Q12H        Active Infusions:  sodium chloride, 75 mL/hr, Last Rate: Stopped (22 1006)        As Needed Medications:  •  acetaminophen **OR** acetaminophen **OR** acetaminophen  •  benzonatate  •  fluticasone  •  nitroglycerin  •  ondansetron  •  ondansetron  •  sodium chloride  •  sodium chloride  •  sodium chloride    Vital Signs  Vital Signs Patient Vitals for the past 24 hrs:   BP Temp Temp src Pulse Resp SpO2   22 0713 138/96 -- -- 61 18 96 %    08/01/22 0001 126/72 97.6 °F (36.4 °C) Oral -- 20 --   07/31/22 1837 119/78 97.8 °F (36.6 °C) Oral 92 20 96 %   07/31/22 1433 134/69 97.6 °F (36.4 °C) Oral -- -- --   07/31/22 1347 -- -- -- 84 20 98 %     Vital Signs (range)  Temp:  [97.6 °F (36.4 °C)-97.8 °F (36.6 °C)] 97.6 °F (36.4 °C)  Heart Rate:  [61-92] 61  Resp:  [18-20] 18  BP: (119-138)/(69-96) 138/96  I/O:  I/O last 3 completed shifts:  In: 240 [P.O.:240]  Out: -   I/O:   Intake/Output Summary (Last 24 hours) at 8/1/2022 0822  Last data filed at 8/1/2022 0126  Gross per 24 hour   Intake 240 ml   Output --   Net 240 ml     BMI:  Body mass index is 30.47 kg/m².    Physical Exam:  Physical Exam   Neurologically nonfocal  Heart regular rate and rhythm  Abdomen benign  Lungs clear    Results Review:     CBC    Results from last 7 days   Lab Units 08/01/22 0435 07/31/22 0407 07/30/22 0351 07/29/22  1556   WBC 10*3/mm3 10.26 8.80 9.40 9.45   HEMOGLOBIN g/dL 12.3 11.9* 11.5* 12.8   PLATELETS 10*3/mm3 274 274 276 321     BMP   Results from last 7 days   Lab Units 08/01/22 0435 07/31/22  0407 07/30/22  0351 07/29/22  1556   SODIUM mmol/L 141 143 142 144   POTASSIUM mmol/L 4.2 4.0 4.5 4.5   CHLORIDE mmol/L 106 107 107 105   CO2 mmol/L 23.0 23.7 28.1 26.8   BUN mg/dL 15 14 16 14   CREATININE mg/dL 0.62 0.63 0.66 0.79   GLUCOSE mg/dL 107* 106* 101* 119*     Cr Clearance Estimated Creatinine Clearance: 94.2 mL/min (by C-G formula based on SCr of 0.62 mg/dL).  Coag     HbA1C   Lab Results   Component Value Date    HGBA1C 6.10 (H) 07/30/2022    HGBA1C 5.8 (H) 07/27/2021    HGBA1C 5.7 (H) 12/11/2020     Blood Glucose   Glucose   Date/Time Value Ref Range Status   07/30/2022 1203 116 70 - 130 mg/dL Final     Comment:     Meter: [id number] : [id number] Mireille Pichardo NA   07/30/2022 0706 97 70 - 130 mg/dL Final     Comment:     Meter: [id number] : [id number] Margarethyzer Korin NA   07/30/2022 0057 106 70 - 130 mg/dL Final     Comment:     Meter:  [id number] : [id number] Bela Langley NA     Infection     Radiology(recent) CT Angiogram Carotids    Result Date: 7/31/2022  1.  No evidence of acute infarction or of intracranial hemorrhage. 2.  The CT angiogram demonstrates approximately a 50% and 50% to 60% stenosis involving the proximal aspect of the internal carotid arteries bilaterally. The stenosis on the right is due to eccentric calcified plaque. Decreased attenuation is identified within the lumen immediately adjacent to the plaque. This may represent beam hardening artifact. Mural thrombus cannot be excluded. Further evaluation with a carotid ultrasound is recommended. 3.  No evidence of proximal intracranial high-grade stenosis. 4.  There is a 2.5 x 1.8 x 1.8 mm protuberance appreciated arising from the left A2 segment proximally. Although this may represent a small infundibulum, an aneurysm cannot be excluded. No other aneurysms are identified.      Radiation dose reduction techniques were utilized, including automated exposure control and exposure modulation based on body size.  This report was finalized on 7/31/2022 11:56 AM by Dr. Jin Barragan M.D.      CT Angiogram Head    Result Date: 7/31/2022  1.  No evidence of acute infarction or of intracranial hemorrhage. 2.  The CT angiogram demonstrates approximately a 50% and 50% to 60% stenosis involving the proximal aspect of the internal carotid arteries bilaterally. The stenosis on the right is due to eccentric calcified plaque. Decreased attenuation is identified within the lumen immediately adjacent to the plaque. This may represent beam hardening artifact. Mural thrombus cannot be excluded. Further evaluation with a carotid ultrasound is recommended. 3.  No evidence of proximal intracranial high-grade stenosis. 4.  There is a 2.5 x 1.8 x 1.8 mm protuberance appreciated arising from the left A2 segment proximally. Although this may represent a small infundibulum, an aneurysm cannot be  excluded. No other aneurysms are identified.      Radiation dose reduction techniques were utilized, including automated exposure control and exposure modulation based on body size.  This report was finalized on 7/31/2022 11:56 AM by Dr. Jin Barragan M.D.        Assessment & Plan     Assessment & Plan      Atrial fibrillation with rapid ventricular response (HCC)    DDD (degenerative disc disease), lumbar    Hypertension      62 y.o. female with current cryptogenic stroke.  Suspicion of aortic arch lesions as source but still have some concerns for the neck even though testing indicates extremely low level of disease.  We will plan on Eliquis and Plavix for discharge and patient will need MRI set up open MRI to evaluate for single versus bilateral hemispheric injuries.  I will see her back in 1 to 2 weeks to go over the MRI.  Please set up prior to discharge      Mason Quiroga MD  08/01/22  08:19 EDT    Please call my office with any question: [phone]    Active Hospital Problems    Diagnosis  POA   • **Atrial fibrillation with rapid ventricular response (HCC) [I48.91]  Yes   • Hypertension [I10]  Unknown   • DDD (degenerative disc disease), lumbar [M51.36]  Yes      Resolved Hospital Problems   No resolved problems to display.

## 2022-08-01 NOTE — PROGRESS NOTES
HR/BP reviewed, all is stable. Plans noted for AC + clopidogrel and no immediate plans for CEA.    Will see as needed.

## 2022-08-01 NOTE — PAYOR COMM NOTE
"Re Sheldon (62 y.o. Female)     PLEASE SEE ATTACHED FOR INPT AUTH.    REF#[id number]    PLEASE CALL  OR  [phone] WITH INPT AUTH.     THANK YOU    GARRETT MABRY LPN CCP            Date of Birth   1960    Social Security Number       Address   [address] [place] [address]    Home Phone   [phone]    MRN   [id number]       Holiness   [facility]    Marital Status                               Admission Date   7/29/22    Admission Type   Emergency    Admitting Provider   Violet Berkowitz MD    Attending Provider   Gume Adler MD    Department, Room/Bed   [address], N433/1       Discharge Date       Discharge Disposition       Discharge Destination                               Attending Provider: Gume Adler MD    Allergies: Sulfa Antibiotics, Augmentin [Amoxicillin-pot Clavulanate], Bactrim [Sulfamethoxazole-trimethoprim]    Isolation: None   Infection: COVID (History) (07/31/22)   Code Status: CPR   Advance Care Planning Activity    Ht: 160 cm (63\")   Wt: 78 kg (172 lb)    Admission Cmt: None   Principal Problem: Atrial fibrillation with rapid ventricular response (HCC) [I48.91]                 Active Insurance as of 7/29/2022     Primary Coverage     Payor Plan Insurance Group Employer/Plan Group    [employer]ERCIAL AETNA [id number]     Payor Plan Address Payor Plan Phone Number Payor Plan Fax Number Effective Dates    [address] [phone]  7/1/2022 - None Entered    [facility] [id number]       Subscriber Name Subscriber Birth Date Member ID       [id number] 7/7/1959 [id number]                 Emergency Contacts      (Rel.) Home Phone Work Phone Mobile Phone    StefanKedar (Spouse) -- -- [phone]            [place]: [facility] [id number]  Tax ID [id number]     History & Physical      Violet Berkowitz MD at 07/29/22 1949          Internal medicine history and physical  INTERNAL MEDICINE "   [facility]       Patient Identification:  Name: Re Sheldon  Age: 62 y.o.  Sex: female  :  1960  MRN: [id number]                   Primary Care Physician: Angelita Vital MD                               Date of admission:2022    Chief Complaint: Has been atrial fibrillation since Wednesday and has lost vision in her right eye and May of this year.    History of Present Illness:   Patient is a 62-year-old female with complicated past medical history as noted below consisting of proximal atrial fibrillation, history of aortic and pulmonary valve disease, history of hypertension dyslipidemia chronic back pain was in her usual state of her health and has ability to tell whether she has gone into atrial fibrillation or not by having very peculiar sensation in her neck and feels like courses are running in her chest and palpitation and usually takes care of these episodes by taking extra beta-blockers doses as recommended by cardiology service.  Patient is also on chronic anticoagulation therapy and takes her Eliquis religiously.  In this background patient had some issues and difficulty to see things from her right thigh.  She did not any focal weakness of arm or legs or difficulty in thinking understanding expressing herself.  She has had multiple episodes of atrial fibrillation since that she had managed with adjusting and taking extra dose of beta-blocker since.  Patient had made an ophthalmology appointment for her eye issues which was initially supposed to be on 2022 but got delayed,because he had COVID earlier in July, to 2022 and she had made her mind to keep that appointment to figure it out what is going on with her eyes.  When she called ophthalmology office to make an appointment he was told that she may have a issue with cataract.  2 days ago patient developed another episode of atrial fibrillation based on the symptoms of palpitation funny feeling in her upper  chest and neck and felt like horses are running in her chest.  She took extra doses of beta-blocker as he has tried before but this time it did not work.  Despite her symptoms and some shortness of breath with activity during persistent atrial fibrillation she did not want to miss her appointment with ophthalmologist so she went there and after evaluation she was told that she is nothing wrong with her eyes in terms of cataract or retinal issues she in fact had a stroke that was causing her vision to be that way.  While this was being discussed and her atrial fibrillation was persistent she decided to come to the emergency room for further evaluation.  In the emergency room patient was noted to have atrial fibrillation with rapid ventricular response with heart rate of 125 bpm.  Patient's case was discussed with her cardiologist Dr. Benedict and extra dose of IV and oral Lopressor was given and digoxin was administered.  Patient is being admitted for further care.      Past Medical History:  Past Medical History:   Diagnosis Date   • Abnormal ECG     Inferolateral ST-T changes at baseline   • Anemia    • Atrial fibrillation (HCC)    • Calcification of aortic valve 2020   • Coronary artery disease    • COVID-19 07/05/2022   • Hyperlipidemia    • Hypertension    • Low back pain    • Mitral regurgitation    • PAF (paroxysmal atrial fibrillation) (HCC)    • Palpitations    • Premature atrial contractions    • Spinal stenosis    • Systolic murmur    • Tonsillitis    • Vitamin D deficiency      Past Surgical History:  Past Surgical History:   Procedure Laterality Date   • ADENOIDECTOMY     • CARPAL TUNNEL RELEASE Bilateral    • COLONOSCOPY  08/04/2014    repeat 10 years , Dr. Karen Spear   • COLONOSCOPY N/A 1/22/2021    Procedure: COLONOSCOPY to cecum with cold biopsy polypectomy;  Surgeon: Ray Hairston MD;  Location: [facility] ENDOSCOPY;  Service: Gastroenterology;  Laterality: N/A;  pre: screening  post:  diverticulosis, polyp, internal hemorrhoids   • FOOT SURGERY     • HAND SURGERY     • TONSILLECTOMY     • TONSILLECTOMY AND ADENOIDECTOMY     • TUBAL ABDOMINAL LIGATION        Home Meds:  (Not in a hospital admission)    Current Meds:     Current Facility-Administered Medications:   •  sodium chloride 0.9 % flush 10 mL, 10 mL, Intravenous, PRN, Winston Soriano MD    Current Outpatient Medications:   •  apixaban (Eliquis) 5 MG tablet tablet, Take 1 tablet by mouth Every 12 (Twelve) Hours., Disp: 180 tablet, Rfl: 3  •  aspirin 81 MG EC tablet, Take 81 mg by mouth Every Other Day., Disp: , Rfl:   •  cetirizine (zyrTEC) 10 MG tablet, 1 OTC tab po QDay for allergies, Disp: , Rfl:   •  cholecalciferol (VITAMIN D3) 25 MCG (1000 UT) tablet, Take 1,000 Units by mouth 2 (Two) Times a Day., Disp: , Rfl:   •  fluticasone (FLONASE) 50 MCG/ACT nasal spray, into the nostril(s) as directed by provider Daily As Needed., Disp: , Rfl:   •  metoprolol tartrate (LOPRESSOR) 50 MG tablet, TAKE 1 TABLET BY MOUTH TWICE A DAY, Disp: 180 tablet, Rfl: 3  •  Pitavastatin Calcium 4 MG tablet, Take 1 tablet by mouth Daily., Disp: 30 tablet, Rfl: 6  Allergies:  Allergies   Allergen Reactions   • Sulfa Antibiotics Diarrhea and Rash   • Augmentin [Amoxicillin-Pot Clavulanate] Diarrhea and Other (See Comments)   • Bactrim [Sulfamethoxazole-Trimethoprim] Diarrhea and Other (See Comments)     Social History:   Social History     Tobacco Use   • Smoking status: Never Smoker   • Smokeless tobacco: Never Used   Substance Use Topics   • Alcohol use: No     Comment: Caffeine use 3-4 cups daily      Family History:  Family History   Problem Relation Age of Onset   • Heart attack Other    • Breast cancer Other    • Heart disease Other    • Heart disease Mother         Mother with MI at 63, and later  from CHF   • Heart disease Father         Father  from complications after valve replacement at 73   • Stroke Father    • Breast cancer Sister    •  "Malig Hyperthermia Neg Hx           Review of Systems  See history of present illness and past medical history.    As described in history presenting illness.  Remainder of ROS is negative.      Vitals:   BP (!) 147/105   Pulse 76   Temp 97.1 °F (36.2 °C)   Resp 16   Ht 160 cm (63\")   SpO2 95%   BMI 30.11 kg/m²   I/O: No intake or output data in the 24 hours ending 07/29/22 1949  Exam:  Patient is examined using the personal protective equipment as per guidelines from infection control for this particular patient as enacted.  Hand washing was performed before and after patient interaction.  General Appearance:    Alert, cooperative, no distress, appears stated age   Head:    Normocephalic, without obvious abnormality, atraumatic   Eyes:   Debility palpitations secondary to ophthalmologic examination earlier today.   Ears:    Normal external ear canals, both ears   Nose:   Nares normal, septum midline, mucosa normal, no drainage    or sinus tenderness   Throat:   Lips, tongue, gums normal; oral mucosa pink and moist   Neck:   Supple, symmetrical, trachea midline, no adenopathy;     thyroid:  no enlargement/tenderness/nodules; no carotid    bruit or JVD   Back:     Symmetric, no curvature, ROM normal, no CVA tenderness   Lungs:     Clear to auscultation bilaterally, respirations unlabored   Chest Wall:    No tenderness or deformity    Heart:   Irregularly irregular   Abdomen:    Soft nontender   Extremities:   Extremities normal, atraumatic, no cyanosis or edema   Pulses:   Pulses palpable in all extremities; symmetric all extremities   Skin:   Skin color normal, Skin is warm and dry,  no rashes or palpable lesions   Neurologic:  Decreased vision in the right field of vision, motor strength grossly intact, sensation grossly intact to light touch, no focal deficits noted       Data Review:      I reviewed the patient's new clinical results.  Results from last 7 days   Lab Units 07/29/22  1556   WBC 10*3/mm3 9.45 "   HEMOGLOBIN g/dL 12.8   PLATELETS 10*3/mm3 321     Results from last 7 days   Lab Units 07/29/22  1556   SODIUM mmol/L 144   POTASSIUM mmol/L 4.5   CHLORIDE mmol/L 105   CO2 mmol/L 26.8   BUN mg/dL 14   CREATININE mg/dL 0.79   CALCIUM mg/dL 9.1   GLUCOSE mg/dL 119*     XR Chest 2 View    Result Date: 7/29/2022  No evidence for acute pulmonary process. Cardiomegaly. Follow-up as clinical indications persist.  This report was finalized on 7/29/2022 3:57 PM by Dr. Godfrey Reza M.D.      CT Head Without Contrast    Result Date: 7/29/2022  No evidence of acute infarction of intracranial hemorrhage. Further evaluation could be performed with a MRI examination of the brain as indicated. Paranasal sinus disease as described above. See above.    Radiation dose reduction techniques were utilized, including automated exposure control and exposure modulation based on body size.       Microbiology Results (last 10 days)     Procedure Component Value - Date/Time    COVID PRE-OP / PRE-PROCEDURE SCREENING ORDER (NO ISOLATION) - Swab, Nasopharynx [[id number]]  (Normal) Collected: 07/29/22 2022    Lab Status: Final result Specimen: Swab from Nasopharynx Updated: 07/30/22 0228    Narrative:      The following orders were created for panel order COVID PRE-OP / PRE-PROCEDURE SCREENING ORDER (NO ISOLATION) - Swab, Nasopharynx.  Procedure                               Abnormality         Status                     ---------                               -----------         ------                     COVID-19,APTIMA PANTHER(...[[id number]]  Normal              Final result                 Please view results for these tests on the individual orders.    COVID-19,APTIMA PANTHER(KAROL),[facility], NP/OP SWAB IN UTM/VTM/SALINE TRANSPORT MEDIA,24 HR TAT - Swab, Nasopharynx [[id number]]  (Normal) Collected: 07/29/22 2022    Lab Status: Final result Specimen: Swab from Nasopharynx Updated: 07/30/22 0228     COVID19 Not Detected    Narrative:  "     Fact sheet for providers: https://www.fda.gov/media/485107/download     Fact sheet for patients: https://www.fda.gov/media/497076/download    Test performed by RT PCR.        ECG 12 Lead   Final Result   HEART RATE= 125  bpm   RR Interval= 480  ms   ND Interval=   ms   P Horizontal Axis=   deg   P Front Axis=   deg   QRSD Interval= 73  ms   QT Interval= 316  ms   QRS Axis= 76  deg   T Wave Axis= 261  deg   - ABNORMAL ECG -   Atrial fibrillation   Repol abnrm suggests ischemia, diffuse leads   No change from previous tracing   Electronically Signed By: Jennifer Ordonez ([facility]) 29-Jul-2022 17:52:45   Date and Time of Study: 2022-07-29 14:59:49          Assessment:  Active Hospital Problems    Diagnosis  POA   • **Atrial fibrillation with rapid ventricular response (HCC) [I48.91]  Yes   • Hypertension [I10]  Unknown   • DDD (degenerative disc disease), lumbar [M51.36]  Yes   Visual disturbances right CVA versus TIA  Recent COVID-19 infection    Plan: See admitting orders  · Cardiology consultation  · Get MRI and MRA of head and neck vessels  · Neurology consultation   · OT PT evaluation and neurochecks  · Continue aspirin and statin and Eliquis    Violet Berkowitz MD   7/29/2022  19:49 EDT  Much of this encounter note is an electronic transcription/translation of spoken language to printed text. The electronic translation of spoken language may permit erroneous, or at times, nonsensical words or phrases to be inadvertently transcribed; Although I have reviewed the note for such errors, some may still exist      Electronically signed by Violet Berkowitz MD at 07/30/22 6556          Emergency Department Notes      Tila Camp, RN at 07/29/22 3382        Pt arrives via PV with c/o SOA. Pt says \"I am going in and out of a-fib.\" pt reports that she has bronchitis and recent Covid infection at the beginning of July.     Pt given mask in triage, staff in PPE.      Electronically signed by Tila Camp, RN at 07/29/22 9220   "   Emilio Toney MD at 07/29/22 1648           EMERGENCY DEPARTMENT ENCOUNTER    Room Number:  N433/1  Date of encounter:  7/29/2022  PCP: Angelita Vital MD  Historian: Patient, spouse, sister-in-law      HPI:  Chief Complaint: My heart rate is acting up, by the way I cannot see out of my right I  A complete HPI/ROS/PMH/PSH/SH/FH are unobtainable due to: Not applicable  Context: Re Sheldon is a 62 y.o. female who presents to the ED c/o the main reason why the patient came to the emergency department is because she is noticed that she has been in atrial fibrillation the past 2 to 3 days.  She notices it on her watch the detects a fast rate as well as she notices palpitations.  Denies any chest pain.  Does report some shortness of breath.  Is important note that she had COVID in the first part of July.  She has had a persistent cough.  She felt that the past 2 days this cough is a little bit worse and has had some shortness of breath with exertion.  At the same time she has had worsening shortness of breath that is when she has had the fast heart rate.  Denies any chest pain denies any fever denies any vomiting.  She has had significant visual impairment in her right eye since the end of May or June.  She went to see an optometrist, ophthalmologist and cardiologist today.  Cardiologist seems to convey to her that he was concerned that she is had a stroke in that right eye.  The cardiologist referred her here to the emergency department for further work-up and treatment.  Denies any abdominal pain or any focal motor or sensory change other than that chronic visual change  At the ophthalmologist office she had an injection in that right eye and her eyes were dilated.      Previous Episodes: Yes with atrial fibrillation in the past  Current Symptoms: See above    MEDICAL HISTORY REVIEWED    Patient has a history of atrial fibrillation and flutter.  She sees [place] cardiology.  Patient had an echo 3/23/2021  ejection fraction was 61 to 65% she had mild LVH left atrial cavity was moderately dilated as well as the right atrial cavity she had moderate aortic valve stenosis moderate mitral valve regurg mild stress cuspid regurg.  Please see complete dictated report in epic.    PAST MEDICAL HISTORY  Active Ambulatory Problems     Diagnosis Date Noted   • Abdominal pain 07/29/2016   • Acute bronchitis 07/29/2016   • Lumbar radiculopathy 07/29/2016   • Leg pain 07/29/2016   • Acute antritis 07/29/2016   • Bulging of lumbar intervertebral disc 07/29/2016   • Chest pain 07/29/2016   • Chronic right-sided low back pain without sciatica 07/29/2016   • DDD (degenerative disc disease), lumbar 07/29/2016   • Dysfunction of eustachian tube 07/29/2016   • Fatigue 07/29/2016   • Hyperlipidemia 07/29/2016   • BP (high blood pressure) 07/29/2016   • Abnormal fasting glucose 07/29/2016   • Gonalgia 07/29/2016   • Maxillary antritis 07/29/2016   • Neck swelling 07/29/2016   • Health care maintenance 07/29/2016   • Allergic rhinitis 07/29/2016   • Bilateral carpal tunnel syndrome 12/15/2016   • Degenerative lumbar spinal stenosis 12/15/2016   • Valvular insufficiency 12/15/2016   • Chest tightness or pressure 03/16/2017   • Family history of early CAD 03/16/2017   • EKG, abnormal 03/16/2017   • PVC (premature ventricular contraction) 12/20/2017   • Mitral valve insufficiency 12/20/2017   • Vitamin D deficiency 05/23/2018   • Meralgia paresthetica of left side 10/26/2020   • Family history of colon cancer 12/22/2020     Resolved Ambulatory Problems     Diagnosis Date Noted   • No Resolved Ambulatory Problems     Past Medical History:   Diagnosis Date   • Abnormal ECG    • Anemia    • Atrial fibrillation (HCC)    • Calcification of aortic valve 2020   • Coronary artery disease    • COVID-19 07/05/2022   • Hypertension    • Low back pain    • Mitral regurgitation    • PAF (paroxysmal atrial fibrillation) ([facility])    • Palpitations    • Premature  atrial contractions    • Spinal stenosis    • Systolic murmur    • Tonsillitis          PAST SURGICAL HISTORY  Past Surgical History:   Procedure Laterality Date   • ADENOIDECTOMY     • CARPAL TUNNEL RELEASE Bilateral    • COLONOSCOPY  2014    repeat 10 years , Dr. Karen Spear   • COLONOSCOPY N/A 2021    Procedure: COLONOSCOPY to cecum with cold biopsy polypectomy;  Surgeon: Ray Hairston MD;  Location: [facility];  Service: Gastroenterology;  Laterality: N/A;  pre: screening  post: diverticulosis, polyp, internal hemorrhoids   • FOOT SURGERY     • HAND SURGERY     • TONSILLECTOMY     • TONSILLECTOMY AND ADENOIDECTOMY     • TUBAL ABDOMINAL LIGATION           FAMILY HISTORY  Family History   Problem Relation Age of Onset   • Heart attack Other    • Breast cancer Other    • Heart disease Other    • Heart disease Mother         Mother with MI at 63, and later  from CHF   • Heart disease Father         Father  from complications after valve replacement at 73   • Stroke Father    • Breast cancer Sister    • Malig Hyperthermia Neg Hx          SOCIAL HISTORY  Social History     Socioeconomic History   • Marital status:      Spouse name: Kedar   • Number of children: 2   Tobacco Use   • Smoking status: Never Smoker   • Smokeless tobacco: Never Used   Vaping Use   • Vaping Use: Never used   Substance and Sexual Activity   • Alcohol use: No     Comment: Caffeine use 3-4 cups daily   • Drug use: No         ALLERGIES  Sulfa antibiotics, Augmentin [amoxicillin-pot clavulanate], and Bactrim [sulfamethoxazole-trimethoprim]        REVIEW OF SYSTEMS  Review of Systems     All systems reviewed and negative except for those discussed in HPI.       PHYSICAL EXAM    I have reviewed the triage vital signs and nursing notes.    ED Triage Vitals   Temp Heart Rate Resp BP SpO2   22 1452 22 1452 22 1452 22 1509 22 1452   97.1 °F (36.2 °C) 108 20 105/84 95 %      Temp src  Heart Rate Source Patient Position BP Location FiO2 (%)   -- 07/29/22 1547 07/29/22 1453 07/29/22 1453 --    Monitor Sitting Right arm        GENERAL: Female no acute distress.Vital signs on my initial evaluation heart rate is 1 10-1 40 on the monitor is irregular regular consistent with atrial fibrillation.  HENT: nares patent  Head/neck/ face are symmetric without gross deformity, signs of trauma, or swelling  EYES: no scleral icterus, patient eyes are dilated bilaterally.  She was seen at the ophthalmologist prior to arrival here and had an injection in that right eye.  There is no obvious subconjunctival hemorrhage or bleeding.  She has severe vision loss in that right eye that is chronic.  NECK: Supple, no meningismus  CV: Irregular regular rhythm with tachycardia.  Harsh systolic murmur 2 out of 6.  RESPIRATORY: normal effort and no respiratory distress.  Clear to auscultation bilaterally.  No significant cough on my history and exam  ABDOMEN: soft and nontender.  Obese  MUSCULOSKELETAL: no deformity.  Intact distal pulses to upper and lower extremities are equal strong and symmetric  NEURO: alert and appropriate, moves all extremities, follows commands.  No focal motor or sensory changes  SKIN: warm, dry    Vital signs and nursing notes reviewed.        LAB RESULTS  Recent Results (from the past 24 hour(s))   ECG 12 Lead    Collection Time: 07/29/22  2:59 PM   Result Value Ref Range    QT Interval 316 ms   Comprehensive Metabolic Panel    Collection Time: 07/29/22  3:56 PM    Specimen: Blood   Result Value Ref Range    Glucose 119 (H) 65 - 99 mg/dL    BUN 14 8 - 23 mg/dL    Creatinine 0.79 0.57 - 1.00 mg/dL    Sodium 144 136 - 145 mmol/L    Potassium 4.5 3.5 - 5.2 mmol/L    Chloride 105 98 - 107 mmol/L    CO2 26.8 22.0 - 29.0 mmol/L    Calcium 9.1 8.6 - 10.5 mg/dL    Total Protein 6.9 6.0 - 8.5 g/dL    Albumin 4.40 3.50 - 5.20 g/dL    ALT (SGPT) 13 1 - 33 U/L    AST (SGOT) 13 1 - 32 U/L    Alkaline  Phosphatase 87 39 - 117 U/L    Total Bilirubin 0.4 0.0 - 1.2 mg/dL    Globulin 2.5 gm/dL    A/G Ratio 1.8 g/dL    BUN/Creatinine Ratio 17.7 7.0 - 25.0    Anion Gap 12.2 5.0 - 15.0 mmol/L    eGFR 85.2 >60.0 mL/min/1.73   BNP    Collection Time: 07/29/22  3:56 PM    Specimen: Blood   Result Value Ref Range    proBNP 1,355.0 (H) 0.0 - 900.0 pg/mL   Troponin    Collection Time: 07/29/22  3:56 PM    Specimen: Blood   Result Value Ref Range    Troponin T <0.010 0.000 - 0.030 ng/mL   Green Top (Gel)    Collection Time: 07/29/22  3:56 PM   Result Value Ref Range    Extra Tube Hold for add-ons.    Lavender Top    Collection Time: 07/29/22  3:56 PM   Result Value Ref Range    Extra Tube hold for add-on    Gold Top - SST    Collection Time: 07/29/22  3:56 PM   Result Value Ref Range    Extra Tube Hold for add-ons.    Light Blue Top    Collection Time: 07/29/22  3:56 PM   Result Value Ref Range    Extra Tube Hold for add-ons.    CBC Auto Differential    Collection Time: 07/29/22  3:56 PM    Specimen: Blood   Result Value Ref Range    WBC 9.45 3.40 - 10.80 10*3/mm3    RBC 4.55 3.77 - 5.28 10*6/mm3    Hemoglobin 12.8 12.0 - 15.9 g/dL    Hematocrit 38.9 34.0 - 46.6 %    MCV 85.5 79.0 - 97.0 fL    MCH 28.1 26.6 - 33.0 pg    MCHC 32.9 31.5 - 35.7 g/dL    RDW 13.3 12.3 - 15.4 %    RDW-SD 40.5 37.0 - 54.0 fl    MPV 10.1 6.0 - 12.0 fL    Platelets 321 140 - 450 10*3/mm3    Neutrophil % 63.6 42.7 - 76.0 %    Lymphocyte % 24.7 19.6 - 45.3 %    Monocyte % 7.1 5.0 - 12.0 %    Eosinophil % 3.8 0.3 - 6.2 %    Basophil % 0.5 0.0 - 1.5 %    Immature Grans % 0.3 0.0 - 0.5 %    Neutrophils, Absolute 6.01 1.70 - 7.00 10*3/mm3    Lymphocytes, Absolute 2.33 0.70 - 3.10 10*3/mm3    Monocytes, Absolute 0.67 0.10 - 0.90 10*3/mm3    Eosinophils, Absolute 0.36 0.00 - 0.40 10*3/mm3    Basophils, Absolute 0.05 0.00 - 0.20 10*3/mm3    Immature Grans, Absolute 0.03 0.00 - 0.05 10*3/mm3    nRBC 0.0 0.0 - 0.2 /100 WBC       Ordered the above labs and  independently reviewed the results.        RADIOLOGY  XR Chest 2 View    Result Date: 7/29/2022  XR CHEST 2 VW-  HISTORY: Female who is 62 years-old,  short of breath  TECHNIQUE: Frontal and lateral views of the chest  COMPARISON: 02/23/2022  FINDINGS: The heart is enlarged. Pulmonary vasculature is unremarkable. No focal pulmonary consolidation, pleural effusion, or pneumothorax. No acute osseous process.      No evidence for acute pulmonary process. Cardiomegaly. Follow-up as clinical indications persist.  This report was finalized on 7/29/2022 3:57 PM by Dr. Godfrey Reza M.D.      CT Head Without Contrast    Result Date: 7/29/2022  CT HEAD WITHOUT CONTRAST  HISTORY:  A. fib, on blood thinners.  COMPARISON: None.  FINDINGS: The brain and ventricles are symmetrical. There is no evidence of hemorrhage, hydrocephalus or of a focal area of decreased attenuation to suggest acute infarction. Mild mucosal thickening involving the ethmoid air cells and frontal sinuses are appreciated. Small mucus retention cysts involving the maxillary sinuses are noted bilaterally.      No evidence of acute infarction of intracranial hemorrhage. Further evaluation could be performed with a MRI examination of the brain as indicated. Paranasal sinus disease as described above. See above.    Radiation dose reduction techniques were utilized, including automated exposure control and exposure modulation based on body size.         I ordered the above noted radiological studies. Reviewed by me and discussed with radiologist.  See dictation for official radiology interpretation.      PROCEDURES    Procedures      MEDICATIONS GIVEN IN ER    Medications   sodium chloride 0.9 % flush 10 mL (has no administration in time range)   fluticasone (FLONASE) 50 MCG/ACT nasal spray 1 spray (has no administration in time range)   cetirizine (zyrTEC) tablet 5 mg (has no administration in time range)   cholecalciferol (VITAMIN D3) tablet 1,000 Units  (has no administration in time range)   aspirin EC tablet 81 mg (has no administration in time range)   metoprolol tartrate (LOPRESSOR) tablet 50 mg (has no administration in time range)   atorvastatin (LIPITOR) tablet 20 mg (has no administration in time range)   sodium chloride 0.9 % flush 10 mL (has no administration in time range)   sodium chloride 0.9 % flush 10 mL (has no administration in time range)   nitroglycerin (NITROSTAT) SL tablet 0.4 mg (has no administration in time range)   ondansetron (ZOFRAN) injection 4 mg (has no administration in time range)   acetaminophen (TYLENOL) tablet 650 mg (has no administration in time range)     Or   acetaminophen (TYLENOL) 160 MG/5ML solution 650 mg (has no administration in time range)     Or   acetaminophen (TYLENOL) suppository 650 mg (has no administration in time range)   digoxin (LANOXIN) injection 250 mcg (has no administration in time range)   atenolol (TENORMIN) tablet 25 mg (has no administration in time range)   apixaban (ELIQUIS) tablet 5 mg (has no administration in time range)   metoprolol tartrate (LOPRESSOR) injection 2.5 mg (2.5 mg Intravenous Given 7/29/22 1710)   metoprolol tartrate (LOPRESSOR) tablet 25 mg (25 mg Oral Given 7/29/22 1826)   digoxin (LANOXIN) injection 250 mcg (250 mcg Intravenous Given 7/29/22 1826)         PROGRESS, DATA ANALYSIS, CONSULTS, AND MEDICAL DECISION MAKING    DDx includes CHF, acute coronary syndrome, pulmonary embolism, pneumothorax, pneumonia, asthma/COPD,aspiration,  pulmonary hypertension, metabolic acidosis, deconditioning, anemia, other hematologic etiologies such as CO poisoning, anxiety.     Informed patient as well as spouse of the initial test that we will order.  All questions answered    We are currently under a pandemic from the COVID19 infection.  The patient presented to the emergency department by ambulance or personal vehicle. I followed the current protocols required by Infection Control at [facility]  [facility] in my evaluation and treatment of the patient. The patient was wearing a face mask during my evaluation and throughout my encounter. During my whole encounter with this patient I used appropriate personal protective equipment.  This equipment consisted of eye protection, facemask, gown, and gloves.  I applied this equipment before entering the room.      All labs have been independently reviewed by me.  All radiology studies have been reviewed by me and discussed with radiologist dictating the report.   EKG's independently viewed and interpreted by me.  Discussion below represents my analysis of pertinent findings related to patient's condition, differential diagnosis, treatment plan and final disposition.      ED Course as of 07/29/22 2242   Fri Jul 29, 2022   1650 proBNP(!): 1,355.0 [MM]   1650 Troponin T: <0.010 [MM]   1650 Patient has some cardiomegaly but no acute pulmonary process appreciated on the x-ray.  I reviewed the radiologist report. [MM]   1650 I reviewed the EKG from 259  Its a rate of 125 it is atrial fibrillation narrow complex normal axis I do not see any acute ST elevation patient in several leads has T wave abnormalities with flipped T waves  Subtle ST depression in V3 through V6 which is nonspecific.  I compared to her previous EKG from 2/23/2022 and she had the same abnormal T waves.  Might be a little bit more prominent on this EKG.  Also had the same downsloping with somewhat similar ST depression.  This could be rate related. [MM]   1702 I discussed the case with Dr. Benedict who is well-known to this patient.  He recommends that I give her a small dose of IV Lopressor, p.o. Lopressor, and 250 mcg of digoxin. [MM]   4427 Discussed the case with Dr. Berkowitz for A.  He agrees to admit the patient to the hospital.  Explained to him the patient's presenting symptoms and results of tests and my initial treatment plan and my conversation with Dr. Benedict the cardiologist.  [MM]   1947 On reevaluation patient's heart rate is in the 90 patient is very stable in no distress.  She has been seen by Dr. Benedict the cardiologist in the emergency department.  All questions answered. [MM]   2012 CT scan of the head shows no acute process.  No hemorrhage or acute infarct.  Please see complete dictated report from the radiologist. [MM]      ED Course User Index  [MM] Emilio Toney MD       AS OF 22:42 EDT VITALS:    BP - (!) 155/102  HR - 97  TEMP - 98.2 °F (36.8 °C) (Oral)  02 SATS - 96%        DIAGNOSIS  Final diagnoses:   Atrial fibrillation with rapid ventricular response (HCC)   Visual impairment right eye that is been occurring for 2 to 3 months   Aortic valve stenosis, etiology of cardiac valve disease unspecified   Coagulopathy (HCC): Eliquis induced         DISPOSITION  We are currently under a pandemic from the COVID19 infection.  The patient presented to the emergency department by ambulance or personal vehicle. I followed the current protocols required by Infection Control at [facility] in my evaluation and treatment of the patient. The patient was wearing a face mask during my evaluation and throughout my encounter. During my whole encounter with this patient I used appropriate personal protective equipment.  This equipment consisted of eye protection, facemask, gown, and gloves.  I applied this equipment before entering the room.            DICTATED UTILIZING DRAGON DICTATION     Emilio Toney MD  07/29/22 2243      Electronically signed by Emilio Toney MD at 07/29/22 2243     Winston Taveras, RN at 07/29/22 1938          Nursing report ED to floor  Re Sheldon  62 y.o.  female    HPI (triage note):   Chief Complaint   Patient presents with   • Shortness of Breath       Admitting doctor:   Violet Berkowitz MD    Admitting diagnosis:   The primary encounter diagnosis was Atrial fibrillation with rapid ventricular response (HCC). Diagnoses of Visual impairment  right eye that is been occurring for 2 to 3 months, Aortic valve stenosis, etiology of cardiac valve disease unspecified, and Coagulopathy (HCC): Eliquis induced were also pertinent to this visit.    Code status:   Current Code Status     Date Active Code Status Order ID Comments User Context       Not on file    Advance Care Planning Activity          Allergies:   Sulfa antibiotics, Augmentin [amoxicillin-pot clavulanate], and Bactrim [sulfamethoxazole-trimethoprim]    Weight:   There were no vitals filed for this visit.    Most recent vitals:   Vitals:    07/29/22 1826 07/29/22 1831 07/29/22 1908 07/29/22 1931   BP: 108/88 90/72  (!) 147/105   BP Location:       Patient Position:       Pulse: 119 109 116 76   Resp: 16      Temp:       SpO2: 94% 97% 97% 95%   Height:           Active LDAs/IV Access:   Lines, Drains & Airways     Active LDAs     Name Placement date Placement time Site Days    Peripheral IV 07/29/22 1708 Right Antecubital 07/29/22 1708  Antecubital  less than 1                Labs (abnormal labs have a star):   Labs Reviewed   COMPREHENSIVE METABOLIC PANEL - Abnormal; Notable for the following components:       Result Value    Glucose 119 (*)     All other components within normal limits    Narrative:     GFR Normal >60  Chronic Kidney Disease <60  Kidney Failure <15     BNP (IN-HOUSE) - Abnormal; Notable for the following components:    proBNP 1,355.0 (*)     All other components within normal limits    Narrative:     Among patients with dyspnea, NT-proBNP is highly sensitive for the detection of acute congestive heart failure. In addition NT-proBNP of <300 pg/ml effectively rules out acute congestive heart failure with 99% negative predictive value.    Results may be falsely decreased if patient taking Biotin.     TROPONIN (IN-HOUSE) - Normal    Narrative:     Troponin T Reference Range:  <= 0.03 ng/mL-   Negative for AMI  >0.03 ng/mL-     Abnormal for myocardial necrosis.  Clinicians would have  to utilize clinical acumen, EKG, Troponin and serial changes to determine if it is an Acute Myocardial Infarction or myocardial injury due to an underlying chronic condition.       Results may be falsely decreased if patient taking Biotin.     CBC WITH AUTO DIFFERENTIAL - Normal   RAINBOW DRAW    Narrative:     The following orders were created for panel order [place] Draw.  Procedure                               Abnormality         Status                     ---------                               -----------         ------                     Green Top (Gel)[[id number]]                                  Final result               Lavender Top[[id number]]                                     Final result               Gold Top - SST[[id number]]                                   Final result               Light Blue Top[[id number]]                                   Final result                 Please view results for these tests on the individual orders.   CBC AND DIFFERENTIAL    Narrative:     The following orders were created for panel order CBC & Differential.  Procedure                               Abnormality         Status                     ---------                               -----------         ------                     CBC Auto Differential[[id number]]        Normal              Final result                 Please view results for these tests on the individual orders.   GREEN TOP   LAVENDER TOP   GOLD TOP - SST   LIGHT BLUE TOP       EKG:   ECG 12 Lead   Final Result   HEART RATE= 125  bpm   RR Interval= 480  ms   CT Interval=   ms   P Horizontal Axis=   deg   P Front Axis=   deg   QRSD Interval= 73  ms   QT Interval= 316  ms   QRS Axis= 76  deg   T Wave Axis= 261  deg   - ABNORMAL ECG -   Atrial fibrillation   Repol abnrm suggests ischemia, diffuse leads   No change from previous tracing   Electronically Signed By: Jennifer Ordonez ([facility]) 29-Jul-2022 17:52:45   Date and Time of Study: 2022-07-29 14:59:49           Meds given in ED:   Medications   sodium chloride 0.9 % flush 10 mL (has no administration in time range)   metoprolol tartrate (LOPRESSOR) injection 2.5 mg (2.5 mg Intravenous Given 7/29/22 1710)   metoprolol tartrate (LOPRESSOR) tablet 25 mg (25 mg Oral Given 7/29/22 1826)   digoxin (LANOXIN) injection 250 mcg (250 mcg Intravenous Given 7/29/22 1826)       Imaging results:  XR Chest 2 View    Result Date: 7/29/2022  No evidence for acute pulmonary process. Cardiomegaly. Follow-up as clinical indications persist.  This report was finalized on 7/29/2022 3:57 PM by Dr. Godfrey Reza M.D.        Ambulatory status:   - stand by    Social issues:   Social History     Socioeconomic History   • Marital status:      Spouse name: Kedar   • Number of children: 2   Tobacco Use   • Smoking status: Never Smoker   • Smokeless tobacco: Never Used   Vaping Use   • Vaping Use: Never used   Substance and Sexual Activity   • Alcohol use: No     Comment: Caffeine use 3-4 cups daily   • Drug use: No          Electronically signed by Winston Taveras RN at 07/29/22 1939       {Outbreak/Travel/Exposure Documentation......;  Question Available Choices Patient Response   COVID-19 Outbreak Screen:  Do you currently have a new onset of the following symptoms?        Fever/Chills, Cough, Shortness of air, Loss of taste or smell, No, Unknown  No (07/29/22 1536)   COVID-19 Outbreak Screen: In the last 14 days, have you had contact with anyone who is ill, has show any of the symptoms listed above and/or has been diagnosis with the 2019 Novel Coronavirus? This includes any immediate household members but excludes any patients with whom you have been in contact within your normal work duties wearing proper PPE, if you are a healthcare worker.  Yes, No, Unknown              No (07/29/22 1536)   COVID-19 Outbreak Screen: Who was notified? Free text (not recorded)   Ebola Screening Outbreak Screen: Have you traveled to the [facility]  [place] or [place] within the past 21 days?  Yes, No, Unknown (not recorded)   Ebola Screening Outbreak Screen: Do you have ANY of the following symptoms: Fever/Chills, Vomiting, Diarrhea, Fatigue, Headache, Muscle pain, Unexplained bleeding, Abdominal (stomach) pain, No, Unknown (not recorded)   Ebola Screening Outbreak Screen: Name of Person notified Free text (not recorded)   Travel Screen: Have you traveled in the last month? If so, to what country have you traveled? If [place] what state? Yes, No, Unknown  List of all countries  List of all States No (07/29/22 1451)  (not recorded)  (not recorded)   Infection Risk: Do you currently have the following symptoms?  (If cough is selected, the Tuberculosis Screen is performed.) Cough, Fever, Rash, No No (07/29/22 1451)   Tuberculosis Screen: Do you have any of the following Tuberculosis Risks?  · Have you lived or spent time with anyone who had or may have TB?  · Have you lived in or visited any of the following areas for more than one month: [place], [place], [place], [place] or [place], the [place] or [place]?  · Do you have HIV/AIDS?  · Have you lived in or worked in a nursing home, homeless shelter, correctional facility, or substance abuse treatment facility?   · No    If Yes do you have any of the following symptoms? Yes responses display to the right    If Yes, symptoms listed are:  Cough greater than or equal to 3 weeks, Loss of appetite, Unexplained weight loss, Night sweats, Bloody sputum or hemoptysis, Hoarseness, Fever, Fatigue, Chest pain, No (not recorded)  (not recorded)   Exposure Screen: Have you been exposed to any of these contagious diseases in the last month? Measles, Chickenpox, Meningitis, Pertussis, Whooping Cough, No No (07/29/22 1451)       Oxygen Therapy (since admission)     Date/Time SpO2 Device (Oxygen Therapy) Flow (L/min) Oxygen Concentration (%) ETCO2 (mmHg)    08/01/22 1256 93 room air -- -- --    08/01/22 0839 --  room air -- -- --    08/01/22 0713 96 room air -- -- --    08/01/22 0001 -- room air -- -- --    07/31/22 2019 -- room air -- -- --    07/31/22 1837 96 room air -- -- --    07/31/22 1410 -- room air -- -- --    07/31/22 1347 98 room air -- -- --    07/31/22 0840 -- room air -- -- --    07/31/22 0753 97 room air -- -- --    07/30/22 2249 95 room air -- -- --    07/30/22 1947 96 room air -- -- --    07/30/22 1640 96 -- -- -- --    07/30/22 1434 -- room air -- -- --    07/30/22 1250 98 room air -- -- --    07/30/22 0803 -- room air -- -- --    07/30/22 0702 95 room air -- -- --    07/30/22 0150 -- room air -- -- --    07/30/22 0020 100 room air -- -- --    07/30/22 0016 -- room air -- -- --    07/29/22 2300 -- room air -- -- --    07/29/22 2205 -- room air -- -- --    07/29/22 2157 96 -- -- -- --    07/29/22 19:59:25 97 room air -- -- --    07/29/22 1931 95 -- -- -- --    07/29/22 1908 97 -- -- -- --    07/29/22 1831 97 -- -- -- --    07/29/22 1826 94 room air -- -- --    07/29/22 1801 94 -- -- -- --    07/29/22 1731 94 -- -- -- --    07/29/22 1710 96 -- -- -- --    07/29/22 1701 95 -- -- -- --    07/29/22 1631 97 -- -- -- --    07/29/22 1601 97 -- -- -- --    07/29/22 15:47:14 95 room air -- -- --    07/29/22 1531 98 -- -- -- --    07/29/22 1509 96 -- -- -- --    07/29/22 1452 95 -- -- -- --        Intake & Output (last 3 days)       07/29 0701 07/30 0700 07/30 0701 07/31 0700 07/31 0701 08/01 0700 08/01 0701 08/02 0700    P.O. 120 920 240 480    Total Intake(mL/kg) 120 (1.5) 920 (11.8) 240 (3.1) 480 (6.2)    Net +120 +920 +240 +480            Urine Unmeasured Occurrence 2 x 5 x 2 x 2 x    Stool Unmeasured Occurrence  1 x  1 x         Lines, Drains & Airways     Active LDAs     Name Placement date Placement time Site Days    Peripheral IV 07/29/22 1708 Right Antecubital 07/29/22  1708  Antecubital  2                Operative/Procedure Notes (last 72 hours)  Notes from 07/29/22 1323 through 08/01/22 1323   No notes  of this type exist for this encounter.            Physician Progress Notes (all)      Christopher Crowder MD at 22 1023        HR/BP reviewed, all is stable. Plans noted for AC + clopidogrel and no immediate plans for CEA.    Will see as needed.    Electronically signed by Christopher Crowder MD at 22 1023     Mason Quiroga MD at 22 0819          Name: Re Sheldon ADMIT: 2022   : 1960  PCP: Angelita Vital MD    MRN: [id number] LOS: 3 days   AGE/SEX: 62 y.o. female  ROOM: [address]    [place], Subsequent Hospital Care    Chief Complaint   Patient presents with   • Shortness of Breath     CC: Right eye stroke  Subjective     62 y.o. female with right central retinal artery occlusion.  Concerns for sore still under investigation.  Currently duplex shows no significant blockage or thrombus load in the carotid bulb.  CT scan shows 50 to 60% lesion.  At this point in time I am not able to confirm that this is the source of stroke and the risk of surgery seems to outweigh the benefit given the low level of stenosis.  She has large bulky plaque throughout her a sending arch.  This seems to be the most likely source but I would like MRI to try to confirm that there have been subclinical injuries on both sides of the brain.  Discussion with cardiology and neurology.  Both agree with plans for outpatient MRI and the addition of Plavix to her Eliquis.    Review of Systems no new symptoms    Objective     Scheduled Medications:   apixaban, 5 mg, Oral, Q12H  atenolol, 25 mg, Oral, Q12H  cetirizine, 5 mg, Oral, Daily  cholecalciferol, 1,000 Units, Oral, Daily  clopidogrel, 75 mg, Oral, Daily  digoxin, 125 mcg, Oral, Daily  LORazepam, 1 mg, Intravenous, Once  pitavastatin calcium, 2 mg, Oral, Nightly  sodium chloride, 10 mL, Intravenous, Q12H  sodium chloride, 10 mL, Intravenous, Q12H        Active Infusions:  sodium chloride, 75 mL/hr, Last Rate: Stopped (22 1006)        As  Needed Medications:  •  acetaminophen **OR** acetaminophen **OR** acetaminophen  •  benzonatate  •  fluticasone  •  nitroglycerin  •  ondansetron  •  ondansetron  •  sodium chloride  •  sodium chloride  •  sodium chloride    Vital Signs  Vital Signs Patient Vitals for the past 24 hrs:   BP Temp Temp src Pulse Resp SpO2   08/01/22 0713 138/96 -- -- 61 18 96 %   08/01/22 0001 126/72 97.6 °F (36.4 °C) Oral -- 20 --   07/31/22 1837 119/78 97.8 °F (36.6 °C) Oral 92 20 96 %   07/31/22 1433 134/69 97.6 °F (36.4 °C) Oral -- -- --   07/31/22 1347 -- -- -- 84 20 98 %     Vital Signs (range)  Temp:  [97.6 °F (36.4 °C)-97.8 °F (36.6 °C)] 97.6 °F (36.4 °C)  Heart Rate:  [61-92] 61  Resp:  [18-20] 18  BP: (119-138)/(69-96) 138/96  I/O:  I/O last 3 completed shifts:  In: 240 [P.O.:240]  Out: -   I/O:   Intake/Output Summary (Last 24 hours) at 8/1/2022 0822  Last data filed at 8/1/2022 0126  Gross per 24 hour   Intake 240 ml   Output --   Net 240 ml     BMI:  Body mass index is 30.47 kg/m².    Physical Exam:  Physical Exam   Neurologically nonfocal  Heart regular rate and rhythm  Abdomen benign  Lungs clear    Results Review:     CBC    Results from last 7 days   Lab Units 08/01/22  0435 07/31/22  0407 07/30/22  0351 07/29/22  1556   WBC 10*3/mm3 10.26 8.80 9.40 9.45   HEMOGLOBIN g/dL 12.3 11.9* 11.5* 12.8   PLATELETS 10*3/mm3 274 274 276 321     BMP   Results from last 7 days   Lab Units 08/01/22  0435 07/31/22  0407 07/30/22  0351 07/29/22  1556   SODIUM mmol/L 141 143 142 144   POTASSIUM mmol/L 4.2 4.0 4.5 4.5   CHLORIDE mmol/L 106 107 107 105   CO2 mmol/L 23.0 23.7 28.1 26.8   BUN mg/dL 15 14 16 14   CREATININE mg/dL 0.62 0.63 0.66 0.79   GLUCOSE mg/dL 107* 106* 101* 119*     Cr Clearance Estimated Creatinine Clearance: 94.2 mL/min (by C-G formula based on SCr of 0.62 mg/dL).  Coag     HbA1C   Lab Results   Component Value Date    HGBA1C 6.10 (H) 07/30/2022    HGBA1C 5.8 (H) 07/27/2021    HGBA1C 5.7 (H) 12/11/2020     Blood  Glucose   Glucose   Date/Time Value Ref Range Status   07/30/2022 1203 116 70 - 130 mg/dL Final     Comment:     Meter: [id number] : [id number] Mireille Pichardo NA   07/30/2022 0706 97 70 - 130 mg/dL Final     Comment:     Meter: [id number] : [id number] Bela Langley NA   07/30/2022 0057 106 70 - 130 mg/dL Final     Comment:     Meter: [id number] : [id number] Smyzer Korin NA     Infection     Radiology(recent) CT Angiogram Carotids    Result Date: 7/31/2022  1.  No evidence of acute infarction or of intracranial hemorrhage. 2.  The CT angiogram demonstrates approximately a 50% and 50% to 60% stenosis involving the proximal aspect of the internal carotid arteries bilaterally. The stenosis on the right is due to eccentric calcified plaque. Decreased attenuation is identified within the lumen immediately adjacent to the plaque. This may represent beam hardening artifact. Mural thrombus cannot be excluded. Further evaluation with a carotid ultrasound is recommended. 3.  No evidence of proximal intracranial high-grade stenosis. 4.  There is a 2.5 x 1.8 x 1.8 mm protuberance appreciated arising from the left A2 segment proximally. Although this may represent a small infundibulum, an aneurysm cannot be excluded. No other aneurysms are identified.      Radiation dose reduction techniques were utilized, including automated exposure control and exposure modulation based on body size.  This report was finalized on 7/31/2022 11:56 AM by Dr. Jin Barragan M.D.      CT Angiogram Head    Result Date: 7/31/2022  1.  No evidence of acute infarction or of intracranial hemorrhage. 2.  The CT angiogram demonstrates approximately a 50% and 50% to 60% stenosis involving the proximal aspect of the internal carotid arteries bilaterally. The stenosis on the right is due to eccentric calcified plaque. Decreased attenuation is identified within the lumen immediately adjacent to the plaque. This may represent beam  hardening artifact. Mural thrombus cannot be excluded. Further evaluation with a carotid ultrasound is recommended. 3.  No evidence of proximal intracranial high-grade stenosis. 4.  There is a 2.5 x 1.8 x 1.8 mm protuberance appreciated arising from the left A2 segment proximally. Although this may represent a small infundibulum, an aneurysm cannot be excluded. No other aneurysms are identified.      Radiation dose reduction techniques were utilized, including automated exposure control and exposure modulation based on body size.  This report was finalized on 7/31/2022 11:56 AM by Dr. Jin Barragan M.D.        Assessment & Plan     Assessment & Plan      Atrial fibrillation with rapid ventricular response (HCC)    DDD (degenerative disc disease), lumbar    Hypertension      62 y.o. female with current cryptogenic stroke.  Suspicion of aortic arch lesions as source but still have some concerns for the neck even though testing indicates extremely low level of disease.  We will plan on Eliquis and Plavix for discharge and patient will need MRI set up open MRI to evaluate for single versus bilateral hemispheric injuries.  I will see her back in 1 to 2 weeks to go over the MRI.  Please set up prior to discharge      Mason Quiroga MD  08/01/22  08:19 EDT    Please call my office with any question: [phone]    Active Hospital Problems    Diagnosis  POA   • **Atrial fibrillation with rapid ventricular response (HCC) [I48.91]  Yes   • Hypertension [I10]  Unknown   • DDD (degenerative disc disease), lumbar [M51.36]  Yes      Resolved Hospital Problems   No resolved problems to display.           Electronically signed by Mason Quiroga MD at 08/01/22 0823     Callum Benedict MD at 07/31/22 1953           LOS: 2 days   Patient Care Team:  Angelita Vital MD as PCP - General (Internal Medicine)    Chief Complaint: Follow-up for paroxysmal atrial fibrillation with RVR, recent right ocular stroke, aortic  stenosis, carotid artery disease, aortic atherosclerosis.    Interval History: Stress test earlier today was normal.  She has had no chest pain.  She still has some shortness of breath, although seems to be improved.  Her rate is better.    Vital Signs:  Temp:  [97.6 °F (36.4 °C)-97.8 °F (36.6 °C)] 97.8 °F (36.6 °C)  Heart Rate:  [69-92] 92  Resp:  [18-20] 20  BP: (119-146)/(69-86) 119/78    Intake/Output Summary (Last 24 hours) at 7/31/2022 1954  Last data filed at 7/31/2022 1700  Gross per 24 hour   Intake 240 ml   Output --   Net 240 ml       Physical Exam:   General Appearance:    No acute distress, alert and oriented x4   Lungs:     Clear to auscultation bilaterally     Heart:   Irregularly irregular rhythm with a normal rate.  III/VI SM RUSB and LUSB.   Abdomen:     Soft, nontender, nondistended.    Extremities:   No clubbing, cyanosis, or edema.     Results Review:    Results from last 7 days   Lab Units 07/31/22  0407   SODIUM mmol/L 143   POTASSIUM mmol/L 4.0   CHLORIDE mmol/L 107   CO2 mmol/L 23.7   BUN mg/dL 14   CREATININE mg/dL 0.63   GLUCOSE mg/dL 106*   CALCIUM mg/dL 8.6     Results from last 7 days   Lab Units 07/30/22  0351 07/29/22  1556   TROPONIN T ng/mL <0.010 <0.010     Results from last 7 days   Lab Units 07/31/22  0407   WBC 10*3/mm3 8.80   HEMOGLOBIN g/dL 11.9*   HEMATOCRIT % 37.3   PLATELETS 10*3/mm3 274         Results from last 7 days   Lab Units 07/30/22  0351   CHOLESTEROL mg/dL 144         Results from last 7 days   Lab Units 07/30/22  0351   CHOLESTEROL mg/dL 144   TRIGLYCERIDES mg/dL 124   HDL CHOL mg/dL 40   LDL CHOL mg/dL 82       I reviewed the patient's new clinical results.        Assessment:  1.  Shortness of breath, likely multifactorial  2.  Paroxysmal atrial fibrillation with RVR  3.  Recent COVID-19 infection in early July 2022  4.  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  Aortic stenosis, moderate  5.  Mitral regurgitation, mild to moderate  6.  Aortic atherosclerosis (PATRICA 8/13/2020 with moderate plaques in the proximal descending aorta and severe plaques in the aortic arch)  7.  Coronary artery calcification  8.  Small PFO  9.  Hypertension  10.  History of statin induced myalgias  11.  Bilateral carotid artery disease    Plan:  -I discussed her with Dr. Quiroga earlier today.  The carotid Doppler revealed no significant velocity elevations and only plaque without stenosis.  He really did not think that a carotid surgery or stent would be beneficial in this case.  However, I did review her PATRICA images from 2020.  There is an area of severe plaque in the aortic arch, and I suspect that this may be the source of the embolism to the right central retinal artery.  She was very faithful about taking Eliquis, and I think that atrial fibrillation would be a much less likely cause here.    -After discussing with Dr. Quiroga, we decided to add Plavix to the Eliquis, and stop the aspirin.  Unfortunately, she can only take Livalo at a 2 mg dose.  Higher doses have caused severe myalgias, and she has had severe myalgias with other statins in the past.  Her LDL during this admission was noted to be 82, HDL 40, and her triglycerides 124.    -I am going to stop her Eliquis and start Lovenox and preparation for possible vascular surgery.    -The stress test was performed in anticipation of potential carotid surgery.  However,  it was normal and without ischemia.    -Echocardiogram reviewed.  Aortic stenosis is still moderate and mitral regurgitation is mild to moderate.    -Continue atenolol 25 mg twice a day (switched from metoprolol) I am going to give her another dose of IV digoxin today for better rate control.  Start oral digoxin tomorrow.      Callum Benedict MD  07/31/22  19:54 EDT        Electronically signed by Callum Benedict MD at 07/31/22 2000     Gume Adler,  "MD at 22 1831          DAILY PROGRESS NOTE  [facility]    Patient Identification:  Name: Re Sheldon  Age: 62 y.o.  Sex: female  :  1960  MRN: [id number]         Primary Care Physician: Angelita Vital MD      Subjective  Patient with no new complaints.  No change in monocular blindness.    Objective:  General Appearance:  Comfortable, well-appearing, in no acute distress and not in pain.    Vital signs: (most recent): Blood pressure 134/69, pulse 84, temperature 97.6 °F (36.4 °C), temperature source Oral, resp. rate 20, height 160 cm (63\"), weight 78 kg (172 lb), SpO2 98 %.    Lungs:  Normal effort and normal respiratory rate.  Breath sounds clear to auscultation.    Heart: Normal rate.  Regular rhythm.    Extremities: There is no dependent edema.    Neurological: Patient is alert and oriented to person, place and time.    Skin:  Warm and dry.                Vital signs in last 24 hours:  Temp:  [97.6 °F (36.4 °C)-97.9 °F (36.6 °C)] 97.6 °F (36.4 °C)  Heart Rate:  [69-84] 84  Resp:  [18-20] 20  BP: (120-146)/(69-86) 134/69    Intake/Output:    Intake/Output Summary (Last 24 hours) at 2022 1831  Last data filed at 2022 1700  Gross per 24 hour   Intake 240 ml   Output --   Net 240 ml         Results from last 7 days   Lab Units 22  0407 22  0351 22  1556   WBC 10*3/mm3 8.80 9.40 9.45   HEMOGLOBIN g/dL 11.9* 11.5* 12.8   PLATELETS 10*3/mm3 274 276 321     Results from last 7 days   Lab Units 22  0407 22  0351 22  1556   SODIUM mmol/L 143 142 144   POTASSIUM mmol/L 4.0 4.5 4.5   CHLORIDE mmol/L 107 107 105   CO2 mmol/L 23.7 28.1 26.8   BUN mg/dL 14 16 14   CREATININE mg/dL 0.63 0.66 0.79   GLUCOSE mg/dL 106* 101* 119*   Estimated Creatinine Clearance: 92.7 mL/min (by C-G formula based on SCr of 0.63 mg/dL).  Results from last 7 days   Lab Units 22  0407 22  0351 22  1556   CALCIUM mg/dL 8.6 8.4* 9.1   ALBUMIN g/dL  --  " "3.80 4.40     Results from last 7 days   Lab Units 22  0351 22  1556   ALBUMIN g/dL 3.80 4.40   BILIRUBIN mg/dL 0.3 0.4   ALK PHOS U/L 74 87   AST (SGOT) U/L 12 13   ALT (SGPT) U/L 11 13       Assessment:  Monocular blindness/central retinal artery occlusion: Likely thromboembolic in origin.  Artery to artery embolism suspected.  Neurology input appreciated.  Bilateral carotid artery stenosis/atherosclerotic disease: Vascular surgery evaluation appreciated.  Paroxysmal atrial fibrillation-RVR: Now regular rate and rhythm again.  Cardiology input appreciated.  Moderate aortic stenosis:  Hypertension: Reasonable control.  Chronic anticoagulation:      Plan:  Please see above.  Patient still considering the possibility MRI.    Gume Adler MD  2022  18:31 EDT      Electronically signed by Gume Adler MD at 22 1833     Snow Willis APRN at 22 1102          DOS: 2022  NAME: Re Sheldon   : 1960  PCP: Angelita Vital MD  Chief Complaint   Patient presents with   • Shortness of Breath     Stroke    Subjective: The patient was seen while in nuclear medicine. Denies any improvement in R eye vision. No TIA/stroke symptoms: no unilateral weakness/numbness, speech difficulty. Pt seen in follow up today, however the problem is new to the examiner.      Objective:  Vital signs: /86 (BP Location: Left arm, Patient Position: Lying)   Pulse 76   Temp 97.7 °F (36.5 °C) (Oral)   Resp 18   Ht 160 cm (63\")   Wt 78 kg (172 lb)   SpO2 97%   BMI 30.47 kg/m²       General appearance: Well developed, well nourished, well groomed, alert and cooperative.   HEENT: Normocephalic.   Neck: Supple.   Cardiac: Regular rate and rhythm.  Peripheral Vasculature: Radial pulses are equal and symmetric.  Chest Exam: Clear to auscultation bilaterally, no wheezes, no rhonchi.  Extremities: Normal, no edema.   Skin: No rashes or birthmarks.     Higher integrative function: " Oriented to time, place, person, intact recent and remote memory, attention span, concentration and language. Spontaneous speech, fund of vocabulary are normal.   CN II: Essentially blind in the right eye however she does recognize some gross movements.  Normal visual acuity in the left eye.    CN III IV VI: Extraocular movements are full without nystagmus.  Right RAPD.  CN V: Normal facial sensation.  CN VII: Facial movements are symmetric, no weakness.   CN VIII: Auditory acuity is normal.   CN IX & X: Symmetric palatal movement.   CN XI: Sternocleidomastoid and trapezius are normal. No weakness.   CN XII: The tongue is midline. No atrophy or fasciculations.   Motor: Normal muscle strength, bulk, and tone in upper and lower extremities. No fasciculations, rigidity, spasticity or abnormal movements.   Sensation: Normal light touch.  Station and gait: Deferred.   Coordination: Finger to nose test showed no dysmetria. Rapid alternating movements were normal. Heel to shin normal.     Scheduled Meds:aspirin, 81 mg, Oral, Every Other Day  atenolol, 25 mg, Oral, Q12H  cetirizine, 5 mg, Oral, Daily  cholecalciferol, 1,000 Units, Oral, Daily  enoxaparin, 80 mg, Subcutaneous, Q12H  LORazepam, 1 mg, Intravenous, Once  pitavastatin calcium, 2 mg, Oral, Nightly  sodium chloride, 10 mL, Intravenous, Q12H  sodium chloride, 10 mL, Intravenous, Q12H      Continuous Infusions:sodium chloride, 75 mL/hr, Last Rate: Stopped (07/30/22 1006)      PRN Meds:.•  acetaminophen **OR** acetaminophen **OR** acetaminophen  •  benzonatate  •  fluticasone  •  nitroglycerin  •  ondansetron  •  ondansetron  •  sodium chloride  •  sodium chloride  •  sodium chloride    Laboratory results:  Lab Results   Component Value Date    GLUCOSE 106 (H) 07/31/2022    CALCIUM 8.6 07/31/2022     07/31/2022    K 4.0 07/31/2022    CO2 23.7 07/31/2022     07/31/2022    BUN 14 07/31/2022    CREATININE 0.63 07/31/2022    EGFRIFAFRI 100 11/19/2021     EGFRIFNONA 75 02/23/2022    BCR 22.2 07/31/2022    ANIONGAP 12.3 07/31/2022     Lab Results   Component Value Date    WBC 8.80 07/31/2022    HGB 11.9 (L) 07/31/2022    HCT 37.3 07/31/2022    MCV 87.4 07/31/2022     07/31/2022     Lab Results   Component Value Date    CHOL 144 07/30/2022     Lab Results   Component Value Date    HDL 40 07/30/2022    HDL 49 11/19/2021    HDL 57 07/27/2021     Lab Results   Component Value Date    LDL 82 07/30/2022     (H) 11/19/2021     (H) 07/27/2021     Lab Results   Component Value Date    TRIG 124 07/30/2022    TRIG 114 11/19/2021    TRIG 122 07/27/2021         Lab 07/30/22  0351   HEMOGLOBIN A1C 6.10*      Review and interpretation of imaging:  Adult Transthoracic Echo Complete W/ Cont if Necessary Per Protocol    Result Date: 7/30/2022  · Left ventricular ejection fraction appears to be 61 - 65%. Left ventricular systolic function is normal. · Left ventricular wall thickness is consistent with mild concentric hypertrophy. · Normal right ventricular cavity size and systolic function noted. · The left atrial cavity is moderately dilated. · The aortic valve leaflets are thickened and heavily calcified · Moderate aortic valve stenosis is present. Aortic valve area is 0.90 cm2. Peak velocity of the flow distal to the aortic valve is 310.8 cm/s. Aortic valve maximum pressure gradient is 38.6 mmHg. Aortic valve mean pressure gradient is 19.3 mmHg. · The mitral valve leaflets are thickened and mildly calcified. The posterior mitral valve leaflet is restricted in motion · Mild to moderate mitral valve regurgitation is present. · Mild tricuspid valve regurgitation is present. · Calculated right ventricular systolic pressure from tricuspid regurgitation is 20 mmHg. · There is a trivial pericardial effusion      XR Chest 2 View    Result Date: 7/29/2022  XR CHEST 2 VW-  HISTORY: Female who is 62 years-old,  short of breath  TECHNIQUE: Frontal and lateral views of the  chest  COMPARISON: 02/23/2022  FINDINGS: The heart is enlarged. Pulmonary vasculature is unremarkable. No focal pulmonary consolidation, pleural effusion, or pneumothorax. No acute osseous process.      No evidence for acute pulmonary process. Cardiomegaly. Follow-up as clinical indications persist.  This report was finalized on 7/29/2022 3:57 PM by Dr. Godfrey Reza M.D.      CT Head Without Contrast    Result Date: 7/30/2022  CT HEAD WITHOUT CONTRAST  HISTORY:  A. fib, on blood thinners.  COMPARISON: None.  FINDINGS: The brain and ventricles are symmetrical. There is no evidence of hemorrhage, hydrocephalus or of a focal area of decreased attenuation to suggest acute infarction. Mild mucosal thickening involving the ethmoid air cells and frontal sinuses are appreciated. Small mucus retention cysts involving the maxillary sinuses are noted bilaterally.      No evidence of acute infarction of intracranial hemorrhage. Further evaluation could be performed with a MRI examination of the brain as indicated. Paranasal sinus disease as described above. See above.    Radiation dose reduction techniques were utilized, including automated exposure control and exposure modulation based on body size.  This report was finalized on 7/30/2022 12:12 PM by Dr. Jin Barragan M.D.      CT Angiogram Carotids    Result Date: 7/30/2022  CT ANGIOGRAM NECK AND HEAD WITH CONTRAST  HISTORY: Right ocular stroke.  COMPARISON: CT head 07/29/2022.  FINDINGS: Initially, a noncontrasted CT examination of the brain was performed. The patient's head is canted in the scanner. Mild-to-moderate vascular calcification is noted. There is no evidence of hemorrhage or of a focal area of decreased attenuation to suggest acute infarction.  A CT angiogram of the neck and head was then performed. Multiplanar as well as 3-dimensional reconstructions were generated.  The great vessels are arranged in a classic configuration. Eccentric calcified plaque is  appreciated involving the right internal carotid artery proximally. This results in a stenosis estimated to be approximately 50% in severity using NASCET criteria. Intraluminal decreased attenuation is identified adjacent to the calcified plaque. This may represent beam hardening artifact however mural thrombus cannot be excluded. On the left, calcified and noncalcified plaque is appreciated which results in a stenosis estimated to be approximately 50% to 60% in severity using NASCET criteria. Mild vascular calcification involving the carotid siphons are noted bilaterally. The left A1 segment is atretic. There is a small focal protuberance appreciated arising from the proximal aspect of the left A2 segment projecting laterally to the left. The appearance is most suggestive of a small aneurysm. This measures approximately 2.5 mm in the AP dimension, 1.8 mm in the craniocaudal dimension and 1.8 mm in the transverse dimension. Otherwise, the proximal aspects of the anterior and middle cerebral arteries appear unremarkable.  Both vertebral arteries were opacified. Beam hardening artifact and patient motion limits evaluation of the left vertebral artery origin. No obvious stenosis is appreciated. The vertebral arteries are of relatively uniform caliber. The basilar artery and the proximal aspects of the posterior cerebral arteries appear unremarkable. There was no evidence of abnormal enhancement after contrast administration.  Moderate mucosal thickening involving the maxillary sinuses is appreciated bilaterally. There is mild-to-moderate loss of disc height at C6-7 with a broad-based disc osteophyte complex at C6-7 resulting in mild flattening of the ventral surface of the thecal sac centrally.      1.  No evidence of acute infarction or of intracranial hemorrhage. 2.  The CT angiogram demonstrates approximately a 50% and 50% to 60% stenosis involving the proximal aspect of the internal carotid arteries bilaterally.  The stenosis on the right is due to eccentric calcified plaque. Decreased attenuation is identified within the lumen immediately adjacent to the plaque. This may represent beam hardening artifact. Mural thrombus cannot be excluded. Further evaluation with a carotid ultrasound is recommended. 3.  No evidence of proximal intracranial high-grade stenosis. 4.  There is a 2.5 x 1.8 x 1.8 mm protuberance appreciated arising from the left A2 segment proximally. Although this may represent a small infundibulum an aneurysm is suspected. No other aneurysms are identified.  CHECK CHECK MRI    Radiation dose reduction techniques were utilized, including automated exposure control and exposure modulation based on body size.       CT Angiogram Head    Result Date: 7/30/2022  CT ANGIOGRAM NECK AND HEAD WITH CONTRAST  HISTORY: Right ocular stroke.  COMPARISON: CT head 07/29/2022.  FINDINGS: Initially, a noncontrasted CT examination of the brain was performed. The patient's head is canted in the scanner. Mild-to-moderate vascular calcification is noted. There is no evidence of hemorrhage or of a focal area of decreased attenuation to suggest acute infarction.  A CT angiogram of the neck and head was then performed. Multiplanar as well as 3-dimensional reconstructions were generated.  The great vessels are arranged in a classic configuration. Eccentric calcified plaque is appreciated involving the right internal carotid artery proximally. This results in a stenosis estimated to be approximately 50% in severity using NASCET criteria. Intraluminal decreased attenuation is identified adjacent to the calcified plaque. This may represent beam hardening artifact however mural thrombus cannot be excluded. On the left, calcified and noncalcified plaque is appreciated which results in a stenosis estimated to be approximately 50% to 60% in severity using NASCET criteria. Mild vascular calcification involving the carotid siphons are noted  bilaterally. The left A1 segment is atretic. There is a small focal protuberance appreciated arising from the proximal aspect of the left A2 segment projecting laterally to the left. The appearance is most suggestive of a small aneurysm. This measures approximately 2.5 mm in the AP dimension, 1.8 mm in the craniocaudal dimension and 1.8 mm in the transverse dimension. Otherwise, the proximal aspects of the anterior and middle cerebral arteries appear unremarkable.  Both vertebral arteries were opacified. Beam hardening artifact and patient motion limits evaluation of the left vertebral artery origin. No obvious stenosis is appreciated. The vertebral arteries are of relatively uniform caliber. The basilar artery and the proximal aspects of the posterior cerebral arteries appear unremarkable. There was no evidence of abnormal enhancement after contrast administration.  Moderate mucosal thickening involving the maxillary sinuses is appreciated bilaterally. There is mild-to-moderate loss of disc height at C6-7 with a broad-based disc osteophyte complex at C6-7 resulting in mild flattening of the ventral surface of the thecal sac centrally.      1.  No evidence of acute infarction or of intracranial hemorrhage. 2.  The CT angiogram demonstrates approximately a 50% and 50% to 60% stenosis involving the proximal aspect of the internal carotid arteries bilaterally. The stenosis on the right is due to eccentric calcified plaque. Decreased attenuation is identified within the lumen immediately adjacent to the plaque. This may represent beam hardening artifact. Mural thrombus cannot be excluded. Further evaluation with a carotid ultrasound is recommended. 3.  No evidence of proximal intracranial high-grade stenosis. 4.  There is a 2.5 x 1.8 x 1.8 mm protuberance appreciated arising from the left A2 segment proximally. Although this may represent a small infundibulum an aneurysm is suspected. No other aneurysms are identified.   CHECK CHECK MRI    Radiation dose reduction techniques were utilized, including automated exposure control and exposure modulation based on body size.         Impression:  62-year-old right-handed female with HTN, HLD, PAF on Eliquis prior to arrival, aortic stenosis, mitral regurgitation, PFO, aortic atherosclerosis, and recent COVID-19 infection who developed significant change in the visual acuity of her right eye a little over a month ago.  She tried to be seen by her eye doctor but appointment was initially deferred.  A month later vision acutely worsen so she was seen by the ophthalmologist who diagnosed her with right central retinal artery occlusion and sent her to the ED for further evaluation.  She has not had any missed doses of her Eliquis recently.  In addition to Eliquis she was on pitavastatin calcium 2 mg nightly.  MRI brain deferred due to claustrophobia and no stroke symptoms.  CTA showed 60% right ICA stenosis.    Work-up:  CTA head/neck: 50 to 60% stenosis in the proximal ICAs bilaterally with eccentric calcified noted in the stenosis on the right.  Mural thrombus cannot be excluded.  2.5 x 1.8 x 1.8 mm protuberance noted arising from the left A2 segment proximally perhaps small infundibulum but aneurysm suspected.  2D echo: EF 61 to 65%, moderately dilated left atrial cavity, moderate aortic valve stenosis, mild to moderate mitral valve regurgitation.  Labs: Sedimentation rate 39, LDL 82, hemoglobin A1c 6.1%,TSH 0.825    Impression:   Symptomatic right carotid stenosis   Right CRAO   PAF, on chronic anticoagulation    Plan:  Follow-up vascular surgery consult, carotid duplex  Cardiology following, Eliquis changed to Lovenox to plan for potential surgery, stress test pending for surgical clearance  D/W Dr Ríos today. Will follow.     Electronically signed by Snow Willis APRN at 07/31/22 8340     Callum Benedict MD at 07/30/22 2351           LOS: 1 day   Patient Care  Team:  Angelita Vital MD as PCP - General (Internal Medicine)    Chief Complaint: Follow-up for paroxysmal atrial fibrillation with RVR, recent right ocular stroke, aortic stenosis, carotid artery disease, aortic atherosclerosis.    Interval History: She was found to have bilateral carotid artery disease, including a 60% lesion in the right internal carotid artery.  Vascular surgery has been consulted.  Her shortness of breath is improved, and her heart rate is improved, although she still has atrial fibrillation with intermittent rapid rates.  She has had no chest pain.    Vital Signs:  Temp:  [97.7 °F (36.5 °C)-98.3 °F (36.8 °C)] 97.7 °F (36.5 °C)  Heart Rate:  [] 71  Resp:  [16-20] 20  BP: ()/() 122/63    Intake/Output Summary (Last 24 hours) at 7/30/2022 1755  Last data filed at 7/30/2022 1320  Gross per 24 hour   Intake 800 ml   Output --   Net 800 ml       Physical Exam:   General Appearance:    No acute distress, alert and oriented x4   Lungs:     Clear to auscultation bilaterally     Heart:   Irregularly irregular rhythm with a normal rate.  III/VI SM RUSB and LUSB.   Abdomen:     Soft, nontender, nondistended.    Extremities:   No clubbing, cyanosis, or edema.     Results Review:    Results from last 7 days   Lab Units 07/30/22  0351   SODIUM mmol/L 142   POTASSIUM mmol/L 4.5   CHLORIDE mmol/L 107   CO2 mmol/L 28.1   BUN mg/dL 16   CREATININE mg/dL 0.66   GLUCOSE mg/dL 101*   CALCIUM mg/dL 8.4*     Results from last 7 days   Lab Units 07/30/22  0351 07/29/22  1556   TROPONIN T ng/mL <0.010 <0.010     Results from last 7 days   Lab Units 07/30/22  0351   WBC 10*3/mm3 9.40   HEMOGLOBIN g/dL 11.5*   HEMATOCRIT % 35.2   PLATELETS 10*3/mm3 276         Results from last 7 days   Lab Units 07/30/22  0351   CHOLESTEROL mg/dL 144         Results from last 7 days   Lab Units 07/30/22  0351   CHOLESTEROL mg/dL 144   TRIGLYCERIDES mg/dL 124   HDL CHOL mg/dL 40   LDL CHOL mg/dL 82       I reviewed  the patient's new clinical results.        Assessment:  1.  Shortness of breath, likely multifactorial  2.  Paroxysmal atrial fibrillation with RVR  3.  Recent COVID-19 infection in early 2022  4.  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  Aortic stenosis, moderate  5.  Mitral regurgitation, mild to moderate  6.  Aortic atherosclerosis (PATRICA 2020 with moderate plaques in the proximal descending aorta and severe plaques in the aortic arch)  7.  Coronary artery calcification  8.  Small PFO  9.  Hypertension  10.  History of statin induced myalgias  11.  Bilateral carotid artery disease    Plan:  -Noted a right internal carotid artery stenosis of up to 60%.  Vascular surgery consultation and carotid Doppler have been ordered per Dr. Ríos.    -I am going to stop her Eliquis and start Lovenox and preparation for possible vascular surgery.    -She has atherosclerotic disease in her aorta and carotids, and significant coronary calcification.  I am going to check a Lexiscan Myoview stress test tomorrow in preparation for potential surgery.    -Echocardiogram reviewed.  Aortic stenosis is still moderate and mitral regurgitation is mild to moderate.    -Continue atenolol 25 mg twice a day (switch from metoprolol) I am going to give her another dose of IV digoxin today for better rate control.    -She has had severe myalgias with multiple statins but tolerates Livalo at 2 mg/day.  I am going to submit a nonformulary request that she has had a stroke and has atherosclerotic disease.    Callum Benedict MD  22  17:56 EDT        Electronically signed by Callum Benedict MD at 22 [id number]     Gume Adler MD at 22 1730          DAILY PROGRESS NOTE  [facility]    Patient Identification:  Name: Re Sheldon  Age: 62 y.o.  Sex: female  :  1960  MRN:  "[id number]         Primary Care Physician: Angelita Vital MD      Subjective  Patient with no new complaints.  No improvement in monocular blindness.    Objective:  General Appearance:  Comfortable, well-appearing, in no acute distress and not in pain.    Vital signs: (most recent): Blood pressure 122/63, pulse 71, temperature 97.7 °F (36.5 °C), temperature source Oral, resp. rate 20, height 160 cm (63\"), weight 78 kg (172 lb), SpO2 96 %.    Lungs:  Normal effort and normal respiratory rate.  Breath sounds clear to auscultation.    Heart: Normal rate.  Regular rhythm.    Extremities: There is no dependent edema.    Neurological: Patient is alert and oriented to person, place and time.    Skin:  Warm and dry.                Vital signs in last 24 hours:  Temp:  [97.7 °F (36.5 °C)-98.3 °F (36.8 °C)] 97.7 °F (36.5 °C)  Heart Rate:  [] 71  Resp:  [16-20] 20  BP: ()/() 122/63    Intake/Output:    Intake/Output Summary (Last 24 hours) at 7/30/2022 1730  Last data filed at 7/30/2022 1320  Gross per 24 hour   Intake 800 ml   Output --   Net 800 ml         Results from last 7 days   Lab Units 07/30/22  0351 07/29/22  1556   WBC 10*3/mm3 9.40 9.45   HEMOGLOBIN g/dL 11.5* 12.8   PLATELETS 10*3/mm3 276 321     Results from last 7 days   Lab Units 07/30/22  0351 07/29/22  1556   SODIUM mmol/L 142 144   POTASSIUM mmol/L 4.5 4.5   CHLORIDE mmol/L 107 105   CO2 mmol/L 28.1 26.8   BUN mg/dL 16 14   CREATININE mg/dL 0.66 0.79   GLUCOSE mg/dL 101* 119*   Estimated Creatinine Clearance: 88.5 mL/min (by C-G formula based on SCr of 0.66 mg/dL).  Results from last 7 days   Lab Units 07/30/22  0351 07/29/22  1556   CALCIUM mg/dL 8.4* 9.1   ALBUMIN g/dL 3.80 4.40     Results from last 7 days   Lab Units 07/30/22  0351 07/29/22  1556   ALBUMIN g/dL 3.80 4.40   BILIRUBIN mg/dL 0.3 0.4   ALK PHOS U/L 74 87   AST (SGOT) U/L 12 13   ALT (SGPT) U/L 11 13       Assessment:  Monocular blindness/central retinal artery occlusion: " Likely thromboembolic in origin.  Artery to artery embolism suspected.  Neurology input appreciated.  Bilateral carotid artery stenosis/atherosclerotic disease: Vascular surgery consult.  Paroxysmal atrial fibrillation-RVR: Now regular rate and rhythm again.  Cardiology input appreciated.  Moderate aortic stenosis:  Hypertension: Reasonable control.  Chronic anticoagulation:    All problems new to this examiner.    Plan:  Please see above.  Discussed with patient and family members at bedside.    Gume Adler MD  7/30/2022  17:30 EDT      Electronically signed by Gume Adler MD at 07/30/22 1627          Consult Notes (all)      Mason Quiroga MD at 07/31/22 1531      Consult Orders    1. Inpatient Vascular Surgery Consult [[id number]] ordered by Adalid Ríos MD at 07/30/22 1338               Patient Name: Re Sheldon Account #: [id number]    MRN: [id number] Admission Date: 7/29/2022      Consulting Service: Vascular Surgery Date of Evaluation: July 31, 2022    Requesting Provider: Sourav Clemente MD    CHIEF COMPLAINT: Right eye stroke  HPI: Re Sheldon is a 62 y.o. 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  Embolic source is highly suspected.  She has been resistant to getting MRI so we do not know if there are other sites of embolic injury in her brain.  This event occurred back in middle of June and progressed to total occlusion.  Unclear if it was 1 embolism that slowly closed the artery down or multiple.  The big concern I have is that the source is still under discussion and initial thoughts the carotid disease was the source may or may not be correct.  Dr. Clemente and I discussed the case earlier and I agreed that it seemed like her right carotid disease with plaque and possible thrombus would be the site however carotid duplex has been done and there is no evidence of thrombus and  actually no evidence of velocity increase.  The calcified plaque is easily visible on duplex but I see no other significant risk factors in the area.    PAST MEDICAL HISTORY:   Past Medical History:   Diagnosis Date   • Abnormal ECG     Inferolateral ST-T changes at baseline   • Anemia    • Atrial fibrillation (HCC)    • Calcification of aortic valve    • Coronary artery disease    • COVID-19 2022   • Hyperlipidemia    • Hypertension    • Low back pain    • Mitral regurgitation    • PAF (paroxysmal atrial fibrillation) (HCC)    • Palpitations    • Premature atrial contractions    • Spinal stenosis    • Systolic murmur    • Tonsillitis    • Vitamin D deficiency       PAST SURGICAL HISTORY:   Past Surgical History:   Procedure Laterality Date   • ADENOIDECTOMY     • CARPAL TUNNEL RELEASE Bilateral    • COLONOSCOPY  2014    repeat 10 years , Dr. Karen Spear   • COLONOSCOPY N/A 2021    Procedure: COLONOSCOPY to cecum with cold biopsy polypectomy;  Surgeon: Ray Hairston MD;  Location: [facility] ENDOSCOPY;  Service: Gastroenterology;  Laterality: N/A;  pre: screening  post: diverticulosis, polyp, internal hemorrhoids   • FOOT SURGERY     • HAND SURGERY     • TONSILLECTOMY     • TONSILLECTOMY AND ADENOIDECTOMY     • TUBAL ABDOMINAL LIGATION        FAMILY HISTORY:   Family History   Problem Relation Age of Onset   • Heart attack Other    • Breast cancer Other    • Heart disease Other    • Heart disease Mother         Mother with MI at 63, and later  from CHF   • Heart disease Father         Father  from complications after valve replacement at 73   • Stroke Father    • Breast cancer Sister    • Malig Hyperthermia Neg Hx       SOCIAL HISTORY:   Social History     Tobacco Use   • Smoking status: Never Smoker   • Smokeless tobacco: Never Used   Vaping Use   • Vaping Use: Never used   Substance Use Topics   • Alcohol use: No     Comment: Caffeine use 3-4 cups daily   • Drug use: No       MEDICATIONS:   No current facility-administered medications on file prior to encounter.     Current Outpatient Medications on File Prior to Encounter   Medication Sig Dispense Refill   • apixaban (Eliquis) 5 MG tablet tablet Take 1 tablet by mouth Every 12 (Twelve) Hours. 180 tablet 3   • aspirin 81 MG EC tablet Take 81 mg by mouth Every Other Day.     • cetirizine (zyrTEC) 10 MG tablet 1 OTC tab po QDay for allergies     • cholecalciferol (VITAMIN D3) 25 MCG (1000 UT) tablet Take 1,000 Units by mouth 2 (Two) Times a Day.     • fluticasone (FLONASE) 50 MCG/ACT nasal spray into the nostril(s) as directed by provider Daily As Needed.     • metoprolol tartrate (LOPRESSOR) 50 MG tablet TAKE 1 TABLET BY MOUTH TWICE A  tablet 3   • Pitavastatin Calcium 4 MG tablet Take 1 tablet by mouth Daily. 30 tablet 6             ALLERGIES: Sulfa antibiotics, Augmentin [amoxicillin-pot clavulanate], and Bactrim [sulfamethoxazole-trimethoprim]   COMPLETE REVIEW OF SYSTEMS:     ENT ROS: negative  Cardiovascular ROS: no chest pain or dyspnea on exertion  Respiratory ROS: no cough, shortness of breath, or wheezing  Gastrointestinal ROS: no abdominal pain, change in bowel habits, or black or bloody stools  Neurological ROS: positive for - visual changes with central right eye blindness Genito-Urinary ROS: no dysuria, trouble voiding, or hematuria  Musculoskeletal ROS: negative  Dermatological ROS: negative  Psychological ROS: negative      PHYSICAL EXAM:   Patient Vitals for the past 24 hrs:   BP Temp Temp src Pulse Resp SpO2   07/31/22 1433 134/69 97.6 °F (36.4 °C) Oral -- -- --   07/31/22 1347 -- -- -- 84 20 98 %   07/31/22 0753 146/86 97.7 °F (36.5 °C) Oral 76 18 97 %   07/30/22 2249 128/73 97.7 °F (36.5 °C) Oral 69 20 95 %   07/30/22 1947 120/70 97.9 °F (36.6 °C) Oral 69 20 96 %   07/30/22 1640 122/63 -- -- 71 -- 96 %   07/30/22 1639 122/63 -- -- 87 -- --        General appearance: alert, well appearing, and in no  distress.  Neurological exam reveals alert, oriented, normal speech, nomovement disorder noted.  Right eye central blindness noted by patient  ENT exam reveals - ENT exam normal, no neck nodes or sinus tenderness.  CVS exam: normal rate, regular rhythm, normal S1, S2, no murmurs, rubs, clicks or gallops.  Chest: clear to auscultation, no wheezes, rales or rhonchi, symmetric air entry.  Abdominal exam: soft, nontender, nondistended, no masses or organomegaly.  Examination of the feet reveals warm, good capillary refill.  Pulses all palpable      LABS:      Results Review:       I reviewed the patient's new clinical results.  Results from last 7 days   Lab Units 07/31/22  0407 07/30/22  0351 07/29/22  1556   WBC 10*3/mm3 8.80 9.40 9.45   HEMOGLOBIN g/dL 11.9* 11.5* 12.8   PLATELETS 10*3/mm3 274 276 321     Results from last 7 days   Lab Units 07/31/22  0407 07/30/22  0351 07/29/22  1556   SODIUM mmol/L 143 142 144   POTASSIUM mmol/L 4.0 4.5 4.5   CHLORIDE mmol/L 107 107 105   CO2 mmol/L 23.7 28.1 26.8   BUN mg/dL 14 16 14   CREATININE mg/dL 0.63 0.66 0.79   GLUCOSE mg/dL 106* 101* 119*   Estimated Creatinine Clearance: 92.7 mL/min (by C-G formula based on SCr of 0.63 mg/dL).  Results from last 7 days   Lab Units 07/31/22  0407 07/30/22  0351 07/29/22  1556   CALCIUM mg/dL 8.6 8.4* 9.1   ALBUMIN g/dL  --  3.80 4.40           The following radiologic or non-invasive studies have been reviewed by me: CT scan and carotid duplex reviewed with images reviewed    Active Hospital Problems    Diagnosis  POA   • **Atrial fibrillation with rapid ventricular response (HCC) [I48.91]  Yes   • Hypertension [I10]  Unknown   • DDD (degenerative disc disease), lumbar [M51.36]  Yes      Resolved Hospital Problems   No resolved problems to display.         ASSESSMENT/PLAN: 62 y.o. female with central retinal artery occlusion likely from embolic disease.  Source is still in under discussion.  Initial thoughts of the carotid as being the  source of been studied and the carotid duplex shows no significant velocity elevations in his essentially normal study with plaque but no stenosis.  I see no thrombus or ulceration in the area.  CT scan shows plaque limited as well with 50 to 60% stenosis being read.  I see no innominate or common carotid disease.  Dr. Benedict has reviewed her studies and looked at her echo again and is sees a large bulky amount of plaque in the ascending arch.  This could be a source for embolic disease.  Patient has refused MRI and it is difficult for us to define whether there could be asymptomatic lesions on both sides of the brain without this testing.  I have recommended she consider getting an MRI even if it is outpatient after discharge.  She will discuss this with Dr. Ríos.  At this point in time I am leaning towards conservative care with the addition of Plavix to her Eliquis and close follow-up.  Dr. Benedict is agreeable with this plan.  Will discuss with neurology.  Patient understands the issues including the fact that currently the risk of surgery appears to outweigh the benefit but obviously this can be discussed depending on further information.  We will follow again in the morning.      I discussed the plan with the patient and she is agreeable to the plan of care at this point. Thank you for this consult.     Mason Quiroga MD   07/31/22      Electronically signed by Mason Quiroga MD at 07/31/22 1540     Adalid Ríos MD at 07/30/22 1414      Consult Orders    1. Inpatient Neurology Consult Stroke [[id number]] ordered by Violet Berkowitz MD at 07/29/22 2332               Neurology Consult Note    Consult Date: 7/30/2022    Referring MD: Violet Berkowitz MD    Reason for Consult I have been asked to see the patient in neurological consultation to render advice and opinion regarding vision loss    Re Sheldon is a 62 y.o. female with A. fib on Eliquis, hypertension, hyperlipidemia, recent  "COVID-19 infection.  She reports that a little over a month ago she noticed a significant change in the visual acuity of the right eye.  She called her eye doctor to try to be seen but the appointment was deferred.  A month later she again abruptly noticed a significant further deterioration in her vision and was ultimately seen by the ophthalmologist yesterday who diagnosed her with right central retinal artery occlusion.  Vision loss has been persistent.  She denies any associated eye pain.  No weakness or numbness on either side of the body.  She reports a history of carotid stenosis which was previously under surveillance.  She takes Eliquis at baseline and denies any missed doses.    Past Medical History:   Diagnosis Date   • Abnormal ECG     Inferolateral ST-T changes at baseline   • Anemia    • Atrial fibrillation (HCC)    • Calcification of aortic valve 2020   • Coronary artery disease    • COVID-19 07/05/2022   • Hyperlipidemia    • Hypertension    • Low back pain    • Mitral regurgitation    • PAF (paroxysmal atrial fibrillation) ([facility])    • Palpitations    • Premature atrial contractions    • Spinal stenosis    • Systolic murmur    • Tonsillitis    • Vitamin D deficiency        ROS:  No fevers, chills  No weakness, numbness  + Vision loss, no eye pain    Exam  /72 (BP Location: Left arm, Patient Position: Lying)   Pulse 93   Temp 97.7 °F (36.5 °C) (Oral)   Resp 20   Ht 160 cm (63\")   Wt 78 kg (172 lb)   SpO2 98%   BMI 30.47 kg/m²   Gen: NAD, vitals reviewed  MS: oriented x3, recent/remote memory intact, normal attention/concentration, language intact, no neglect.  CN: Essentially blind in the right eye with some slight preservation of the right temporal field, normal visual acuity of the left eye, bright optic nerve red and edematous, left fundus normal, right relative afferent pupillary defect, extraocular movements intact, no facial droop, tongue midline  Motor: 5/5 throughout upper and " lower extremities, normal tone  Sensory: Intact to cold temperature and vibration throughout  Gait: Normal station, no ataxia    DATA:    Lab Results   Component Value Date    GLUCOSE 101 (H) 07/30/2022    CALCIUM 8.4 (L) 07/30/2022     07/30/2022    K 4.5 07/30/2022    CO2 28.1 07/30/2022     07/30/2022    BUN 16 07/30/2022    CREATININE 0.66 07/30/2022    EGFRIFAFRI 100 11/19/2021    EGFRIFNONA 75 02/23/2022    BCR 24.2 07/30/2022    ANIONGAP 6.9 07/30/2022     Lab Results   Component Value Date    WBC 9.40 07/30/2022    HGB 11.5 (L) 07/30/2022    HCT 35.2 07/30/2022    MCV 85.2 07/30/2022     07/30/2022       Lab review: ESR ordered    Imaging review: I personally reviewed her CTA of the head and neck which is significant for 50 to 60% bilateral carotid stenosis.  No stroke noted on noncontrast head CT.  Bilateral carotid duplex ordered    Diagnoses:  Symptomatic right carotid stenosis  Right central retinal artery occlusion  Paroxysmal atrial fibrillation  Long-term use of anticoagulation    Comment: Right CRAO in the setting of what looks like about 60% right ICA stenosis.  I think in particular her 1 month apart sequential sudden ischemic events in the eye seem highly consistent with the ICA as a source.    PLAN:  -Bilateral carotid duplex  -Vascular surgery consult for possible intervention on the right carotid artery  -I do not think MRI would  at this time given negative noncontrast head CT and lack of other focal findings; patient is quite claustrophobic and would prefer to avoid it anyway  -she has optho follow up scheduled for her CRAO    Will follow        Electronically signed by Adalid Ríos MD at 07/31/22 0730     Callum Benedict MD at 07/29/22 2025      Consult Orders    1. LCG (on-call MD unless specified) [[id number]] ordered by Emilio Toney MD at 07/29/22 1705               Date of Consultation: 07/29/22    Referral Provider: Violet Berkowitz  MD     Reason for Consultation: Rapid atrial fibrillation, aortic stenosis, recent right ocular stroke.    Encounter Provider: Callum Benedict MD    Group of Service: [facility]     Patient Name: Re Sheldon    :1960    Chief complaint: Palpitations, shortness of breath.    History of Present Illness:      This is a very pleasant 62 year-old female who I normally follow in the office.  She has a history of paroxysmal atrial fibrillation, aortic stenosis, mitral regurgitation, PFO, coronary artery calcification, and aortic atherosclerosis.    Her last echocardiogram was on 3/23/2021.  At that time, her ejection fraction was 60 to 65%, there was moderate aortic stenosis, and moderate mitral regurgitation.  She does have known moderate plaques in her proximal descending aorta and severe plaques of the aortic arch visualized on a PATRICA on 2020.    The patient presents with several issues.  She states that in May 2022, she began to have decreasing vision in her right eye.  It progressed to the point where she can barely see anything out of that eye, and she actually went to the ophthalmologist today.  She was told that she has had a right ocular stroke.  She has been faithful about taking the Eliquis, and has not missed any doses.  She also had COVID-19 in early 2022, and since that time is felt poorly.  She states that she has been more short of breath in general, and has had more issues with her atrial fibrillation.  She typically is symptomatic with the atrial fibrillation, although her watch has recently notified her that she was in atrial fibrillation when she did not realize it.  She is in rapid atrial fibrillation today, and she does state that she has been less responsive to her metoprolol as an outpatient.  She has not had any chest pain, although she does state that she has had more dyspnea on exertion.    Past Medical History:   Diagnosis Date   • Abnormal ECG      Inferolateral ST-T changes at baseline   • Anemia    • Atrial fibrillation (HCC)    • Calcification of aortic valve 2020   • Coronary artery disease    • COVID-19 07/05/2022   • Hyperlipidemia    • Hypertension    • Low back pain    • Mitral regurgitation    • PAF (paroxysmal atrial fibrillation) ([facility])    • Palpitations    • Premature atrial contractions    • Spinal stenosis    • Systolic murmur    • Tonsillitis    • Vitamin D deficiency          Past Surgical History:   Procedure Laterality Date   • ADENOIDECTOMY     • CARPAL TUNNEL RELEASE Bilateral    • COLONOSCOPY  08/04/2014    repeat 10 years , Dr. Karen Spear   • COLONOSCOPY N/A 1/22/2021    Procedure: COLONOSCOPY to cecum with cold biopsy polypectomy;  Surgeon: Ray Hairston MD;  Location: [facility] ENDOSCOPY;  Service: Gastroenterology;  Laterality: N/A;  pre: screening  post: diverticulosis, polyp, internal hemorrhoids   • FOOT SURGERY     • HAND SURGERY     • TONSILLECTOMY     • TONSILLECTOMY AND ADENOIDECTOMY     • TUBAL ABDOMINAL LIGATION           Allergies   Allergen Reactions   • Sulfa Antibiotics Diarrhea and Rash   • Augmentin [Amoxicillin-Pot Clavulanate] Diarrhea and Other (See Comments)   • Bactrim [Sulfamethoxazole-Trimethoprim] Diarrhea and Other (See Comments)         No current facility-administered medications on file prior to encounter.     Current Outpatient Medications on File Prior to Encounter   Medication Sig Dispense Refill   • apixaban (Eliquis) 5 MG tablet tablet Take 1 tablet by mouth Every 12 (Twelve) Hours. 180 tablet 3   • aspirin 81 MG EC tablet Take 81 mg by mouth Every Other Day.     • cetirizine (zyrTEC) 10 MG tablet 1 OTC tab po QDay for allergies     • cholecalciferol (VITAMIN D3) 25 MCG (1000 UT) tablet Take 1,000 Units by mouth 2 (Two) Times a Day.     • fluticasone (FLONASE) 50 MCG/ACT nasal spray into the nostril(s) as directed by provider Daily As Needed.     • metoprolol tartrate (LOPRESSOR) 50 MG tablet TAKE 1  "TABLET BY MOUTH TWICE A  tablet 3   • Pitavastatin Calcium 4 MG tablet Take 1 tablet by mouth Daily. 30 tablet 6   • [DISCONTINUED] Ascorbic Acid (VITAMIN C PO)            Social History     Socioeconomic History   • Marital status:      Spouse name: Kedar   • Number of children: 2   Tobacco Use   • Smoking status: Never Smoker   • Smokeless tobacco: Never Used   Vaping Use   • Vaping Use: Never used   Substance and Sexual Activity   • Alcohol use: No     Comment: Caffeine use 3-4 cups daily   • Drug use: No         Family History   Problem Relation Age of Onset   • Heart attack Other    • Breast cancer Other    • Heart disease Other    • Heart disease Mother         Mother with MI at 63, and later  from CHF   • Heart disease Father         Father  from complications after valve replacement at 73   • Stroke Father    • Breast cancer Sister    • Malig Hyperthermia Neg Hx        REVIEW OF SYSTEMS:   Pertinent positives were noted in the HPI above.  Otherwise, all other systems were reviewed, and are negative.     Objective:     Vitals:    22 1831 22 1908 22 1931 22 195   BP: 90/72  (!) 147/105 125/79   BP Location:    Left arm   Patient Position:    Lying   Pulse: 109 116 76 98   Resp:    16   Temp:       SpO2: 97% 97% 95% 97%   Height:         Body mass index is 30.11 kg/m².  Flowsheet Rows    Flowsheet Row First Filed Value   Admission Height 160 cm (63\") Documented at 2022 1453   Admission Weight --           General:    No acute distress, alert and oriented x4, pleasant                   Head:    Normocephalic, atraumatic.   Eyes:          Conjunctivae and sclerae normal, no icterus, PERRLA   Throat:   No oral lesions, no thrush, oral mucosa moist.    Neck:   Supple, trachea midline.   Lungs:     Clear to auscultation bilaterally     Heart:    Irregularly irregular rhythm with a tachycardic rate, III   Abdomen:     Soft, non-tender, non-distended, positive " bowel sounds.    Extremities:   No clubbing, cyanosis, or edema.     Pulses:   Pulses palpable and equal bilaterally.    Skin:   No bleeding or rash.   Neuro:   Non-focal.  Moves all extremities well.    Psychiatric:   Normal mood and affect.         Lab Review:                Results from last 7 days   Lab Units 07/29/22  1556   SODIUM mmol/L 144   POTASSIUM mmol/L 4.5   CHLORIDE mmol/L 105   CO2 mmol/L 26.8   BUN mg/dL 14   CREATININE mg/dL 0.79   GLUCOSE mg/dL 119*   CALCIUM mg/dL 9.1     Results from last 7 days   Lab Units 07/29/22  1556   TROPONIN T ng/mL <0.010     Results from last 7 days   Lab Units 07/29/22  1556   WBC 10*3/mm3 9.45   HEMOGLOBIN g/dL 12.8   HEMATOCRIT % 38.9   PLATELETS 10*3/mm3 321                       EKG (reviewed by me personally): Atrial fibrillation with RVR, diffuse ST and T wave changes (essentially unchanged from prior)      Assessment:   1.  Shortness of breath, likely multifactorial  2.  Paroxysmal atrial fibrillation with RVR  3.  Recent COVID-19 infection in early July 2022  4.  Right sided visual loss, status post right ocular stroke (per her ophthalmologist on 7/29/2022)  4.  Aortic stenosis, previously moderate  5.  Mitral regurgitation, previously moderate  6.  Aortic atherosclerosis (PATRICA 8/13/2020 with moderate plaques in the proximal descending aorta and severe plaques in the aortic arch)  7.  Coronary artery calcification  8.  Small PFO  9.  Hypertension  10.  History of statin induced myalgias    Plan:       Again, there are multiple issues.  First, she was told today by her ophthalmologist that she has had a right ocular stroke.  She started with visual loss in May 2022, and it has progressed to near blindness in that eye.  I suspect this is embolic, although I would have a low suspicion for the atrial fibrillation as she has been very faithful about taking her Eliquis and has not missed any doses.  She did have severe plaques in her aortic arch on her PATRICA on  8/13/2020.  Her CT scan of the head earlier today did not show acute pathology.  I am going to check a CT angiogram of the head and neck.  She may ultimately need neurological consultation and an MRI, although I will defer this to [facility].    Her atrial fibrillation has been more difficult to control, and she has been symptomatic with this for the most part.  She has had more atrial fibrillation in general, and it does not seem to respond to the metoprolol.  She had COVID-19 earlier this month, and I have seen on many occasions where arrhythmias get worse after this particular infection.  She was given IV metoprolol and IV digoxin in the emergency department.  I am going to give her another 250 mcg dose of IV digoxin, and I am going to change her metoprolol to atenolol for potentially better rate control.  If she is not responsive to this, I will place her on a diltiazem drip.  I would continue her on the Eliquis for anticoagulation for now.  I will also check a TSH tomorrow morning.    She does have aortic stenosis, and I have ordered an echocardiogram to reevaluate this, as well as the mitral regurgitation.  These were both moderate, although the last echocardiogram was in March 2021.  She has been more short of breath, which is likely multifactorial, although the echocardiogram should shed more light on her valvular disease and her ejection fraction.  Some of the shortness of breath may be from uncontrolled atrial fibrillation as well.  She has coronary artery calcification and is at high risk to have coronary artery disease.  She gets statin induced myalgias, although tolerates Livalo at a 2 mg dose.    Thank you very much for this consult.    Bola Benedict MD      Electronically signed by Callum Benedict MD at 07/29/22 2048

## 2022-08-01 NOTE — PROGRESS NOTES
DOS: 2022  NAME: Re Sheldon   : 1960  PCP: Angelita Vital MD    Chief Complaint   Patient presents with   • Shortness of Breath        Stroke    Subjective: Pt seen in follow up, however the problem is new to me.  Denies any new weakness, numbness, speech or visual disturbances, or headaches.  Still remains with central vision loss of her right eye.  No family at bedside.    Objective:  Vital signs:      Vitals:    22 0001 22 0713 22 1159 22 1256   BP: 126/72 138/96  114/65   BP Location: Left arm Left arm  Left arm   Patient Position: Lying Lying  Lying   Pulse:  61 79 85   Resp: 20 18  17   Temp: 97.6 °F (36.4 °C)   97.5 °F (36.4 °C)   TempSrc: Oral   Oral   SpO2:  96%  93%   Weight:       Height:           Current Facility-Administered Medications:   •  acetaminophen (TYLENOL) tablet 650 mg, 650 mg, Oral, Q4H PRN **OR** acetaminophen (TYLENOL) 160 MG/5ML solution 650 mg, 650 mg, Oral, Q4H PRN **OR** acetaminophen (TYLENOL) suppository 650 mg, 650 mg, Rectal, Q4H PRN, Violet Berkowitz MD  •  apixaban (ELIQUIS) tablet 5 mg, 5 mg, Oral, Q12H, Mason Quiroga MD  •  atenolol (TENORMIN) tablet 25 mg, 25 mg, Oral, Q12H, Callum Benedict MD, 25 mg at 22 0839  •  benzonatate (TESSALON) capsule 200 mg, 200 mg, Oral, TID PRN, Callum Benedict MD, 200 mg at 22 0839  •  cetirizine (zyrTEC) tablet 5 mg, 5 mg, Oral, Daily, Violet Berkowitz MD  •  cholecalciferol (VITAMIN D3) tablet 1,000 Units, 1,000 Units, Oral, Daily, Violet Berkowitz MD, 1,000 Units at 22 0839  •  clopidogrel (PLAVIX) tablet 75 mg, 75 mg, Oral, Daily, Mason Quiroga MD, 75 mg at 22 0839  •  digoxin (LANOXIN) tablet 125 mcg, 125 mcg, Oral, Daily, Callum Benedict MD, 125 mcg at 22 1200  •  fluticasone (FLONASE) 50 MCG/ACT nasal spray 1 spray, 1 spray, Nasal, Daily PRN, WooViolet avilez MD  •  LORazepam (ATIVAN) injection 1 mg, 1 mg, Intravenous, Once, Adalid Ríos MD  •   nitroglycerin (NITROSTAT) SL tablet 0.4 mg, 0.4 mg, Sublingual, Q5 Min PRN, Violet Berkowitz MD  •  ondansetron (ZOFRAN) injection 4 mg, 4 mg, Intravenous, Q6H PRN, Violet Berkowitz MD  •  ondansetron (ZOFRAN) injection 4 mg, 4 mg, Intravenous, Q6H PRN, Violet Berkowitz MD  •  pitavastatin calcium (LIVALO) tablet 2 mg, 2 mg, Oral, Nightly, Callum Benedict MD, 2 mg at 07/31/22 2110  •  sodium chloride 0.9 % flush 10 mL, 10 mL, Intravenous, PRN, Violet Berkowitz MD  •  sodium chloride 0.9 % flush 10 mL, 10 mL, Intravenous, Q12H, Violet Berkowitz MD, 10 mL at 07/31/22 2109  •  sodium chloride 0.9 % flush 10 mL, 10 mL, Intravenous, PRN, Violet Berkowitz MD  •  sodium chloride 0.9 % flush 10 mL, 10 mL, Intravenous, Q12H, Violet Berkowitz MD, 10 mL at 07/31/22 2109  •  sodium chloride 0.9 % flush 10 mL, 10 mL, Intravenous, PRN, Violet Berkowitz MD  •  sodium chloride 0.9 % infusion, 75 mL/hr, Intravenous, Continuous, Violet Berkowitz MD, Stopped at 07/30/22 1006    PRN meds  •  acetaminophen **OR** acetaminophen **OR** acetaminophen  •  benzonatate  •  fluticasone  •  nitroglycerin  •  ondansetron  •  ondansetron  •  sodium chloride  •  sodium chloride  •  sodium chloride    No current facility-administered medications on file prior to encounter.     Current Outpatient Medications on File Prior to Encounter   Medication Sig   • apixaban (Eliquis) 5 MG tablet tablet Take 1 tablet by mouth Every 12 (Twelve) Hours.   • aspirin 81 MG EC tablet Take 81 mg by mouth Every Other Day.   • cetirizine (zyrTEC) 10 MG tablet 1 OTC tab po QDay for allergies   • cholecalciferol (VITAMIN D3) 25 MCG (1000 UT) tablet Take 1,000 Units by mouth 2 (Two) Times a Day.   • fluticasone (FLONASE) 50 MCG/ACT nasal spray into the nostril(s) as directed by provider Daily As Needed.   • metoprolol tartrate (LOPRESSOR) 50 MG tablet TAKE 1 TABLET BY MOUTH TWICE A DAY   • Pitavastatin Calcium 4 MG tablet Take 1 tablet by mouth Daily.       General appearance: Pleasant elderly  female, NAD, alert and cooperative, well groomed  HEENT: Normocephalic, atraumatic, no masses or tenderness  Resp: Even and unlabored  Extremities: No obvious edema  Skin: warm, dry    Neurological:   MS: oriented x3, recent/remote memory intact, normal attention/concentration, language intact, no neglect, normal fund of knowledge  CN: visual acuity abnormal of the central vision of the right eye, visual fields full, EOMI, facial sensation equal, no facial droop, hearing symmetric, tongue midline  Motor: 5/5 in all 4 ext., normal tone  Sensory: light touch sensation intact in all 4 ext.  Coordination: Normal finger to nose test  Gait and station: Deferred  Rapid alternating movements: normal finger to thumb tap    Laboratory results:  Lab Results   Component Value Date    TSH 0.825 07/30/2022     Lab Results   Component Value Date    HGBA1C 6.10 (H) 07/30/2022     No results found for: [id number]  Lab Results   Component Value Date    CHOL 144 07/30/2022    CHLPL 191 11/19/2021    CHLPL 208 (H) 07/27/2021    CHLPL 210 (H) 12/11/2020     Lab Results   Component Value Date    TRIG 124 07/30/2022    TRIG 114 11/19/2021    TRIG 122 07/27/2021     Lab Results   Component Value Date    HDL 40 07/30/2022    HDL 49 11/19/2021    HDL 57 07/27/2021     Lab Results   Component Value Date    LDL 82 07/30/2022     (H) 11/19/2021     (H) 07/27/2021     Lab Results   Component Value Date    WBC 10.26 08/01/2022    HGB 12.3 08/01/2022    HCT 38.1 08/01/2022    MCV 87.2 08/01/2022     08/01/2022     Lab Results   Component Value Date    GLUCOSE 107 (H) 08/01/2022    BUN 15 08/01/2022    CREATININE 0.62 08/01/2022    EGFRIFNONA 75 02/23/2022    EGFRIFAFRI 100 11/19/2021    BCR 24.2 08/01/2022    K 4.2 08/01/2022    CO2 23.0 08/01/2022    CALCIUM 9.0 08/01/2022    PROTENTOTREF 7.2 11/19/2021    ALBUMIN 3.80 07/30/2022    LABIL2 1.7 11/19/2021    AST 12 07/30/2022    ALT 11 07/30/2022     No results found for:  PTT  No results found for: INR, PROTIME  Brief Urine Lab Results  (Last result in the past 365 days)      Color   Clarity   Blood   Leuk Est   Nitrite   Protein   CREAT   Urine HCG        12/17/21 1305 Yellow   Clear   Trace   Trace   Negative   Negative                 Review and interpretation of imaging:  XR Chest 2 View    Result Date: 7/29/2022  No evidence for acute pulmonary process. Cardiomegaly. Follow-up as clinical indications persist.  This report was finalized on 7/29/2022 3:57 PM by Dr. Godfrey Reza M.D.      CT Head Without Contrast    Result Date: 7/30/2022  No evidence of acute infarction of intracranial hemorrhage. Further evaluation could be performed with a MRI examination of the brain as indicated. Paranasal sinus disease as described above. See above.    Radiation dose reduction techniques were utilized, including automated exposure control and exposure modulation based on body size.  This report was finalized on 7/30/2022 12:12 PM by Dr. Jin Barragan M.D.      CT Angiogram Carotids    Result Date: 7/31/2022  1.  No evidence of acute infarction or of intracranial hemorrhage. 2.  The CT angiogram demonstrates approximately a 50% and 50% to 60% stenosis involving the proximal aspect of the internal carotid arteries bilaterally. The stenosis on the right is due to eccentric calcified plaque. Decreased attenuation is identified within the lumen immediately adjacent to the plaque. This may represent beam hardening artifact. Mural thrombus cannot be excluded. Further evaluation with a carotid ultrasound is recommended. 3.  No evidence of proximal intracranial high-grade stenosis. 4.  There is a 2.5 x 1.8 x 1.8 mm protuberance appreciated arising from the left A2 segment proximally. Although this may represent a small infundibulum, an aneurysm cannot be excluded. No other aneurysms are identified.      Radiation dose reduction techniques were utilized, including automated exposure control and  exposure modulation based on body size.  This report was finalized on 7/31/2022 11:56 AM by Dr. Jin Barragan M.D.      CT Angiogram Head    Result Date: 7/31/2022  1.  No evidence of acute infarction or of intracranial hemorrhage. 2.  The CT angiogram demonstrates approximately a 50% and 50% to 60% stenosis involving the proximal aspect of the internal carotid arteries bilaterally. The stenosis on the right is due to eccentric calcified plaque. Decreased attenuation is identified within the lumen immediately adjacent to the plaque. This may represent beam hardening artifact. Mural thrombus cannot be excluded. Further evaluation with a carotid ultrasound is recommended. 3.  No evidence of proximal intracranial high-grade stenosis. 4.  There is a 2.5 x 1.8 x 1.8 mm protuberance appreciated arising from the left A2 segment proximally. Although this may represent a small infundibulum, an aneurysm cannot be excluded. No other aneurysms are identified.      Radiation dose reduction techniques were utilized, including automated exposure control and exposure modulation based on body size.  This report was finalized on 7/31/2022 11:56 AM by Dr. Jin Barragan M.D.      Results for orders placed during the hospital encounter of 07/29/22    Adult Transthoracic Echo Complete W/ Cont if Necessary Per Protocol    Interpretation Summary  · Left ventricular ejection fraction appears to be 61 - 65%. Left ventricular systolic function is normal.  · Left ventricular wall thickness is consistent with mild concentric hypertrophy.  · Normal right ventricular cavity size and systolic function noted.  · The left atrial cavity is moderately dilated.  · The aortic valve leaflets are thickened and heavily calcified  · Moderate aortic valve stenosis is present. Aortic valve area is 0.90 cm2. Peak velocity of the flow distal to the aortic valve is 310.8 cm/s. Aortic valve maximum pressure gradient is 38.6 mmHg. Aortic valve mean pressure  gradient is 19.3 mmHg.  · The mitral valve leaflets are thickened and mildly calcified. The posterior mitral valve leaflet is restricted in motion  · Mild to moderate mitral valve regurgitation is present.  · Mild tricuspid valve regurgitation is present.  · Calculated right ventricular systolic pressure from tricuspid regurgitation is 20 mmHg.  · There is a trivial pericardial effusion      Impression/Assessment:  This is a 62-year-old female with past medical history of atrial fibrillation on Eliquis 5 mg twice daily PTA, CAD on ASA 81 mg and Livalo 2 mg PTA, recent COVID-19 infection earlier this month, hypertension, hyperlipidemia, spinal stenosis who presented to the hospital on 7/29/2022 with complaints of decreased visual acuity of her right eye.  Patient states she noticed that she was having decreased vision of her right eye since the end of May.  She states initially it was kind of blurred and then in mid June it turned to complete blackout of her vision of the central part of her right eye.  She states she called her ophthalmologist and was told she would not be able to be seen until July.  She states when she got into see her ophthalmologist he told her that she had right central retinal artery occlusion and needed to be seen in the ER.  She denied any other associated numbness, weakness, speech disturbances, headaches, or episodes of ataxia.  She reported a history of carotid stenosis which was being monitored.  She denied any missed doses of her Eliquis.    BP on arrival 111/77, .  EKG A. fib with RVR.  Temp 97.1.    Diagnosis:  Right central retinal artery occlusion  Paroxysmal atrial fibrillation on chronic anticoagulation  Bilateral carotid plaque without severe stenosis  Severe aortic arch plaque  Recent COVID-19 infection  Abnormal focal protuberance arising from the proximal left A2 segment suspicious for small aneurysm    Work up to date:  CTH WO: No acute intracranial abnormalities.  CTA  H/N: Eccentric calcified plaque involving the right ICA proximally resulting in 50% stenosis, calcified and noncalcified plaque in the left ICA approximately 50 to 60% stenosis.  Left A1 segment is a atretic.  Small focal protuberance appreciated arising from the proximal aspect of the left A2 segment projecting laterally to the left most suggestive of a small aneurysm.  MRI Brain WO: Patient refused secondary to claustrophobia  2D Echo: LA cavity moderately dilated, EF 61 to 65%, no evidence of LV thrombus present, moderate AV stenosis present, mild to moderate MVR present.  Bilateral carotid duplex 7/31: R ICA and LICA plaque without significant stenosis.  Labs: ESR [id number]    Plan:  Noted Dr. Quiroga's note, he does not feel that a carotid surgery or stent would be beneficial due to her imaging of her neck revealing extremely low level of disease.  Noted Dr. Benedict reviewed the patient's echo again and stated there was a large bulky amount of plaque in the ascending arch that could be source for embolic disease.  Both cardiology and vascular surgery recommend adding Plavix onto her Eliquis. I discussed bleeding precautions with the patient. She voiced understanding.  Discussed with the patient again today about obtaining MRI brain however she is adamant that she is severely claustrophobic and would like to go forward with [facility] imaging outpatient open MRI.  While I was in the room vascular surgery office was calling her to set up a follow-up appointment and what I am assuming outpatient MRI referral.  I did asked the patient to call my office if she had not heard anything from [facility] in a few days and I can arrange an order to be placed.  She is reportedly statin intolerant secondary to severe myalgias and is currently on Livalo 2 mg due to higher doses causing again severe myalgias.  LDL this admission 82.  Need to continue to monitor this closely. ?  Repatha use in the future.  Neurochecks per stroke  protocol  Normalize BP  Stroke Education  SOHAN/SCDs  PT/OT/ST  I will arrange follow-up in our office in 3 months for stroke follow-up.  She needs to be reevaluated by ophthalmology for driving clearance in the outpatient setting.  Recommend follow-up with interventional neurosurgery for her incidental suspected left A2 aneurysm noted on her CTA imaging.  Therapies as written. CCP for discharge planning. Call RRT for any acute neurological changes. We will sign off, will see again per request.    Case discussed with patient and Dr. Dhaliwal, and he agrees with plan above.     JONATHAN Harper

## 2022-08-01 NOTE — TELEPHONE ENCOUNTER
Review of chart shows Dr. Benedict dis prescribe Digoxin while pt was hospitalized. Advised pt to call Dr. Benedict's office in am if she want to be certain that he wants her to take this. Caller agrees to follow care advice.     Reason for Disposition  • [1] Caller has NON-URGENT medicine question about med that PCP prescribed AND [2] triager unable to answer question    Additional Information  • Negative: [1] Intentional drug overdose AND [2] suicidal thoughts or ideas  • Negative: Drug overdose and triager unable to answer question  • Negative: Caller requesting information unrelated to medicine  • Negative: Caller requesting information about COVID-19 Vaccine  • Negative: Caller requesting information about Emergency Contraception  • Negative: Caller requesting information about Combined Birth Control Pills  • Negative: Caller requesting information about Progestin Birth Control Pills  • Negative: Caller requesting information about Post-Op pain or medicines  • Negative: Caller requesting a prescription antibiotic (such as Penicillin) for Strep throat and has a positive culture result  • Negative: Caller requesting a prescription anti-viral med (such as Tamiflu) and has influenza (flu)  symptoms  • Negative: Immunization reaction suspected  • Negative: Rash while taking a medicine or within 3 days of stopping it  • Negative: [1] Asthma and [2] having symptoms of asthma (cough, wheezing, etc.)  • Negative: [1] Symptom of illness (e.g., headache, abdominal pain, earache, vomiting) AND [2] more than mild  • Negative: Breastfeeding questions about mother's medicines and diet  • Negative: MORE THAN A DOUBLE DOSE of a prescription or over-the-counter (OTC) drug  • Negative: [1] DOUBLE DOSE (an extra dose or lesser amount) of prescription drug AND [2] any symptoms (e.g., dizziness, nausea, pain, sleepiness)  • Negative: [1] DOUBLE DOSE (an extra dose or lesser amount) of over-the-counter (OTC) drug AND [2] any  "symptoms (e.g., dizziness, nausea, pain, sleepiness)  • Negative: Took another person's prescription drug  • Negative: [1] DOUBLE DOSE (an extra dose or lesser amount) of prescription drug AND [2] NO symptoms (Exception: a double dose of antibiotics)  • Negative: Diabetes drug error or overdose (e.g., took wrong type of insulin or took extra dose)  • Negative: [1] Prescription refill request for ESSENTIAL medicine (i.e., likelihood of harm to patient if not taken) AND [2] triager unable to refill per department policy  • Negative: [1] Prescription not at pharmacy AND [2] was prescribed by PCP recently (Exception: triager has access to EMR and prescription is recorded there. Go to Home Care and confirm for pharmacy.)  • Negative: [1] Pharmacy calling with prescription question AND [2] triager unable to answer question  • Negative: [1] Caller has URGENT medicine question about med that PCP or specialist prescribed AND [2] triager unable to answer question  • Negative: Medicine patch causing local rash or itching  • Negative: [1] Caller has medicine question about med NOT prescribed by PCP AND [2] triager unable to answer question (e.g., compatibility with other med, storage)  • Negative: Prescription request for new medicine (not a refill)  • Negative: Prescription refill request for a CONTROLLED substance (e.g., narcotics, ADHD medicines)  • Negative: [1] Prescription refill request for NON-ESSENTIAL medicine (i.e., no harm to patient if med not taken) AND [2] triager unable to refill per department policy    Answer Assessment - Initial Assessment Questions  1. NAME of MEDICATION: \"What medicine are you calling about?\"      Digoxin   2. QUESTION: \"What is your question?\" (e.g., medication refill, side effect)      Asking if Dr. Benedict is aware of this new prescription  3. PRESCRIBING HCP: \"Who prescribed it?\" Reason: if prescribed by specialist, call should be referred to that group.      Dr. Adler  4. " "SYMPTOMS: \"Do you have any symptoms?\"      *No Answer*  5. SEVERITY: If symptoms are present, ask \"Are they mild, moderate or severe?\"      *No Answer*  6. PREGNANCY:  \"Is there any chance that you are pregnant?\" \"When was your last menstrual period?\"      *No Answer*    Protocols used: MEDICATION QUESTION CALL-ADULT-      "

## 2022-08-01 NOTE — CASE MANAGEMENT/SOCIAL WORK
Case Management Discharge Note      Final Note: home with family assist         Selected Continued Care - Discharged on 8/1/2022 Admission date: 7/29/2022 - Discharge disposition: Home or Self Care    Destination    No services have been selected for the patient.              Durable Medical Equipment    No services have been selected for the patient.              Dialysis/Infusion    No services have been selected for the patient.              Home Medical Care    No services have been selected for the patient.              Therapy    No services have been selected for the patient.              Community Resources    No services have been selected for the patient.              Community & DME    No services have been selected for the patient.                  Transportation Services  Private: Car    Final Discharge Disposition Code: 01 - home or self-care

## 2022-08-01 NOTE — PLAN OF CARE
Goal Outcome Evaluation:           Progress: improving  Outcome Evaluation: VSS, pt rested comfortably overnight with no c/o pain, safety maintained, will CTM cl

## 2022-08-01 NOTE — DISCHARGE SUMMARY
PHYSICIAN DISCHARGE SUMMARY                                                                        [facility]    Patient Identification:  Name: Re Sheldon  Age: 62 y.o.  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  Please H&P for full details.  In addition she is in RVR on presentation.  The patient was admitted and rate control with beta-blockers.  She is switched over to twice daily Tenormin and has done well with that since.  She is being followed by cardiology.  Eliquis continued.  She was evaluated by neurology and also neurosurgery due to significant aortic stenosis.  She has at least 3 significant risk factors for CVAs including her paroxysmal atrial fibrillation, bilateral carotid artery stenosis as well as significant atherosclerotic plaques in the aorta.  There is been consideration as to whether undergo vascular surgery for her carotid stenosis.  Prior to this she needs an MRI to determine whether there is bilateral lesions.  She has significant claustrophobia and declines having MRI at this facility as result.  She will then be continued on Eliquis and her aspirin has been switched over the Plavix.  Arrangements are being made for her to undergo an open MRI as an outpatient and she will then follow-up with vascular surgery for those results and further  recommendations.        Consults:     Consults     Date and Time Order Name Status Description    7/31/2022 12:33 AM Inpatient Vascular Surgery Consult Completed     7/29/2022 11:33 PM Inpatient Neurology Consult Stroke Completed     7/29/2022  5:03 PM АНДРЕЙG (on-call MD unless specified) Completed     7/29/2022  5:03 PM TAHIR (on-call MD unless specified) Details              Discharge Exam:  Afebrile vital signs stable.  Well-developed well-nourished female no apparent distress.  Lungs clear to auscultation good air movement.  Heart presently regular rate and rhythm.  Extremity no clubbing cyanosis or edema.  Alert articulate cooperative pleasant.     Disposition:  Home    Patient Instructions:      Discharge Medications      New Medications      Instructions Start Date   atenolol 25 MG tablet  Commonly known as: TENORMIN   25 mg, Oral, Every 12 Hours Scheduled      benzonatate 200 MG capsule  Commonly known as: TESSALON   200 mg, Oral, 3 Times Daily PRN      clopidogrel 75 MG tablet  Commonly known as: PLAVIX   75 mg, Oral, Daily   Start Date: August 2, 2022     digoxin 125 MCG tablet  Commonly known as: LANOXIN   125 mcg, Oral, Daily Digoxin   Start Date: August 2, 2022        Continue These Medications      Instructions Start Date   apixaban 5 MG tablet tablet  Commonly known as: Eliquis   5 mg, Oral, Every 12 Hours      cetirizine 10 MG tablet  Commonly known as: zyrTEC   1 OTC tab po QDay for allergies      cholecalciferol 25 MCG (1000 UT) tablet  Commonly known as: VITAMIN D3   1,000 Units, Oral, 2 Times Daily      fluticasone 50 MCG/ACT nasal spray  Commonly known as: FLONASE   Nasal, Daily PRN      Pitavastatin Calcium 4 MG tablet   4 mg, Oral, Daily         Stop These Medications    aspirin 81 MG EC tablet     metoprolol tartrate 50 MG tablet  Commonly known as: LOPRESSOR          Diet Instructions     Diet: Cardiac      Discharge Diet: Cardiac        Future Appointments   Date Time Provider Department Center    8/15/2022  9:00 AM Callum Benedict MD [facility]     Additional Instructions for the Follow-ups that You Need to Schedule     Discharge Follow-up with PCP   As directed       Currently Documented PCP:    Angelita Vital MD    PCP Phone Number:    [phone]     Follow Up Details: 1 week         Discharge Follow-up with Specialty: Vascular surgery.  Cardiology.  Neurology.   As directed      Specialty: Vascular surgery.  Cardiology.  Neurology.    Follow Up Details: As directed.            Follow-up Information     Mason Quiroga MD Follow up in 2 week(s).    Specialty: Vascular Surgery  Why: Patient is to have MRI of the brain at open MRI prior to follow-up.  Hospitalist to set this up prior to discharge.  Contact information:  [address]  [facility] [address]  [facility] [id number] [phone]             Angelita Vital MD .    Specialties: Internal Medicine, Internal Medicine & Pediatrics  Why: 1 week  Contact information:  [address]  [address]  [facility] [id number] [phone]                       Discharge Order (From admission, onward)     Start     Ordered    08/01/22 1508  Discharge patient  Once        Expected Discharge Date: 08/01/22    Discharge Disposition: Home or Self Care    Physician of Record for Attribution - Please select from Treatment Team: GUME BEATTY [[id number]]    Review needed by CMO to determine Physician of Record: No       Question Answer Comment   Physician of Record for Attribution - Please select from Treatment Team GUME BEATTY    Review needed by CMO to determine Physician of Record No        08/01/22 1515                  Total time spent discharging patient including evaluation,post hospitalization follow up,  medication and post hospitalization instructions and education total time exceeds 30 minutes.    Signed:  Gume Beatty MD  8/1/2022  15:48 EDT    EMR Dragon/Transcription disclaimer:   Much of this encounter note is an electronic  transcription/translation of spoken language to printed text. The electronic translation of spoken language may permit erroneous, or at times, nonsensical words or phrases to be inadvertently transcribed; Although I have reviewed the note for such errors, some may still exist.

## 2022-08-01 NOTE — OUTREACH NOTE
Prep Survey    Flowsheet Row Responses   [facility] facility patient discharged from? [facility]   Is LACE score < 7 ? No   Emergency Room discharge w/ pulse ox? No   Eligibility [facility]   Date of Admission 07/29/22   Date of Discharge 08/01/22   Discharge Disposition Home or Self Care   Discharge diagnosis acute onset monocular blindness/central retinal artery occlusion, A-fib RVR, carotid artery stenosis   Does the patient have one of the following disease processes/diagnoses(primary or secondary)? Other   Does the patient have Home health ordered? No   Is there a DME ordered? No   Prep survey completed? Yes          LUZ ELENA WHITFIELD - Registered Nurse

## 2022-08-02 ENCOUNTER — TRANSITIONAL CARE MANAGEMENT TELEPHONE ENCOUNTER (OUTPATIENT)
Dept: CALL CENTER | Facility: HOSPITAL | Age: 62
End: 2022-08-02

## 2022-08-02 ENCOUNTER — TELEPHONE (OUTPATIENT)
Dept: CARDIOLOGY | Facility: CLINIC | Age: 62
End: 2022-08-02

## 2022-08-02 NOTE — OUTREACH NOTE
Call Center TCM Note    Flowsheet Row Responses   [facility] patient discharged from? [place]   Does the patient have one of the following disease processes/diagnoses(primary or secondary)? Other   TCM attempt successful? Yes   Discharge diagnosis acute onset monocular blindness/central retinal artery occlusion, A-fib RVR, carotid artery stenosis   Meds reviewed with patient/caregiver? Yes   Is the patient having any side effects they believe may be caused by any medication additions or changes? No   Does the patient have all medications ordered at discharge? Yes   Is the patient taking all medications as directed (includes completed medication regime)? Yes   Does the patient have a primary care provider?  Yes   Does the patient have an appointment with their PCP within 7 days of discharge? No   Comments regarding PCP PCP Dr Vital has no appts within TCM time frame if ofc can please review sched and call pt for TCM APPT to be complted by 08/15/2022.   Has the patient kept scheduled appointments due by today? N/A   Has home health visited the patient within 72 hours of discharge? N/A   Psychosocial issues? No   Did the patient receive a copy of their discharge instructions? Yes   Nursing interventions Reviewed instructions with patient   What is the patient's perception of their health status since discharge? Improving   Is the patient/caregiver able to teach back signs and symptoms related to disease process for when to call PCP? Yes   Is the patient/caregiver able to teach back signs and symptoms related to disease process for when to call 911? Yes   Is the patient/caregiver able to teach back the hierarchy of who to call/visit for symptoms/problems? PCP, Specialist, Home health nurse, Urgent Care, ED, 911 Yes   If the patient is a current smoker, are they able to teach back resources for cessation? Not a smoker   TCM call completed? Yes   Wrap up additional comments D/C DX: acute onset monocular  blindness/central retinal artery occlusion, A-fib RVR, carotid artery stenosis** Pt tired but doing ok. All new medicaitons in place. No questions at this time. PCP Dr Vital has no appts within TCM time frame if ofc can please review sched and call pt for TCM APPT to be complted by 08/15/2022.          Becca Amin MA    8/2/2022, 15:55 EDT

## 2022-08-02 NOTE — TELEPHONE ENCOUNTER
Eusebio,    She actually needs the Digoxin to keep her heart rate under control.  I actually started her on this in the hospital.  I think it would be good for her to stay on this.  Thanks     AHSAN

## 2022-08-02 NOTE — TELEPHONE ENCOUNTER
Called and spoke with the patient and told her the above.  She verbalized understanding.    Eusebio

## 2022-08-02 NOTE — TELEPHONE ENCOUNTER
Patient called and stated that she was advised to start digoxin and she is concerned with starting that.  She would like to know if you are ok with her taking this medication.  Please advise.    CB: [phone]    Thanks,  Eusebio

## 2022-08-03 NOTE — PAYOR COMM NOTE
"Aviles, Re S (62 y.o. Female)     PLEASE SEE ATTACHED DC SUMMARY    REF#[id number]    THANK YOU    GARRETT MABRY LPN CCP            Date of Birth   1960    Social Security Number       Address   [address] [place] [address]    Home Phone   [phone]    MRN   [id number]       Nondenominational   [facility] [place]    Marital Status                               Admission Date   7/29/22    Admission Type   Emergency    Admitting Provider   Violet Berkowitz MD    Attending Provider       Department, Room/Bed   [address], [address]       Discharge Date   8/1/2022    Discharge Disposition   Home or Self Care    Discharge Destination                               Attending Provider: (none)   Allergies: Sulfa Antibiotics, Augmentin [Amoxicillin-pot Clavulanate], Bactrim [Sulfamethoxazole-trimethoprim]    Isolation: None   Infection: COVID (History) (07/31/22)   Code Status: Prior   Advance Care Planning Activity    Ht: 160 cm (63\")   Wt: 78 kg (172 lb)    Admission Cmt: None   Principal Problem: Atrial fibrillation with rapid ventricular response (HCC) [I48.91]                 Active Insurance as of 7/29/2022     Primary Coverage     Payor Plan Insurance Group Employer/Plan Group    [employer] COMMERCIAL AET[employer] [id number]     Payor Plan Address Payor Plan Phone Number Payor Plan Fax Number Effective Dates    [address] [phone]  7/1/2022 - None Entered    [facility] [id number]       Subscriber Name Subscriber Birth Date Member ID       KEDAR AVILES [birth year]1960 [id number]                 Emergency Contacts      (Rel.) Home Phone Work Phone Mobile Phone    Kedar Aviles (Spouse) -- -- [phone]               Discharge Summary      Gume Adler MD at 08/01/22 1516                                                                             PHYSICIAN DISCHARGE SUMMARY                                                                        [facility]" [place]    Patient Identification:  Name: Re Sheldon  Age: 62 y.o.  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  Please H&P for full details.  In addition she is in RVR on presentation.  The patient was admitted and rate control with beta-blockers.  She is switched over to twice daily Tenormin and has done well with that since.  She is being followed by cardiology.  Eliquis continued.  She was evaluated by neurology and also neurosurgery due to significant aortic stenosis.  She has at least 3 significant risk factors for CVAs including her paroxysmal atrial fibrillation, bilateral carotid artery stenosis as well as significant atherosclerotic plaques in the aorta.  There is been consideration as to whether undergo vascular surgery for her carotid stenosis.  Prior to this she needs an MRI to determine whether there is bilateral lesions.  She has significant claustrophobia and declines having MRI at this facility as result.  She will then be continued on Eliquis and her aspirin has been switched over the Plavix.  Arrangements are being made for her to undergo an open MRI as an outpatient and she will then follow-up with vascular surgery for those results and further recommendations.        Consults:     Consults     Date and Time Order Name Status Description    2022 12:33 AM Inpatient Vascular Surgery Consult Completed     2022 11:33 PM  Inpatient Neurology Consult Stroke Completed     7/29/2022  5:03 PM NIDHI (on-call MD unless specified) Completed     7/29/2022  5:03 PM TAHIR (on-call MD unless specified) Details              Discharge Exam:  Afebrile vital signs stable.  Well-developed well-nourished female no apparent distress.  Lungs clear to auscultation good air movement.  Heart presently regular rate and rhythm.  Extremity no clubbing cyanosis or edema.  Alert articulate cooperative pleasant.     Disposition:  Home    Patient Instructions:      Discharge Medications      New Medications      Instructions Start Date   atenolol 25 MG tablet  Commonly known as: TENORMIN   25 mg, Oral, Every 12 Hours Scheduled      benzonatate 200 MG capsule  Commonly known as: TESSALON   200 mg, Oral, 3 Times Daily PRN      clopidogrel 75 MG tablet  Commonly known as: PLAVIX   75 mg, Oral, Daily   Start Date: August 2, 2022     digoxin 125 MCG tablet  Commonly known as: LANOXIN   125 mcg, Oral, Daily Digoxin   Start Date: August 2, 2022        Continue These Medications      Instructions Start Date   apixaban 5 MG tablet tablet  Commonly known as: Eliquis   5 mg, Oral, Every 12 Hours      cetirizine 10 MG tablet  Commonly known as: zyrTEC   1 OTC tab po QDay for allergies      cholecalciferol 25 MCG (1000 UT) tablet  Commonly known as: VITAMIN D3   1,000 Units, Oral, 2 Times Daily      fluticasone 50 MCG/ACT nasal spray  Commonly known as: FLONASE   Nasal, Daily PRN      Pitavastatin Calcium 4 MG tablet   4 mg, Oral, Daily         Stop These Medications    aspirin 81 MG EC tablet     metoprolol tartrate 50 MG tablet  Commonly known as: LOPRESSOR          Diet Instructions     Diet: Cardiac      Discharge Diet: Cardiac        Future Appointments   Date Time Provider Department Center   8/15/2022  9:00 AM Callum Benedict MD [facility]     Additional Instructions for the Follow-ups that You Need to Schedule     Discharge Follow-up with PCP   As directed        Currently Documented PCP:    Angelita Vital MD    PCP Phone Number:    [phone]     Follow Up Details: 1 week         Discharge Follow-up with Specialty: Vascular surgery.  Cardiology.  Neurology.   As directed      Specialty: Vascular surgery.  Cardiology.  Neurology.    Follow Up Details: As directed.            Follow-up Information     Mason Quiroga MD Follow up in 2 week(s).    Specialty: Vascular Surgery  Why: Patient is to have MRI of the brain at open MRI prior to follow-up.  Hospitalist to set this up prior to discharge.  Contact information:  [address]  [address]  [facility] [id number] [phone]             Angelita Vital MD .    Specialties: Internal Medicine, Internal Medicine & Pediatrics  Why: 1 week  Contact information:  [address]  [address]  [facility] [id number] [phone]                       Discharge Order (From admission, onward)     Start     Ordered    08/01/22 1508  Discharge patient  Once        Expected Discharge Date: 08/01/22    Discharge Disposition: Home or Self Care    Physician of Record for Attribution - Please select from Treatment Team: GUME ADLER [[id number]]    Review needed by CMO to determine Physician of Record: No       Question Answer Comment   Physician of Record for Attribution - Please select from Treatment Team GUME ADLER    Review needed by CMO to determine Physician of Record No        08/01/22 1515                  Total time spent discharging patient including evaluation,post hospitalization follow up,  medication and post hospitalization instructions and education total time exceeds 30 minutes.    Signed:  Gume Adler MD  8/1/2022  15:48 EDT    EMR Dragon/Transcription disclaimer:   Much of this encounter note is an electronic transcription/translation of spoken language to printed text. The electronic translation of spoken language may permit erroneous, or at times, nonsensical words or phrases to be  inadvertently transcribed; Although I have reviewed the note for such errors, some may still exist.       Electronically signed by Gume Adler MD at 08/01/22 3069

## 2022-08-08 ENCOUNTER — APPOINTMENT (OUTPATIENT)
Dept: CT IMAGING | Facility: HOSPITAL | Age: 62
End: 2022-08-08

## 2022-08-08 ENCOUNTER — HOSPITAL ENCOUNTER (OUTPATIENT)
Facility: HOSPITAL | Age: 62
Discharge: HOME | End: 2022-08-10
Attending: EMERGENCY MEDICINE | Admitting: INTERNAL MEDICINE

## 2022-08-08 ENCOUNTER — APPOINTMENT (OUTPATIENT)
Dept: GENERAL RADIOLOGY | Facility: HOSPITAL | Age: 62
End: 2022-08-08

## 2022-08-08 ENCOUNTER — TELEPHONE (OUTPATIENT)
Dept: CARDIOLOGY | Facility: CLINIC | Age: 62
End: 2022-08-08

## 2022-08-08 DIAGNOSIS — R06.00 ACUTE DYSPNEA: Primary | ICD-10-CM

## 2022-08-08 DIAGNOSIS — I10 HYPERTENSION, UNSPECIFIED TYPE: ICD-10-CM

## 2022-08-08 PROBLEM — R06.09 EXERTIONAL DYSPNEA: Status: ACTIVE | Noted: 2022-08-08

## 2022-08-08 LAB
ALBUMIN SERPL-MCNC: 4.2 G/DL (ref 3.5–5.2)
ALBUMIN/GLOB SERPL: 1.7 G/DL
ALP SERPL-CCNC: 93 U/L (ref 39–117)
ALT SERPL W P-5'-P-CCNC: 60 U/L (ref 1–33)
ANION GAP SERPL CALCULATED.3IONS-SCNC: 12.6 MMOL/L (ref 5–15)
AST SERPL-CCNC: 31 U/L (ref 1–32)
B PARAPERT DNA SPEC QL NAA+PROBE: NOT DETECTED
B PERT DNA SPEC QL NAA+PROBE: NOT DETECTED
BASOPHILS # BLD AUTO: 0.05 10*3/MM3 (ref 0–0.2)
BASOPHILS NFR BLD AUTO: 0.6 % (ref 0–1.5)
BILIRUB SERPL-MCNC: 0.4 MG/DL (ref 0–1.2)
BUN SERPL-MCNC: 11 MG/DL (ref 8–23)
BUN/CREAT SERPL: 16.7 (ref 7–25)
C PNEUM DNA NPH QL NAA+NON-PROBE: NOT DETECTED
CALCIUM SPEC-SCNC: 8.6 MG/DL (ref 8.6–10.5)
CHLORIDE SERPL-SCNC: 104 MMOL/L (ref 98–107)
CO2 SERPL-SCNC: 25.4 MMOL/L (ref 22–29)
CREAT SERPL-MCNC: 0.66 MG/DL (ref 0.57–1)
DEPRECATED RDW RBC AUTO: 41.3 FL (ref 37–54)
EGFRCR SERPLBLD CKD-EPI 2021: 99.9 ML/MIN/1.73
EOSINOPHIL # BLD AUTO: 0.27 10*3/MM3 (ref 0–0.4)
EOSINOPHIL NFR BLD AUTO: 3.2 % (ref 0.3–6.2)
ERYTHROCYTE [DISTWIDTH] IN BLOOD BY AUTOMATED COUNT: 13.5 % (ref 12.3–15.4)
FLUAV SUBTYP SPEC NAA+PROBE: NOT DETECTED
FLUBV RNA ISLT QL NAA+PROBE: NOT DETECTED
GLOBULIN UR ELPH-MCNC: 2.5 GM/DL
GLUCOSE SERPL-MCNC: 126 MG/DL (ref 65–99)
HADV DNA SPEC NAA+PROBE: NOT DETECTED
HCOV 229E RNA SPEC QL NAA+PROBE: NOT DETECTED
HCOV HKU1 RNA SPEC QL NAA+PROBE: NOT DETECTED
HCOV NL63 RNA SPEC QL NAA+PROBE: NOT DETECTED
HCOV OC43 RNA SPEC QL NAA+PROBE: NOT DETECTED
HCT VFR BLD AUTO: 34 % (ref 34–46.6)
HGB BLD-MCNC: 11.1 G/DL (ref 12–15.9)
HMPV RNA NPH QL NAA+NON-PROBE: NOT DETECTED
HOLD SPECIMEN: NORMAL
HOLD SPECIMEN: NORMAL
HPIV1 RNA ISLT QL NAA+PROBE: NOT DETECTED
HPIV2 RNA SPEC QL NAA+PROBE: NOT DETECTED
HPIV3 RNA NPH QL NAA+PROBE: NOT DETECTED
HPIV4 P GENE NPH QL NAA+PROBE: NOT DETECTED
IMM GRANULOCYTES # BLD AUTO: 0.05 10*3/MM3 (ref 0–0.05)
IMM GRANULOCYTES NFR BLD AUTO: 0.6 % (ref 0–0.5)
LYMPHOCYTES # BLD AUTO: 1.59 10*3/MM3 (ref 0.7–3.1)
LYMPHOCYTES NFR BLD AUTO: 18.6 % (ref 19.6–45.3)
M PNEUMO IGG SER IA-ACNC: NOT DETECTED
MCH RBC QN AUTO: 28 PG (ref 26.6–33)
MCHC RBC AUTO-ENTMCNC: 32.6 G/DL (ref 31.5–35.7)
MCV RBC AUTO: 85.9 FL (ref 79–97)
MONOCYTES # BLD AUTO: 0.42 10*3/MM3 (ref 0.1–0.9)
MONOCYTES NFR BLD AUTO: 4.9 % (ref 5–12)
NEUTROPHILS NFR BLD AUTO: 6.19 10*3/MM3 (ref 1.7–7)
NEUTROPHILS NFR BLD AUTO: 72.1 % (ref 42.7–76)
NRBC BLD AUTO-RTO: 0 /100 WBC (ref 0–0.2)
NT-PROBNP SERPL-MCNC: 339 PG/ML (ref 0–900)
PLATELET # BLD AUTO: 254 10*3/MM3 (ref 140–450)
PMV BLD AUTO: 10.3 FL (ref 6–12)
POTASSIUM SERPL-SCNC: 4.4 MMOL/L (ref 3.5–5.2)
PROT SERPL-MCNC: 6.7 G/DL (ref 6–8.5)
RBC # BLD AUTO: 3.96 10*6/MM3 (ref 3.77–5.28)
RHINOVIRUS RNA SPEC NAA+PROBE: NOT DETECTED
RSV RNA NPH QL NAA+NON-PROBE: NOT DETECTED
SARS-COV-2 RNA NPH QL NAA+NON-PROBE: NOT DETECTED
SODIUM SERPL-SCNC: 142 MMOL/L (ref 136–145)
TROPONIN T SERPL-MCNC: <0.01 NG/ML (ref 0–0.03)
TROPONIN T SERPL-MCNC: <0.01 NG/ML (ref 0–0.03)
WBC NRBC COR # BLD: 8.57 10*3/MM3 (ref 3.4–10.8)
WHOLE BLOOD HOLD COAG: NORMAL
WHOLE BLOOD HOLD SPECIMEN: NORMAL

## 2022-08-08 PROCEDURE — 71046 X-RAY EXAM CHEST 2 VIEWS: CPT

## 2022-08-08 PROCEDURE — 0202U NFCT DS 22 TRGT SARS-COV-2: CPT | Performed by: EMERGENCY MEDICINE

## 2022-08-08 PROCEDURE — 80053 COMPREHEN METABOLIC PANEL: CPT

## 2022-08-08 PROCEDURE — 84484 ASSAY OF TROPONIN QUANT: CPT | Performed by: EMERGENCY MEDICINE

## 2022-08-08 PROCEDURE — 99284 EMERGENCY DEPT VISIT MOD MDM: CPT

## 2022-08-08 PROCEDURE — 85025 COMPLETE CBC W/AUTO DIFF WBC: CPT

## 2022-08-08 PROCEDURE — 0 IOPAMIDOL PER 1 ML: Performed by: EMERGENCY MEDICINE

## 2022-08-08 PROCEDURE — 84484 ASSAY OF TROPONIN QUANT: CPT

## 2022-08-08 PROCEDURE — 71275 CT ANGIOGRAPHY CHEST: CPT

## 2022-08-08 PROCEDURE — G0378 HOSPITAL OBSERVATION PER HR: HCPCS

## 2022-08-08 PROCEDURE — 93010 ELECTROCARDIOGRAM REPORT: CPT | Performed by: INTERNAL MEDICINE

## 2022-08-08 PROCEDURE — 83880 ASSAY OF NATRIURETIC PEPTIDE: CPT

## 2022-08-08 PROCEDURE — 36415 COLL VENOUS BLD VENIPUNCTURE: CPT

## 2022-08-08 PROCEDURE — 93005 ELECTROCARDIOGRAM TRACING: CPT | Performed by: EMERGENCY MEDICINE

## 2022-08-08 RX ORDER — DIGOXIN 125 MCG
125 TABLET ORAL
Status: DISCONTINUED | OUTPATIENT
Start: 2022-08-09 | End: 2022-08-10 | Stop reason: HOSPADM

## 2022-08-08 RX ORDER — SODIUM CHLORIDE 0.9 % (FLUSH) 0.9 %
10 SYRINGE (ML) INJECTION AS NEEDED
Status: DISCONTINUED | OUTPATIENT
Start: 2022-08-08 | End: 2022-08-10 | Stop reason: HOSPADM

## 2022-08-08 RX ORDER — NITROGLYCERIN 0.4 MG/1
0.4 TABLET SUBLINGUAL
Status: DISCONTINUED | OUTPATIENT
Start: 2022-08-08 | End: 2022-08-10 | Stop reason: HOSPADM

## 2022-08-08 RX ORDER — HYDROMORPHONE HYDROCHLORIDE 1 MG/ML
0.5 INJECTION, SOLUTION INTRAMUSCULAR; INTRAVENOUS; SUBCUTANEOUS
Status: DISCONTINUED | OUTPATIENT
Start: 2022-08-08 | End: 2022-08-10 | Stop reason: HOSPADM

## 2022-08-08 RX ORDER — BENZONATATE 100 MG/1
200 CAPSULE ORAL 3 TIMES DAILY PRN
Status: DISCONTINUED | OUTPATIENT
Start: 2022-08-08 | End: 2022-08-10 | Stop reason: HOSPADM

## 2022-08-08 RX ORDER — NALOXONE HCL 0.4 MG/ML
0.4 VIAL (ML) INJECTION
Status: DISCONTINUED | OUTPATIENT
Start: 2022-08-08 | End: 2022-08-10 | Stop reason: HOSPADM

## 2022-08-08 RX ORDER — NITROGLYCERIN 0.4 MG/1
0.4 TABLET SUBLINGUAL
Status: DISCONTINUED | OUTPATIENT
Start: 2022-08-08 | End: 2022-08-08 | Stop reason: SDUPTHER

## 2022-08-08 RX ORDER — SODIUM CHLORIDE 0.9 % (FLUSH) 0.9 %
10 SYRINGE (ML) INJECTION EVERY 12 HOURS SCHEDULED
Status: DISCONTINUED | OUTPATIENT
Start: 2022-08-08 | End: 2022-08-09

## 2022-08-08 RX ORDER — ATORVASTATIN CALCIUM 20 MG/1
40 TABLET, FILM COATED ORAL DAILY
Status: DISCONTINUED | OUTPATIENT
Start: 2022-08-09 | End: 2022-08-10 | Stop reason: HOSPADM

## 2022-08-08 RX ORDER — CETIRIZINE HYDROCHLORIDE 10 MG/1
5 TABLET ORAL NIGHTLY
Status: DISCONTINUED | OUTPATIENT
Start: 2022-08-08 | End: 2022-08-09

## 2022-08-08 RX ORDER — CLOPIDOGREL BISULFATE 75 MG/1
75 TABLET ORAL DAILY
Status: DISCONTINUED | OUTPATIENT
Start: 2022-08-09 | End: 2022-08-10 | Stop reason: HOSPADM

## 2022-08-08 RX ORDER — HYDROCODONE BITARTRATE AND ACETAMINOPHEN 5; 325 MG/1; MG/1
1 TABLET ORAL EVERY 4 HOURS PRN
Status: DISCONTINUED | OUTPATIENT
Start: 2022-08-08 | End: 2022-08-10 | Stop reason: HOSPADM

## 2022-08-08 RX ORDER — ATENOLOL 25 MG/1
25 TABLET ORAL EVERY 12 HOURS SCHEDULED
Status: DISCONTINUED | OUTPATIENT
Start: 2022-08-08 | End: 2022-08-10 | Stop reason: HOSPADM

## 2022-08-08 RX ORDER — FLUTICASONE PROPIONATE 50 MCG
1 SPRAY, SUSPENSION (ML) NASAL DAILY
Status: DISCONTINUED | OUTPATIENT
Start: 2022-08-09 | End: 2022-08-09

## 2022-08-08 RX ADMIN — IOPAMIDOL 100 ML: 755 INJECTION, SOLUTION INTRAVENOUS at 19:49

## 2022-08-08 NOTE — TELEPHONE ENCOUNTER
It is hard to say.  It really depends on how fast the atrial fibrillation is and whether it is causing the shortness of breath, or whether there is another cause.  Her aortic stenosis was moderate, and her stress test was normal.  She may need to see EP and potentially be cardioverted.  Unfortunately, these are things I cannot determine as an outpatient.  If she is severely short of breath, going to the ER is the best plan, and we would have to take it from there.  I am not covering the hospital this week, but we could potentially have Dr. Gomez see her in the hospital as a consult if the atrial fibrillation is felt to be part of the problem.      GM

## 2022-08-08 NOTE — TELEPHONE ENCOUNTER
08/08/22   Humphrey called for pt wanting to know what to do? He states she is extremely SOA and Fatigue.   He was wanting a plan in place before he took her to hospital.   Please advise   Sorin's call back # [phone]   Thanks John ASENCIO

## 2022-08-08 NOTE — ED PROVIDER NOTES
EMERGENCY DEPARTMENT ENCOUNTER    Room Number:  2210/1  PCP: Angelita Vital MD  Historian: Patient  History Limited By: Nothing      HPI  Chief Complaint: Shortness of breath  Context: Re Sheldon is a 62 y.o. female who presents to the ED c/o shortness of breath.  Patient recently admitted for atrial fibrillation with rapid ventricular response.  Patient had echo that showed moderate aortic stenosis.  Stress test that was negative.  Patient states she has had worsening shortness of breath with minimal exertion.  Had COVID in July.  Patient has had no lower extremity swelling.  No chest pain pressure tightness.  Has had no fevers or chills.  Has had chronic cough that she has felt was probably bronchitis.      Location: Shortness of breath  Radiation: None  Character: Worse with exertion  Duration: May worse over the last few days  Severity: Moderate  Progression: Worsening  Aggravating Factors: Exertion  Alleviating Factors: Rest        MEDICAL RECORD REVIEW    Recent admission for atrial fibrillation          PAST MEDICAL HISTORY  Active Ambulatory Problems     Diagnosis Date Noted   • Abdominal pain 07/29/2016   • Acute bronchitis 07/29/2016   • Lumbar radiculopathy 07/29/2016   • Leg pain 07/29/2016   • Acute antritis 07/29/2016   • Bulging of lumbar intervertebral disc 07/29/2016   • Chest pain 07/29/2016   • Chronic right-sided low back pain without sciatica 07/29/2016   • DDD (degenerative disc disease), lumbar 07/29/2016   • Dysfunction of eustachian tube 07/29/2016   • Fatigue 07/29/2016   • Hyperlipidemia 07/29/2016   • BP (high blood pressure) 07/29/2016   • Abnormal fasting glucose 07/29/2016   • Gonalgia 07/29/2016   • Maxillary antritis 07/29/2016   • Neck swelling 07/29/2016   • Health care maintenance 07/29/2016   • Allergic rhinitis 07/29/2016   • Bilateral carpal tunnel syndrome 12/15/2016   • Degenerative lumbar spinal stenosis 12/15/2016   • Valvular insufficiency 12/15/2016   • Chest  tightness or pressure 2017   • Family history of early CAD 2017   • EKG, abnormal 2017   • PVC (premature ventricular contraction) 2017   • Mitral valve insufficiency 2017   • Vitamin D deficiency 2018   • Meralgia paresthetica of left side 10/26/2020   • Family history of colon cancer 2020   • Atrial fibrillation with rapid ventricular response (HCC) 2022   • Hypertension      Resolved Ambulatory Problems     Diagnosis Date Noted   • No Resolved Ambulatory Problems     Past Medical History:   Diagnosis Date   • Abnormal ECG    • Anemia    • Atrial fibrillation (HCC)    • Calcification of aortic valve    • Coronary artery disease    • COVID-19 2022   • Low back pain    • Mitral regurgitation    • PAF (paroxysmal atrial fibrillation) ([facility])    • Palpitations    • Premature atrial contractions    • Spinal stenosis    • Systolic murmur    • Tonsillitis          PAST SURGICAL HISTORY  Past Surgical History:   Procedure Laterality Date   • ADENOIDECTOMY     • CARPAL TUNNEL RELEASE Bilateral    • COLONOSCOPY  2014    repeat 10 years , Dr. Karen Spear   • COLONOSCOPY N/A 2021    Procedure: COLONOSCOPY to cecum with cold biopsy polypectomy;  Surgeon: Ray Hairston MD;  Location: [facility] ENDOSCOPY;  Service: Gastroenterology;  Laterality: N/A;  pre: screening  post: diverticulosis, polyp, internal hemorrhoids   • FOOT SURGERY     • HAND SURGERY     • TONSILLECTOMY     • TONSILLECTOMY AND ADENOIDECTOMY     • TUBAL ABDOMINAL LIGATION           FAMILY HISTORY  Family History   Problem Relation Age of Onset   • Heart attack Other    • Breast cancer Other    • Heart disease Other    • Heart disease Mother         Mother with MI at 63, and later  from CHF   • Heart disease Father         Father  from complications after valve replacement at 73   • Stroke Father    • Breast cancer Sister    • Malig Hyperthermia Neg Hx          SOCIAL HISTORY  Social  History     Socioeconomic History   • Marital status:      Spouse name: Kedar   • Number of children: 2   Tobacco Use   • Smoking status: Never Smoker   • Smokeless tobacco: Never Used   Vaping Use   • Vaping Use: Never used   Substance and Sexual Activity   • Alcohol use: No     Comment: Caffeine use 3-4 cups daily   • Drug use: No         ALLERGIES  Sulfa antibiotics, Augmentin [amoxicillin-pot clavulanate], and Bactrim [sulfamethoxazole-trimethoprim]        REVIEW OF SYSTEMS  Review of Systems   Constitutional: Negative for fever.   HENT: Negative for sore throat.    Eyes: Positive for visual disturbance.   Respiratory: Positive for cough and shortness of breath.    Cardiovascular: Negative for chest pain.   Gastrointestinal: Negative for abdominal pain, diarrhea and vomiting.   Genitourinary: Negative for dysuria.   Musculoskeletal: Negative for neck pain.   Skin: Negative for rash.   Allergic/Immunologic: Negative.    Neurological: Negative for weakness, numbness and headaches.   Hematological: Negative.    Psychiatric/Behavioral: Negative.    All other systems reviewed and are negative.           PHYSICAL EXAM  ED Triage Vitals [08/08/22 1539]   Temp Heart Rate Resp BP SpO2   98.9 °F (37.2 °C) 63 20 -- 98 %      Temp src Heart Rate Source Patient Position BP Location FiO2 (%)   Tympanic Monitor -- -- --       Physical Exam  Vitals and nursing note reviewed.   Constitutional:       General: She is not in acute distress.  HENT:      Head: Normocephalic and atraumatic.   Eyes:      Pupils: Pupils are equal, round, and reactive to light.   Cardiovascular:      Rate and Rhythm: Normal rate and regular rhythm.      Heart sounds: Normal heart sounds.   Pulmonary:      Effort: Pulmonary effort is normal. No respiratory distress.      Breath sounds: Normal breath sounds.   Abdominal:      Palpations: Abdomen is soft.      Tenderness: There is no abdominal tenderness. There is no guarding or rebound.    Musculoskeletal:         General: Normal range of motion.      Cervical back: Normal range of motion and neck supple.   Skin:     General: Skin is warm and dry.      Findings: No rash.   Neurological:      Mental Status: She is alert and oriented to person, place, and time.      Sensory: Sensation is intact.      Motor: No weakness.   Psychiatric:         Mood and Affect: Mood and affect normal.       Patient was wearing a face mask when I entered the room and they continued to wear a mask throughout their stay in the ED.  I wore PPE, including gloves, face mask with shield or face mask with goggles whenever I was in the room with patient.       LAB RESULTS  Recent Results (from the past 24 hour(s))   Comprehensive Metabolic Panel    Collection Time: 08/08/22  3:48 PM    Specimen: Blood   Result Value Ref Range    Glucose 126 (H) 65 - 99 mg/dL    BUN 11 8 - 23 mg/dL    Creatinine 0.66 0.57 - 1.00 mg/dL    Sodium 142 136 - 145 mmol/L    Potassium 4.4 3.5 - 5.2 mmol/L    Chloride 104 98 - 107 mmol/L    CO2 25.4 22.0 - 29.0 mmol/L    Calcium 8.6 8.6 - 10.5 mg/dL    Total Protein 6.7 6.0 - 8.5 g/dL    Albumin 4.20 3.50 - 5.20 g/dL    ALT (SGPT) 60 (H) 1 - 33 U/L    AST (SGOT) 31 1 - 32 U/L    Alkaline Phosphatase 93 39 - 117 U/L    Total Bilirubin 0.4 0.0 - 1.2 mg/dL    Globulin 2.5 gm/dL    A/G Ratio 1.7 g/dL    BUN/Creatinine Ratio 16.7 7.0 - 25.0    Anion Gap 12.6 5.0 - 15.0 mmol/L    eGFR 99.9 >60.0 mL/min/1.73   BNP    Collection Time: 08/08/22  3:48 PM    Specimen: Blood   Result Value Ref Range    proBNP 339.0 0.0 - 900.0 pg/mL   Troponin    Collection Time: 08/08/22  3:48 PM    Specimen: Blood   Result Value Ref Range    Troponin T <0.010 0.000 - 0.030 ng/mL   Green Top (Gel)    Collection Time: 08/08/22  3:48 PM   Result Value Ref Range    Extra Tube Hold for add-ons.    Lavender Top    Collection Time: 08/08/22  3:48 PM   Result Value Ref Range    Extra Tube hold for add-on    Gold Top - SST    Collection  Time: 08/08/22  3:48 PM   Result Value Ref Range    Extra Tube Hold for add-ons.    Light Blue Top    Collection Time: 08/08/22  3:48 PM   Result Value Ref Range    Extra Tube Hold for add-ons.    CBC Auto Differential    Collection Time: 08/08/22  3:48 PM    Specimen: Blood   Result Value Ref Range    WBC 8.57 3.40 - 10.80 10*3/mm3    RBC 3.96 3.77 - 5.28 10*6/mm3    Hemoglobin 11.1 (L) 12.0 - 15.9 g/dL    Hematocrit 34.0 34.0 - 46.6 %    MCV 85.9 79.0 - 97.0 fL    MCH 28.0 26.6 - 33.0 pg    MCHC 32.6 31.5 - 35.7 g/dL    RDW 13.5 12.3 - 15.4 %    RDW-SD 41.3 37.0 - 54.0 fl    MPV 10.3 6.0 - 12.0 fL    Platelets 254 140 - 450 10*3/mm3    Neutrophil % 72.1 42.7 - 76.0 %    Lymphocyte % 18.6 (L) 19.6 - 45.3 %    Monocyte % 4.9 (L) 5.0 - 12.0 %    Eosinophil % 3.2 0.3 - 6.2 %    Basophil % 0.6 0.0 - 1.5 %    Immature Grans % 0.6 (H) 0.0 - 0.5 %    Neutrophils, Absolute 6.19 1.70 - 7.00 10*3/mm3    Lymphocytes, Absolute 1.59 0.70 - 3.10 10*3/mm3    Monocytes, Absolute 0.42 0.10 - 0.90 10*3/mm3    Eosinophils, Absolute 0.27 0.00 - 0.40 10*3/mm3    Basophils, Absolute 0.05 0.00 - 0.20 10*3/mm3    Immature Grans, Absolute 0.05 0.00 - 0.05 10*3/mm3    nRBC 0.0 0.0 - 0.2 /100 WBC   ECG 12 Lead    Collection Time: 08/08/22  7:19 PM   Result Value Ref Range    QT Interval 425 ms   Respiratory Panel PCR w/COVID-19(SARS-CoV-2) HEIDY/KORIN/ZOILA/PAD/COR/MAD/MALENA In-House, NP Swab in UTM/VTM, 3-4 HR TAT - Swab, Nasopharynx    Collection Time: 08/08/22  7:27 PM    Specimen: Nasopharynx; Swab   Result Value Ref Range    ADENOVIRUS, PCR Not Detected Not Detected    Coronavirus 229E Not Detected Not Detected    Coronavirus HKU1 Not Detected Not Detected    Coronavirus NL63 Not Detected Not Detected    Coronavirus OC43 Not Detected Not Detected    COVID19 Not Detected Not Detected - Ref. Range    Human Metapneumovirus Not Detected Not Detected    Human Rhinovirus/Enterovirus Not Detected Not Detected    Influenza A PCR Not Detected Not  Detected    Influenza B PCR Not Detected Not Detected    Parainfluenza Virus 1 Not Detected Not Detected    Parainfluenza Virus 2 Not Detected Not Detected    Parainfluenza Virus 3 Not Detected Not Detected    Parainfluenza Virus 4 Not Detected Not Detected    RSV, PCR Not Detected Not Detected    Bordetella pertussis pcr Not Detected Not Detected    Bordetella parapertussis PCR Not Detected Not Detected    Chlamydophila pneumoniae PCR Not Detected Not Detected    Mycoplasma pneumo by PCR Not Detected Not Detected   Troponin    Collection Time: 08/08/22  8:51 PM    Specimen: Arm, Left; Blood   Result Value Ref Range    Troponin T <0.010 0.000 - 0.030 ng/mL       Ordered the above labs and reviewed the results.        RADIOLOGY  CT Angiogram Chest   Final Result   Negative CT angiogram of the chest with contrast.       Radiation dose reduction techniques were utilized, including automated   exposure control and exposure modulation based on body size.       This report was finalized on 8/8/2022 8:31 PM by Dr. Hever Fairchild M.D.          XR Chest 2 View   Final Result   Negative.       This report was finalized on 8/8/2022 4:16 PM by Dr. Jamil Fontana M.D.               Ordered the above noted radiological studies. Reviewed by me in PACS.  Discussed with Dr. Fairchild (radiologist) regarding CT/MRI scan results.          PROCEDURES  Procedures      EKG:          EKG time: 1919  Rhythm/Rate: Sinus bradycardia 58  P waves and CO: Normal P waves  QRS, axis: Normal QRS  ST and T waves: T wave inversion diffuse    Interpreted Contemporaneously by me, independently viewed  Changed compared to prior 7/29/2022.  A. fib has resolved        MEDICATIONS GIVEN IN ER  Medications   sodium chloride 0.9 % flush 10 mL (has no administration in time range)   fluticasone (FLONASE) 50 MCG/ACT nasal spray 1 spray (has no administration in time range)   cetirizine (zyrTEC) tablet 5 mg (has no administration in time range)   atenolol  (TENORMIN) tablet 25 mg (has no administration in time range)   digoxin (LANOXIN) tablet 125 mcg (has no administration in time range)   clopidogrel (PLAVIX) tablet 75 mg (has no administration in time range)   benzonatate (TESSALON) capsule 200 mg (has no administration in time range)   atorvastatin (LIPITOR) tablet 40 mg (has no administration in time range)   sodium chloride 0.9 % flush 10 mL (has no administration in time range)   sodium chloride 0.9 % flush 10 mL (has no administration in time range)   HYDROcodone-acetaminophen (NORCO) 5-325 MG per tablet 1 tablet (has no administration in time range)   HYDROmorphone (DILAUDID) injection 0.5 mg (has no administration in time range)     And   naloxone (NARCAN) injection 0.4 mg (has no administration in time range)   nitroglycerin (NITROSTAT) SL tablet 0.4 mg (has no administration in time range)   iopamidol (ISOVUE-370) 76 % injection 100 mL (100 mL Intravenous Given 8/8/22 1949)             PROGRESS AND CONSULTS  ED Course as of 08/09/22 0015   Mon Aug 08, 2022   2119 21:20 EDT  Patient with dyspnea on exertion of unclear etiology.  Patient CT scan shows no evidence of pulmonary embolism or pneumonia.  No evidence of pneumothorax.  No evidence of congestive heart failure.  Patient's troponin negative and EKG shows T wave changes that have been present in the past.  Patient is currently not in A. fib.  Patient discussed with Dr. Mai who also talked with Dr. Benedict.  Will be admitted for further eval. [SL]      ED Course User Index  [SL] Anshul Ortiz MD           MEDICAL DECISION MAKING      MDM  Number of Diagnoses or Management Options     Amount and/or Complexity of Data Reviewed  Clinical lab tests: reviewed and ordered  Tests in the radiology section of CPT®: reviewed and ordered (CT chest negative)  Decide to obtain previous medical records or to obtain history from someone other than the patient: yes  Discuss the patient with other  providers: yes (Discussed with Dr. Mai who will admit)               DIAGNOSIS  Final diagnoses:   Acute dyspnea           DISPOSITION  admit        Latest Documented Vital Signs:  As of 00:15 EDT  BP- 160/88 HR- 56 Temp- 98.9 °F (37.2 °C) (Oral) O2 sat- 97%                     Anshul Ortiz MD  08/09/22 0017

## 2022-08-08 NOTE — TELEPHONE ENCOUNTER
"Called Humphrey Re Sheldon' spouse, for additional information.    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  Humphrey reports after patient developed COVID and bronchitis in late June, patient has continued to cough to the point where \"it takes her breath\".  Additionally, patient becomes extremely short of breath with very minimal activity.  Per Humphrey, patient will walk to the restroom and will be winded and worn out after this short walk.    Humphrey reports O2 saturation 97% after ambulating, with HR in the 80s.    Humphrey stated that since medication changes after last admission, they do not believe patient has been in atrial fibrillation (resting HR in the 70's).    Humphrey expressed concern that patient's shortness of breath will continue to deteriorate.  Reviewed Dr. Benedict's message with Humphrey and he verbalized understanding.  Explained that I will discuss conversation with Dr. Benedict and will return call with additional recommendations.    Discussed with Dr. Benedict: patient is scheduled to see me next week, but if shortness of breath is severe, best place for patient is the ED for evaluation; patient needs to be evaluated to determine need for further testing    Reviewed recommendations with humphreyRe' spouse and he verbalized understanding.    Please let me know if there is anything else you would like me to do for this patient.    Thank you,  Lucille Dunham RN  Triage Nurse [facility] [place]   "

## 2022-08-08 NOTE — ED NOTES
Pt arrived by PV reports increase in the last 3 days SOA. Pt reports Cardiology sent pt to be seen     Patient was placed in face mask during first look triage.  Patient was wearing a face mask throughout encounter.  I wore personal protective equipment throughout the encounter.  Hand hygiene was performed before and after patient encounter.

## 2022-08-08 NOTE — ED NOTES
Pt c/o shortness of breath progressively getting worse since May. Pt states she has had a cough since July 8th.

## 2022-08-09 LAB
ANION GAP SERPL CALCULATED.3IONS-SCNC: 9.1 MMOL/L (ref 5–15)
BUN SERPL-MCNC: 14 MG/DL (ref 8–23)
BUN/CREAT SERPL: 23 (ref 7–25)
CALCIUM SPEC-SCNC: 8.5 MG/DL (ref 8.6–10.5)
CHLORIDE SERPL-SCNC: 107 MMOL/L (ref 98–107)
CO2 SERPL-SCNC: 26.9 MMOL/L (ref 22–29)
CREAT SERPL-MCNC: 0.61 MG/DL (ref 0.57–1)
EGFRCR SERPLBLD CKD-EPI 2021: 101.9 ML/MIN/1.73
GLUCOSE SERPL-MCNC: 99 MG/DL (ref 65–99)
POTASSIUM SERPL-SCNC: 4.1 MMOL/L (ref 3.5–5.2)
QT INTERVAL: 425 MS
SODIUM SERPL-SCNC: 143 MMOL/L (ref 136–145)

## 2022-08-09 PROCEDURE — 80048 BASIC METABOLIC PNL TOTAL CA: CPT | Performed by: INTERNAL MEDICINE

## 2022-08-09 PROCEDURE — 82810 BLOOD GASES O2 SAT ONLY: CPT

## 2022-08-09 PROCEDURE — G0378 HOSPITAL OBSERVATION PER HR: HCPCS

## 2022-08-09 PROCEDURE — 99220 PR INITIAL OBSERVATION CARE/DAY 70 MINUTES: CPT | Performed by: INTERNAL MEDICINE

## 2022-08-09 PROCEDURE — 85018 HEMOGLOBIN: CPT

## 2022-08-09 PROCEDURE — 0 IOPAMIDOL PER 1 ML: Performed by: INTERNAL MEDICINE

## 2022-08-09 PROCEDURE — 25010000002 FUROSEMIDE PER 20 MG: Performed by: INTERNAL MEDICINE

## 2022-08-09 PROCEDURE — C1894 INTRO/SHEATH, NON-LASER: HCPCS | Performed by: INTERNAL MEDICINE

## 2022-08-09 PROCEDURE — 25010000002 FENTANYL CITRATE (PF) 50 MCG/ML SOLUTION: Performed by: INTERNAL MEDICINE

## 2022-08-09 PROCEDURE — 93460 R&L HRT ART/VENTRICLE ANGIO: CPT | Performed by: INTERNAL MEDICINE

## 2022-08-09 PROCEDURE — C1769 GUIDE WIRE: HCPCS | Performed by: INTERNAL MEDICINE

## 2022-08-09 PROCEDURE — 99152 MOD SED SAME PHYS/QHP 5/>YRS: CPT | Performed by: INTERNAL MEDICINE

## 2022-08-09 PROCEDURE — 85014 HEMATOCRIT: CPT

## 2022-08-09 PROCEDURE — 25010000002 HEPARIN (PORCINE) PER 1000 UNITS: Performed by: INTERNAL MEDICINE

## 2022-08-09 PROCEDURE — 25010000002 MIDAZOLAM PER 1 MG: Performed by: INTERNAL MEDICINE

## 2022-08-09 RX ORDER — SODIUM CHLORIDE 0.9 % (FLUSH) 0.9 %
10 SYRINGE (ML) INJECTION EVERY 12 HOURS SCHEDULED
Status: DISCONTINUED | OUTPATIENT
Start: 2022-08-09 | End: 2022-08-10 | Stop reason: HOSPADM

## 2022-08-09 RX ORDER — FUROSEMIDE 10 MG/ML
60 INJECTION INTRAMUSCULAR; INTRAVENOUS ONCE
Status: COMPLETED | OUTPATIENT
Start: 2022-08-09 | End: 2022-08-09

## 2022-08-09 RX ORDER — MIDAZOLAM HYDROCHLORIDE 1 MG/ML
INJECTION INTRAMUSCULAR; INTRAVENOUS AS NEEDED
Status: DISCONTINUED | OUTPATIENT
Start: 2022-08-09 | End: 2022-08-09 | Stop reason: HOSPADM

## 2022-08-09 RX ORDER — ONDANSETRON 4 MG/1
4 TABLET, FILM COATED ORAL EVERY 6 HOURS PRN
Status: DISCONTINUED | OUTPATIENT
Start: 2022-08-09 | End: 2022-08-10 | Stop reason: HOSPADM

## 2022-08-09 RX ORDER — ONDANSETRON 2 MG/ML
4 INJECTION INTRAMUSCULAR; INTRAVENOUS EVERY 6 HOURS PRN
Status: DISCONTINUED | OUTPATIENT
Start: 2022-08-09 | End: 2022-08-10 | Stop reason: HOSPADM

## 2022-08-09 RX ORDER — SODIUM CHLORIDE 0.9 % (FLUSH) 0.9 %
10 SYRINGE (ML) INJECTION AS NEEDED
Status: DISCONTINUED | OUTPATIENT
Start: 2022-08-09 | End: 2022-08-10 | Stop reason: HOSPADM

## 2022-08-09 RX ORDER — LIDOCAINE HYDROCHLORIDE 20 MG/ML
INJECTION, SOLUTION INFILTRATION; PERINEURAL AS NEEDED
Status: DISCONTINUED | OUTPATIENT
Start: 2022-08-09 | End: 2022-08-09 | Stop reason: HOSPADM

## 2022-08-09 RX ORDER — FENTANYL CITRATE 50 UG/ML
INJECTION, SOLUTION INTRAMUSCULAR; INTRAVENOUS AS NEEDED
Status: DISCONTINUED | OUTPATIENT
Start: 2022-08-09 | End: 2022-08-09 | Stop reason: HOSPADM

## 2022-08-09 RX ORDER — SODIUM CHLORIDE 9 MG/ML
INJECTION, SOLUTION INTRAVENOUS CONTINUOUS PRN
Status: DISCONTINUED | OUTPATIENT
Start: 2022-08-09 | End: 2022-08-09 | Stop reason: HOSPADM

## 2022-08-09 RX ADMIN — FUROSEMIDE 60 MG: 10 INJECTION, SOLUTION INTRAMUSCULAR; INTRAVENOUS at 16:44

## 2022-08-09 RX ADMIN — CETIRIZINE HYDROCHLORIDE 5 MG: 10 TABLET ORAL at 01:12

## 2022-08-09 RX ADMIN — BENZONATATE 200 MG: 100 CAPSULE ORAL at 01:12

## 2022-08-09 RX ADMIN — BENZONATATE 200 MG: 100 CAPSULE ORAL at 21:42

## 2022-08-09 RX ADMIN — BENZONATATE 200 MG: 100 CAPSULE ORAL at 08:33

## 2022-08-09 RX ADMIN — CLOPIDOGREL 75 MG: 75 TABLET, FILM COATED ORAL at 08:31

## 2022-08-09 RX ADMIN — Medication 10 ML: at 20:30

## 2022-08-09 RX ADMIN — Medication 10 ML: at 01:13

## 2022-08-09 RX ADMIN — ATENOLOL 25 MG: 25 TABLET ORAL at 08:31

## 2022-08-09 RX ADMIN — Medication 10 ML: at 08:33

## 2022-08-09 NOTE — H&P
[facility] History and Physical       Encounter Date:22  Patient:Re Sheldon  :1960  MRN:[id number]    Date of Admission: 2022  Date of Encounter Visit: 22  Encounter Provider: Josemanuel Schmitz MD  Place of Service: [facility]  Patient Care Team:  Angelita Vital MD as PCP - General (Internal Medicine)      Chief Complaint: SOA      History of Presenting Illness:      Ms. Sheldon is a 62 y.o. woman who is followed by Dr. Benedict with past medical history notable for paroxysmal atrial fibrillation, aortic stenosis, mitral valve regurgitation, patent foramen ovale, coronary artery calcification, and aortic atherosclerosis who has recently experienced an ocular stroke and was started on Eliquis.  Unfortunately since then she did develop COVID-19.  Since then she has been feeling poorly.  She was having more shortness of breath as well as issues with atrial fibrillation with rapid ventricular response.  Additionally recently as part of her work-up for ocular she was found to have complex carotid disease was also being followed by vascular.  They did escalate her therapy from aspirin and anticoagulation to clopidogrel and anticoagulation.  Unfortunately she was too claustrophobic to get an MRI as an inpatient was scheduled to have a outpatient MRI but due to ongoing dyspnea on exertion shortness of breath she came back to the emergency room.  There is concerns that she might need further evaluation of her coronary anatomy prior to proceeding forward with surgery as well as to further evaluate her dyspneic dyspnea on exertion and thus was admitted and this morning is feeling pretty much the same.  She has a hoarse voice.  She states that she has exertional dyspnea on exertion.  Rest makes it better.  Does have some chest pressure with it.  Mainly associated with shortness of breath.  Given concerns for stopping anticoagulation for a long period of time is  requested that she have a right left heart catheterization while she was here we will plan on that later today.      Review of Systems:  Review of Systems   Constitutional: Positive for malaise/fatigue.   HENT: Negative.    Eyes: Positive for visual disturbance.   Cardiovascular: Positive for chest pain and dyspnea on exertion.   Respiratory: Positive for shortness of breath.    Endocrine: Negative.    Hematologic/Lymphatic: Negative.    Skin: Negative.    Musculoskeletal: Negative.    Gastrointestinal: Negative.    Genitourinary: Negative.    Neurological: Positive for focal weakness.   Psychiatric/Behavioral: Negative.    Allergic/Immunologic: Negative.        Medications:  Prior to Admission medications    Medication Sig Start Date End Date Taking? Authorizing Provider   apixaban (Eliquis) 5 MG tablet tablet Take 1 tablet by mouth Every 12 (Twelve) Hours. 12/29/21   Yuliana Blair APRN   atenolol (TENORMIN) 25 MG tablet Take 1 tablet by mouth Every 12 (Twelve) Hours. 8/1/22   Gume Adler MD   benzonatate (TESSALON) 200 MG capsule Take 1 capsule by mouth 3 (Three) Times a Day As Needed for Cough. 8/1/22   Gume Adler MD   cetirizine (zyrTEC) 10 MG tablet 1 OTC tab po QDay for allergies 10/17/18   Parish Weeks MD   cholecalciferol (VITAMIN D3) 25 MCG (1000 UT) tablet Take 1,000 Units by mouth 2 (Two) Times a Day.    Provider, MD Linwood   clopidogrel (PLAVIX) 75 MG tablet Take 1 tablet by mouth Daily. 8/2/22   Gume Adler MD   digoxin (LANOXIN) 125 MCG tablet Take 1 tablet by mouth Daily. 8/2/22   Gume Adler MD   fluticasone (FLONASE) 50 MCG/ACT nasal spray into the nostril(s) as directed by provider Daily As Needed. 3/27/14   ProviderLinwood MD   Pitavastatin Calcium 4 MG tablet Take 1 tablet by mouth Daily. 5/5/22   Callum Benedict MD       Allergies   Allergen Reactions   • Sulfa Antibiotics Diarrhea and Rash   • Augmentin [Amoxicillin-Pot  Clavulanate] Diarrhea and Other (See Comments)   • Bactrim [Sulfamethoxazole-Trimethoprim] Diarrhea and Other (See Comments)       Past Medical History:   Diagnosis Date   • Abnormal ECG     Inferolateral ST-T changes at baseline   • Anemia    • Atrial fibrillation (HCC)    • Calcification of aortic valve    • Coronary artery disease    • COVID-19 2022   • Hyperlipidemia    • Hypertension    • Low back pain    • Mitral regurgitation    • PAF (paroxysmal atrial fibrillation) (HCC)    • Palpitations    • Premature atrial contractions    • Spinal stenosis    • Systolic murmur    • Tonsillitis    • Vitamin D deficiency        Past Surgical History:   Procedure Laterality Date   • ADENOIDECTOMY     • CARPAL TUNNEL RELEASE Bilateral    • COLONOSCOPY  2014    repeat 10 years , Dr. Karen Spear   • COLONOSCOPY N/A 2021    Procedure: COLONOSCOPY to cecum with cold biopsy polypectomy;  Surgeon: Ray Hairston MD;  Location: [facility] ENDOSCOPY;  Service: Gastroenterology;  Laterality: N/A;  pre: screening  post: diverticulosis, polyp, internal hemorrhoids   • FOOT SURGERY     • HAND SURGERY     • TONSILLECTOMY     • TONSILLECTOMY AND ADENOIDECTOMY     • TUBAL ABDOMINAL LIGATION         Social History     Socioeconomic History   • Marital status:      Spouse name: Kedar   • Number of children: 2   Tobacco Use   • Smoking status: Never Smoker   • Smokeless tobacco: Never Used   Vaping Use   • Vaping Use: Never used   Substance and Sexual Activity   • Alcohol use: No     Comment: Caffeine use 3-4 cups daily   • Drug use: No       Family History   Problem Relation Age of Onset   • Heart attack Other    • Breast cancer Other    • Heart disease Other    • Heart disease Mother         Mother with MI at 63, and later  from CHF   • Heart disease Father         Father  from complications after valve replacement at 73   • Stroke Father    • Breast cancer Sister    • Malig Hyperthermia Neg Hx           The following portions of the patient's history were reviewed and updated as appropriate: allergies, current medications, past family history, past medical history, past social history, past surgical history and problem list.         Objective:      Temp:  [97.8 °F (36.6 °C)-98.9 °F (37.2 °C)] 98.6 °F (37 °C)  Heart Rate:  [52-73] 73  Resp:  [18-20] 18  BP: (142-167)/(64-88) 158/78   No intake or output data in the 24 hours ending 08/09/22 0800  Body mass index is 30.92 kg/m².      08/08/22  2240   Weight: 79.2 kg (174 lb 8 oz)           Physical Exam:  Constitutional: Well appearing, well developed, no acute distress   HENT: Oropharynx clear and membrane moist  Eyes: Normal conjunctiva, no sclera icterus.  Neck: Supple, no carotid bruit bilaterally.  Cardiovascular: Regular rate and rhythm, Early peaking systolic murmur over the right upper sternal border, No bilateral lower extremity edema.  Pulmonary: Normal respiratory effort, normal lung sounds, no wheezing.  Abdominal: Soft, nontender, no hepatosplenomegaly, liver is non-pulsatile.  Neurological: Alert and orient x 3.   Skin: Warm, dry, no ecchymosis, no rash.  Psych: Appropriate mood and affect. Normal judgment and insight.        Lab Review:   Results from last 7 days   Lab Units 08/09/22  0333 08/08/22  1548   SODIUM mmol/L 143 142   POTASSIUM mmol/L 4.1 4.4   CHLORIDE mmol/L 107 104   CO2 mmol/L 26.9 25.4   BUN mg/dL 14 11   CREATININE mg/dL 0.61 0.66   GLUCOSE mg/dL 99 126*   CALCIUM mg/dL 8.5* 8.6   AST (SGOT) U/L  --  31   ALT (SGPT) U/L  --  60*     Results from last 7 days   Lab Units 08/08/22  2051 08/08/22  1548   TROPONIN T ng/mL <0.010 <0.010     Results from last 7 days   Lab Units 08/08/22  1548   WBC 10*3/mm3 8.57   HEMOGLOBIN g/dL 11.1*   HEMATOCRIT % 34.0   PLATELETS 10*3/mm3 254                   Invalid input(s): LDLCALC  The 10-year ASCVD risk score (Printer DC Jr., et al., 2013) is: 7.1%    Values used to calculate the score:       Age: 62 years      Sex: Female      Is Non- : No      Diabetic: No      Tobacco smoker: No      Systolic Blood Pressure: 158 mmHg      Is BP treated: Yes      HDL Cholesterol: 40 mg/dL      Total Cholesterol: 144 mg/dL     Results from last 7 days   Lab Units 08/08/22  1548   PROBNP pg/mL 339.0         EKG on 8/8/22-      Baseline EKG on 7/29/22-      Previous Testing    Stress Test on 7/31/22:  · Myocardial perfusion imaging indicates a normal myocardial perfusion study with no evidence of ischemia.  · Left ventricular ejection fraction is normal. (Calculated EF = 65%).  · Impressions are consistent with a low risk study.    Echocardiogram on 7/30/22:  · Left ventricular ejection fraction appears to be 61 - 65%. Left ventricular systolic function is normal.  · Left ventricular wall thickness is consistent with mild concentric hypertrophy.  · Normal right ventricular cavity size and systolic function noted.  · The left atrial cavity is moderately dilated.  · The aortic valve leaflets are thickened and heavily calcified  · Moderate aortic valve stenosis is present. Aortic valve area is 0.90 cm2. Peak velocity of the flow distal to the aortic valve is 310.8 cm/s. Aortic valve maximum pressure gradient is 38.6 mmHg. Aortic valve mean pressure gradient is 19.3 mmHg.  · The mitral valve leaflets are thickened and mildly calcified. The posterior mitral valve leaflet is restricted in motion  · Mild to moderate mitral valve regurgitation is present.  · Mild tricuspid valve regurgitation is present.  · Calculated right ventricular systolic pressure from tricuspid regurgitation is 20 mmHg.  · There is a trivial pericardial effusion    Echocardiogram on 3/23/21:  · Left ventricular ejection fraction appears to be 61 - 65%.  · Left ventricular wall thickness is consistent with mild concentric hypertrophy  · Left ventricular diastolic function was normal.  · Normal right ventricular cavity size and  systolic function noted.  · The left atrial cavity is moderately dilated.  · The right atrial cavity is moderately dilated.  · The aortic valve leaflets are thickened and heavily calcified  · Moderate aortic valve stenosis is present. Peak velocity of the flow distal to the aortic valve is 321.1 cm/s. Aortic valve maximum pressure gradient is 41 mmHg. Aortic valve mean pressure gradient is 19 mmHg. Aortic valve area is 1.4 cm2.  · The posterior mitral valve leaflet is restricted  · Moderate mitral valve regurgitation is present.  · Mild tricuspid valve regurgitation is present  · Calculated right ventricular systolic pressure from tricuspid regurgitation is 20 mmHg.  · There is a trivial pericardial effusion.    PATRICA on 8/31/20:  · Left ventricular systolic function is normal. Estimated EF = 60%.  · Normal right ventricular cavity size and systolic function noted.  · Left atrial cavity size is moderate-to-severely dilated.  · There is a small PFO noted by left-to-right color flow only. No significant amount of bubbles crossed the atrial septum.  · Right atrial cavity size is moderately dilated.  · Mild aortic valve stenosis is present. Aortic valve maximum pressure gradient is 25.2 mmHg. Aortic valve mean pressure gradient is 14.0 mmHg.  · Moderate-to-severe mitral valve regurgitation is present.  · The posterior mitral valve leaflet appears to be restricted in motion.  · Moderate tricuspid valve regurgitation is present.  · Calculated right ventricular systolic pressure from tricuspid regurgitation is 57 mmHg  · There were moderate plaques in the proximal descending thoracic aorta. There was severe plaques in the aortic arch.  · There is no evidence of pericardial effusion.    Echocardiogram on 11/15/19:  · Calculated EF = 66%  · Left ventricular systolic function is normal.  · Mild mitral valve regurgitation is present  · Mild tricuspid valve regurgitation is present.  · Estimated right ventricular systolic  pressure from tricuspid regurgitation is mildly elevated (35-45 mmHg).  · Calculated right ventricular systolic pressure from tricuspid regurgitation is 41 mmHg.    Stress Test on 4/10/17:  · Findings consistent with an equivocal ECG stress test. Has baseline ST abnormality  · Left ventricular ejection fraction is normal (Calculated EF = 65%).  · Myocardial perfusion imaging indicates a normal myocardial perfusion study with no evidence of ischemia.          Assessment:           Exertional dyspnea    Acute dyspnea         Plan:       Ms. Sheldon is a 62 y.o. woman who is followed by Dr. Benedict with past medical history notable for paroxysmal atrial fibrillation, aortic stenosis, mitral valve regurgitation, patent foramen ovale, coronary artery calcification, and aortic atherosclerosis who has recently experienced an ocular stroke and was started on Eliquis.  Unfortunately since then she did develop COVID-19.     Dyspnea:  · Will further evaluate with right and left heart catheterization    Paroxysmal atrial fibrillation  · Will hold anticoagulation today and restart tomorrow  · Will continue metoprolol and digoxin    Carotid stenosis:  · Will continue plavix  · Vascular following as outpatient             Josemanuel Schmitz MD  [facility]  08/09/22  08:00 EDT

## 2022-08-09 NOTE — PLAN OF CARE
Goal Outcome Evaluation:  Plan of Care Reviewed With: patient        Progress: improving  Outcome Evaluation: Pt. s/p R & L heart cath. R brachial and R radial sites CDI, +2 pulses. 60mg IV lasix given per orders, 600ml UOP thus far. SBP elevated 150-160s, SB-SR 50-60s, RA, afebrile. No c/o pain. WCTM.

## 2022-08-09 NOTE — PLAN OF CARE
Case discussed with Gorge Ortiz and Marichuy. Patient with remote history of Covid-19 infection, PAF, A/C diastolic HF with progressive ROWLAND. Currently in SR with abnormal repolarization, negative troponin and uncertain etiology for symptoms. Recent ocular CVA with desire to minimize time off AC. Dr Benedict would like to bring the patient in for L+RHC to complete cardiac workup due to debilitating clinical problems. We will admit tonight for observation and keep npo after midnight. Dr Benedict will discuss with Interventional Cardiology in the morning.

## 2022-08-10 ENCOUNTER — TELEPHONE (OUTPATIENT)
Dept: CARDIOLOGY | Facility: CLINIC | Age: 62
End: 2022-08-10

## 2022-08-10 ENCOUNTER — APPOINTMENT (OUTPATIENT)
Dept: CARDIOLOGY | Facility: HOSPITAL | Age: 62
End: 2022-08-10

## 2022-08-10 ENCOUNTER — READMISSION MANAGEMENT (OUTPATIENT)
Dept: CALL CENTER | Facility: HOSPITAL | Age: 62
End: 2022-08-10

## 2022-08-10 VITALS
DIASTOLIC BLOOD PRESSURE: 73 MMHG | BODY MASS INDEX: 32.02 KG/M2 | HEIGHT: 62 IN | SYSTOLIC BLOOD PRESSURE: 139 MMHG | TEMPERATURE: 98 F | HEART RATE: 80 BPM | WEIGHT: 174 LBS | OXYGEN SATURATION: 95 % | RESPIRATION RATE: 17 BRPM

## 2022-08-10 LAB
ANION GAP SERPL CALCULATED.3IONS-SCNC: 11.2 MMOL/L (ref 5–15)
AORTIC DIMENSIONLESS INDEX: 0.3 (DI)
ASCENDING AORTA: 2.9 CM
BH CV ECHO MEAS - ACS: 1.25 CM
BH CV ECHO MEAS - AO MAX PG: 46.4 MMHG
BH CV ECHO MEAS - AO MEAN PG: 25.3 MMHG
BH CV ECHO MEAS - AO ROOT DIAM: 3.2 CM
BH CV ECHO MEAS - AO V2 MAX: 340.7 CM/SEC
BH CV ECHO MEAS - AO V2 VTI: 80.2 CM
BH CV ECHO MEAS - AVA(I,D): 1.02 CM2
BH CV ECHO MEAS - CONTRAST EF 4CH: 46 CM2
BH CV ECHO MEAS - EDV(CUBED): 95.7 ML
BH CV ECHO MEAS - EDV(MOD-SP2): 87 ML
BH CV ECHO MEAS - EDV(MOD-SP4): 62 ML
BH CV ECHO MEAS - EF(MOD-BP): 46.5 %
BH CV ECHO MEAS - EF(MOD-SP2): 46 %
BH CV ECHO MEAS - EF(MOD-SP4): 45.2 %
BH CV ECHO MEAS - ESV(CUBED): 25.8 ML
BH CV ECHO MEAS - ESV(MOD-SP2): 47 ML
BH CV ECHO MEAS - ESV(MOD-SP4): 34 ML
BH CV ECHO MEAS - FS: 35.4 %
BH CV ECHO MEAS - IVS/LVPW: 0.73 CM
BH CV ECHO MEAS - IVSD: 0.89 CM
BH CV ECHO MEAS - LAT PEAK E' VEL: 6.3 CM/SEC
BH CV ECHO MEAS - LV MASS(C)D: 168.3 GRAMS
BH CV ECHO MEAS - LV MAX PG: 5.9 MMHG
BH CV ECHO MEAS - LV MEAN PG: 3.1 MMHG
BH CV ECHO MEAS - LV V1 MAX: 121.4 CM/SEC
BH CV ECHO MEAS - LV V1 VTI: 29 CM
BH CV ECHO MEAS - LVIDD: 4.6 CM
BH CV ECHO MEAS - LVIDS: 3 CM
BH CV ECHO MEAS - LVOT AREA: 2.8 CM2
BH CV ECHO MEAS - LVOT DIAM: 1.9 CM
BH CV ECHO MEAS - LVPWD: 1.21 CM
BH CV ECHO MEAS - MED PEAK E' VEL: 7 CM/SEC
BH CV ECHO MEAS - MR MAX PG: 142.7 MMHG
BH CV ECHO MEAS - MR MAX VEL: 597.2 CM/SEC
BH CV ECHO MEAS - MR MEAN PG: 90.1 MMHG
BH CV ECHO MEAS - MR MEAN VEL: 446 CM/SEC
BH CV ECHO MEAS - MR VTI: 204.9 CM
BH CV ECHO MEAS - MV A DUR: 0.1 SEC
BH CV ECHO MEAS - MV A MAX VEL: 33.4 CM/SEC
BH CV ECHO MEAS - MV DEC SLOPE: 430.2 CM/SEC2
BH CV ECHO MEAS - MV DEC TIME: 0.28 MSEC
BH CV ECHO MEAS - MV E MAX VEL: 121.5 CM/SEC
BH CV ECHO MEAS - MV E/A: 3.6
BH CV ECHO MEAS - MV MAX PG: 8.3 MMHG
BH CV ECHO MEAS - MV MEAN PG: 2.9 MMHG
BH CV ECHO MEAS - MV P1/2T: 105.3 MSEC
BH CV ECHO MEAS - MV V2 VTI: 37.1 CM
BH CV ECHO MEAS - MVA(P1/2T): 2.09 CM2
BH CV ECHO MEAS - MVA(VTI): 2.21 CM2
BH CV ECHO MEAS - PA ACC TIME: 0.09 SEC
BH CV ECHO MEAS - PA PR(ACCEL): 39.8 MMHG
BH CV ECHO MEAS - PA V2 MAX: 96.2 CM/SEC
BH CV ECHO MEAS - PULM A REVS DUR: 0.13 SEC
BH CV ECHO MEAS - PULM A REVS VEL: 20 CM/SEC
BH CV ECHO MEAS - PULM DIAS VEL: 24.3 CM/SEC
BH CV ECHO MEAS - PULM S/D: 3.6
BH CV ECHO MEAS - PULM SYS VEL: 87.1 CM/SEC
BH CV ECHO MEAS - QP/QS: 0.46
BH CV ECHO MEAS - RAP SYSTOLE: 3 MMHG
BH CV ECHO MEAS - RV MAX PG: 1.3 MMHG
BH CV ECHO MEAS - RV V1 MAX: 57 CM/SEC
BH CV ECHO MEAS - RV V1 VTI: 9.7 CM
BH CV ECHO MEAS - RVOT DIAM: 2.24 CM
BH CV ECHO MEAS - RVSP: 30.7 MMHG
BH CV ECHO MEAS - SV(LVOT): 81.9 ML
BH CV ECHO MEAS - SV(MOD-SP2): 40 ML
BH CV ECHO MEAS - SV(MOD-SP4): 28 ML
BH CV ECHO MEAS - SV(RVOT): 38 ML
BH CV ECHO MEAS - TAPSE (>1.6): 2.1 CM
BH CV ECHO MEAS - TR MAX PG: 27.7 MMHG
BH CV ECHO MEAS - TR MAX VEL: 263.3 CM/SEC
BH CV ECHO MEASUREMENTS AVERAGE E/E' RATIO: 18.27
BH CV VAS BP LEFT ARM: NORMAL MMHG
BH CV XLRA - RV BASE: 3.1 CM
BH CV XLRA - RV LENGTH: 7.4 CM
BH CV XLRA - RV MID: 1.47 CM
BH CV XLRA - TDI S': 12 CM/SEC
BUN SERPL-MCNC: 17 MG/DL (ref 8–23)
BUN/CREAT SERPL: 27.4 (ref 7–25)
CALCIUM SPEC-SCNC: 8.7 MG/DL (ref 8.6–10.5)
CHLORIDE SERPL-SCNC: 106 MMOL/L (ref 98–107)
CO2 SERPL-SCNC: 26.8 MMOL/L (ref 22–29)
CREAT SERPL-MCNC: 0.62 MG/DL (ref 0.57–1)
EGFRCR SERPLBLD CKD-EPI 2021: 101.5 ML/MIN/1.73
GLUCOSE SERPL-MCNC: 108 MG/DL (ref 65–99)
HCT VFR BLDA CALC: 33 % (ref 38–51)
HCT VFR BLDA CALC: 34 % (ref 38–51)
HGB BLDA-MCNC: 11.2 G/DL (ref 12–17)
HGB BLDA-MCNC: 11.6 G/DL (ref 12–17)
LEFT ATRIUM VOLUME INDEX: 40.6 ML/M2
MAXIMAL PREDICTED HEART RATE: 159 BPM
POTASSIUM SERPL-SCNC: 3.8 MMOL/L (ref 3.5–5.2)
QT INTERVAL: 413 MS
SAO2 % BLDA: 100 % (ref 95–98)
SAO2 % BLDA: 61 % (ref 95–98)
SINUS: 3.2 CM
SODIUM SERPL-SCNC: 144 MMOL/L (ref 136–145)
STJ: 2.36 CM
STRESS TARGET HR: 135 BPM

## 2022-08-10 PROCEDURE — 99217 PR OBSERVATION CARE DISCHARGE MANAGEMENT: CPT | Performed by: INTERNAL MEDICINE

## 2022-08-10 PROCEDURE — 93005 ELECTROCARDIOGRAM TRACING: CPT | Performed by: INTERNAL MEDICINE

## 2022-08-10 PROCEDURE — 93306 TTE W/DOPPLER COMPLETE: CPT | Performed by: INTERNAL MEDICINE

## 2022-08-10 PROCEDURE — 80048 BASIC METABOLIC PNL TOTAL CA: CPT | Performed by: INTERNAL MEDICINE

## 2022-08-10 PROCEDURE — G0378 HOSPITAL OBSERVATION PER HR: HCPCS

## 2022-08-10 PROCEDURE — 93010 ELECTROCARDIOGRAM REPORT: CPT | Performed by: INTERNAL MEDICINE

## 2022-08-10 PROCEDURE — 93306 TTE W/DOPPLER COMPLETE: CPT

## 2022-08-10 PROCEDURE — 25010000002 PERFLUTREN (DEFINITY) 8.476 MG IN SODIUM CHLORIDE (PF) 0.9 % 10 ML INJECTION: Performed by: INTERNAL MEDICINE

## 2022-08-10 RX ORDER — FUROSEMIDE 40 MG/1
40 TABLET ORAL DAILY
Qty: 30 TABLET | Refills: 11 | Status: SHIPPED | OUTPATIENT
Start: 2022-08-10 | End: 2022-08-22 | Stop reason: SDUPTHER

## 2022-08-10 RX ORDER — LOSARTAN POTASSIUM 25 MG/1
25 TABLET ORAL
Qty: 30 TABLET | Refills: 11 | Status: SHIPPED | OUTPATIENT
Start: 2022-08-10 | End: 2022-08-22 | Stop reason: SDUPTHER

## 2022-08-10 RX ORDER — FUROSEMIDE 40 MG/1
40 TABLET ORAL DAILY
Status: DISCONTINUED | OUTPATIENT
Start: 2022-08-10 | End: 2022-08-10 | Stop reason: HOSPADM

## 2022-08-10 RX ORDER — LOSARTAN POTASSIUM 25 MG/1
25 TABLET ORAL
Status: DISCONTINUED | OUTPATIENT
Start: 2022-08-10 | End: 2022-08-10 | Stop reason: HOSPADM

## 2022-08-10 RX ADMIN — FUROSEMIDE 40 MG: 40 TABLET ORAL at 12:58

## 2022-08-10 RX ADMIN — Medication 10 ML: at 08:08

## 2022-08-10 RX ADMIN — PERFLUTREN 2 ML: 6.52 INJECTION, SUSPENSION INTRAVENOUS at 11:12

## 2022-08-10 RX ADMIN — CLOPIDOGREL 75 MG: 75 TABLET, FILM COATED ORAL at 08:08

## 2022-08-10 NOTE — OUTREACH NOTE
Medical Week 2 Survey    Flowsheet Row Responses   [facility] patient discharged from? [facility]   Does the patient have one of the following disease processes/diagnoses(primary or secondary)? Other   Week 2 attempt successful? No   Revoke Readmitted          PAULO VEGA - Registered Nurse

## 2022-08-10 NOTE — TELEPHONE ENCOUNTER
PA for Livalo completed through CMM     Per CMM Livalo has been approved    Sent letter to scanning to be placed in chart

## 2022-08-10 NOTE — DISCHARGE SUMMARY
[facility] Discharge Summary      Patient Name: Re Sheldon  Age/Sex: 62 y.o. female  : 1960  MRN: [id number]    Encounter Provider: Josemanuel Schmitz MD  Referring Provider: Josemanuel Schmitz MD  Place of Service: [facility] CARDIOLOGY  Patient Care Team:  Angelita Vital MD as PCP - General (Internal Medicine)         Date of Discharge:  8/10/2022     Date of Admit: 2022      Discharge Diagnosis:   Exertional dyspnea    Acute dyspnea    Hospital Course:   Ms. Sheldon is a 62 y.o. woman who is followed by Dr. Benedict with past medical history notable for paroxysmal atrial fibrillation, aortic stenosis, mitral valve regurgitation, patent foramen ovale, coronary artery calcification, and aortic atherosclerosis who has recently experienced an ocular stroke and was started on Eliquis.  Unfortunately since then she did develop COVID-19 and did have recent evaluation with echocardiogram and stress test both of which were normal.  Unfortunately over the last couple of weeks she has had progression of her dyspnea on exertion which was mild before COVID but has significantly progressed since then.  She also was noted to be in atrial fibrillation with rapid ventricular response earlier this month but when she arrived to the emergency room was actually in sinus rhythm.  She was admitted for further evaluation for a right left heart catheterization.  She underwent this yesterday and did demonstrate mild coronary disease with no major revascularization targets as well as moderate aortic stenosis but did show significantly elevated filling pressures both in the right and left sides.  She was given IV diuresis yesterday was feeling better and repeat echocardiogram today does show a mildly reduced ejection fraction again demonstrating elevated filling pressures may be more significant mitral regurgitation and again moderate aortic stenosis.  It seems to be a  strain change from just a few weeks ago and her echo showed pretty much normal heart function both systolic and diastolic function.  Her proBNP level on admission was also normal but I would like to start her on low-dose diuretic as well as losartan to try and optimize her blood pressure and filling pressures.  She already has a follow-up appointment with her primary cardiologist this coming Monday we will get repeat labs and can reassess symptoms at that time.  Likely will need long-term follow-up with regards to her valvular heart disease but hopefully these measures will improve some of her symptoms and in the short-term also improve her cardiac function.    Physical Examination:   Constitutional: Well appearing, well developed, no acute distress   HENT: Oropharynx clear and membrane moist  Eyes: Normal conjunctiva, no sclera icterus.  Neck: Supple, no carotid bruit bilaterally.  Cardiovascular: Regular rate and rhythm, Early peaking systolic murmur over the right upper sternal border, No bilateral lower extremity edema.  Pulmonary: Normal respiratory effort, normal lung sounds, no wheezing.  Abdominal: Soft, nontender, no hepatosplenomegaly, liver is non-pulsatile.  Neurological: Alert and orient x 3.   Skin: Warm, dry, no ecchymosis, no rash.  Psych: Appropriate mood and affect. Normal judgment and insight.    Procedures Performed:  Procedure(s):  Right and Left Heart Cath  Coronary angiography       Echocardiogram 8/10/2022 with images reviewed by myself and final report pending:  · Mildly reduced ejection fraction in the 45 to 50% range  · Moderate aortic stenosis with calculated aortic valve area of approximately 1.1 cm2 with mean gradient of 25 mmHg and dimensionless valve index of 0.3  · Mild to moderate mitral valve regurgitation  · Elevated left atrial pressures with blunting pulmonary vein inflow    Cardiac catheterization 8/9/2022:  · Mild coronary artery disease with 30% mid right coronary artery, 20%  mid LAD, and luminal regularities throughout the circumflex.  · Elevated right and left-sided filling pressures with right atrial pressures approximately 16 mmHg and left ventricular filling pressures of approximately 30 mmHg,  · Moderate pulmonary hypertension with a mean PA pressure of 36 mmHg in setting of elevated left-sided filling pressures.  · Peak to peak gradient across aortic valve approximately 31 mmHg  · Moderately reduced cardiac index of 1.4 L/min/m2        Consults:  Consults     Date and Time Order Name Status Description    8/8/2022  8:28 PM LCG (on-call MD unless specified)      7/31/2022 12:33 AM Inpatient Vascular Surgery Consult Completed     7/29/2022 11:33 PM Inpatient Neurology Consult Stroke Completed     7/29/2022  5:03 PM LCG (on-call MD unless specified) Completed           Pertinent Test Results:    Results from last 7 days   Lab Units 08/10/22  0321 08/09/22  0333 08/08/22  1548   SODIUM mmol/L 144 143 142   POTASSIUM mmol/L 3.8 4.1 4.4   CHLORIDE mmol/L 106 107 104   CO2 mmol/L 26.8 26.9 25.4   BUN mg/dL 17 14 11   CREATININE mg/dL 0.62 0.61 0.66   GLUCOSE mg/dL 108* 99 126*   CALCIUM mg/dL 8.7 8.5* 8.6       Results from last 7 days   Lab Units 08/08/22  2051 08/08/22  1548   TROPONIN T ng/mL <0.010 <0.010     Results from last 7 days   Lab Units 08/09/22  1341 08/09/22  1337 08/08/22  1548   WBC 10*3/mm3  --   --  8.57   HEMOGLOBIN g/dL  --   --  11.1*   HEMOGLOBIN, POC g/dL 11.6* 11.2*  --    HEMATOCRIT %  --   --  34.0   HEMATOCRIT POC % 34* 33*  --    PLATELETS 10*3/mm3  --   --  254                 Results from last 7 days   Lab Units 08/08/22  1548   PROBNP pg/mL 339.0               Discharge Medications     Your medication list      START taking these medications      Instructions Last Dose Given Next Dose Due   furosemide 40 MG tablet  Commonly known as: LASIX      Take 1 tablet by mouth Daily.       losartan 25 MG tablet  Commonly known as: COZAAR      Take 1 tablet by mouth  Daily.          CONTINUE taking these medications      Instructions Last Dose Given Next Dose Due   apixaban 5 MG tablet tablet  Commonly known as: Eliquis      Take 1 tablet by mouth Every 12 (Twelve) Hours.       atenolol 25 MG tablet  Commonly known as: TENORMIN      Take 1 tablet by mouth Every 12 (Twelve) Hours.       benzonatate 200 MG capsule  Commonly known as: TESSALON      Take 1 capsule by mouth 3 (Three) Times a Day As Needed for Cough.       cetirizine 10 MG tablet  Commonly known as: zyrTEC      1 OTC tab po QDay for allergies       cholecalciferol 25 MCG (1000 UT) tablet  Commonly known as: VITAMIN D3      Take 1,000 Units by mouth 2 (Two) Times a Day.       clopidogrel 75 MG tablet  Commonly known as: PLAVIX      Take 1 tablet by mouth Daily.       digoxin 125 MCG tablet  Commonly known as: LANOXIN      Take 1 tablet by mouth Daily.       fluticasone 50 MCG/ACT nasal spray  Commonly known as: FLONASE      into the nostril(s) as directed by provider Daily As Needed.       Pitavastatin Calcium 4 MG tablet      Take 1 tablet by mouth Daily.             Where to Get Your Medications      These medications were sent to [facility] [place] [id number]    Hours: 7:00 AM-6:00 PM Mon-Fri, 8:00 AM-4:30 PM Sat-Sun (Closed 12-12:30PM) Phone: [phone]   · furosemide 40 MG tablet  · losartan 25 MG tablet           Discharge Diet:   Dietary Orders (From admission, onward)     Start     Ordered    08/09/22 1351  Diet Regular; Cardiac  Diet Effective Now        Question Answer Comment   Diet Texture / Consistency Regular    Common Modifiers Cardiac        08/09/22 1350                    Discharge disposition: Home    Discharge condition: Improved and stable      Follow-up Appointments  Future Appointments   Date Time Provider Department Center   8/15/2022  9:00 AM Callum Benedict MD [facility]   9/14/2022  1:00 PM Angelita Vital MD [facility]    11/1/2022 10:00 AM Mehnaz Bhatti, APRN [facility]      Follow-up Information     Callum Benedict MD Follow up.    Specialty: Cardiology  Why: As scheduled  Contact information:  [address]  [address]  [facility] [id number] [phone]             Angelita Vital MD .    Specialties: Internal Medicine, Internal Medicine & Pediatrics  Contact information:  [address]  [address]  [facility] [id number] [phone]                           Test Results Pending at Discharge  Additional Instructions for the Follow-ups that You Need to Schedule     Basic Metabolic Panel    Aug 15, 2022 (Approximate)      Release to patient: Routine Release                  Time:   I spent 34 minutes on this discharge activity which included: face-to-face encounter with the patient, reviewing the data in the system, coordination of the care with the nursing staff as well as consultants, documentation, and entering orders.          Josemanuel Schmitz MD  [facility]  08/10/22  14:30 EDT

## 2022-08-10 NOTE — PROGRESS NOTES
[facility] Clinical Pharmacy Services: Patient Counseling Consult    Re Sheldon has been counseled on the following medications: furosemide and losartan. Counseling points included the following:    Explained indication of each medication, and patient's need for these medications.  Went over dosing and frequency of each medication.  Discussed any special administration, storage, or monitoring instructions with medications.  Discussed all important drug interactions, including over-the-counter medications and supplements.  Explained possible side effects for each medication.  Instructed the patient not to begin or discontinue any medications without informing his/her physician/pharmacist.    Patient expressed understanding and had no further questions.      Flori Flores, Pharm.D., [facility]   Clinical Pharmacist  Phone Extension #[phone]

## 2022-08-10 NOTE — OUTREACH NOTE
Prep Survey    Flowsheet Row Responses   [facility] facility patient discharged from? [place]   Is LACE score < 7 ? Yes   Emergency Room discharge w/ pulse ox? No   Eligibility [facility]   Date of Admission 08/08/22   Date of Discharge 08/10/22   Discharge Disposition Home or Self Care   Discharge diagnosis Right and Left Heart cath this visit   Does the patient have one of the following disease processes/diagnoses(primary or secondary)? Other   Does the patient have Home health ordered? No   Is there a DME ordered? No   Prep survey completed? Yes          AJAY CARRINGTON - Registered Nurse

## 2022-08-10 NOTE — PLAN OF CARE
Goal Outcome Evaluation:  Plan of Care Reviewed With: patient        Progress: improving   S/p R+L heart cath. Rt radial and brachial. No complaints overnight. Expected to DC today.

## 2022-08-11 ENCOUNTER — TRANSITIONAL CARE MANAGEMENT TELEPHONE ENCOUNTER (OUTPATIENT)
Dept: CALL CENTER | Facility: HOSPITAL | Age: 62
End: 2022-08-11

## 2022-08-11 NOTE — OUTREACH NOTE
Call Center TCM Note    Flowsheet Row Responses   [facility] [place], [facility] by [facility] patient discharged from? [facility]   Does the patient have one of the following disease processes/diagnoses(primary or secondary)? Other   TCM attempt successful? Yes   Call start time 0958   Call end time 1003   Discharge diagnosis Right and Left Heart cath this visit   Person spoke with today (if not patient) and relationship spouse   Meds reviewed with patient/caregiver? Yes   Is the patient having any side effects they believe may be caused by any medication additions or changes? No   Does the patient have all medications ordered at discharge? Yes   Is the patient taking all medications as directed (includes completed medication regime)? Yes   Does the patient have a primary care provider?  Yes   Does the patient have an appointment with their PCP within 7 days of discharge? No   What is preventing the patient from scheduling follow up appointments within 7 days of discharge? Earlier appointment not available   Nursing Interventions Educated patient on importance of making appointment, Advised patient to make appointment   Has the patient kept scheduled appointments due by today? N/A   Comments Cardiology fu appt on 8/15/22 at 9:00 AM   Psychosocial issues? No   Did the patient receive a copy of their discharge instructions? Yes   Nursing interventions Reviewed instructions with patient   What is the patient's perception of their health status since discharge? Improving   Is the patient/caregiver able to teach back signs and symptoms related to disease process for when to call PCP? Yes   Is the patient/caregiver able to teach back signs and symptoms related to disease process for when to call 911? Yes   Is the patient/caregiver able to teach back the hierarchy of who to call/visit for symptoms/problems? PCP, Specialist, Home health nurse, Urgent Care, ED, 911 Yes   If the patient is a current smoker, are they able to teach back resources for  cessation? Not a smoker   TCM call completed? Yes   Wrap up additional comments Spouse states pt is doing better, and denies SOB as spouse shares it was resolved after starting lasix. RN will send message to PCP office to help pt make a PCP hospitals fu appt as there are no appts available that satisfy TCM guidelines. Spouse states that pt was treated well, and felt staff was engaged. No questions/concerns.           Amanda Waters RN    8/11/2022, 10:08 EDT

## 2022-08-11 NOTE — PROGRESS NOTES
I left a message for the patient to return my call.    8/26/22 @ 130pm     Hub please see if pt is ok with appt day and time and schedule.

## 2022-08-15 ENCOUNTER — TRANSCRIBE ORDERS (OUTPATIENT)
Dept: CARDIOLOGY | Facility: CLINIC | Age: 62
End: 2022-08-15

## 2022-08-15 ENCOUNTER — OFFICE VISIT (OUTPATIENT)
Dept: CARDIOLOGY | Facility: CLINIC | Age: 62
End: 2022-08-15

## 2022-08-15 VITALS
HEART RATE: 67 BPM | DIASTOLIC BLOOD PRESSURE: 78 MMHG | RESPIRATION RATE: 16 BRPM | BODY MASS INDEX: 31.28 KG/M2 | SYSTOLIC BLOOD PRESSURE: 122 MMHG | WEIGHT: 170 LBS | HEIGHT: 62 IN

## 2022-08-15 DIAGNOSIS — Z01.818 OTHER SPECIFIED PRE-OPERATIVE EXAMINATION: Primary | ICD-10-CM

## 2022-08-15 DIAGNOSIS — I63.9 CEREBROVASCULAR ACCIDENT (CVA), UNSPECIFIED MECHANISM: ICD-10-CM

## 2022-08-15 DIAGNOSIS — I34.0 NONRHEUMATIC MITRAL VALVE REGURGITATION: Primary | ICD-10-CM

## 2022-08-15 DIAGNOSIS — I70.0 THORACIC AORTA ATHEROSCLEROSIS: ICD-10-CM

## 2022-08-15 DIAGNOSIS — Q21.12 PFO (PATENT FORAMEN OVALE): ICD-10-CM

## 2022-08-15 DIAGNOSIS — I48.0 PAROXYSMAL ATRIAL FIBRILLATION: ICD-10-CM

## 2022-08-15 DIAGNOSIS — I25.10 CORONARY ARTERY DISEASE INVOLVING NATIVE CORONARY ARTERY OF NATIVE HEART WITHOUT ANGINA PECTORIS: ICD-10-CM

## 2022-08-15 DIAGNOSIS — I35.0 NONRHEUMATIC AORTIC VALVE STENOSIS: ICD-10-CM

## 2022-08-15 PROCEDURE — 99214 OFFICE O/P EST MOD 30 MIN: CPT | Performed by: INTERNAL MEDICINE

## 2022-08-15 RX ORDER — AZITHROMYCIN 250 MG/1
TABLET, FILM COATED ORAL
COMMUNITY
Start: 2022-07-15 | End: 2022-09-20

## 2022-08-15 RX ORDER — ALBUTEROL SULFATE 90 UG/1
AEROSOL, METERED RESPIRATORY (INHALATION)
COMMUNITY
Start: 2022-07-28

## 2022-08-15 RX ORDER — PREDNISONE 20 MG/1
TABLET ORAL
COMMUNITY
Start: 2022-07-15 | End: 2022-09-14

## 2022-08-22 ENCOUNTER — TELEPHONE (OUTPATIENT)
Dept: CARDIOLOGY | Facility: CLINIC | Age: 62
End: 2022-08-22

## 2022-08-22 RX ORDER — FUROSEMIDE 40 MG/1
40 TABLET ORAL DAILY
Qty: 30 TABLET | Refills: 11 | Status: SHIPPED | OUTPATIENT
Start: 2022-08-22

## 2022-08-22 RX ORDER — LOSARTAN POTASSIUM 25 MG/1
25 TABLET ORAL
Qty: 30 TABLET | Refills: 11 | Status: SHIPPED | OUTPATIENT
Start: 2022-08-22

## 2022-08-22 RX ORDER — DIGOXIN 125 MCG
125 TABLET ORAL
Qty: 30 TABLET | Refills: 6 | Status: SHIPPED | OUTPATIENT
Start: 2022-08-22 | End: 2022-09-14 | Stop reason: SDUPTHER

## 2022-08-22 RX ORDER — CLOPIDOGREL BISULFATE 75 MG/1
75 TABLET ORAL DAILY
Qty: 30 TABLET | Refills: 6 | Status: SHIPPED | OUTPATIENT
Start: 2022-08-22 | End: 2023-02-13

## 2022-08-22 NOTE — TELEPHONE ENCOUNTER
Pt called needs refills sent to [facility] in [facility].   Several from hospital.   Plavix, Losartan, lasix and Digoxin ( 1 Left).   Jhon mota

## 2022-08-23 ENCOUNTER — LAB (OUTPATIENT)
Dept: LAB | Facility: HOSPITAL | Age: 62
End: 2022-08-23

## 2022-08-23 DIAGNOSIS — Z01.818 OTHER SPECIFIED PRE-OPERATIVE EXAMINATION: ICD-10-CM

## 2022-08-23 LAB — SARS-COV-2 ORF1AB RESP QL NAA+PROBE: NOT DETECTED

## 2022-08-23 PROCEDURE — C9803 HOPD COVID-19 SPEC COLLECT: HCPCS

## 2022-08-23 PROCEDURE — U0004 COV-19 TEST NON-CDC HGH THRU: HCPCS

## 2022-08-24 ENCOUNTER — HOSPITAL ENCOUNTER (OUTPATIENT)
Dept: CARDIOLOGY | Facility: HOSPITAL | Age: 62
Discharge: HOME | End: 2022-08-24
Admitting: INTERNAL MEDICINE

## 2022-08-24 VITALS
HEART RATE: 64 BPM | RESPIRATION RATE: 18 BRPM | OXYGEN SATURATION: 94 % | SYSTOLIC BLOOD PRESSURE: 131 MMHG | DIASTOLIC BLOOD PRESSURE: 66 MMHG

## 2022-08-24 DIAGNOSIS — I34.0 NONRHEUMATIC MITRAL VALVE REGURGITATION: ICD-10-CM

## 2022-08-24 PROCEDURE — 93320 DOPPLER ECHO COMPLETE: CPT

## 2022-08-24 PROCEDURE — 99152 MOD SED SAME PHYS/QHP 5/>YRS: CPT

## 2022-08-24 PROCEDURE — 99153 MOD SED SAME PHYS/QHP EA: CPT

## 2022-08-24 PROCEDURE — 93325 DOPPLER ECHO COLOR FLOW MAPG: CPT

## 2022-08-24 PROCEDURE — 93325 DOPPLER ECHO COLOR FLOW MAPG: CPT | Performed by: INTERNAL MEDICINE

## 2022-08-24 PROCEDURE — 25010000002 FENTANYL CITRATE (PF) 50 MCG/ML SOLUTION: Performed by: INTERNAL MEDICINE

## 2022-08-24 PROCEDURE — 93320 DOPPLER ECHO COMPLETE: CPT | Performed by: INTERNAL MEDICINE

## 2022-08-24 PROCEDURE — 93312 ECHO TRANSESOPHAGEAL: CPT | Performed by: INTERNAL MEDICINE

## 2022-08-24 PROCEDURE — 93312 ECHO TRANSESOPHAGEAL: CPT

## 2022-08-24 PROCEDURE — 25010000002 MIDAZOLAM PER 1 MG: Performed by: INTERNAL MEDICINE

## 2022-08-24 RX ORDER — FENTANYL CITRATE 50 UG/ML
INJECTION, SOLUTION INTRAMUSCULAR; INTRAVENOUS
Status: COMPLETED | OUTPATIENT
Start: 2022-08-24 | End: 2022-08-24

## 2022-08-24 RX ORDER — MIDAZOLAM HYDROCHLORIDE 1 MG/ML
INJECTION INTRAMUSCULAR; INTRAVENOUS
Status: COMPLETED | OUTPATIENT
Start: 2022-08-24 | End: 2022-08-24

## 2022-08-24 RX ORDER — SODIUM CHLORIDE 9 MG/ML
INJECTION, SOLUTION INTRAVENOUS
Status: COMPLETED | OUTPATIENT
Start: 2022-08-24 | End: 2022-08-24

## 2022-08-24 RX ORDER — LIDOCAINE HYDROCHLORIDE 20 MG/ML
SOLUTION OROPHARYNGEAL
Status: COMPLETED | OUTPATIENT
Start: 2022-08-24 | End: 2022-08-24

## 2022-08-24 RX ADMIN — FENTANYL CITRATE 25 MCG: 50 INJECTION, SOLUTION INTRAMUSCULAR; INTRAVENOUS at 08:10

## 2022-08-24 RX ADMIN — FENTANYL CITRATE 25 MCG: 50 INJECTION, SOLUTION INTRAMUSCULAR; INTRAVENOUS at 08:29

## 2022-08-24 RX ADMIN — FENTANYL CITRATE 25 MCG: 50 INJECTION, SOLUTION INTRAMUSCULAR; INTRAVENOUS at 08:13

## 2022-08-24 RX ADMIN — MIDAZOLAM HYDROCHLORIDE 1 MG: 1 INJECTION, SOLUTION INTRAMUSCULAR; INTRAVENOUS at 08:14

## 2022-08-24 RX ADMIN — MIDAZOLAM HYDROCHLORIDE 2 MG: 1 INJECTION, SOLUTION INTRAMUSCULAR; INTRAVENOUS at 08:32

## 2022-08-24 RX ADMIN — MIDAZOLAM HYDROCHLORIDE 2 MG: 1 INJECTION, SOLUTION INTRAMUSCULAR; INTRAVENOUS at 08:10

## 2022-08-24 RX ADMIN — MIDAZOLAM HYDROCHLORIDE 1 MG: 1 INJECTION, SOLUTION INTRAMUSCULAR; INTRAVENOUS at 08:12

## 2022-08-24 RX ADMIN — LIDOCAINE HYDROCHLORIDE 10 ML: 20 SOLUTION ORAL; TOPICAL at 08:00

## 2022-08-24 RX ADMIN — SODIUM CHLORIDE 50 ML/HR: 9 INJECTION, SOLUTION INTRAVENOUS at 08:00

## 2022-08-26 LAB
BH CV ECHO MEAS - AO MAX PG: 29.1 MMHG
BH CV ECHO MEAS - AO MEAN PG: 16.3 MMHG
BH CV ECHO MEAS - AO V2 MAX: 269.7 CM/SEC
BH CV ECHO MEAS - AO V2 VTI: 66.9 CM
BH CV ECHO MEAS - MR MAX PG: 152.8 MMHG
BH CV ECHO MEAS - MR MAX VEL: 618 CM/SEC
BH CV ECHO MEAS - RAP SYSTOLE: 3 MMHG
BH CV ECHO MEAS - RVSP: 42 MMHG
BH CV ECHO MEAS - TR MAX PG: 38.7 MMHG
BH CV ECHO MEAS - TR MAX VEL: 311 CM/SEC
MAXIMAL PREDICTED HEART RATE: 159 BPM
STRESS TARGET HR: 135 BPM

## 2022-08-29 DIAGNOSIS — I35.0 AORTIC STENOSIS, MILD: ICD-10-CM

## 2022-08-29 DIAGNOSIS — I35.0 NONRHEUMATIC AORTIC VALVE STENOSIS: ICD-10-CM

## 2022-08-29 DIAGNOSIS — I34.0 NONRHEUMATIC MITRAL VALVE REGURGITATION: Primary | ICD-10-CM

## 2022-08-30 NOTE — PROGRESS NOTES
I called and spoke to both the patient and her  extensively on 8/26/2022.  I went over the PATRICA and her whole clinical situation in detail.  She is doing better from a symptomatic standpoint currently.  I had recommended potentially waiting on surgical evaluation because of her age (in the setting of a possible tissue valve replacement) and her symptoms getting better.  The patient and her  were concerned about waiting too long, but did not want to go too soon either.  I suspect her mitral regurgitation may get worse when she is ambulating or active, which could contribute to her symptoms.  If she were to get surgery, she would likely need a mitral valve repair, tissue aortic valve replacement, Maze procedure, and PFO closure.  After discussing all the options in detail, we decided to get a surgical opinion and go over potential risks during surgery as well.  I am going to refer her to Dr. Hamilton in CV surgery for a surgical opinion.      GM

## 2022-09-02 RX ORDER — ATENOLOL 25 MG/1
25 TABLET ORAL EVERY 12 HOURS SCHEDULED
Qty: 180 TABLET | Refills: 1 | Status: SHIPPED | OUTPATIENT
Start: 2022-09-02 | End: 2023-02-13

## 2022-09-12 NOTE — PROGRESS NOTES
Date of Office Visit:  8/15/2022  Encounter Provider: Callum Benedict MD  Place of Service: [facility] CARDIOLOGY  Patient Name: Re Sheldon  :1960    Chief complaint: Follow-up for paroxysmal atrial fibrillation, aortic stenosis, mitral regurgitation, diastolic CHF, nonobstructive coronary artery disease, ocular CVA, PFO, and atherosclerosis of the aortic arch.    History of Present Illness:    I again had the pleasure of seeing the patient in cardiology office on 8/15/2022.  She is a very pleasant 62 year-old female with a history of paroxysmal atrial fibrillation, chronic diastolic CHF, mitral regurgitation, aortic stenosis, ocular CVA, and PFO who presents for follow-up.  The patient formerly followed with Dr. Pena in the [facility] system, but switched care to me on 2018.    She was initially treated for paroxysmal atrial fibrillation in 2020.  She also had a longstanding harsh systolic murmur which was initially unclear in etiology.  A PATRICA on 2020 showed aortic stenosis with a heavily calcified and thickened tricuspid aortic valve.  She also had a restricted posterior mitral valve leaflet and moderate to severe mitral regurgitation at the time.  The etiology for her valvular heart disease was not clear if she had not had prior radiation, and she did not have a bicuspid aortic valve.  She also did have moderate plaques in the proximal descending aorta and severe plaques in the aortic arch at that time.  She was found to have significant coronary artery calcifications, and was placed on Livalo (she had not tolerated Crestor or pravastatin in the past).    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  She was also found to be in rapid atrial fibrillation, and diastolic  CHF.  Both of these entities were felt to contribute to her shortness of breath at the time.  An echocardiogram again showed moderate aortic stenosis, although her ejection fraction remained intact at 60 to 65%.  She had been having more atrial fibrillation with RVR, and her metoprolol was switched to atenolol with good response.  She also initially was felt to have carotid artery stenosis, although Dr. Quiroga performed an ultrasound which showed only plaque without significant stenosis.    She was again admitted on 8/8/2022 with recurrent CHF symptoms and shortness of breath.  She actually was in sinus rhythm during this admission.  A right and left heart catheterization on 8/9/2022 showed mild nonobstructive coronary artery disease of the LAD and RCA, although the pulmonary capillary wedge pressure was elevated at 28 and the PA pressure was elevated at 36.  A repeat echocardiogram on 8/10/2022 showed an ejection fraction of 55 to 60%, grade 3 reversible diastolic dysfunction, moderate aortic stenosis with a peak gradient of 46 and mean gradient 25, and moderate mitral regurgitation.  She was diuresed again, and felt much better by the time she was discharged.    The patient presents today for follow-up.  Her cough is almost resolved and she was discharged.  She is feeling much better since diuresis in the hospital, and has lost at least 5 pounds of fluid.  She still gets shortness of breath with exertion, although it is improved.  She is in sinus rhythm today with a heart rate in the 60s.  She denied any significant chest pain.  She did not have any complications from her heart catheterization.    Past Medical History:   Diagnosis Date   • Abnormal ECG     Inferolateral ST-T changes at baseline   • Anemia    • Atrial fibrillation (HCC)    • Calcification of aortic valve 2020   • Coronary artery disease    • COVID-19 07/05/2022   • Hyperlipidemia    • Hypertension    • Low back pain    • Mitral regurgitation    • PAF  (paroxysmal atrial fibrillation) (formerly [facility])    • Palpitations    • Premature atrial contractions    • Spinal stenosis    • Systolic murmur    • Tonsillitis    • Vitamin D deficiency        Past Surgical History:   Procedure Laterality Date   • ADENOIDECTOMY     • CARDIAC CATHETERIZATION N/A 8/9/2022    Procedure: Right and Left Heart Cath;  Surgeon: Josemanuel Schmitz MD;  Location:  [facility];  Service: Cardiovascular;  Laterality: N/A;   • CARDIAC CATHETERIZATION N/A 8/9/2022    Procedure: Coronary angiography;  Surgeon: Josemanuel Schmitz MD;  Location:  [facility];  Service: Cardiovascular;  Laterality: N/A;   • CARPAL TUNNEL RELEASE Bilateral    • COLONOSCOPY  08/04/2014    repeat 10 years , Dr. Karen Spear   • COLONOSCOPY N/A 1/22/2021    Procedure: COLONOSCOPY to cecum with cold biopsy polypectomy;  Surgeon: Ray Hairston MD;  Location: [facility] ENDOSCOPY;  Service: Gastroenterology;  Laterality: N/A;  pre: screening  post: diverticulosis, polyp, internal hemorrhoids   • FOOT SURGERY     • HAND SURGERY     • TONSILLECTOMY     • TONSILLECTOMY AND ADENOIDECTOMY     • TUBAL ABDOMINAL LIGATION         Current Outpatient Medications on File Prior to Visit   Medication Sig Dispense Refill   • apixaban (Eliquis) 5 MG tablet tablet Take 1 tablet by mouth Every 12 (Twelve) Hours. 180 tablet 3   • benzonatate (TESSALON) 200 MG capsule Take 1 capsule by mouth 3 (Three) Times a Day As Needed for Cough. (Patient taking differently: Take 200 mg by mouth 3 (Three) Times a Day As Needed for Cough. As needed) 21 capsule 0   • cetirizine (zyrTEC) 10 MG tablet 1 OTC tab po QDay for allergies     • cholecalciferol (VITAMIN D3) 25 MCG (1000 UT) tablet Take 1,000 Units by mouth 2 (Two) Times a Day.     • Pitavastatin Calcium 4 MG tablet Take 1 tablet by mouth Daily. 30 tablet 6   • albuterol sulfate  (90 Base) MCG/ACT inhaler TAKE 2 PUFF(S) (INHALATION) EVERY 4 HOURS AS NEEDED FOR 10 DAYS    "  • azithromycin (ZITHROMAX) 250 MG tablet TAKE 2 TABLETS BY MOUTH ON DAY 1, THEN TAKE 1 TABLET BY MOUTH ON DAYS 2-5     • fluticasone (FLONASE) 50 MCG/ACT nasal spray into the nostril(s) as directed by provider Daily As Needed.     • predniSONE (DELTASONE) 20 MG tablet TAKE 1 TABLET BY MOUTH EVERY DAY FOR 5 DAYS       No current facility-administered medications on file prior to visit.     Allergies as of 08/15/2022 - Reviewed 08/15/2022   Allergen Reaction Noted   • Sulfa antibiotics Diarrhea and Rash 2016   • Augmentin [amoxicillin-pot clavulanate] Diarrhea and Other (See Comments) 2016   • Bactrim [sulfamethoxazole-trimethoprim] Diarrhea and Other (See Comments) 2016     Social History     Socioeconomic History   • Marital status:      Spouse name: Kedar   • Number of children: 2   Tobacco Use   • Smoking status: Never Smoker   • Smokeless tobacco: Never Used   Vaping Use   • Vaping Use: Never used   Substance and Sexual Activity   • Alcohol use: No     Comment: Caffeine use 3-4 cups daily   • Drug use: No     Family History   Problem Relation Age of Onset   • Heart attack Other    • Breast cancer Other    • Heart disease Other    • Heart disease Mother         Mother with MI at 63, and later  from CHF   • Heart disease Father         Father  from complications after valve replacement at 73   • Stroke Father    • Breast cancer Sister    • Malig Hyperthermia Neg Hx        Review of Systems   Constitutional: Positive for malaise/fatigue.   Cardiovascular: Positive for dyspnea on exertion and palpitations.   All other systems reviewed and are negative.     Objective:     Vitals:    08/15/22 0911   BP: 122/78   BP Location: Left arm   Patient Position: Sitting   Cuff Size: Adult   Pulse: 67   Resp: 16   Weight: 77.1 kg (170 lb)   Height: 157.5 cm (62\")     Body mass index is 31.09 kg/m².    Constitutional:       Appearance: Healthy appearance. Well-developed.   Eyes:      " Conjunctiva/sclera: Conjunctivae normal.   HENT:      Head: Normocephalic and atraumatic.   Pulmonary:      Effort: Pulmonary effort is normal.      Breath sounds: Normal breath sounds.   Cardiovascular:      Normal rate. Regular rhythm.      Murmurs: There is a grade 3/6 systolic murmur at the URSB and LLSB.      No gallop.   Edema:     Peripheral edema absent.   Abdominal:      Palpations: Abdomen is soft.      Tenderness: There is no abdominal tenderness.   Musculoskeletal:      Cervical back: Neck supple. Skin:     General: Skin is warm.   Neurological:      Mental Status: Alert and oriented to person, place, and time.   Psychiatric:         Behavior: Behavior normal.       Lab Review:   Procedures    Lipid Panel    Lipid Panel 11/19/21 7/30/22   Total Cholesterol  144   Total Cholesterol 191    Triglycerides 114 124   HDL Cholesterol 49 40   VLDL Cholesterol 21 22   LDL Cholesterol  121 (A) 82   LDL/HDL Ratio 2.5 1.98   (A) Abnormal value       Comments are available for some flowsheets but are not being displayed.                Assessment:       Diagnosis Plan   1. Nonrheumatic mitral valve regurgitation  Adult Transesophageal Echo (PATRICA) W/ Cont if Necessary Per Protocol   2. Paroxysmal atrial fibrillation (HCC)     3. Coronary artery disease involving native coronary artery of native heart without angina pectoris     4. Cerebrovascular accident (CVA), unspecified mechanism (HCC)     5. Nonrheumatic aortic valve stenosis     6. Thoracic aorta atherosclerosis (HCC)     7. PFO (patent foramen ovale)       Plan:       I had a long discussion with the patient today.  I also reviewed all of her recent cardiac testing.  I am not sure if the atrial fibrillation may have triggered the diastolic CHF.  She is in sinus rhythm today, although she had been in atrial fibrillation in late July, and was having frequent RVR.  Her metoprolol was changed to atenolol and digoxin at that time, and she has had a good response to  this.  However, she still had recurrent CHF and had an elevated wedge pressure and PA pressure.  I reviewed her echo from 8/10/2022, and there is a very significant eccentric jet of mitral regurgitation which is posteriorly directed.  It is only visualized in 1 apical view.  I want to further evaluate this with a PATRICA as significant mitral regurgitation could certainly cause decompensated CHF.  She is in agreement with this.    She will continue on the Eliquis for the atrial fibrillation.  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  This was visualized on a prior PATRICA.  She does have a small PFO, although I doubt this would have caused the stroke.  She does have significant right sided visual loss, and anticoagulation is going to be very important going forward.  She is also on Plavix 75 mg/day for this reason.  She will continue on the Livalo at 4 mg/day.  She has been intolerant to other statins in the past, including pravastatin.  She will continue on the losartan 25 mg/day, and the Lasix at 40 mg/day.  Another option in the future would be to potentially add either Jardiance or Farxiga.  However, I will defer this until after the new PATRICA results are noted.

## 2022-09-14 ENCOUNTER — OFFICE VISIT (OUTPATIENT)
Dept: FAMILY MEDICINE CLINIC | Facility: CLINIC | Age: 62
End: 2022-09-14

## 2022-09-14 VITALS
TEMPERATURE: 96.4 F | WEIGHT: 166.8 LBS | OXYGEN SATURATION: 99 % | SYSTOLIC BLOOD PRESSURE: 120 MMHG | RESPIRATION RATE: 16 BRPM | HEART RATE: 66 BPM | HEIGHT: 62 IN | DIASTOLIC BLOOD PRESSURE: 72 MMHG | BODY MASS INDEX: 30.69 KG/M2

## 2022-09-14 DIAGNOSIS — R73.01 ABNORMAL FASTING GLUCOSE: ICD-10-CM

## 2022-09-14 DIAGNOSIS — I10 PRIMARY HYPERTENSION: Primary | ICD-10-CM

## 2022-09-14 DIAGNOSIS — R06.83 SNORING: ICD-10-CM

## 2022-09-14 DIAGNOSIS — E78.49 OTHER HYPERLIPIDEMIA: ICD-10-CM

## 2022-09-14 DIAGNOSIS — I38 VALVULAR INSUFFICIENCY: ICD-10-CM

## 2022-09-14 DIAGNOSIS — R79.89 ELEVATED LFTS: ICD-10-CM

## 2022-09-14 DIAGNOSIS — E55.9 VITAMIN D DEFICIENCY: ICD-10-CM

## 2022-09-14 PROCEDURE — 99215 OFFICE O/P EST HI 40 MIN: CPT | Performed by: INTERNAL MEDICINE

## 2022-09-14 RX ORDER — DIGOXIN 125 MCG
125 TABLET ORAL
Qty: 90 TABLET | Refills: 3 | Status: SHIPPED | OUTPATIENT
Start: 2022-09-14

## 2022-09-14 NOTE — PROGRESS NOTES
"Chief Complaint  Follow-up (Would like to update the physician on medical hx )    Subjective        Re Sheldon presents to [facility] PRIMARY [facility]  History of Present Illness  Here for hospital follow up from two admissions related to AF with RVR and CHF.    She recently had vision loss in her right eye and was dx with right retinal vein occlusion by Sami Celaya.  She was sent to Dr. Ulrich for further treatment.  She has lost central vision on the right.  Left eye has good vision.  Dr. Ulrich has done 2 injections in right eye which has improved the swelling in her retina.  She has also had carotid artery evaluations due to occlusion and has recently seen Dr. Juwan Quiroga to follow carotid atherosclerosis.    On 7/29/22, she was  Admitted to [facility] in [place] with RVR.  Echo showed 61% EF and mild to mod MR and AS.  She is on eliquis already for PAF.  Her Mitral regurgitation is significant and she and her cardiologist Dr. Benedict have decided to see CT valve surgeon sooner rather than later.  Her dad waited too long for valve replacement and had lots of complications.    Seeing Dr. Hamilton 10/4/22 to review the mitral valve regurgitation.    On July 4th, she developed Covid  About 2 weeks later, she developed worsening ROWLAND and was hospitalized in late July and also on 8/9/22.  The later admission she had right/left heart cath and showed elevated filling pressures and mild CAD (no revascularization needed).  She received IV diuresis with significant improvement in breathing.  It also showed AS and MR.  She was also started at that time on losartan and lasix.   She continues to take atenolol and digoxin for PAF   AC are eliquis and plavix.  HL on livalo.    Objective   Vital Signs:  /72 (BP Location: Left arm, Patient Position: Sitting, Cuff Size: Small Adult)   Pulse 66   Temp 96.4 °F (35.8 °C) (Temporal)   Resp 16   Ht 157.5 cm (62\")   Wt 75.7 kg (166 lb 12.8 oz)   SpO2 99%   BMI 30.51 kg/m² " "  Estimated body mass index is 30.51 kg/m² as calculated from the following:    Height as of this encounter: 157.5 cm (62\").    Weight as of this encounter: 75.7 kg (166 lb 12.8 oz).          Physical Exam  Vitals and nursing note reviewed.   Constitutional:       Appearance: Normal appearance. She is well-developed.   HENT:      Head: Normocephalic and atraumatic.      Right Ear: External ear normal.      Left Ear: External ear normal.   Eyes:      Extraocular Movements: Extraocular movements intact.      Conjunctiva/sclera: Conjunctivae normal.   Neck:      Vascular: Carotid bruit present.   Cardiovascular:      Rate and Rhythm: Normal rate and regular rhythm.      Heart sounds: Murmur heard.      Comments: No bruits  Pulmonary:      Effort: Pulmonary effort is normal. No respiratory distress.      Breath sounds: Normal breath sounds. No wheezing or rales.   Abdominal:      General: Bowel sounds are normal. There is no distension.      Palpations: Abdomen is soft. There is no mass.      Tenderness: There is no abdominal tenderness.   Musculoskeletal:      Cervical back: Neck supple.      Right lower leg: No edema.      Left lower leg: No edema.   Lymphadenopathy:      Cervical: No cervical adenopathy.   Skin:     General: Skin is warm.   Neurological:      General: No focal deficit present.      Mental Status: She is alert and oriented to person, place, and time.   Psychiatric:         Mood and Affect: Mood normal.         Behavior: Behavior normal.         Thought Content: Thought content normal.         Judgment: Judgment normal.        Result Review :                Assessment and Plan   Diagnoses and all orders for this visit:    1. Primary hypertension (Primary)  -     Comprehensive Metabolic Panel  -     Lipid Panel With LDL / HDL Ratio    2. Valvular insufficiency    3. Vitamin D deficiency  -     Vitamin D 25 Hydroxy    4. Other hyperlipidemia  -     Comprehensive Metabolic Panel  -     Lipid Panel With " LDL / HDL Ratio  -     Hemoglobin A1c  -     TSH  -     T4, Free    5. Abnormal fasting glucose  -     Hemoglobin A1c  -     Comprehensive Metabolic Panel; Future  -     Hemoglobin A1c; Future    6. Snoring  -     Ambulatory Referral to Sleep Medicine    7. Elevated LFTs  -     Comprehensive Metabolic Panel; Future    Other orders  -     metFORMIN ER (GLUCOPHAGE-XR) 500 MG 24 hr tablet; Take 1 tablet by mouth Daily With Breakfast.  Dispense: 90 tablet; Refill: 0    we did discuss ways to reduce her risk of atherosclerosis progression in light of the retinal vein occlusion and mild CAD, valvular heart disease, PAF, and carotid atherosclerosis --later followed by vascular DrAlison Quiroga.  She is on eliquis and plavix.  I would like to check her cholesterol while on livalo and check a1c and sugars.  We also discussed possible sleep apnea and has fhx spoke with Dr. Benedict and reviewed her case.  He is in agreement that she needs aggressive risk factor modification with sugars/lipids etc.  Retinal vein occlusion followed and treated by Dr. Ulrich.   Carotid disease followed by Dr. Quiroga    Addendum:  I called pt on 9/20/22 and reviewed all labs with her.  She is ok for me to start metformin  mg qd with food.  Reviewed side effects and advised on proper use.  She also agreed with sleep study as she does snore and has fhx of [place].        I Spent over 41 minutes with this patient and in the chart coordinating care, calling her cardiologist  And managing her care.         Follow Up   Return in about 6 months (around 3/14/2023).  Patient was given instructions and counseling regarding her condition or for health maintenance advice. Please see specific information pulled into the AVS if appropriate.

## 2022-09-15 LAB
25(OH)D3+25(OH)D2 SERPL-MCNC: 55.7 NG/ML (ref 30–100)
ALBUMIN SERPL-MCNC: 5 G/DL (ref 3.5–5.2)
ALBUMIN/GLOB SERPL: 2.1 G/DL
ALP SERPL-CCNC: 96 U/L (ref 39–117)
ALT SERPL-CCNC: 50 U/L (ref 1–33)
AST SERPL-CCNC: 43 U/L (ref 1–32)
BILIRUB SERPL-MCNC: 0.5 MG/DL (ref 0–1.2)
BUN SERPL-MCNC: 15 MG/DL (ref 8–23)
BUN/CREAT SERPL: 19.2 (ref 7–25)
CALCIUM SERPL-MCNC: 9.6 MG/DL (ref 8.6–10.5)
CHLORIDE SERPL-SCNC: 97 MMOL/L (ref 98–107)
CHOLEST SERPL-MCNC: 174 MG/DL (ref 0–200)
CO2 SERPL-SCNC: 32 MMOL/L (ref 22–29)
CREAT SERPL-MCNC: 0.78 MG/DL (ref 0.57–1)
EGFRCR-CYS SERPLBLD CKD-EPI 2021: 86.5 ML/MIN/1.73
GLOBULIN SER CALC-MCNC: 2.4 GM/DL
GLUCOSE SERPL-MCNC: 110 MG/DL (ref 65–99)
HBA1C MFR BLD: 6.1 % (ref 4.8–5.6)
HDLC SERPL-MCNC: 49 MG/DL (ref 40–60)
LDLC SERPL CALC-MCNC: 100 MG/DL (ref 0–100)
LDLC/HDLC SERPL: 1.97 {RATIO}
POTASSIUM SERPL-SCNC: 4.6 MMOL/L (ref 3.5–5.2)
PROT SERPL-MCNC: 7.4 G/DL (ref 6–8.5)
SODIUM SERPL-SCNC: 139 MMOL/L (ref 136–145)
T4 FREE SERPL-MCNC: 1 NG/DL (ref 0.93–1.7)
TRIGL SERPL-MCNC: 142 MG/DL (ref 0–150)
TSH SERPL DL<=0.005 MIU/L-ACNC: 0.8 UIU/ML (ref 0.27–4.2)
VLDLC SERPL CALC-MCNC: 25 MG/DL (ref 5–40)

## 2022-09-20 ENCOUNTER — APPOINTMENT (OUTPATIENT)
Dept: WOMENS IMAGING | Facility: HOSPITAL | Age: 62
End: 2022-09-20

## 2022-09-20 ENCOUNTER — RESEARCH ENCOUNTER (OUTPATIENT)
Dept: OTHER | Facility: OTHER | Age: 62
End: 2022-09-20

## 2022-09-20 ENCOUNTER — TELEPHONE (OUTPATIENT)
Dept: FAMILY MEDICINE CLINIC | Facility: CLINIC | Age: 62
End: 2022-09-20

## 2022-09-20 PROCEDURE — 77067 SCR MAMMO BI INCL CAD: CPT | Performed by: RADIOLOGY

## 2022-09-20 PROCEDURE — 77063 BREAST TOMOSYNTHESIS BI: CPT | Performed by: RADIOLOGY

## 2022-09-20 RX ORDER — METFORMIN HYDROCHLORIDE 500 MG/1
500 TABLET, EXTENDED RELEASE ORAL
Qty: 90 TABLET | Refills: 0 | Status: SHIPPED | OUTPATIENT
Start: 2022-09-20 | End: 2022-10-27

## 2022-09-20 NOTE — TELEPHONE ENCOUNTER
Caller: Re Sheldon    Relationship: Self    Best call back number: [phone]    What was the call regarding: PATIENT WAS RETURNING A MISSED PHONE CALL, NOT SURE WHO IT WAS OR WHAT IT WAS REGARDING.     Do you require a callback: YES

## 2022-09-20 NOTE — RESEARCH
Informed Consent worksheet for the protocol: [employer] 20-08 Prospective Case Collection Study for [employer]    Consent version: 7.0 with the approval dated 08/31/2022  Protocol version: 7.0   Date:28-April 2022  Subject -[id number]      The patient was pre screened by phone call and identified as a candidate for the [employer] 20-08 Prospective Case Collection Study for [employer], and voiced interest in participating in the study.    The following people were present at the consent conference: Location at the [facility]  Patient: Re Burrelllps   : Yari Meng RN       The following was discussed with the patient prior to signing the informed consent.    • The study purpose   • Procedures, including identification of an experimental investigational device  • Duration of study participation  • New Information  • Risks and Discomforts  • Any known side effects when applicable    • Alternative treatment  • Benefits   • Costs/Payment   • Patient's accountability and responsibilities    • The voluntary nature of research participants     • Right to withdraw consent    • The withdrawal process    • Confidentiality   • Authorization to use and disclose information for research purposes - Including who can view information and the types of information shared.    The patient was informed of what to do in case of an illness or injury resulting from the study and who to contact for more trial information, or information on rights and welfare as a research volunteer.     The patient was given opportunity for questions.The patient verbalizes understanding and is willing to sign the informed consent.     After all questions were satisfied the patient signed the informed consent and a copy of the signed main informed consent form were given to the patient.    No study-specific procedures were completed prior to patient signing study informed  consent form(s)    The  is aware of the subject's participation status.    Yari Meng RN   Clinical Research Coordinator

## 2022-09-28 ENCOUNTER — TELEPHONE (OUTPATIENT)
Dept: FAMILY MEDICINE CLINIC | Facility: CLINIC | Age: 62
End: 2022-09-28

## 2022-09-28 NOTE — TELEPHONE ENCOUNTER
Caller: Re Sheldon    Relationship to patient: Self    Best call back number: [phone]    Patient is needing: ASKS TO SPEAK TO DR CLARK ABOUT LABWORK THAT WAS DISCUSSED LAST WEEK. PATIENT HAS MORE QUESTIONS

## 2022-09-28 NOTE — TELEPHONE ENCOUNTER
Once speaking with pt she stated she had a Елена 3 days prior to getting blood work done. She would like to know if perhaps one of her medications could be the cause if ellen SpO2 being elevated. She's a social drinker and does not drink but very rarely and only on occasion. Please advise your thoughts. Thank you

## 2022-09-29 NOTE — TELEPHONE ENCOUNTER
Alcohol can cause LFT elevation which she did have on her labs from 9/14/22.  It can also negatively effect the TG and LDL as well as blood sugar.

## 2022-10-04 ENCOUNTER — OFFICE VISIT (OUTPATIENT)
Dept: CARDIAC SURGERY | Facility: CLINIC | Age: 62
End: 2022-10-04

## 2022-10-04 VITALS
DIASTOLIC BLOOD PRESSURE: 65 MMHG | RESPIRATION RATE: 20 BRPM | HEART RATE: 64 BPM | HEIGHT: 63 IN | TEMPERATURE: 97.3 F | BODY MASS INDEX: 29.06 KG/M2 | WEIGHT: 164 LBS | OXYGEN SATURATION: 98 % | SYSTOLIC BLOOD PRESSURE: 127 MMHG

## 2022-10-04 DIAGNOSIS — I34.0 MITRAL VALVE INSUFFICIENCY, UNSPECIFIED ETIOLOGY: ICD-10-CM

## 2022-10-04 DIAGNOSIS — Z82.49 FAMILY HISTORY OF EARLY CAD: ICD-10-CM

## 2022-10-04 DIAGNOSIS — R07.89 OTHER CHEST PAIN: Primary | ICD-10-CM

## 2022-10-04 DIAGNOSIS — E78.49 OTHER HYPERLIPIDEMIA: ICD-10-CM

## 2022-10-04 DIAGNOSIS — I48.91 ATRIAL FIBRILLATION WITH RAPID VENTRICULAR RESPONSE: ICD-10-CM

## 2022-10-04 DIAGNOSIS — R53.83 FATIGUE, UNSPECIFIED TYPE: ICD-10-CM

## 2022-10-04 DIAGNOSIS — I35.0 AORTIC STENOSIS, MODERATE: ICD-10-CM

## 2022-10-04 DIAGNOSIS — I10 PRIMARY HYPERTENSION: ICD-10-CM

## 2022-10-04 DIAGNOSIS — R06.09 EXERTIONAL DYSPNEA: ICD-10-CM

## 2022-10-04 PROCEDURE — 99204 OFFICE O/P NEW MOD 45 MIN: CPT | Performed by: THORACIC SURGERY (CARDIOTHORACIC VASCULAR SURGERY)

## 2022-10-05 ENCOUNTER — TELEPHONE (OUTPATIENT)
Dept: CARDIOLOGY | Facility: CLINIC | Age: 62
End: 2022-10-05

## 2022-10-05 PROBLEM — I35.0 AORTIC STENOSIS, MODERATE: Status: ACTIVE | Noted: 2022-10-05

## 2022-10-05 NOTE — TELEPHONE ENCOUNTER
Dr. Ordonez,    This is a pt previously seen by Dr. Bola Benedict. She called, [phone] and lvm today asking why she is taking Plavix and Eliquis? This apparently is something Dr. Hamilton asked her about at her appt y-day.     Here is the plan from pt's last visit with Dr. Benedict on 8/15/22, and I can see why she is on both.  Do you want her to continue with both?  You see her in f/u on 11/28/22.    Plan:       I had a long discussion with the patient today.  I also reviewed all of her recent cardiac testing.  I am not sure if the atrial fibrillation may have triggered the diastolic CHF.  She is in sinus rhythm today, although she had been in atrial fibrillation in late July, and was having frequent RVR.  Her metoprolol was changed to atenolol and digoxin at that time, and she has had a good response to this.  However, she still had recurrent CHF and had an elevated wedge pressure and PA pressure.  I reviewed her echo from 8/10/2022, and there is a very significant eccentric jet of mitral regurgitation which is posteriorly directed.  It is only visualized in 1 apical view.  I want to further evaluate this with a PATRICA as significant mitral regurgitation could certainly cause decompensated CHF.  She is in agreement with this.     She will continue on the Eliquis for the atrial fibrillation.  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  This was visualized on a prior PATRICA.  She does have a small PFO, although I doubt this would have caused the stroke.  She does have significant right sided visual loss, and anticoagulation is going to be very important going forward.  She is also on Plavix 75 mg/day for this reason.  She will continue on the Livalo at 4 mg/day.  She has been intolerant to other statins in the past, including pravastatin.  She will continue on the losartan 25 mg/day, and the Lasix  at 40 mg/day.  Another option in the future would be to potentially add either Jardiance or Farxiga.  However, I will defer this until after the new PATRICA results are noted.        Thank you,    MERLE Rudolph

## 2022-10-05 NOTE — PROGRESS NOTES
10/5/2022    Seen on 10/4/2022    Chief Complaint     Dyspnea, valuation of mitral regurgitation    History of Present Illness:       Dear Bola and Colleagues,  It was nice to see Re Sheldon in consultation at your request. She is a 62 y.o. female with hyperlipidemia, hypertension, family history of coronary artery disease, chest tightness, bilateral lumbar spinal stenosis, and mitral valve regurgitation who has developed symptoms of fatigue and dyspnea.  The symptoms have been chronic seem to be stable.  You have seen the patient since 2020 when she was initially treated for paroxysmal atrial fibrillation at the time she had a PATRICA that showed moderate to severe mitral regurgitation.  She was admitted to the hospital in July 2022 and she was found to have evidence of right ocular stroke secondary to right central retinal artery occlusion.  She was found to have rapid atrial fibrillation and diastolic CHF.  At that time she had an echocardiogram that showed moderate aortic stenosis moderate mitral regurgitation and ejection fraction of 60 to 65%.  She had a carotid ultrasound and vascular surgeon saw her and there was no evidence of significant carotid stenosis.  She was admitted with recurrent CHF in August 2022 and at that time she was in sinus rhythm during the admission.  She had a right and left heart cath that showed nonobstructive coronary artery disease of the LAD and RCA, elevated pulmonary artery pressures up to 36 mmHg.  Ejection fraction was 55% and a mom moderate aortic stenosis was measured to a peak gradient of 46 and a mean gradient of 25 mmHg.  There was also moderate mitral regurgitation.  She was diuresed again and she felt much better and then she was discharged home.  She has maintained a regular heart rate since then. She denies syncope, TIA, orthopnea or PND.  She had a PATRICA that showed a moderate aortic stenosis and moderate mitral regurgitation with retraction of the posterior leaflet and  in 1 view may be severe mitral regurgitation.  She has no family history of connective tissue disorders and she has no personal history of scarlet or rheumatic fever, endocarditis of IV drug use.  She denies febrile syndromes.      Patient Active Problem List   Diagnosis   • Abdominal pain   • Acute bronchitis   • Lumbar radiculopathy   • Leg pain   • Acute antritis   • Bulging of lumbar intervertebral disc   • Chest pain   • Chronic right-sided low back pain without sciatica   • DDD (degenerative disc disease), lumbar   • Dysfunction of eustachian tube   • Fatigue   • Hyperlipidemia   • BP (high blood pressure)   • Abnormal fasting glucose   • Gonalgia   • Maxillary antritis   • Neck swelling   • Health care maintenance   • Allergic rhinitis   • Bilateral carpal tunnel syndrome   • Degenerative lumbar spinal stenosis   • Valvular insufficiency   • Chest tightness or pressure   • Family history of early CAD   • EKG, abnormal   • PVC (premature ventricular contraction)   • Mitral valve insufficiency   • Vitamin D deficiency   • Meralgia paresthetica of left side   • Family history of colon cancer   • Atrial fibrillation with rapid ventricular response (HCC)   • Hypertension   • Exertional dyspnea   • Acute dyspnea   • Aortic stenosis, moderate       Past Medical History:   Diagnosis Date   • Abnormal ECG     Inferolateral ST-T changes at baseline   • Anemia    • Atrial fibrillation (HCC)    • Calcification of aortic valve 2020   • Coronary artery disease    • COVID-19 07/05/2022   • Hyperlipidemia    • Hypertension    • Low back pain    • Mitral regurgitation    • PAF (paroxysmal atrial fibrillation) ([facility])    • Palpitations    • Premature atrial contractions    • Retinal vein occlusion    • Spinal stenosis    • Systolic murmur    • Tonsillitis    • Vitamin D deficiency        Past Surgical History:   Procedure Laterality Date   • ADENOIDECTOMY     • CARDIAC CATHETERIZATION N/A 8/9/2022    Procedure: Right and Left  Heart Cath;  Surgeon: Josemanuel Schmitz MD;  Location:  [facility];  Service: Cardiovascular;  Laterality: N/A;   • CARDIAC CATHETERIZATION N/A 8/9/2022    Procedure: Coronary angiography;  Surgeon: Josemanuel Schmitz MD;  Location:  [facility];  Service: Cardiovascular;  Laterality: N/A;   • CARPAL TUNNEL RELEASE Bilateral    • COLONOSCOPY  08/04/2014    repeat 10 years , Dr. Karen Spear   • COLONOSCOPY N/A 1/22/2021    Procedure: COLONOSCOPY to cecum with cold biopsy polypectomy;  Surgeon: Ray Hairston MD;  Location: [facility] ENDOSCOPY;  Service: Gastroenterology;  Laterality: N/A;  pre: screening  post: diverticulosis, polyp, internal hemorrhoids   • FOOT SURGERY     • HAND SURGERY     • TONSILLECTOMY     • TONSILLECTOMY AND ADENOIDECTOMY     • TUBAL ABDOMINAL LIGATION         Allergies   Allergen Reactions   • Sulfa Antibiotics Diarrhea and Rash   • Augmentin [Amoxicillin-Pot Clavulanate] Diarrhea and Other (See Comments)   • Bactrim [Sulfamethoxazole-Trimethoprim] Diarrhea and Other (See Comments)         Current Outpatient Medications:   •  albuterol sulfate  (90 Base) MCG/ACT inhaler, TAKE 2 PUFF(S) (INHALATION) EVERY 4 HOURS AS NEEDED FOR 10 DAYS, Disp: , Rfl:   •  apixaban (Eliquis) 5 MG tablet tablet, Take 1 tablet by mouth Every 12 (Twelve) Hours., Disp: 180 tablet, Rfl: 3  •  atenolol (TENORMIN) 25 MG tablet, Take 1 tablet by mouth Every 12 (Twelve) Hours., Disp: 180 tablet, Rfl: 1  •  cetirizine (zyrTEC) 10 MG tablet, 1 OTC tab po QDay for allergies, Disp: , Rfl:   •  cholecalciferol (VITAMIN D3) 25 MCG (1000 UT) tablet, Take 1,000 Units by mouth 2 (Two) Times a Day., Disp: , Rfl:   •  clopidogrel (PLAVIX) 75 MG tablet, Take 1 tablet by mouth Daily., Disp: 30 tablet, Rfl: 6  •  digoxin (LANOXIN) 125 MCG tablet, Take 1 tablet by mouth Daily., Disp: 90 tablet, Rfl: 3  •  fluticasone (FLONASE) 50 MCG/ACT nasal spray, into the nostril(s) as directed by provider  Daily As Needed., Disp: , Rfl:   •  furosemide (LASIX) 40 MG tablet, Take 1 tablet by mouth Daily., Disp: 30 tablet, Rfl: 11  •  losartan (COZAAR) 25 MG tablet, Take 1 tablet by mouth Daily., Disp: 30 tablet, Rfl: 11  •  metFORMIN ER (GLUCOPHAGE-XR) 500 MG 24 hr tablet, Take 1 tablet by mouth Daily With Breakfast., Disp: 90 tablet, Rfl: 0  •  Pitavastatin Calcium 4 MG tablet, Take 1 tablet by mouth Daily., Disp: 30 tablet, Rfl: 6    Social History     Socioeconomic History   • Marital status:      Spouse name: Kedar   • Number of children: 2   Tobacco Use   • Smoking status: Never Smoker   • Smokeless tobacco: Never Used   Vaping Use   • Vaping Use: Never used   Substance and Sexual Activity   • Alcohol use: No     Comment: Caffeine use 3-4 cups daily   • Drug use: No       Family History   Problem Relation Age of Onset   • Heart attack Other    • Breast cancer Other    • Heart disease Other    • Heart disease Mother         Mother with MI at 63, and later  from CHF   • Heart disease Father         Father  from complications after valve replacement at 73   • Stroke Father    • Breast cancer Sister    • Malig Hyperthermia Neg Hx      Review of Systems   Constitutional: Positive for fatigue.   Eyes: Positive for visual disturbance.   Respiratory: Positive for shortness of breath.    Cardiovascular: Positive for palpitations. Negative for leg swelling.   Neurological: Negative for dizziness, syncope and weakness.   All other systems reviewed and are negative.      Physical Exam:    Vital Signs:  Weight: 74.4 kg (164 lb)   Body mass index is 29.05 kg/m².  Temp: 97.3 °F (36.3 °C)   Heart Rate: 64   BP: 127/65     Constitutional:       Appearance: Well-developed.   Eyes:      Conjunctiva/sclera: Conjunctivae normal.      Pupils: Pupils are equal, round, and reactive to light.   HENT:      Head: Normocephalic and atraumatic.      Nose: Nose normal.   Neck:      Thyroid: No thyromegaly.      Vascular: No  JVD.      Lymphadenopathy: No cervical adenopathy.   Pulmonary:      Effort: Pulmonary effort is normal.      Breath sounds: Normal breath sounds. No rales.   Cardiovascular:      PMI at left midclavicular line. Normal rate. Regular rhythm.      Murmurs: There is a high frequency blowing holosystolic murmur at the URSB and apex. The intensity does not change with Valsalva.      No gallop.   Pulses:     Intact distal pulses. No decreased pulses.   Edema:     Peripheral edema absent.   Abdominal:      General: Bowel sounds are normal. There is no distension.      Palpations: Abdomen is soft. There is no abdominal mass.      Tenderness: There is no abdominal tenderness.   Musculoskeletal: Normal range of motion.         General: No tenderness or deformity.      Cervical back: Normal range of motion and neck supple. Skin:     General: Skin is warm and dry.      Findings: No erythema or rash.   Neurological:      Mental Status: Alert and oriented to person, place, and time.      Deep Tendon Reflexes: Reflexes are normal and symmetric.   Psychiatric:         Behavior: Behavior normal.          Assessment:     Problems Addressed this Visit        Cardiac and Vasculature    Chest pain - Primary    Hyperlipidemia    BP (high blood pressure)    Mitral valve insufficiency    Atrial fibrillation with rapid ventricular response (HCC)    Exertional dyspnea    Aortic stenosis, moderate       Family History    Family history of early CAD       Symptoms and Signs    Fatigue      Diagnoses       Codes Comments    Other chest pain    -  Primary ICD-10-CM: R07.89  ICD-9-CM: 786.59     Other hyperlipidemia     ICD-10-CM: E78.49  ICD-9-CM: 272.4     Primary hypertension     ICD-10-CM: I10  ICD-9-CM: 401.9     Atrial fibrillation with rapid ventricular response (HCC)     ICD-10-CM: I48.91  ICD-9-CM: 427.31     Mitral valve insufficiency, unspecified etiology     ICD-10-CM: I34.0  ICD-9-CM: 424.0     Family history of early CAD      ICD-10-CM: Z82.49  ICD-9-CM: V17.3     Exertional dyspnea     ICD-10-CM: R06.09  ICD-9-CM: 786.09     Aortic stenosis, moderate     ICD-10-CM: I35.0  ICD-9-CM: 424.1     Fatigue, unspecified type     ICD-10-CM: R53.83  ICD-9-CM: 780.79         Assessment/recommendation:     Moderate may be severe mitral regurgitation with most likely posterior leaflet retraction.  She is not now in sinus rhythm so it is difficult to attribute her mitral regurgitation to the atrial fibrillation with rapid ventricular response.  She feels better and her dyspnea is stable.  She is in congestive heart failure class II.  It is difficult to indicate surgery based on predominantly moderate mitral regurgitation.  It is possible that regurgitation is underestimated.  She has also moderate aortic stenosis which has been stable.  Her atrial fibrillation has converted into sinus rhythm with medications.  I discussed with the patient in length the findings and expressed my hesitation about indicating surgery for 2 valve with predominantly moderate disease.  I recommend repeat a 2D echocardiogram or JOSÉ MIGUEL in 6 months to further assess severity of the mitral regurgitation.  If she develops congestive heart failure again or the mitral regurgitation increase in the echocardiogram then aortic valve replacement, mitral valve repair and Maze procedure should be indicated.  I discussed these findings and the plan of work-up and follow-up with the patient and  and they verbalized understanding.  They wish to proceed with the plan    Thank you for allowing me to participate in her care.    Regards,    Rayo Hamilton M.D.    I spent over 45 minutes before,during and after the visit in reviewing the records, examining the patient, reviewing and interpreting the echocardiogram and josé miguel myself, discussing the findings and options, delineating the plan of follow up and documenting in the electronic record

## 2022-10-06 ENCOUNTER — PATIENT ROUNDING (BHMG ONLY) (OUTPATIENT)
Dept: CARDIAC SURGERY | Facility: CLINIC | Age: 62
End: 2022-10-06

## 2022-10-06 NOTE — PROGRESS NOTES
A My Chart message has been sent to the patient for PATIENT ROUNDING with [facility]

## 2022-10-06 NOTE — TELEPHONE ENCOUNTER
She is on the Plavix because she had an ocular stroke and has severe plaques in her aortic arch.  She is on the Eliquis because of the atrial fibrillation.  Ideally, she should probably stay on both of these.  Joy probably did not know about the ocular stroke and plaques in her arch.  Thanks

## 2022-10-08 ENCOUNTER — TELEPHONE (OUTPATIENT)
Dept: CARDIOLOGY | Facility: CLINIC | Age: 62
End: 2022-10-08

## 2022-10-08 NOTE — TELEPHONE ENCOUNTER
I received a call from her  on 10/8/2022 around 0130 that Mrs. Sheldon was in Afib/Aflutter and she felt terrible. Her HR was  and BP was 130s. She had no chest pain, but breathing was uncomfortable. She had already taken an extra dose of atenolol when I spoke with them; she takes 25 mg BID. She still had metoprolol at home, so I had her take 50 mg metoprolol tartrate.     I called to check on her on 10/8 at 6pm. She converted back to regular rhythm around 0400 and was able to get some sleep. She feels better other than exhausted. I advised her to take 50 mg metoprolol tartrate if this occurred again.     She saw Dr. Hamilton and was deemed not an appropriate valve surgery candidate due to her moderate disease. They are also worry about her mildly increased LFTs on labs from September 2022; her blood sugar is also running higher of late.     They wanted to know if she should be seen or having any testing such as repeat labs.     Thanks!  JONATHAN Clark

## 2022-10-10 NOTE — TELEPHONE ENCOUNTER
Please let the patient know to continue her current medications. No further recommendations at this time.

## 2022-10-10 NOTE — TELEPHONE ENCOUNTER
Pt advised and verbalized understanding to continue current meds.  She has asked for a sooner appt w/ Dr. Ordonez (nov 28), I offered to put her on the cancellation list, and she agreed.    Marla graff  10/10/22

## 2022-10-11 NOTE — TELEPHONE ENCOUNTER
This level of LFT elevation is minor and after several years on a statin unlikely to be related to the statin. Now that her A1c Is elevated that is all the more reason to stay on a statin as all diabetics should be treated with one.   Since I have never met her before it is probably better to discuss these nonurgent issues in person.

## 2022-10-21 ENCOUNTER — OFFICE VISIT (OUTPATIENT)
Dept: CARDIOLOGY | Facility: CLINIC | Age: 62
End: 2022-10-21

## 2022-10-21 VITALS
HEART RATE: 56 BPM | DIASTOLIC BLOOD PRESSURE: 70 MMHG | HEIGHT: 63 IN | WEIGHT: 164 LBS | SYSTOLIC BLOOD PRESSURE: 124 MMHG | BODY MASS INDEX: 29.06 KG/M2

## 2022-10-21 DIAGNOSIS — I48.0 AF (PAROXYSMAL ATRIAL FIBRILLATION): Primary | ICD-10-CM

## 2022-10-21 PROCEDURE — 99214 OFFICE O/P EST MOD 30 MIN: CPT | Performed by: INTERNAL MEDICINE

## 2022-10-21 NOTE — PROGRESS NOTES
Subjective:     Encounter Date:10/21/2022      Patient ID: Re Sheldon is a 62 y.o. female.    Chief Complaint: Moderate to severe MR, moderate aortic stenosis, atrial fibrillation, hypertension, central retinal artery occlusion  HPI:   This is a pleasant 62-year-old woman who I am meeting for the first time today.  All of her problems are new to me.  She was previously a Dr. Benedict patient.  She was diagnosed with atrial fibrillation in April 2020.  In August 2020 she had a transesophageal echo which showed aortic stenosis with moderate to severe MR.  She also had severe aortic plaque at that time and was placed on Livalo.  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  Her echo at that time was unchanged with a preserved systolic function, moderate aortic stenosis and moderate to severe MR.  In August 22 she was admitted again with CHF.  Right and left heart catheterization showed mild nonobstructive disease of the LAD and RCA, severely elevated wedge pressure of 28 and mean PA pressure of 36.  She was diuresed.    Since then she has seen Dr. Hamilton who first plans on monitoring her valvular disease closely with serial echocardiograms every 6 months.  The patient today presents for an urgent visit.  She feels exhausted.  She feels weak and short of breath with minimal exertion.  Her weight has been stable.  No orthopnea or lower extremity edema.  Blood pressures are mostly between 101 30 systolic although she has had 1 day where her pressures were in the 70s.  At that time her heart rate was in the 40s.  She notes her watch is alerting her to atrial fibrillation several times per week and multiple times throughout the day.    She is very stressed she is very concerned about her health.  She is distressed about her vision loss.  She is worried her heart will deteriorate to the point where she is unable to have surgery.  Her  father had valvular surgery at the age of 75 which led to his demise.    The following portions of the patient's history were reviewed and updated as appropriate: allergies, current medications, past family history, past medical history, past social history, past surgical history and problem list.     REVIEW OF SYSTEMS:   All systems reviewed.  Pertinent positives identified in HPI.  All other systems are negative.    Past Medical History:   Diagnosis Date   • Abnormal ECG     Inferolateral ST-T changes at baseline   • Anemia    • Atrial fibrillation (HCC)    • Calcification of aortic valve    • Coronary artery disease    • COVID-19 2022   • Hyperlipidemia    • Hypertension    • Low back pain    • Mitral regurgitation    • PAF (paroxysmal atrial fibrillation) ([facility])    • Palpitations    • Premature atrial contractions    • Retinal vein occlusion    • Spinal stenosis    • Systolic murmur    • Tonsillitis    • Vitamin D deficiency        Family History   Problem Relation Age of Onset   • Heart attack Other    • Breast cancer Other    • Heart disease Other    • Heart disease Mother         Mother with MI at 63, and later  from CHF   • Heart disease Father         Father  from complications after valve replacement at 73   • Stroke Father    • Breast cancer Sister    • Malig Hyperthermia Neg Hx        Social History     Socioeconomic History   • Marital status:      Spouse name: Kedar   • Number of children: 2   Tobacco Use   • Smoking status: Never   • Smokeless tobacco: Never   Vaping Use   • Vaping Use: Never used   Substance and Sexual Activity   • Alcohol use: No     Comment: Caffeine use 3-4 cups daily   • Drug use: No       Allergies   Allergen Reactions   • Sulfa Antibiotics Diarrhea and Rash   • Augmentin [Amoxicillin-Pot Clavulanate] Diarrhea and Other (See Comments)   • Bactrim [Sulfamethoxazole-Trimethoprim] Diarrhea and Other (See Comments)       Past Surgical History:   Procedure  Laterality Date   • ADENOIDECTOMY     • CARDIAC CATHETERIZATION N/A 8/9/2022    Procedure: Right and Left Heart Cath;  Surgeon: Josemanuel Schmitz MD;  Location:  [facility];  Service: Cardiovascular;  Laterality: N/A;   • CARDIAC CATHETERIZATION N/A 8/9/2022    Procedure: Coronary angiography;  Surgeon: Josemanuel Schmitz MD;  Location:  [facility];  Service: Cardiovascular;  Laterality: N/A;   • CARPAL TUNNEL RELEASE Bilateral    • COLONOSCOPY  08/04/2014    repeat 10 years , Dr. Karen Spear   • COLONOSCOPY N/A 1/22/2021    Procedure: COLONOSCOPY to cecum with cold biopsy polypectomy;  Surgeon: Ray Hairston MD;  Location:  [facility];  Service: Gastroenterology;  Laterality: N/A;  pre: screening  post: diverticulosis, polyp, internal hemorrhoids   • FOOT SURGERY     • HAND SURGERY     • TONSILLECTOMY     • TONSILLECTOMY AND ADENOIDECTOMY     • TUBAL ABDOMINAL LIGATION         Procedures       Objective:         PHYSICAL EXAM:  GEN: VSS, no distress,   Eyes: normal sclera, normal lids and lashes  HENT: moist mucus membranes,   Respiratory: CTAB, no rales or wheezes  CV: RRR, 3 out of 6 systolic murmur rating to the carotids, 3 out of 6 apical blowing harsh murmur at the apex, +2 DP and 2+ carotid pulses b/l  GI: NABS, soft,  Nontender, nondistended  MSK: no edema, no scoliosis or kyphosis  Skin: no rash, warm, dry  Heme/Lymph: no bruising or bleeding  Psych: organized thought, normal behavior and affect  Neuro: Cranial nerves grossly intact, Alert and Oriented x 3.         Assessment:          Diagnosis Plan   1. AF (paroxysmal atrial fibrillation) ([facility])  Holter Monitor - 72 Hour Up To 15 Days    proBNP    Basic Metabolic Panel             Plan:       1.  Moderate to severe MR: I hypothesized that she is feeling intermittently weak fatigued and dyspneic due to worsening of MR in the setting of rapid A. fib.  2.  Dyspnea weakness fatigue: We will get some basic labs.   Her last BNP was normal in August.  Continue Lasix 40 daily for now, she appears euvolemic her weight is stable without edema.  We will have her wear a 2-week monitor to assess A. fib burden and heart rates.  3.  Moderate aortic stenosis  4.  Heart failure with preserved EF  5.  Hypertension: Well-controlled with 1 episode of symptomatic hypotension.  6.  Bradycardia: 1 episode of heart rates in the 40s which was symptomatic.  ZIO as above  7.  Central retinal artery occlusion: On apixaban, Plavix, blood pressure meds, statin    Dr. Vital thank you very much for referring this kind patient to me. Please call me with any questions or concerns. I will see the patient again in the office in 1 month.         Jennifer Ordonez MD  10/21/22  [facility]    Outpatient Encounter Medications as of 10/21/2022   Medication Sig Dispense Refill   • albuterol sulfate  (90 Base) MCG/ACT inhaler TAKE 2 PUFF(S) (INHALATION) EVERY 4 HOURS AS NEEDED FOR 10 DAYS     • apixaban (Eliquis) 5 MG tablet tablet Take 1 tablet by mouth Every 12 (Twelve) Hours. 180 tablet 3   • atenolol (TENORMIN) 25 MG tablet Take 1 tablet by mouth Every 12 (Twelve) Hours. 180 tablet 1   • cetirizine (zyrTEC) 10 MG tablet 1 OTC tab po QDay for allergies     • cholecalciferol (VITAMIN D3) 25 MCG (1000 UT) tablet Take 1,000 Units by mouth 2 (Two) Times a Day.     • clopidogrel (PLAVIX) 75 MG tablet Take 1 tablet by mouth Daily. 30 tablet 6   • digoxin (LANOXIN) 125 MCG tablet Take 1 tablet by mouth Daily. 90 tablet 3   • fluticasone (FLONASE) 50 MCG/ACT nasal spray into the nostril(s) as directed by provider Daily As Needed.     • furosemide (LASIX) 40 MG tablet Take 1 tablet by mouth Daily. 30 tablet 11   • losartan (COZAAR) 25 MG tablet Take 1 tablet by mouth Daily. 30 tablet 11   • metFORMIN ER (GLUCOPHAGE-XR) 500 MG 24 hr tablet Take 1 tablet by mouth Daily With Breakfast. 90 tablet 0   • Pitavastatin Calcium 4 MG tablet Take 1 tablet by  mouth Daily. 30 tablet 6     No facility-administered encounter medications on file as of 10/21/2022.

## 2022-10-26 RX ORDER — PITAVASTATIN CALCIUM 4.18 MG/1
TABLET, FILM COATED ORAL
Qty: 90 TABLET | Refills: 1 | Status: SHIPPED | OUTPATIENT
Start: 2022-10-26 | End: 2022-12-23 | Stop reason: SDUPTHER

## 2022-10-26 NOTE — TELEPHONE ENCOUNTER
Rx Refill Note  Requested Prescriptions     Pending Prescriptions Disp Refills   • metFORMIN ER (GLUCOPHAGE-XR) 500 MG 24 hr tablet [Pharmacy Med Name: METFORMIN HCL  MG TABLET] 90 tablet 0     Sig: TAKE 1 TABLET BY MOUTH EVERY DAY WITH BREAKFAST      Last office visit with prescribing clinician: 9/14/2022      Next office visit with prescribing clinician: 3/14/2023       {TIP  Please add Last Relevant Lab Date if appropriate: 9/14/2022    Bethanie Lua, PCT  10/26/22, 13:13 EDT

## 2022-10-27 RX ORDER — METFORMIN HYDROCHLORIDE 500 MG/1
TABLET, EXTENDED RELEASE ORAL
Qty: 90 TABLET | Refills: 0 | Status: SHIPPED | OUTPATIENT
Start: 2022-10-27 | End: 2023-03-16

## 2022-10-28 LAB
AMBIG ABBREV BMP8 DEFAULT: NORMAL
BUN SERPL-MCNC: 18 MG/DL (ref 8–27)
BUN/CREAT SERPL: 22 (ref 12–28)
CALCIUM SERPL-MCNC: 9.1 MG/DL (ref 8.7–10.3)
CHLORIDE SERPL-SCNC: 101 MMOL/L (ref 96–106)
CO2 SERPL-SCNC: 27 MMOL/L (ref 20–29)
CREAT SERPL-MCNC: 0.83 MG/DL (ref 0.57–1)
EGFRCR SERPLBLD CKD-EPI 2021: 80 ML/MIN/1.73
GLUCOSE SERPL-MCNC: 111 MG/DL (ref 70–99)
NT-PROBNP SERPL-MCNC: 254 PG/ML (ref 0–287)
POTASSIUM SERPL-SCNC: 4.4 MMOL/L (ref 3.5–5.2)
SODIUM SERPL-SCNC: 141 MMOL/L (ref 134–144)

## 2022-10-28 NOTE — PROGRESS NOTES
Please call patient with their normal test results. Lab work looks ok, doesn't indicate she is retaining fluid. Continue her current meds and we will see what the heart monitor shows

## 2022-10-31 ENCOUNTER — APPOINTMENT (OUTPATIENT)
Dept: SLEEP MEDICINE | Facility: HOSPITAL | Age: 62
End: 2022-10-31

## 2022-12-14 ENCOUNTER — TELEPHONE (OUTPATIENT)
Dept: FAMILY MEDICINE CLINIC | Facility: CLINIC | Age: 62
End: 2022-12-14

## 2022-12-14 DIAGNOSIS — I10 PRIMARY HYPERTENSION: ICD-10-CM

## 2022-12-14 DIAGNOSIS — E55.9 VITAMIN D DEFICIENCY: ICD-10-CM

## 2022-12-14 DIAGNOSIS — E78.49 OTHER HYPERLIPIDEMIA: Primary | ICD-10-CM

## 2022-12-14 DIAGNOSIS — R73.01 ABNORMAL FASTING GLUCOSE: ICD-10-CM

## 2022-12-19 ENCOUNTER — APPOINTMENT (OUTPATIENT)
Dept: SLEEP MEDICINE | Facility: HOSPITAL | Age: 62
End: 2022-12-19

## 2022-12-20 ENCOUNTER — TELEPHONE (OUTPATIENT)
Dept: CARDIOLOGY | Facility: CLINIC | Age: 62
End: 2022-12-20

## 2022-12-20 RX ORDER — PITAVASTATIN CALCIUM 4.18 MG/1
1 TABLET, FILM COATED ORAL DAILY
Qty: 90 TABLET | Refills: 1 | Status: CANCELLED | OUTPATIENT
Start: 2022-12-20

## 2022-12-20 NOTE — TELEPHONE ENCOUNTER
Caller: SHE    Relationship: SELF    Best call back number: [phone]    Requested Prescriptions:   Requested Prescriptions     Pending Prescriptions Disp Refills   • Pitavastatin Calcium (Livalo) 4 MG tablet 90 tablet 1     Sig: Take 1 tablet by mouth Daily.        Pharmacy where request should be sent: [facility]/PHARMACY #[id number] - [place], [place] - [address] [phone] [facility] [phone] FX     Additional details provided by patient:    Does the patient have less than a 3 day supply:  [] Yes  [x] No    Would you like a call back once the refill request has been completed: [x] Yes [] No    If the office needs to give you a call back, can they leave a voicemail: [x] Yes [] No    Anurag Awad Rep   12/20/22 16:20 EST

## 2022-12-20 NOTE — TELEPHONE ENCOUNTER
Caller: Re Sheldon    Relationship to patient: Self    Best call back number: [phone]    Patient is needing: PT CALLED TO CANCEL HER 4 WEEK FOLLOW UP FOR 12/23.  SHE IS FEELING BETTER SHE WAS NOT SURE IF SHE NEEDED TO SCHEDULE ANOTHER FOLLOW UP APT FOR A COUPLE MONTHS OUT. PLEASE CONTACT T TO ADVISE.     THANK YOU.

## 2022-12-21 NOTE — TELEPHONE ENCOUNTER
I think I talked to her about her results when she was here int he office with a family member for their appt. I will see her in 3 months.

## 2022-12-23 RX ORDER — PITAVASTATIN CALCIUM 4.18 MG/1
1 TABLET, FILM COATED ORAL DAILY
Qty: 90 TABLET | Refills: 1 | Status: SHIPPED | OUTPATIENT
Start: 2022-12-23 | End: 2022-12-28

## 2022-12-23 NOTE — TELEPHONE ENCOUNTER
Rx Refill Note  Requested Prescriptions     Pending Prescriptions Disp Refills   • Pitavastatin Calcium (Livalo) 4 MG tablet 90 tablet 1     Sig: Take 1 tablet by mouth Daily.      Last office visit with prescribing clinician: 10/21/2022   Last telemedicine visit with prescribing clinician: 3/23/2023   Next office visit with prescribing clinician: 3/23/2023                         Would you like a call back once the refill request has been completed: [] Yes [x] No    If the office needs to give you a call back, can they leave a voicemail: [x] Yes [] No    Lolis Cheema MA  12/23/22, 12:29 EST

## 2022-12-27 ENCOUNTER — TELEPHONE (OUTPATIENT)
Dept: CARDIOLOGY | Facility: CLINIC | Age: 62
End: 2022-12-27

## 2022-12-28 RX ORDER — SIMVASTATIN 10 MG
10 TABLET ORAL NIGHTLY
Qty: 30 TABLET | Refills: 3 | Status: SHIPPED | OUTPATIENT
Start: 2022-12-28 | End: 2023-02-23

## 2022-12-28 NOTE — TELEPHONE ENCOUNTER
Called pt, yulim that [facility] sent in Simvastatin 10mg qd, ok per HIPAA.  Pt has to try and fail before insurance will cover Livalo.    tamar cma  12/28/22

## 2022-12-29 NOTE — TELEPHONE ENCOUNTER
Louann and/or Dr. Ordonez,    Pt called me back and I am going to try and see if I can get another PA approved for Livalo. I do have samples, that I will put at , and she is aware.  She says she has tried many statins and they all cause myalgia.   Can you please put Livalo 4mg qd back on pt's med list?    There is another issue now, and I made her aware. She has [employer] insurance. I don't know if it's a PPO, but she is going to call them and find out.    Thank you,    Klaudia, CMA

## 2022-12-29 NOTE — TELEPHONE ENCOUNTER
Put samples of Livalo 4mg at . Pt wants to stay on Livalo.  LOT [id number]  Exp 8/24  4 boxes    jgaus [facility]  12/29/22  4:05pm

## 2022-12-30 RX ORDER — APIXABAN 5 MG/1
TABLET, FILM COATED ORAL
Qty: 180 TABLET | Refills: 3 | Status: SHIPPED | OUTPATIENT
Start: 2022-12-30

## 2023-01-02 NOTE — TELEPHONE ENCOUNTER
I am not a fan of CBD oil but there are no contraindications with her medications that are known to me at this time.  No verifiable studies have been done with CBD oil to test.    
Pt is calling asking you if it is okay to take CBD oil with her current medications for Blood Pressure and Her high cholesterol?   
Pt notified In detail.  
no known precautions/limitations

## 2023-02-13 ENCOUNTER — TELEPHONE (OUTPATIENT)
Dept: CARDIOLOGY | Facility: CLINIC | Age: 63
End: 2023-02-13
Payer: COMMERCIAL

## 2023-02-13 RX ORDER — ATENOLOL 25 MG/1
TABLET ORAL
Qty: 180 TABLET | Refills: 1 | Status: SHIPPED | OUTPATIENT
Start: 2023-02-13

## 2023-02-13 RX ORDER — CLOPIDOGREL BISULFATE 75 MG/1
TABLET ORAL
Qty: 90 TABLET | Refills: 2 | Status: SHIPPED | OUTPATIENT
Start: 2023-02-13

## 2023-02-13 NOTE — TELEPHONE ENCOUNTER
Pt called to request PA on Livalo. She has new insurance now, through [employer]. She had been taking Livalo for a couple of years, but then her old insurance wouldn't cover it.    ID [id number]  [facility] [id number]    RX phone # [phone]    Sending PA via [email or web address], and I will notify pt of outcome.    jamarcus cma  2/13/23

## 2023-02-15 NOTE — TELEPHONE ENCOUNTER
PA still pending. Can take up to 72 hours before I get a response.    jamarcus [facility]  2/15/23

## 2023-02-20 ENCOUNTER — TELEPHONE (OUTPATIENT)
Dept: FAMILY MEDICINE CLINIC | Facility: CLINIC | Age: 63
End: 2023-02-20

## 2023-02-20 NOTE — TELEPHONE ENCOUNTER
Pt has been called and advised labs are in the system and she can have them drawn at a location of her choice.

## 2023-02-20 NOTE — TELEPHONE ENCOUNTER
Caller: Re Sheldon    Relationship: Self    Best call back number:     What orders are you requesting (i.e. lab or imaging): LABS    In what timeframe would the patient need to come in:     Where will you receive your lab/imaging services: [facility]    Additional notes: PATIENT IS CALLING IN TO SEE IF DR CLARK WILL SEND HER ORDERS FOR A LAB [facility] .  SHE WANTS TO BE CALLED ONCE THE ORDER IS COMPLETED.

## 2023-02-22 NOTE — TELEPHONE ENCOUNTER
I called [facility], spoke w/ Winston, and told him I put PA in as an Urgent request.  He is going to check the status and call me back.    jamarcus cma  2/22/23

## 2023-02-23 RX ORDER — PITAVASTATIN CALCIUM 4.18 MG/1
1 TABLET, FILM COATED ORAL NIGHTLY
Qty: 90 TABLET | Refills: 3 | Status: SHIPPED | OUTPATIENT
Start: 2023-02-23 | End: 2023-03-01 | Stop reason: SDUPTHER

## 2023-02-23 NOTE — TELEPHONE ENCOUNTER
PA approved, pt notified, added med to pt's list, sent message to Dr. Ordonez to send in new RX for Livalo 4mg qd, qty of 90 w/ refills to local [facility].    Approvedtoday  CaseId:[id number];Status:Approved;Review Type:Prior Auth;Coverage Start Date:02/13/2023;Coverage End Date:02/23/2024;

## 2023-02-23 NOTE — TELEPHONE ENCOUNTER
PA approved, pt notified. Please send in new RX and d/c the Simvastatin.    Thank you,    MERLE Rudolph

## 2023-03-01 RX ORDER — PITAVASTATIN CALCIUM 4.18 MG/1
1 TABLET, FILM COATED ORAL NIGHTLY
Qty: 14 TABLET | Refills: 0 | COMMUNITY
Start: 2023-03-01

## 2023-03-01 NOTE — TELEPHONE ENCOUNTER
Pt called for Livalo samples. Even after PA, with [employer] insurance, isn't not very cost efficient.    I put 2 boxes of Livalo 4mg at , pt is aware she can .    LOT [id number]  Exp 5/2024    jamarcus [facility]  3/1/23

## 2023-03-14 ENCOUNTER — OFFICE VISIT (OUTPATIENT)
Dept: FAMILY MEDICINE CLINIC | Facility: CLINIC | Age: 63
End: 2023-03-14
Payer: COMMERCIAL

## 2023-03-14 VITALS
DIASTOLIC BLOOD PRESSURE: 82 MMHG | WEIGHT: 150.1 LBS | BODY MASS INDEX: 26.59 KG/M2 | SYSTOLIC BLOOD PRESSURE: 110 MMHG | HEIGHT: 63 IN | RESPIRATION RATE: 16 BRPM | TEMPERATURE: 96.9 F | HEART RATE: 79 BPM | OXYGEN SATURATION: 97 %

## 2023-03-14 DIAGNOSIS — I10 HYPERTENSION, UNSPECIFIED TYPE: ICD-10-CM

## 2023-03-14 DIAGNOSIS — E55.9 VITAMIN D DEFICIENCY: ICD-10-CM

## 2023-03-14 DIAGNOSIS — R73.01 ABNORMAL FASTING GLUCOSE: ICD-10-CM

## 2023-03-14 DIAGNOSIS — R73.03 PREDIABETES: ICD-10-CM

## 2023-03-14 DIAGNOSIS — E78.00 PURE HYPERCHOLESTEROLEMIA: Primary | ICD-10-CM

## 2023-03-14 PROCEDURE — 99214 OFFICE O/P EST MOD 30 MIN: CPT | Performed by: INTERNAL MEDICINE

## 2023-03-14 NOTE — PROGRESS NOTES
Chief Complaint  Hypertension and Hyperlipidemia    Subjective        Re Sheldon presents to [facility]  History of Present Illness  Patient is here today to follow-up on hyperlipidemia on Livalo 4 mg daily and hypertension on 25 mg of losartan daily.  Patient has history of atrial fibrillation with rapid ventricular response and she also takes Eliquis for anticoagulation.  She also has aortic stenosis and moderate to severe mitral valve regurgitation.  She has severe aortic plaque present.  In July 2022 she was diagnosed with a right ocular stroke (now on plavix)  due to a central retinal vein occlusion which caused right-sided blindness.  Her echo at that time was unchanged with preserved systolic function moderate aortic stenosis and moderate to severe mitral regurgitation.  In August 2022 she was then readmitted with CHF.  Right and left heart cath showed nonobstructive disease of the left anterior descending and RCA, severely elevated wedge pressure of 28 and mean pulmonary artery pressure of 36 she was diuresed.  Since that time she has seen Dr. Hamilton is monitoring her valvular disease closely with serial echocardiograms every 6 months.  Her cardiologist is Dr. Ordonez and she saw her 10/2022.  Dr. Quiroga follows her carotids.  Has upcoming appt with him as well.  She is exercising and eating differently and has lost 24 pounds intentionally through diet and exercise.  She is on metformin  mg qd.  HL  For which she takes livalo 4 mg qd and she tolerates it well w/o joint pain.  Her dog recently passed away and that has allowed her to get more rest and sleep more.    Had sleep study scheduled but due to multiple scheduling conflicts and insurance issues, she had an appt in 12/2022 but then their office wouldn't see her by end of the year.  She lost tae in their office and canceled the sleep study.    CN 2021 with Dr. Marika PRATER done with gyn    Objective   Vital Signs:  BP  "110/82 (BP Location: Right arm, Patient Position: Sitting, Cuff Size: Small Adult)   Pulse 79   Temp 96.9 °F (36.1 °C) (Temporal)   Resp 16   Ht 160 cm (63\")   Wt 68.1 kg (150 lb 1.6 oz)   SpO2 97%   BMI 26.59 kg/m²   Estimated body mass index is 26.59 kg/m² as calculated from the following:    Height as of this encounter: 160 cm (63\").    Weight as of this encounter: 68.1 kg (150 lb 1.6 oz).             Physical Exam  Vitals and nursing note reviewed.   Constitutional:       Appearance: Normal appearance. She is well-developed.   HENT:      Head: Normocephalic and atraumatic.      Right Ear: External ear normal.      Left Ear: External ear normal.   Eyes:      Extraocular Movements: Extraocular movements intact.      Conjunctiva/sclera: Conjunctivae normal.   Neck:      Vascular: No carotid bruit.   Cardiovascular:      Rate and Rhythm: Normal rate. Rhythm irregular.      Heart sounds: Murmur heard.      Comments: No bruits  Pulmonary:      Effort: Pulmonary effort is normal. No respiratory distress.      Breath sounds: Normal breath sounds. No stridor. No wheezing, rhonchi or rales.   Chest:      Chest wall: No tenderness.   Abdominal:      General: Bowel sounds are normal. There is no distension.      Palpations: Abdomen is soft. There is no mass.      Tenderness: There is no abdominal tenderness. There is no guarding or rebound.      Hernia: No hernia is present.   Musculoskeletal:      Cervical back: Neck supple.   Lymphadenopathy:      Cervical: No cervical adenopathy.   Skin:     General: Skin is warm.   Neurological:      Mental Status: She is alert and oriented to person, place, and time. Mental status is at baseline.   Psychiatric:         Mood and Affect: Mood normal.         Behavior: Behavior normal.         Thought Content: Thought content normal.         Judgment: Judgment normal.        Result Review :                   Assessment and Plan   Diagnoses and all orders for this visit:    1. Pure " hypercholesterolemia (Primary)  -     Comprehensive Metabolic Panel; Future  -     Lipid Panel With LDL / HDL Ratio; Future  -     TSH; Future  -     T4, Free; Future  -     Hemoglobin A1c; Future  -     CBC & Differential; Future    2. Hypertension, unspecified type  -     Comprehensive Metabolic Panel; Future  -     Lipid Panel With LDL / HDL Ratio; Future  -     TSH; Future  -     T4, Free; Future  -     Hemoglobin A1c; Future  -     CBC & Differential; Future    3. Vitamin D deficiency  -     Vitamin D 25 hydroxy; Future    4. Abnormal fasting glucose  -     Hemoglobin A1c; Future    5. Prediabetes  -     Comprehensive Metabolic Panel; Future  -     Lipid Panel With LDL / HDL Ratio; Future  -     TSH; Future  -     T4, Free; Future  -     Hemoglobin A1c; Future  -     CBC & Differential; Future    she sees  next week for f/u on AS and MR with echo every 6 months.    She sees Dr. Ulrich for f/u on retinal vein occlusion next week  Prediabetes--stable with metformin ER. Continue weight loss and exercising  HL continue livalo 4 mg qd  She still needs sleep study due to 8-9 times wakening in the night and heart disease.  I explained that the initial OV with sleep medicine is when they determine if she meets criteria for sleep testing.  She will call and set up an initial consult  AS, AF and MR--followed by Dr Ordonez and Dr. Hamilton.  Continue digoxin, Eliquis, losartan, and Lasix.  No evidence of heart failure today.  Rhythm is rate controlled.  H/o right sided blindness with retinal artery occlusion--followed by Dr. Ulrich  Prediabetes--applauded her on weight loss and lifestyle changes.  a1c stable.  Continue metfomin         Follow Up   Return in about 6 months (around 9/14/2023).  Patient was given instructions and counseling regarding her condition or for health maintenance advice. Please see specific information pulled into the AVS if appropriate.

## 2023-03-16 RX ORDER — METFORMIN HYDROCHLORIDE 500 MG/1
TABLET, EXTENDED RELEASE ORAL
Qty: 90 TABLET | Refills: 0 | Status: SHIPPED | OUTPATIENT
Start: 2023-03-16

## 2023-03-16 NOTE — TELEPHONE ENCOUNTER
Rx Refill Note  Requested Prescriptions     Pending Prescriptions Disp Refills   • metFORMIN ER (GLUCOPHAGE-XR) 500 MG 24 hr tablet [Pharmacy Med Name: METFORMIN HCL  MG TABLET] 90 tablet 0     Sig: TAKE 1 TABLET BY MOUTH EVERY DAY WITH BREAKFAST      Last office visit with prescribing clinician: 3/14/2023   Last telemedicine visit with prescribing clinician: Visit date not found   Next office visit with prescribing clinician: 9/26/2023       {TIP  Please add Last Relevant Lab Date if appropriate: 3/6/23                 Would you like a call back once the refill request has been completed: [] Yes [] No    If the office needs to give you a call back, can they leave a voicemail: [] Yes [] No    Enedina Stein MA  03/16/23, 09:26 EDT

## 2023-03-23 ENCOUNTER — OFFICE VISIT (OUTPATIENT)
Dept: CARDIOLOGY | Facility: CLINIC | Age: 63
End: 2023-03-23
Payer: COMMERCIAL

## 2023-03-23 VITALS
HEIGHT: 63 IN | HEART RATE: 76 BPM | WEIGHT: 151 LBS | BODY MASS INDEX: 26.75 KG/M2 | DIASTOLIC BLOOD PRESSURE: 78 MMHG | SYSTOLIC BLOOD PRESSURE: 122 MMHG

## 2023-03-23 DIAGNOSIS — I34.0 NONRHEUMATIC MITRAL VALVE REGURGITATION: ICD-10-CM

## 2023-03-23 DIAGNOSIS — I48.0 PAROXYSMAL ATRIAL FIBRILLATION: Primary | ICD-10-CM

## 2023-03-23 PROCEDURE — 99213 OFFICE O/P EST LOW 20 MIN: CPT | Performed by: INTERNAL MEDICINE

## 2023-03-23 NOTE — PROGRESS NOTES
Subjective:     Encounter Date: 03/23/23        Patient ID: Re Sheldon is a 63 y.o. female.    Chief Complaint: Moderate to severe MR, moderate aortic stenosis, atrial fibrillation, hypertension, central retinal artery occlusion  HPI:   This is a pleasant 63-year-old woman who was diagnosed with atrial fibrillation in April 2020.  In August 2020 she had a transesophageal echo which showed aortic stenosis with moderate to severe MR.  She also had severe aortic plaque at that time and was placed on Livalo.  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 At that time she was anticoagulated with Eliquis for AF.  Her echo at that time was unchanged with a preserved systolic function, moderate aortic stenosis and moderate to severe MR and a PFO.  In August 22 she was admitted again with CHF.  Right and left heart catheterization showed mild nonobstructive disease of the LAD and RCA, severely elevated wedge pressure of 28 and mean PA pressure of 36.  She was diuresed.    When I last saw her she was exhausted with palpitations dizziness and exertional dyspnea.  She wore a Holter monitor which showed no sustained atrial fibs but had intermittent PSVT, PACs and PVCs.  Since then she is lost about 30 pounds.  She feels overall much improved.  No orthopnea, palpitations, lower extremity edema.  Occasionally she gets an alert for atrial fibrillation from her Apple Watch however this is asymptomatic.  She has bruising with Plavix and Eliquis but no ilda bleeding.    The following portions of the patient's history were reviewed and updated as appropriate: allergies, current medications, past family history, past medical history, past social history, past surgical history and problem list.     REVIEW OF SYSTEMS:   All systems reviewed.  Pertinent positives identified in HPI.  All other systems are negative.    Past Medical History:   Diagnosis  Date   • Abnormal ECG     Inferolateral ST-T changes at baseline   • Anemia    • Atrial fibrillation (HCC)    • Calcification of aortic valve    • Coronary artery disease    • COVID-19 2022   • Hyperlipidemia    • Hypertension    • Low back pain    • Mitral regurgitation    • PAF (paroxysmal atrial fibrillation) ([facility])    • Palpitations    • Premature atrial contractions    • Retinal vein occlusion    • Spinal stenosis    • Systolic murmur    • Tonsillitis    • Vitamin D deficiency        Family History   Problem Relation Age of Onset   • Heart attack Other    • Breast cancer Other    • Heart disease Other    • Heart disease Mother         Mother with MI at 63, and later  from CHF   • Heart disease Father         Father  from complications after valve replacement at 73   • Stroke Father    • Breast cancer Sister    • Malig Hyperthermia Neg Hx        Social History     Socioeconomic History   • Marital status:      Spouse name: Kedar   • Number of children: 2   Tobacco Use   • Smoking status: Never   • Smokeless tobacco: Never   Vaping Use   • Vaping Use: Never used   Substance and Sexual Activity   • Alcohol use: No     Comment: Caffeine use 3-4 cups daily   • Drug use: No   • Sexual activity: Yes     Partners: Male       Allergies   Allergen Reactions   • Sulfa Antibiotics Diarrhea and Rash   • Augmentin [Amoxicillin-Pot Clavulanate] Diarrhea and Other (See Comments)   • Bactrim [Sulfamethoxazole-Trimethoprim] Diarrhea and Other (See Comments)       Past Surgical History:   Procedure Laterality Date   • ADENOIDECTOMY     • CARDIAC CATHETERIZATION N/A 2022    Procedure: Right and Left Heart Cath;  Surgeon: Josemanuel Schmitz MD;  Location: [facility] CATH INVASIVE LOCATION;  Service: Cardiovascular;  Laterality: N/A;   • CARDIAC CATHETERIZATION N/A 2022    Procedure: Coronary angiography;  Surgeon: Josemanuel Schmitz MD;  Location: [facility] CATH INVASIVE LOCATION;  Service:  Cardiovascular;  Laterality: N/A;   • CARPAL TUNNEL RELEASE Bilateral    • COLONOSCOPY  08/04/2014    repeat 10 years , Dr. Karen Spear   • COLONOSCOPY N/A 1/22/2021    Procedure: COLONOSCOPY to cecum with cold biopsy polypectomy;  Surgeon: Ray Hairston MD;  Location: [facility] ENDOSCOPY;  Service: Gastroenterology;  Laterality: N/A;  pre: screening  post: diverticulosis, polyp, internal hemorrhoids   • FOOT SURGERY     • HAND SURGERY     • TONSILLECTOMY     • TONSILLECTOMY AND ADENOIDECTOMY     • TUBAL ABDOMINAL LIGATION         Procedures       Objective:         PHYSICAL EXAM:  GEN: VSS, no distress,   Eyes: normal sclera, normal lids and lashes  HENT: moist mucus membranes,   Respiratory: CTAB, no rales or wheezes  CV: RRR, 3 out of 6 systolic murmur rating to the carotids, 3 out of 6 apical blowing harsh murmur at the apex, +2 DP and 2+ carotid pulses b/l  GI: NABS, soft,  Nontender, nondistended  MSK: no edema, no scoliosis or kyphosis  Skin: no rash, warm, dry  Heme/Lymph: no bruising or bleeding  Psych: organized thought, normal behavior and affect  Neuro: Cranial nerves grossly intact, Alert and Oriented x 3.         Assessment:          Diagnosis Plan   1. Paroxysmal atrial fibrillation (HCC)               Plan:       1.  Moderate to severe MR: Appears to be asymptomatic at this time.  Blood pressure has improved, volume status has improved.  We will continue to monitor and consider repeat echocardiogram in 6 months  2.  Moderate aortic stenosis  3.  PFO: Unclear if this was related to her CRAO.  4.  Heart failure with preserved EF: Euvolemic  5.  Hypertension: Well-controlled  6.  Atrial fibrillation: Controlled on digoxin and beta-blocker.  Eliquis.  7.  Central retinal artery occlusion: On apixaban, Plavix  8.  Moderate carotid disease    Dr. Vital thank you very much for referring this kind patient to me. Please call me with any questions or concerns. I will see the patient again in the office in 6  months.         Jennifer Ordonez MD  03/23/23  [facility]    Outpatient Encounter Medications as of 3/23/2023   Medication Sig Dispense Refill   • albuterol sulfate  (90 Base) MCG/ACT inhaler TAKE 2 PUFF(S) (INHALATION) EVERY 4 HOURS AS NEEDED FOR 10 DAYS     • atenolol (TENORMIN) 25 MG tablet TAKE 1 TABLET BY MOUTH EVERY 12 HOURS 180 tablet 1   • cetirizine (zyrTEC) 10 MG tablet 1 OTC tab po QDay for allergies     • cholecalciferol (VITAMIN D3) 25 MCG (1000 UT) tablet Take 1 tablet by mouth 2 (Two) Times a Day.     • clopidogrel (PLAVIX) 75 MG tablet TAKE 1 TABLET BY MOUTH EVERY DAY 90 tablet 2   • digoxin (LANOXIN) 125 MCG tablet Take 1 tablet by mouth Daily. 90 tablet 3   • Eliquis 5 MG tablet tablet TAKE 1 TABLET BY MOUTH EVERY 12 HOURS 180 tablet 3   • fluticasone (FLONASE) 50 MCG/ACT nasal spray into the nostril(s) as directed by provider Daily As Needed.     • furosemide (LASIX) 40 MG tablet Take 1 tablet by mouth Daily. 30 tablet 11   • losartan (COZAAR) 25 MG tablet Take 1 tablet by mouth Daily. 30 tablet 11   • metFORMIN ER (GLUCOPHAGE-XR) 500 MG 24 hr tablet TAKE 1 TABLET BY MOUTH EVERY DAY WITH BREAKFAST 90 tablet 0   • Pitavastatin Calcium (Livalo) 4 MG tablet Take 1 tablet by mouth Every Night. 14 tablet 0     No facility-administered encounter medications on file as of 3/23/2023.

## 2023-04-03 DIAGNOSIS — I34.0 MITRAL VALVE INSUFFICIENCY, UNSPECIFIED ETIOLOGY: Primary | ICD-10-CM

## 2023-04-07 ENCOUNTER — TELEPHONE (OUTPATIENT)
Dept: CARDIOLOGY | Facility: CLINIC | Age: 63
End: 2023-04-07
Payer: COMMERCIAL

## 2023-04-07 NOTE — TELEPHONE ENCOUNTER
colleen w/ pt, she is going to talk to Dr. Hamilton's office to see if the ECHO is necessary at this time. I did tell her that  was going to repeat ECHO in 6 months    [facility]  4/7/23

## 2023-04-07 NOTE — TELEPHONE ENCOUNTER
Dr. Ordonez,    Pt called [phone], and lvm asking if she still needs ECHO? She is now getting a phone call from a  asking if she needs ECHO in Hospital or in office? The ECHO order was placed by Dr. Hamilton.      You had mentioned in your previous note:    Plan       1.  Moderate to severe MR: Appears to be asymptomatic at this time.  Blood pressure has improved, volume status has improved.  We will continue to monitor and consider repeat echocardiogram in 6 months  2.  Moderate aortic stenosis  3.  PFO: Unclear if this was related to her CRAO.  4.  Heart failure with preserved EF: Euvolemic  5.  Hypertension: Well-controlled  6.  Atrial fibrillation: Controlled on digoxin and beta-blocker.  Eliquis.  7.  Central retinal artery occlusion: On apixaban, Plavix  8.  Moderate carotid disease     Dr. Vital thank you very much for referring this kind patient to me. Please call me with any questions or concerns. I will see the patient again in the office in 6 months.              Jennifer Ordonez MD  03/23/23        Please advise.    Thank you,    MERLE Rudolph

## 2023-04-07 NOTE — TELEPHONE ENCOUNTER
Looks like dr forrest ordered it, im not sure what their discussion was, I was going to repeat one in 6 months, im not sure what his plan was. If he wants to do it now that's fine w me

## 2023-05-30 RX ORDER — ATENOLOL 25 MG/1
TABLET ORAL
Qty: 180 TABLET | Refills: 1 | Status: SHIPPED | OUTPATIENT
Start: 2023-05-30

## 2023-06-15 RX ORDER — METFORMIN HYDROCHLORIDE 500 MG/1
TABLET, EXTENDED RELEASE ORAL
Qty: 90 TABLET | Refills: 0 | Status: SHIPPED | OUTPATIENT
Start: 2023-06-15

## 2023-09-25 ENCOUNTER — APPOINTMENT (OUTPATIENT)
Dept: WOMENS IMAGING | Facility: HOSPITAL | Age: 63
End: 2023-09-25
Payer: COMMERCIAL

## 2023-09-25 ENCOUNTER — OFFICE VISIT (OUTPATIENT)
Dept: CARDIOLOGY | Facility: CLINIC | Age: 63
End: 2023-09-25

## 2023-09-25 VITALS
WEIGHT: 157 LBS | HEIGHT: 63 IN | HEART RATE: 70 BPM | BODY MASS INDEX: 27.82 KG/M2 | SYSTOLIC BLOOD PRESSURE: 128 MMHG | DIASTOLIC BLOOD PRESSURE: 80 MMHG

## 2023-09-25 DIAGNOSIS — I35.0 NONRHEUMATIC AORTIC VALVE STENOSIS: ICD-10-CM

## 2023-09-25 DIAGNOSIS — I48.0 PAROXYSMAL ATRIAL FIBRILLATION: ICD-10-CM

## 2023-09-25 DIAGNOSIS — I34.0 NONRHEUMATIC MITRAL VALVE REGURGITATION: Primary | ICD-10-CM

## 2023-09-25 PROCEDURE — 77067 SCR MAMMO BI INCL CAD: CPT | Performed by: RADIOLOGY

## 2023-09-25 PROCEDURE — 77063 BREAST TOMOSYNTHESIS BI: CPT | Performed by: RADIOLOGY

## 2023-09-25 PROCEDURE — 99213 OFFICE O/P EST LOW 20 MIN: CPT | Performed by: INTERNAL MEDICINE

## 2023-09-25 RX ORDER — METOPROLOL TARTRATE 50 MG/1
50 TABLET, FILM COATED ORAL 2 TIMES DAILY PRN
COMMUNITY

## 2023-09-25 NOTE — PROGRESS NOTES
Subjective:     Encounter Date: 09/25/23        Patient ID: Re Sheldon is a 63 y.o. female.    Chief Complaint: Moderate to severe MR, moderate aortic stenosis, atrial fibrillation, hypertension, central retinal artery occlusion  HPI:   This is a pleasant 63-year-old woman who was diagnosed with atrial fibrillation in April 2020.  In August 2020 she had a transesophageal echo which showed moderate aortic stenosis with moderate to severe MR.  She also had severe aortic plaque at that time and was placed on Livalo.  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 At that time she was anticoagulated with Eliquis for AF.  Her echo at that time was unchanged with a preserved systolic function, moderate aortic stenosis and moderate to severe MR and a PFO.  In August 22 she was admitted again with CHF.  Right and left heart catheterization showed mild nonobstructive disease of the LAD and RCA, severely elevated wedge pressure of 28 and mean PA pressure of 36.  She was diuresed.    Today she returns.  She feels well.  She was able to walk on the beach in [place] over the summer without any angina or dyspnea.  Minimal palpitations without any prolonged tachycardia.    The following portions of the patient's history were reviewed and updated as appropriate: allergies, current medications, past family history, past medical history, past social history, past surgical history and problem list.     REVIEW OF SYSTEMS:   All systems reviewed.  Pertinent positives identified in HPI.  All other systems are negative.    Past Medical History:   Diagnosis Date    Abnormal ECG     Inferolateral ST-T changes at baseline    Anemia     Atrial fibrillation     Calcification of aortic valve 2020    Coronary artery disease     COVID-19 07/05/2022    Hyperlipidemia     Hypertension     Low back pain     Mitral regurgitation     PAF (paroxysmal atrial  fibrillation)     Palpitations     Premature atrial contractions     Retinal vein occlusion     Spinal stenosis     Systolic murmur     Tonsillitis     Vitamin D deficiency        Family History   Problem Relation Age of Onset    Heart attack Other     Breast cancer Other     Heart disease Other     Heart disease Mother         Mother with MI at 63, and later  from CHF    Heart disease Father         Father  from complications after valve replacement at 73    Stroke Father     Breast cancer Sister     Malneena Hyperthermia Neg Hx        Social History     Socioeconomic History    Marital status:      Spouse name: Kedar    Number of children: 2   Tobacco Use    Smoking status: Never    Smokeless tobacco: Never   Vaping Use    Vaping Use: Never used   Substance and Sexual Activity    Alcohol use: No     Comment: Caffeine use 3-4 cups daily    Drug use: No    Sexual activity: Yes     Partners: Male       Allergies   Allergen Reactions    Sulfa Antibiotics Diarrhea and Rash    Augmentin [Amoxicillin-Pot Clavulanate] Diarrhea and Other (See Comments)    Bactrim [Sulfamethoxazole-Trimethoprim] Diarrhea and Other (See Comments)       Past Surgical History:   Procedure Laterality Date    ADENOIDECTOMY      CARDIAC CATHETERIZATION N/A 2022    Procedure: Right and Left Heart Cath;  Surgeon: Josemanuel Schmitz MD;  Location: [facility] CATH INVASIVE LOCATION;  Service: Cardiovascular;  Laterality: N/A;    CARDIAC CATHETERIZATION N/A 2022    Procedure: Coronary angiography;  Surgeon: Josemanuel Schmitz MD;  Location:  [facility] CATH INVASIVE LOCATION;  Service: Cardiovascular;  Laterality: N/A;    CARPAL TUNNEL RELEASE Bilateral     COLONOSCOPY  2014    repeat 10 years , Dr. Karen Spear    COLONOSCOPY N/A 2021    Procedure: COLONOSCOPY to cecum with cold biopsy polypectomy;  Surgeon: Ray Hairston MD;  Location: [facility] ENDOSCOPY;  Service: Gastroenterology;  Laterality: N/A;  pre: screening  post:  diverticulosis, polyp, internal hemorrhoids    FOOT SURGERY      HAND SURGERY      TONSILLECTOMY      TONSILLECTOMY AND ADENOIDECTOMY      TUBAL ABDOMINAL LIGATION         Procedures       Objective:         PHYSICAL EXAM:  GEN: VSS, no distress,   Eyes: normal sclera, normal lids and lashes  HENT: moist mucus membranes,   Respiratory: CTAB, no rales or wheezes  CV: RRR, 3 out of 6 systolic murmur rating to the carotids, 3 out of 6 apical blowing harsh murmur at the apex, +2 DP and 2+ carotid pulses b/l  GI: NABS, soft,  Nontender, nondistended  MSK: no edema, no scoliosis or kyphosis  Skin: no rash, warm, dry  Heme/Lymph: no bruising or bleeding  Psych: organized thought, normal behavior and affect  Neuro: Cranial nerves grossly intact, Alert and Oriented x 3.         Assessment:         No diagnosis found.         Plan:       1.  Moderate to severe MR: Asymptomatic at this time.   2.  Moderate aortic stenosis  3.  PFO: Unclear if this was related to her CRAO.  4.  Heart failure with preserved EF: Euvolemic  5.  Hypertension: Well-controlled  6.  Atrial fibrillation: Controlled on digoxin and beta-blocker.  Eliquis.  7.  Central retinal artery occlusion: On apixaban, Plavix  8.  Moderate carotid disease    Dr. Vital thank you very much for referring this kind patient to me. Please call me with any questions or concerns. I will see the patient again in the office in 6 months.         Jennifer Ordonez MD  09/25/23  [facility]    Outpatient Encounter Medications as of 9/25/2023   Medication Sig Dispense Refill    albuterol sulfate  (90 Base) MCG/ACT inhaler TAKE 2 PUFF(S) (INHALATION) EVERY 4 HOURS AS NEEDED FOR 10 DAYS      atenolol (TENORMIN) 25 MG tablet TAKE 1 TABLET BY MOUTH EVERY 12 HOURS 180 tablet 1    cetirizine (zyrTEC) 10 MG tablet 1 OTC tab po QDay for allergies      cholecalciferol (VITAMIN D3) 25 MCG (1000 UT) tablet Take 1 tablet by mouth 2 (Two) Times a Day.      digoxin  (LANOXIN) 125 MCG tablet TAKE 1 TABLET BY MOUTH EVERY DAY 90 tablet 3    Eliquis 5 MG tablet tablet TAKE 1 TABLET BY MOUTH EVERY 12 HOURS 180 tablet 3    fluticasone (FLONASE) 50 MCG/ACT nasal spray into the nostril(s) as directed by provider Daily As Needed.      furosemide (LASIX) 40 MG tablet TAKE 1 TABLET BY MOUTH EVERY DAY 90 tablet 3    losartan (COZAAR) 25 MG tablet TAKE 1 TABLET BY MOUTH EVERY DAY 90 tablet 3    metFORMIN ER (GLUCOPHAGE-XR) 500 MG 24 hr tablet TAKE 1 TABLET BY MOUTH EVERY DAY WITH BREAKFAST 90 tablet 0    metoprolol tartrate (LOPRESSOR) 50 MG tablet Take 1 tablet by mouth 2 (Two) Times a Day As Needed.      Pitavastatin Calcium (Livalo) 4 MG tablet Take 1 tablet by mouth Every Night. 14 tablet 0    [DISCONTINUED] clopidogrel (PLAVIX) 75 MG tablet TAKE 1 TABLET BY MOUTH EVERY DAY 90 tablet 2     No facility-administered encounter medications on file as of 9/25/2023.

## 2023-10-05 RX ORDER — METFORMIN HYDROCHLORIDE 500 MG/1
TABLET, EXTENDED RELEASE ORAL
Qty: 90 TABLET | Refills: 0 | Status: SHIPPED | OUTPATIENT
Start: 2023-10-05

## 2023-10-05 NOTE — TELEPHONE ENCOUNTER
Rx Refill Note  Requested Prescriptions     Pending Prescriptions Disp Refills    metFORMIN ER (GLUCOPHAGE-XR) 500 MG 24 hr tablet [Pharmacy Med Name: METFORMIN HCL  MG TABLET] 90 tablet 0     Sig: TAKE 1 TABLET BY MOUTH EVERY DAY WITH BREAKFAST      Last office visit with prescribing clinician: 3/14/2023   Last telemedicine visit with prescribing clinician: Visit date not found   Next office visit with prescribing clinician: 1/10/2024       {TIP  Please add Last Relevant Lab Date if appropriate: 03/06/23                 Would you like a call back once the refill request has been completed: [] Yes [] No    If the office needs to give you a call back, can they leave a voicemail: [] Yes [] No    Danyell Alvarez MA  10/05/23, 07:12 EDT

## 2023-11-27 RX ORDER — APIXABAN 5 MG/1
TABLET, FILM COATED ORAL
Qty: 180 TABLET | Refills: 3 | Status: SHIPPED | OUTPATIENT
Start: 2023-11-27

## 2023-11-27 RX ORDER — ATENOLOL 25 MG/1
TABLET ORAL
Qty: 180 TABLET | Refills: 1 | Status: SHIPPED | OUTPATIENT
Start: 2023-11-27

## 2023-12-05 ENCOUNTER — OFFICE VISIT (OUTPATIENT)
Dept: FAMILY MEDICINE CLINIC | Facility: CLINIC | Age: 63
End: 2023-12-05
Payer: COMMERCIAL

## 2023-12-05 VITALS
RESPIRATION RATE: 18 BRPM | WEIGHT: 155.8 LBS | HEART RATE: 75 BPM | HEIGHT: 63 IN | SYSTOLIC BLOOD PRESSURE: 124 MMHG | DIASTOLIC BLOOD PRESSURE: 78 MMHG | BODY MASS INDEX: 27.61 KG/M2 | TEMPERATURE: 96.4 F | OXYGEN SATURATION: 98 %

## 2023-12-05 DIAGNOSIS — R10.30 LOWER ABDOMINAL PAIN: Primary | ICD-10-CM

## 2023-12-05 PROCEDURE — 99214 OFFICE O/P EST MOD 30 MIN: CPT | Performed by: NURSE PRACTITIONER

## 2023-12-05 NOTE — PROGRESS NOTES
Chief Complaint  Abdominal Pain (X Day after Thanksgiving, Got better x 3 days. Came back, sever since last Friday.  Bloated, gas. )    Subjective        Re Sheldon presents to [facility]  History of Present Illness    Re Sheldon is a 63-year-old female who presents today with abdominal pain for the past 2 weeks.    For the past 2 weeks she has been experiencing lower abdominal pain. No urinary symptoms, vomiting, diarrhea, or belching. She has been experiencing constipation, which is unusual for her. Her symptoms began the day before Thanksgiving, 11/22/2023. The night of 11/22/2023 she ate mushrooms with cream cheese and jalapenos the night before Thanksgiving. Thanksgiving, 11/23/2023, they did not have thanksgiving dinner but lasagna and salad instead. That night at home her and her  were on the toilet which may be related to the food from 11/22/2023.  Friday, 11/24/2023 she experienced severe bloating and gas which eventually subsided. On 11/25/2023 she experienced the same pain intermittently. This past Friday, 12/01/2023, she went out to dinner and everything was fine. Saturday, 12/02/2023 she experienced the same pain, constipation, bloating, and gas. This morning when she got out of bed she could feel where the pain was. It is very noticeable when it occurs. The symptoms have caused her to eat a bland diet. She denies hematochezia. An appointment is scheduled for her to see gastroenterology on 01/11/2024 and her family doctor. The patient inquired if she could possibly have diverticulitis. She already has diverticulosis.    A Pap smear was completed in 10/2023. She has all of her reproductive organs.    The patient has aortic stenosis, mitral valve leak, and a heart murmur. She has been putting off further treatment for the heart murmur because a valve replacement will be completed.    She is allergic to Augmentin with the reaction of diarrhea and feeling unwell.  HPI:    Patient ID: Gopal Lainez is a 53year old male. HPI  This 61-year-old male presents as a new patient to me on consult from . Patient's chief complaint is heel pain. It has been present for as much as a year.   He took meloxicam about 6 mon "The patient is not sure how she does with oral contrast.    The patient was in the hospital last year, 2022. 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 At the time she was dealing with other medical concerns.  Objective   Vital Signs:  /78 (BP Location: Right arm, Patient Position: Sitting, Cuff Size: Adult)   Pulse 75   Temp 96.4 °F (35.8 °C) (Temporal)   Resp 18   Ht 160 cm (62.99\")   Wt 70.7 kg (155 lb 12.8 oz)   SpO2 98%   BMI 27.61 kg/m²   Estimated body mass index is 27.61 kg/m² as calculated from the following:    Height as of this encounter: 160 cm (62.99\").    Weight as of this encounter: 70.7 kg (155 lb 12.8 oz).            Physical Exam  Constitutional:       General: She is not in acute distress.     Appearance: She is well-developed. She is not diaphoretic.   Cardiovascular:      Rate and Rhythm: Normal rate and regular rhythm.      Heart sounds: Normal heart sounds. No murmur heard.     No friction rub. No gallop.   Pulmonary:      Effort: Pulmonary effort is normal. No respiratory distress.      Breath sounds: Normal breath sounds. No wheezing or rales.   Abdominal:      General: Bowel sounds are normal. There is no distension.      Palpations: Abdomen is soft.      Tenderness: There is no abdominal tenderness.   Musculoskeletal:      Cervical back: Neck supple.   Skin:     General: Skin is warm and dry.   Neurological:      Mental Status: She is alert and oriented to person, place, and time.        Result Review :                  Assessment and Plan   Diagnoses and all orders for this visit:    1. Lower abdominal pain (Primary)  -     CT Abdomen Pelvis With Contrast; Future      1. Lower abdominal pain (Primary)  -     CT scan of abdomen and pelvis will be obtained.  -     We will wait for results of CT scan to determine if patient will need an antibiotic.  -     Blood work will be obtained for CBC and CMP.   Recommend labs today, will order stat CT and determine if " functional position on weightbearing and there seems to be adequate ankle joint range of motion with the knee extended. X-ray today confirms no apparent osseous pathology. Symptoms are quite consistent and classic for plantar fasciitis.   After diagram an there is a need for antibiotics.  Addendum: ct scan was declined, recommend starting with labs to determine if wbc is increased and treat for possible diverticulitis, she has known diverticulosis and pain is primarily suprapubic and left lower quadrant    Follow Up   No follow-ups on file.  Patient was given instructions and counseling regarding her condition or for health maintenance advice. Please see specific information pulled into the AVS if appropriate.       Transcribed from ambient dictation for JONATHAN Ledezma by Yasmin Carrillo.  12/05/23   12:28 EST    Patient or patient representative verbalized consent to the visit recording.  I have personally performed the services described in this document as transcribed by the above individual, and it is both accurate and complete.

## 2024-01-02 RX ORDER — METFORMIN HYDROCHLORIDE 500 MG/1
TABLET, EXTENDED RELEASE ORAL
Qty: 90 TABLET | Refills: 0 | Status: SHIPPED | OUTPATIENT
Start: 2024-01-02

## 2024-02-01 ENCOUNTER — OFFICE VISIT (OUTPATIENT)
Dept: FAMILY MEDICINE CLINIC | Facility: CLINIC | Age: 64
End: 2024-02-01
Payer: COMMERCIAL

## 2024-02-01 VITALS
OXYGEN SATURATION: 97 % | HEIGHT: 63 IN | WEIGHT: 159 LBS | SYSTOLIC BLOOD PRESSURE: 116 MMHG | DIASTOLIC BLOOD PRESSURE: 64 MMHG | HEART RATE: 74 BPM | BODY MASS INDEX: 28.17 KG/M2 | TEMPERATURE: 97.9 F

## 2024-02-01 DIAGNOSIS — J06.9 ACUTE URI: Primary | ICD-10-CM

## 2024-02-01 DIAGNOSIS — J10.1 INFLUENZA B: ICD-10-CM

## 2024-02-01 LAB
EXPIRATION DATE: ABNORMAL
FLUAV AG UPPER RESP QL IA.RAPID: NOT DETECTED
FLUBV AG UPPER RESP QL IA.RAPID: DETECTED
INTERNAL CONTROL: ABNORMAL
Lab: ABNORMAL
SARS-COV-2 AG UPPER RESP QL IA.RAPID: NOT DETECTED

## 2024-02-01 PROCEDURE — 99213 OFFICE O/P EST LOW 20 MIN: CPT | Performed by: NURSE PRACTITIONER

## 2024-02-01 PROCEDURE — 87428 SARSCOV & INF VIR A&B AG IA: CPT | Performed by: NURSE PRACTITIONER

## 2024-02-01 RX ORDER — AZITHROMYCIN 250 MG/1
250 TABLET, FILM COATED ORAL DAILY
COMMUNITY

## 2024-02-01 RX ORDER — HYDROCODONE POLISTIREX AND CHLORPHENIRAMINE POLISTIREX 10; 8 MG/5ML; MG/5ML
5 SUSPENSION, EXTENDED RELEASE ORAL EVERY 12 HOURS PRN
Qty: 120 ML | Refills: 0 | Status: SHIPPED | OUTPATIENT
Start: 2024-02-01

## 2024-02-01 RX ORDER — PREDNISONE 20 MG/1
10 TABLET ORAL EVERY 12 HOURS SCHEDULED
COMMUNITY
Start: 2024-01-29

## 2024-02-01 RX ORDER — CLOPIDOGREL BISULFATE 75 MG/1
75 TABLET ORAL DAILY
COMMUNITY

## 2024-02-01 RX ORDER — BROMPHENIRAMINE MALEATE, PSEUDOEPHEDRINE HYDROCHLORIDE, AND DEXTROMETHORPHAN HYDROBROMIDE 2; 30; 10 MG/5ML; MG/5ML; MG/5ML
SYRUP ORAL
COMMUNITY
Start: 2024-01-29 | End: 2024-02-01

## 2024-02-01 RX ORDER — ALBUTEROL SULFATE 90 UG/1
2 AEROSOL, METERED RESPIRATORY (INHALATION)
Qty: 8 G | Refills: 0 | Status: SHIPPED | OUTPATIENT
Start: 2024-02-01

## 2024-02-01 NOTE — PROGRESS NOTES
"Chief Complaint  Nasal Congestion, Cough, and Headache (Started Sunday just a cough only, went to urgent care had covid, flu, and strep done, all negative. Cough has gotten worst, chest soreness from cough, patient states she's always out of breathe. Headache is severe. )    Subjective        Re Sheldon presents to [facility]  History of Present Illness    Re Sheldon is a 63-year-old female who presents today for upper respiratory symptoms.    The patient reports her symptoms started on Sunday night, 01/28/2024, with a \"tickle\" cough. She denied having a sore throat or headache at that time. She went to urgent care on Monday, 01/29/2024. She was negative for influenza or strep throat. She was informed that her lungs sounded good, but she had cerumen and fluid in her ears which they flushed. She was prescribed steroids and Bromfed DM for her cough.    Since starting the prescriptions, her symptoms have worsened. She is unable to sleep due to coughing. She denies rhinorrhea or nasal congestion before going to the urgent care. However, now she feels sinus congestion \"building up\" and it feels like it needs to come out but does not. She does not have significant nasal discharge nor is her cough productive. She coughs quite often and describes her cough as \"horsey.\"  The coughing and congestion are causing her to have headaches. She complains of a headache right now while sitting here in the office. She denies having asthma, but she is having difficulty breathing. She did take her steroids this morning. She is interested in getting tested for COVID-19 today.     She notes that Tessalon Perles has never been effective for her.     The patient and her  are moving in 2 weeks to a new home.      She has atrial fibrillation. She has been hospitalized for heart failure.      Objective   Vital Signs:  /64 (BP Location: Left arm, Patient Position: Sitting, Cuff Size: Adult)   " "Pulse 74   Temp 97.9 °F (36.6 °C)   Ht 160 cm (63\")   Wt 72.1 kg (159 lb)   SpO2 97%   BMI 28.17 kg/m²   Estimated body mass index is 28.17 kg/m² as calculated from the following:    Height as of this encounter: 160 cm (63\").    Weight as of this encounter: 72.1 kg (159 lb).            Physical Exam  Vitals reviewed.   Constitutional:       General: She is not in acute distress.     Appearance: She is well-developed. She is not diaphoretic.   HENT:      Head: Normocephalic and atraumatic.      Right Ear: Tympanic membrane, ear canal and external ear normal.      Left Ear: Tympanic membrane, ear canal and external ear normal.      Nose: Nose normal. Congestion present.      Mouth/Throat:      Pharynx: Uvula midline. No oropharyngeal exudate.   Cardiovascular:      Rate and Rhythm: Normal rate and regular rhythm.      Heart sounds: Normal heart sounds. No murmur heard.     No friction rub. No gallop.   Pulmonary:      Effort: Pulmonary effort is normal. No respiratory distress.      Breath sounds: Wheezing present. No rales.   Abdominal:      General: Bowel sounds are normal. There is no distension.      Palpations: Abdomen is soft.      Tenderness: There is no abdominal tenderness.   Musculoskeletal:      Cervical back: Neck supple.   Skin:     General: Skin is warm and dry.   Neurological:      Mental Status: She is alert and oriented to person, place, and time.        Result Review :                    Assessment and Plan   Diagnoses and all orders for this visit:    1. Acute URI (Primary)  -     POCT SARS-CoV-2 Antigen FIDELIA + Flu    2. Influenza B  -     Hydrocod Aroldo-Chlorphe Aroldo ER (TUSSIONEX PENNKINETIC) 10-8 MG/5ML ER suspension; Take 5 mL by mouth Every 12 (Twelve) Hours As Needed for Cough.  Dispense: 120 mL; Refill: 0    Other orders  -     albuterol sulfate  (90 Base) MCG/ACT inhaler; Inhale 2 puffs 4 (Four) Times a Day.  Dispense: 8 g; Refill: 0        1. Acute URI   - An order has been placed " for POC SARS-CoV-2 FIDELIA plus flu. A prescription has been sent for Tussionex oral suspension to be taken every 12 hours as needed for cough.     2. Influenza B  Patient positive for flu b, she is out of window to treat with antiviral, recommend albuterol inhaler for wheezing/shortness of breath, complete oral steroid, increase fluids, tylenol for headache, tussionex for cough. Advised no driving or operating heavy machinery while taking cough medication. She will follow up for worsening or no improvement in symptoms       Follow Up   No follow-ups on file.  Patient was given instructions and counseling regarding her condition or for health maintenance advice. Please see specific information pulled into the AVS if appropriate.       Transcribed from ambient dictation for JONATHAN Ledezma by My Morales.  02/01/24   17:09 EST    Patient or patient representative verbalized consent to the visit recording.  I have personally performed the services described in this document as transcribed by the above individual, and it is both accurate and complete.

## 2024-02-06 ENCOUNTER — TELEPHONE (OUTPATIENT)
Dept: FAMILY MEDICINE CLINIC | Facility: CLINIC | Age: 64
End: 2024-02-06

## 2024-02-06 NOTE — TELEPHONE ENCOUNTER
Caller: Pito Re S     Relationship: SELF    Best call back number: [phone]     What is your medical concern?     PATIENT SAW YOU ON 2/1/2024 AND WAS DIAGNOSED AS HAVING THE FLU.  YOU PRESCRIBED HER A INHALER AND SOME COUGH MEDICATION.    SINCE THEN SHE IS NOW, HAVING PAIN IN HER SINUS AREAS, BLOWING OUT YELLOW MUCUS, AND SOME WHEEZING.      THE CONCERN IS DOES SHE NEED SOME ANTIBIOTIC CALLED INTO THE PHARMACY OR DOES SHE NEED TO STAY THE COURSE AND CONTINUE WITH WHAT SHE HAS BEEN DOING?    PLEASE ADVISE      [facility] #[id number] - [place], [place] - [address] AT [facility] [address] - [phone]  - [phone] FX

## 2024-02-07 RX ORDER — DOXYCYCLINE HYCLATE 100 MG/1
100 CAPSULE ORAL 2 TIMES DAILY
Qty: 20 CAPSULE | Refills: 0 | Status: SHIPPED | OUTPATIENT
Start: 2024-02-07 | End: 2024-02-08 | Stop reason: ALTCHOICE

## 2024-02-07 NOTE — TELEPHONE ENCOUNTER
Hub staff attempted to follow warm transfer process and was unsuccessful     Caller: Re Sheldon    Relationship to patient: Self    Best call back number: [phone]    Patient is needing: RETURNING CALL TO [facility]

## 2024-02-07 NOTE — TELEPHONE ENCOUNTER
Caller: Re Sheldon    Relationship: Self    Best call back number: [phone]     Which medication are you concerned about: doxycycline (VIBRAMYCIN) 100 MG capsule     Who prescribed you this medication: DR CLARK      When did you start taking this medication: SHE HAS NOT STARTED TAKING THIS MEDICATION.      SHE DOES NOT WANT TO TAKE THIS MEDICATION DUE TO SOME SIDE EFFECTS WITH HEAD PRESSURE AND HER VISION.  SHE HAS TO KEEP THE PRESSURE DOWN.  SHE GOES EVERY 8 WEEKS TO GET THOSE LEVELS CHECKED AND DOES NOT WANT TO CAUSE ANY MORE DAMAGE TO HER EYES.    What are your concerns:      CAN YOU CALL HER IN AMOXICILLIAN SHE CAN TAKE THAT WITH NO CONCERNS    IN ADDITION:  THE MEDICATION WAS SENT TO [facility].  PATIENT NO LONGER USES [facility].        [facility] #[id number] [place], [place] - [facility] [address] AT [facility] [address] - [phone]  - [phone] FX

## 2024-02-07 NOTE — TELEPHONE ENCOUNTER
Please advise,  Pt does not want to take doxycycline due to side affects. Pt is wanting to take amoxicillin as an alternate.

## 2024-02-08 RX ORDER — AMOXICILLIN 875 MG/1
875 TABLET, COATED ORAL 2 TIMES DAILY
Qty: 20 TABLET | Refills: 0 | Status: SHIPPED | OUTPATIENT
Start: 2024-02-08

## 2024-02-08 NOTE — TELEPHONE ENCOUNTER
Caller: Re Sheldon    Relationship: Self    Best call back number: [phone]    Who are you requesting to speak with (clinical staff, provider,  specific staff member):CLINICAL    What was the call regarding:PATIENT STATES SHE ONLY HAS A PROBLEM WITH AUGMENTIN, THAT IS THE ONLY ONE THAT CAUSES STOMACH PROBLEMS.    PATIENT HAS TAKEN AMOXICILLIN BEFORE, AND IT WORKS WELL FOR HER. PATIENT WOULD LIKE THIS SENT OVER TO HER PHARMACY.   [facility] #[id number] [place], [place] - [facility] [address] AT [facility] [address] - [phone]  - [phone] FX

## 2024-02-09 ENCOUNTER — TELEPHONE (OUTPATIENT)
Dept: CARDIOLOGY | Facility: CLINIC | Age: 64
End: 2024-02-09
Payer: COMMERCIAL

## 2024-02-09 NOTE — TELEPHONE ENCOUNTER
Spoke with this patient in detail. Voiced understanding. NO further questions noted at this time.

## 2024-02-28 RX ORDER — PITAVASTATIN 4 MG/1
1 TABLET, FILM COATED ORAL
Qty: 90 TABLET | Refills: 0 | Status: SHIPPED | OUTPATIENT
Start: 2024-02-28

## 2024-02-28 RX ORDER — FUROSEMIDE 40 MG/1
40 TABLET ORAL DAILY
Qty: 90 TABLET | Refills: 0 | Status: SHIPPED | OUTPATIENT
Start: 2024-02-28

## 2024-02-28 RX ORDER — LOSARTAN POTASSIUM 25 MG/1
25 TABLET ORAL DAILY
Qty: 90 TABLET | Refills: 0 | Status: SHIPPED | OUTPATIENT
Start: 2024-02-28

## 2024-02-28 RX ORDER — ATENOLOL 25 MG/1
25 TABLET ORAL EVERY 12 HOURS
Qty: 180 TABLET | Refills: 0 | Status: SHIPPED | OUTPATIENT
Start: 2024-02-28

## 2024-02-28 RX ORDER — METOPROLOL TARTRATE 50 MG/1
50 TABLET, FILM COATED ORAL 2 TIMES DAILY PRN
Qty: 180 TABLET | Refills: 0 | Status: SHIPPED | OUTPATIENT
Start: 2024-02-28

## 2024-02-28 RX ORDER — DIGOXIN 125 MCG
125 TABLET ORAL DAILY
Qty: 90 TABLET | Refills: 0 | Status: CANCELLED | OUTPATIENT
Start: 2024-02-28

## 2024-02-28 NOTE — TELEPHONE ENCOUNTER
Pt called, lvm asking for refills/new rx to be sent to new pharmacy. [facility] - Kory Sawyer cma  2/28/24

## 2024-02-28 NOTE — PROGRESS NOTES
Care Transitions Initial Follow Up Call    Outreach made within 2 business days of discharge: Yes    Patient: Júnior Szymanski Patient : 1943   MRN: [id number]  Reason for Admission: There are no discharge diagnoses documented for the most recent discharge.  Discharge Date: 24       Spoke with: Júnior    Discharge department/facility: [facility]    TCM Interactive Patient Contact:  Was patient able to fill all prescriptions: Yes  Was patient instructed to bring all medications to the follow-up visit: Yes  Is patient taking all medications as directed in the discharge summary? Yes  Does patient understand their discharge instructions: Yes  Does patient have questions or concerns that need addressed prior to 7-14 day follow up office visit: no    He has already scheduled with his cardiologist.   He scheduled here on 24 at 4:20.    Scheduled appointment with PCP within 7-14 days    Follow Up  Future Appointments   Date Time Provider Department Center   2024  1:00 PM Ciarra Leblanc, PT [facility] S[facility] PT [facility]   3/15/2024  9:45 AM Vidal Johnson MD [facility]   3/19/2024  1:00 PM Esteban Belcher MD [facility]   3/21/2024  1:00 PM Paco Drake MD [facility]   2024  1:00 PM Dar Cabral PA-C [facility]   5/3/2024  9:00 AM Alejandro Call MD [facility] CT SURG [facility]   2024  1:00 PM Esteban Belcher MD [facility]   2024 10:40 AM Augustine Harvey MD [facility] MMA   2024 10:45 AM Vidal Johnson MD [facility]  MMA       Cliff Col[facility]ns     Procedure   Ear Cerumen Removal Instrumentation  Date/Time: 5/23/2018 1:49 PM  Performed by: ANGELINA HAYNES  Authorized by: RENETTA CLARK   Consent: Verbal consent obtained. Written consent not obtained.  Consent given by: patient  Patient understanding: patient states understanding of the procedure being performed  Patient identity confirmed: verbally with patient    Anesthesia:  Local Anesthetic: none  Location details: left ear  Comments: PT TOLERATED WELL. EAR WAX SUCCESSFULLY REMOVED   Procedure type: irrigation   Sedation:  Patient sedated: no

## 2024-02-29 RX ORDER — DIGOXIN 125 MCG
125 TABLET ORAL DAILY
Qty: 90 TABLET | Refills: 3 | Status: SHIPPED | OUTPATIENT
Start: 2024-02-29

## 2024-03-26 ENCOUNTER — OFFICE VISIT (OUTPATIENT)
Dept: CARDIOLOGY | Facility: CLINIC | Age: 64
End: 2024-03-26
Payer: COMMERCIAL

## 2024-03-26 VITALS
HEART RATE: 95 BPM | WEIGHT: 153 LBS | HEIGHT: 65 IN | BODY MASS INDEX: 25.49 KG/M2 | DIASTOLIC BLOOD PRESSURE: 83 MMHG | OXYGEN SATURATION: 97 % | SYSTOLIC BLOOD PRESSURE: 128 MMHG

## 2024-03-26 DIAGNOSIS — I48.92 ATRIAL FLUTTER WITH CONTROLLED RESPONSE: ICD-10-CM

## 2024-03-26 DIAGNOSIS — E78.2 MIXED HYPERLIPIDEMIA: ICD-10-CM

## 2024-03-26 DIAGNOSIS — I48.21 PERMANENT ATRIAL FIBRILLATION: Primary | ICD-10-CM

## 2024-03-26 DIAGNOSIS — R06.00 DYSPNEA, UNSPECIFIED TYPE: ICD-10-CM

## 2024-03-26 DIAGNOSIS — I10 PRIMARY HYPERTENSION: ICD-10-CM

## 2024-03-26 DIAGNOSIS — I65.23 CAROTID STENOSIS, BILATERAL: ICD-10-CM

## 2024-03-26 DIAGNOSIS — Z86.73 HISTORY OF STROKE: ICD-10-CM

## 2024-03-26 DIAGNOSIS — I48.0 PAROXYSMAL A-FIB: ICD-10-CM

## 2024-03-26 PROCEDURE — 93000 ELECTROCARDIOGRAM COMPLETE: CPT | Performed by: NURSE PRACTITIONER

## 2024-03-26 PROCEDURE — 99214 OFFICE O/P EST MOD 30 MIN: CPT | Performed by: NURSE PRACTITIONER

## 2024-03-26 RX ORDER — PITAVASTATIN CALCIUM 4.18 MG/1
1 TABLET, FILM COATED ORAL
Qty: 90 TABLET | Refills: 3 | Status: SHIPPED | OUTPATIENT
Start: 2024-03-26

## 2024-03-26 RX ORDER — DIGOXIN 125 MCG
125 TABLET ORAL DAILY
Qty: 90 TABLET | Refills: 3 | Status: SHIPPED | OUTPATIENT
Start: 2024-03-26

## 2024-03-26 RX ORDER — FUROSEMIDE 40 MG/1
40 TABLET ORAL DAILY
Qty: 90 TABLET | Refills: 3 | Status: SHIPPED | OUTPATIENT
Start: 2024-03-26

## 2024-03-26 RX ORDER — ATENOLOL 25 MG/1
25 TABLET ORAL EVERY 12 HOURS
Qty: 180 TABLET | Refills: 3 | Status: SHIPPED | OUTPATIENT
Start: 2024-03-26

## 2024-03-26 RX ORDER — LOSARTAN POTASSIUM 25 MG/1
25 TABLET ORAL DAILY
Qty: 90 TABLET | Refills: 3 | Status: SHIPPED | OUTPATIENT
Start: 2024-03-26

## 2024-03-26 NOTE — PROGRESS NOTES
Date of Office Visit: 2024  Encounter Provider: JONATHAN Mitchell  Place of Service: [facility] CARDIOLOGY  Patient Name: Re Sheldon  :1960    Chief Complaint   Patient presents with    Atrial Fibrillation   :     HPI: Re Sheldon is a 64 y.o. female who is a patient of Dr. Garza and is new to me today.  She has a history of atrial fibrillation that was diagnosed in 2020.  She had transesophageal echo at that time that showed moderate aortic stenosis with moderate to severe MR.  She also had severe aortic plaque and was placed on the Vallo.  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  At that time she was anticoagulated for atrial fibrillation.  Her echo was unchanged moderate to severe MR and PFO.  In 2022 she was admitted again with heart failure underwent a right and left heart cath that showed mild nonobstructive disease in the LAD and RCA severe elevated wedge pressure of 28 and a mean pressure of 36.  She was diuresed.    She was last in the office in September she was doing well she was walking without angina or dyspnea and had just been to the beach in [place].  She comes in for routine follow-up today.  Previous testing and notes have been reviewed by me.   Past Medical History:   Diagnosis Date    Abnormal ECG     Inferolateral ST-T changes at baseline    Anemia     Atrial fibrillation     Calcification of aortic valve     Coronary artery disease     COVID-19 2022    Hyperlipidemia     Hypertension     Low back pain     Mitral regurgitation     PAF (paroxysmal atrial fibrillation)     Palpitations     Premature atrial contractions     Retinal vein occlusion     Spinal stenosis     Systolic murmur     Tonsillitis     Vitamin D deficiency        Past Surgical History:   Procedure Laterality Date    ADENOIDECTOMY      CARDIAC CATHETERIZATION  N/A 2022    Procedure: Right and Left Heart Cath;  Surgeon: Josemanuel Schmitz MD;  Location:  [facility];  Service: Cardiovascular;  Laterality: N/A;    CARDIAC CATHETERIZATION N/A 2022    Procedure: Coronary angiography;  Surgeon: Josemanuel Schmitz MD;  Location:  [facility];  Service: Cardiovascular;  Laterality: N/A;    CARPAL TUNNEL RELEASE Bilateral     COLONOSCOPY  2014    repeat 10 years , Dr. Karen Spear    COLONOSCOPY N/A 2021    Procedure: COLONOSCOPY to cecum with cold biopsy polypectomy;  Surgeon: Ray Hairston MD;  Location:  [facility] END[facility];  Service: Gastroenterology;  Laterality: N/A;  pre: screening  post: diverticulosis, polyp, internal hemorrhoids    FOOT SURGERY      HAND SURGERY      TONSILLECTOMY      TONSILLECTOMY AND ADENOIDECTOMY      TUBAL ABDOMINAL LIGATION         Social History     Socioeconomic History    Marital status:      Spouse name: Kedar    Number of children: 2   Tobacco Use    Smoking status: Never     Passive exposure: Never    Smokeless tobacco: Never   Vaping Use    Vaping status: Never Used   Substance and Sexual Activity    Alcohol use: No     Comment: Caffeine use 3-4 cups daily    Drug use: No    Sexual activity: Yes     Partners: Male       Family History   Problem Relation Age of Onset    Heart attack Other     Breast cancer Other     Heart disease Other     Heart disease Mother         Mother with MI at 63, and later  from CHF    Heart disease Father         Father  from complications after valve replacement at 73    Stroke Father     Breast cancer Sister     Malig Hyperthermia Neg Hx        ROS    Allergies   Allergen Reactions    Sulfa Antibiotics Diarrhea and Rash    Augmentin [Amoxicillin-Pot Clavulanate] Diarrhea and Other (See Comments)    Bactrim [Sulfamethoxazole-Trimethoprim] Diarrhea and Other (See Comments)         Current Outpatient Medications:     albuterol sulfate   "(90 Base) MCG/ACT inhaler, Inhale 2 puffs 4 (Four) Times a Day., Disp: 8 g, Rfl: 0    amoxicillin (AMOXIL) 875 MG tablet, Take 1 tablet by mouth 2 (Two) Times a Day., Disp: 20 tablet, Rfl: 0    apixaban (Eliquis) 5 MG tablet tablet, Take 1 tablet by mouth Every 12 (Twelve) Hours., Disp: 180 tablet, Rfl: 3    atenolol (TENORMIN) 25 MG tablet, Take 1 tablet by mouth Every 12 (Twelve) Hours., Disp: 180 tablet, Rfl: 3    cetirizine (zyrTEC) 10 MG tablet, 1 OTC tab po QDay for allergies, Disp: , Rfl:     cholecalciferol (VITAMIN D3) 25 MCG (1000 UT) tablet, Take 1 tablet by mouth 2 (Two) Times a Day., Disp: , Rfl:     clopidogrel (PLAVIX) 75 MG tablet, Take 1 tablet by mouth Daily., Disp: , Rfl:     digoxin (LANOXIN) 125 MCG tablet, Take 1 tablet by mouth Daily., Disp: 90 tablet, Rfl: 3    fluticasone (FLONASE) 50 MCG/ACT nasal spray, into the nostril(s) as directed by provider Daily As Needed., Disp: , Rfl:     furosemide (LASIX) 40 MG tablet, Take 1 tablet by mouth Daily., Disp: 90 tablet, Rfl: 3    Hydrocod Aroldo-Chlorphe Aroldo ER (TUSSIONEX PENNKINETIC) 10-8 MG/5ML ER suspension, Take 5 mL by mouth Every 12 (Twelve) Hours As Needed for Cough., Disp: 120 mL, Rfl: 0    losartan (COZAAR) 25 MG tablet, Take 1 tablet by mouth Daily., Disp: 90 tablet, Rfl: 3    metFORMIN ER (GLUCOPHAGE-XR) 500 MG 24 hr tablet, TAKE 1 TABLET BY MOUTH EVERY DAY WITH BREAKFAST, Disp: 90 tablet, Rfl: 0    metoprolol tartrate (LOPRESSOR) 50 MG tablet, Take 1 tablet by mouth 2 (Two) Times a Day As Needed (2 Times Daily PRN)., Disp: 180 tablet, Rfl: 0    Pitavastatin Calcium 4 MG tablet, Take 1 tablet by mouth every night at bedtime., Disp: 90 tablet, Rfl: 3    predniSONE (DELTASONE) 20 MG tablet, Take 0.5 tablets by mouth Every 12 (Twelve) Hours., Disp: , Rfl:       Objective:     Vitals:    03/26/24 1026   BP: 128/83   Pulse: 95   SpO2: 97%   Weight: 69.4 kg (153 lb)   Height: 165.1 cm (65\")     Body mass index is 25.46 kg/m².    PHYSICAL " EXAM:    Physical Exam      ECG 12 Lead    Date/Time: 3/26/2024 10:35 AM  Performed by: Louann Peña APRN    Authorized by: Louann Peña APRN  Comparison: compared with previous ECG from 10/21/2022  Comparison to previous ECG: Sr has been replaced by afib  Rhythm: atrial flutter  Rate: normal  T inversion: III, II and aVF  T flattening: V6, V5 and V4  QRS axis: normal    Clinical impression: abnormal EKG            Assessment:       Diagnosis Plan   1. Permanent atrial fibrillation     Now in atrial flutter. HR well controlled on Digoxin and atenolol. Anticoagulated with Eliquis 5mg BID   2. Primary hypertension     BP controlled    3. Mixed hyperlipidemia     At goal on Livalo   4. History of stroke     Remains on anticoagulation and Plavix   5. Atrial flutter with controlled response  Ambulatory Referral to Cardiac Electrophysiology   Now in flutter, refer to EP   6. Dyspnea, unspecified type  Adult Transthoracic Echo Complete w/ Color, Spectral and Contrast if Necessary Per Protocol   Check echo to evaluate progression of valvle disease    7. Carotid stenosis, bilateral  Duplex Carotid Ultrasound CAR   Get duplex, continue on plavix and livalo   8. VHD- moderate to severe MR and moderate AS. Check echo         Orders Placed This Encounter   Procedures    Ambulatory Referral to Cardiac Electrophysiology     Referral Priority:   Routine     Referral Type:   Consultation     Referral Reason:   Specialty Services Required     Referred to Provider:   Ray Suarez MD     Requested Specialty:   Cardiac Electrophysiology     Number of Visits Requested:   1    ECG 12 Lead     This order was created via procedure documentation     Order Specific Question:   Release to patient     Answer:   Routine Release [[id number]]    Adult Transthoracic Echo Complete w/ Color, Spectral and Contrast if Necessary Per Protocol     Standing Status:   Future     Standing Expiration Date:   3/26/2025     Order Specific  Question:   Reason for exam?     Answer:   Valvular Function     Order Specific Question:   Release to patient     Answer:   Routine Release [[id number]]          Plan:       Follow up in 6 months         Your medication list            Accurate as of March 26, 2024 10:59 AM. If you have any questions, ask your nurse or doctor.                CONTINUE taking these medications        Instructions Last Dose Given Next Dose Due   albuterol sulfate  (90 Base) MCG/ACT inhaler  Commonly known as: PROVENTIL HFA;VENTOLIN HFA;PROAIR HFA      Inhale 2 puffs 4 (Four) Times a Day.       amoxicillin 875 MG tablet  Commonly known as: AMOXIL      Take 1 tablet by mouth 2 (Two) Times a Day.       apixaban 5 MG tablet tablet  Commonly known as: Eliquis      Take 1 tablet by mouth Every 12 (Twelve) Hours.       atenolol 25 MG tablet  Commonly known as: TENORMIN      Take 1 tablet by mouth Every 12 (Twelve) Hours.       cetirizine 10 MG tablet  Commonly known as: zyrTEC      1 OTC tab po QDay for allergies       cholecalciferol 25 MCG (1000 UT) tablet      Take 1 tablet by mouth 2 (Two) Times a Day.       clopidogrel 75 MG tablet  Commonly known as: PLAVIX      Take 1 tablet by mouth Daily.       digoxin 125 MCG tablet  Commonly known as: LANOXIN      Take 1 tablet by mouth Daily.       fluticasone 50 MCG/ACT nasal spray  Commonly known as: FLONASE      into the nostril(s) as directed by provider Daily As Needed.       furosemide 40 MG tablet  Commonly known as: LASIX      Take 1 tablet by mouth Daily.       Hydrocod Aroldo-Chlorphe Aroldo ER 10-8 MG/5ML ER suspension  Commonly known as: TUSSIONEX PENNKINETIC      Take 5 mL by mouth Every 12 (Twelve) Hours As Needed for Cough.       losartan 25 MG tablet  Commonly known as: COZAAR      Take 1 tablet by mouth Daily.       metFORMIN  MG 24 hr tablet  Commonly known as: GLUCOPHAGE-XR      TAKE 1 TABLET BY MOUTH EVERY DAY WITH BREAKFAST       metoprolol tartrate 50 MG  tablet  Commonly known as: LOPRESSOR      Take 1 tablet by mouth 2 (Two) Times a Day As Needed (2 Times Daily PRN).       Pitavastatin Calcium 4 MG tablet      Take 1 tablet by mouth every night at bedtime.       predniSONE 20 MG tablet  Commonly known as: DELTASONE      Take 0.5 tablets by mouth Every 12 (Twelve) Hours.                 Where to Get Your Medications        These medications were sent to [facility] #[id number] - [place], [place] - [address] [facility] [address] [facility] [address] - [phone]  - [phone]   [address] [facility] [address]      Phone: [phone]   apixaban 5 MG tablet tablet  atenolol 25 MG tablet  digoxin 125 MCG tablet  furosemide 40 MG tablet  losartan 25 MG tablet  Pitavastatin Calcium 4 MG tablet           As always, it has been a pleasure to participate in your patient's care.      Sincerely,     Louann CASTILLO

## 2024-04-11 RX ORDER — METFORMIN HYDROCHLORIDE 500 MG/1
TABLET, EXTENDED RELEASE ORAL
Qty: 90 TABLET | Refills: 0 | Status: SHIPPED | OUTPATIENT
Start: 2024-04-11

## 2024-04-16 ENCOUNTER — HOSPITAL ENCOUNTER (OUTPATIENT)
Dept: CARDIOLOGY | Facility: HOSPITAL | Age: 64
Discharge: HOME | End: 2024-04-16
Payer: COMMERCIAL

## 2024-04-16 VITALS
BODY MASS INDEX: 25.49 KG/M2 | OXYGEN SATURATION: 98 % | SYSTOLIC BLOOD PRESSURE: 130 MMHG | WEIGHT: 153 LBS | HEIGHT: 65 IN | DIASTOLIC BLOOD PRESSURE: 80 MMHG | HEART RATE: 90 BPM

## 2024-04-16 DIAGNOSIS — R06.00 DYSPNEA, UNSPECIFIED TYPE: ICD-10-CM

## 2024-04-16 DIAGNOSIS — I65.23 CAROTID STENOSIS, BILATERAL: ICD-10-CM

## 2024-04-16 LAB
AORTIC ARCH: 3.2 CM
AORTIC DIMENSIONLESS INDEX: 0.3 (DI)
ASCENDING AORTA: 3.1 CM
BH CV ECHO MEAS - ACS: 1 CM
BH CV ECHO MEAS - AO MAX PG: 54.2 MMHG
BH CV ECHO MEAS - AO MEAN PG: 30 MMHG
BH CV ECHO MEAS - AO ROOT DIAM: 2.8 CM
BH CV ECHO MEAS - AO V2 MAX: 368 CM/SEC
BH CV ECHO MEAS - AO V2 VTI: 85.5 CM
BH CV ECHO MEAS - AVA(I,D): 0.93 CM2
BH CV ECHO MEAS - EDV(CUBED): 125 ML
BH CV ECHO MEAS - EDV(MOD-SP2): 91 ML
BH CV ECHO MEAS - EDV(MOD-SP4): 80 ML
BH CV ECHO MEAS - EF(MOD-BP): 64.2 %
BH CV ECHO MEAS - EF(MOD-SP2): 56 %
BH CV ECHO MEAS - EF(MOD-SP4): 67.5 %
BH CV ECHO MEAS - ESV(CUBED): 53.2 ML
BH CV ECHO MEAS - ESV(MOD-SP2): 40 ML
BH CV ECHO MEAS - ESV(MOD-SP4): 26 ML
BH CV ECHO MEAS - FS: 24.8 %
BH CV ECHO MEAS - IVS/LVPW: 1 CM
BH CV ECHO MEAS - IVSD: 1.2 CM
BH CV ECHO MEAS - LAT PEAK E' VEL: 8.5 CM/SEC
BH CV ECHO MEAS - LV DIASTOLIC VOL/BSA (35-75): 45.3 CM2
BH CV ECHO MEAS - LV MASS(C)D: 233.7 GRAMS
BH CV ECHO MEAS - LV MAX PG: 4.5 MMHG
BH CV ECHO MEAS - LV MEAN PG: 3 MMHG
BH CV ECHO MEAS - LV SYSTOLIC VOL/BSA (12-30): 14.7 CM2
BH CV ECHO MEAS - LV V1 MAX: 106 CM/SEC
BH CV ECHO MEAS - LV V1 VTI: 26.2 CM
BH CV ECHO MEAS - LVIDD: 5 CM
BH CV ECHO MEAS - LVIDS: 3.8 CM
BH CV ECHO MEAS - LVOT AREA: 3 CM2
BH CV ECHO MEAS - LVOT DIAM: 1.96 CM
BH CV ECHO MEAS - LVPWD: 1.2 CM
BH CV ECHO MEAS - MED PEAK E' VEL: 9.9 CM/SEC
BH CV ECHO MEAS - MR MAX PG: 117.1 MMHG
BH CV ECHO MEAS - MR MAX VEL: 541 CM/SEC
BH CV ECHO MEAS - MV DEC SLOPE: 453.4 CM/SEC2
BH CV ECHO MEAS - MV DEC TIME: 0.14 SEC
BH CV ECHO MEAS - MV E MAX VEL: 85.3 CM/SEC
BH CV ECHO MEAS - MV MAX PG: 6.7 MMHG
BH CV ECHO MEAS - MV MEAN PG: 2.6 MMHG
BH CV ECHO MEAS - MV P1/2T: 83.8 MSEC
BH CV ECHO MEAS - MV V2 VTI: 35.3 CM
BH CV ECHO MEAS - MVA(P1/2T): 2.6 CM2
BH CV ECHO MEAS - MVA(VTI): 2.25 CM2
BH CV ECHO MEAS - PA ACC TIME: 0.1 SEC
BH CV ECHO MEAS - PA V2 MAX: 93.1 CM/SEC
BH CV ECHO MEAS - PULM DIAS VEL: 73.3 CM/SEC
BH CV ECHO MEAS - PULM S/D: 0.5
BH CV ECHO MEAS - PULM SYS VEL: 36.8 CM/SEC
BH CV ECHO MEAS - QP/QS: 0.31
BH CV ECHO MEAS - RAP SYSTOLE: 8 MMHG
BH CV ECHO MEAS - RV MAX PG: 0.86 MMHG
BH CV ECHO MEAS - RV V1 MAX: 46.3 CM/SEC
BH CV ECHO MEAS - RV V1 VTI: 8.8 CM
BH CV ECHO MEAS - RVOT DIAM: 1.9 CM
BH CV ECHO MEAS - RVSP: 32 MMHG
BH CV ECHO MEAS - SI(MOD-SP2): 28.9 ML/M2
BH CV ECHO MEAS - SI(MOD-SP4): 30.6 ML/M2
BH CV ECHO MEAS - SUP REN AO DIAM: 1.7 CM
BH CV ECHO MEAS - SV(LVOT): 79.3 ML
BH CV ECHO MEAS - SV(MOD-SP2): 51 ML
BH CV ECHO MEAS - SV(MOD-SP4): 54 ML
BH CV ECHO MEAS - SV(RVOT): 24.9 ML
BH CV ECHO MEAS - TAPSE (>1.6): 1.65 CM
BH CV ECHO MEAS - TR MAX PG: 23.9 MMHG
BH CV ECHO MEAS - TR MAX VEL: 244.3 CM/SEC
BH CV ECHO MEASUREMENTS AVERAGE E/E' RATIO: 9.27
BH CV XLRA - RV BASE: 2.5 CM
BH CV XLRA - RV LENGTH: 6.8 CM
BH CV XLRA - RV MID: 2.22 CM
BH CV XLRA - TDI S': 11.1 CM/SEC
BH CV XLRA MEAS LEFT DIST CCA EDV: 21.3 CM/SEC
BH CV XLRA MEAS LEFT DIST CCA PSV: 80.4 CM/SEC
BH CV XLRA MEAS LEFT DIST ICA EDV: -24.2 CM/SEC
BH CV XLRA MEAS LEFT DIST ICA PSV: -68.8 CM/SEC
BH CV XLRA MEAS LEFT ICA/CCA RATIO: -1.29
BH CV XLRA MEAS LEFT MID CCA EDV: 11.2 CM/SEC
BH CV XLRA MEAS LEFT MID CCA PSV: 50.9 CM/SEC
BH CV XLRA MEAS LEFT MID ICA EDV: -26.1 CM/SEC
BH CV XLRA MEAS LEFT MID ICA PSV: -103.6 CM/SEC
BH CV XLRA MEAS LEFT PROX CCA EDV: 16.7 CM/SEC
BH CV XLRA MEAS LEFT PROX CCA PSV: 62.7 CM/SEC
BH CV XLRA MEAS LEFT PROX ECA PSV: -82.3 CM/SEC
BH CV XLRA MEAS LEFT PROX ICA EDV: -30 CM/SEC
BH CV XLRA MEAS LEFT PROX ICA PSV: -85.2 CM/SEC
BH CV XLRA MEAS LEFT PROX SCLA PSV: 97.1 CM/SEC
BH CV XLRA MEAS LEFT VERTEBRAL A PSV: -39.3 CM/SEC
BH CV XLRA MEAS RIGHT DIST CCA EDV: 21.1 CM/SEC
BH CV XLRA MEAS RIGHT DIST CCA PSV: 59 CM/SEC
BH CV XLRA MEAS RIGHT DIST ICA EDV: -25.5 CM/SEC
BH CV XLRA MEAS RIGHT DIST ICA PSV: -73.9 CM/SEC
BH CV XLRA MEAS RIGHT ICA/CCA RATIO: -1.71
BH CV XLRA MEAS RIGHT MID CCA EDV: 15.5 CM/SEC
BH CV XLRA MEAS RIGHT MID CCA PSV: 64.6 CM/SEC
BH CV XLRA MEAS RIGHT MID ICA EDV: -21.1 CM/SEC
BH CV XLRA MEAS RIGHT MID ICA PSV: -93.8 CM/SEC
BH CV XLRA MEAS RIGHT PROX CCA EDV: 16.2 CM/SEC
BH CV XLRA MEAS RIGHT PROX CCA PSV: 54 CM/SEC
BH CV XLRA MEAS RIGHT PROX ECA PSV: -85.7 CM/SEC
BH CV XLRA MEAS RIGHT PROX ICA EDV: -38.5 CM/SEC
BH CV XLRA MEAS RIGHT PROX ICA PSV: -100.6 CM/SEC
BH CV XLRA MEAS RIGHT PROX SCLA PSV: 102.4 CM/SEC
BH CV XLRA MEAS RIGHT VERTEBRAL A PSV: -38.6 CM/SEC
LEFT ATRIUM VOLUME INDEX: 52.5 ML/M2
SINUS: 3.3 CM
STJ: 2.4 CM

## 2024-04-16 PROCEDURE — 93306 TTE W/DOPPLER COMPLETE: CPT | Performed by: INTERNAL MEDICINE

## 2024-04-16 PROCEDURE — 93880 EXTRACRANIAL BILAT STUDY: CPT

## 2024-04-16 PROCEDURE — 93880 EXTRACRANIAL BILAT STUDY: CPT | Performed by: INTERNAL MEDICINE

## 2024-04-16 PROCEDURE — 93306 TTE W/DOPPLER COMPLETE: CPT

## 2024-04-16 PROCEDURE — 25510000001 PERFLUTREN (DEFINITY) 8.476 MG IN SODIUM CHLORIDE (PF) 0.9 % 10 ML INJECTION: Performed by: NURSE PRACTITIONER

## 2024-04-16 RX ADMIN — PERFLUTREN 1.5 ML: 6.52 INJECTION, SUSPENSION INTRAVENOUS at 11:24

## 2024-05-14 NOTE — TELEPHONE ENCOUNTER
Caller: Re Sheldon    Relationship: Self    Best call back number: [phone]     Requested Prescriptions:   Requested Prescriptions     Pending Prescriptions Disp Refills    metFORMIN ER (GLUCOPHAGE-XR) 500 MG 24 hr tablet 90 tablet 0     Sig: Take 1 tablet by mouth Daily With Breakfast.        Pharmacy where request should be sent: [facility] #[id number] [place], [place] - [facility] [address] [facility] AT [facility] [address]  - [phone] [facility] [phone] FX     Last office visit with prescribing clinician: 3/14/2023   Last telemedicine visit with prescribing clinician: Visit date not found   Next office visit with prescribing clinician: 6/11/2024     Additional details provided by patient: PATIENT IS OUT OF THIS MEDICATION.    Does the patient have less than a 3 day supply:  [x] Yes  [] No    Would you like a call back once the refill request has been completed: [x] Yes [] No    If the office needs to give you a call back, can they leave a voicemail: [] Yes [] No    Anurag Ortiz Rep   05/14/24 10:41 EDT

## 2024-05-15 RX ORDER — METFORMIN HYDROCHLORIDE 500 MG/1
500 TABLET, EXTENDED RELEASE ORAL
Qty: 90 TABLET | Refills: 0 | Status: SHIPPED | OUTPATIENT
Start: 2024-05-15

## 2024-05-15 NOTE — TELEPHONE ENCOUNTER
Rx Refill Note  Requested Prescriptions     Pending Prescriptions Disp Refills    metFORMIN ER (GLUCOPHAGE-XR) 500 MG 24 hr tablet 90 tablet 0     Sig: Take 1 tablet by mouth Daily With Breakfast.      Last office visit with prescribing clinician: 3/14/2023   Last telemedicine visit with prescribing clinician: Visit date not found   Next office visit with prescribing clinician: 8/13/2024                         Would you like a call back once the refill request has been completed: [] Yes [] No    If the office needs to give you a call back, can they leave a voicemail: [] Yes [] No    Anurag Steven Rep  05/15/24, 09:43 EDT

## 2024-05-21 ENCOUNTER — OFFICE VISIT (OUTPATIENT)
Age: 64
End: 2024-05-21
Payer: COMMERCIAL

## 2024-05-21 VITALS
HEART RATE: 61 BPM | DIASTOLIC BLOOD PRESSURE: 74 MMHG | HEIGHT: 65 IN | WEIGHT: 154 LBS | SYSTOLIC BLOOD PRESSURE: 118 MMHG | BODY MASS INDEX: 25.66 KG/M2

## 2024-05-21 DIAGNOSIS — I34.0 MITRAL VALVE INSUFFICIENCY, UNSPECIFIED ETIOLOGY: ICD-10-CM

## 2024-05-21 DIAGNOSIS — I35.0 AORTIC STENOSIS, MODERATE: ICD-10-CM

## 2024-05-21 DIAGNOSIS — I48.0 PAROXYSMAL A-FIB: Primary | ICD-10-CM

## 2024-05-21 NOTE — PROGRESS NOTES
Date of Office Visit: 2024  Encounter Provider: Ray Suarez MD  Place of Service: [facility] CARDIOLOGY  Patient Name: Re Sheldon  :1960    Chief Complaint   Patient presents with    permanent AFIB    Atrial Flutter   :     HPI: Re Sheldon is a 64 y.o. female who presents today for atrial fibrillation.     She was first diagnosed with atrial fibrillation in .      It has been paroxysmal, but is progressing and becoming more frequent.      She has symptoms of palpitations and dyspnea.     She has also been noted to have moderate aortic stenosis and mitral regurgitation.     The MR was felt to be moderate to severe on PATRICA in .      TTE last week demonstrated moderate AS, but only reports mild MR          Past Medical History:   Diagnosis Date    Abnormal ECG     Inferolateral ST-T changes at baseline    Anemia     Atrial fibrillation     Calcification of aortic valve     Coronary artery disease     COVID-19 2022    Hyperlipidemia     Hypertension     Low back pain     Mitral regurgitation     PAF (paroxysmal atrial fibrillation)     Palpitations     Premature atrial contractions     Retinal vein occlusion     Spinal stenosis     Systolic murmur     Tonsillitis     Vitamin D deficiency        Past Surgical History:   Procedure Laterality Date    ADENOIDECTOMY      CARDIAC CATHETERIZATION N/A 2022    Procedure: Right and Left Heart Cath;  Surgeon: Josemanuel Schmitz MD;  Location:  [facility];  Service: Cardiovascular;  Laterality: N/A;    CARDIAC CATHETERIZATION N/A 2022    Procedure: Coronary angiography;  Surgeon: Josemanuel Schmitz MD;  Location:  [facility] CATH [facility]VASIVE [facility];  Service: Cardiovascular;  Laterality: N/A;    CARPAL TUNNEL RELEASE Bilateral     COLONOSCOPY  2014    repeat 10 years , Dr. Karen Spear    COLONOSCOPY N/A 2021    Procedure: COLONOSCOPY to cecum with cold biopsy polypectomy;   Surgeon: Ray Hairston MD;  Location: [facility] ENDOSCOPY;  Service: Gastroenterology;  Laterality: N/A;  pre: screening  post: diverticulosis, polyp, internal hemorrhoids    FOOT SURGERY      HAND SURGERY      TONSILLECTOMY      TONSILLECTOMY AND ADENOIDECTOMY      TUBAL ABDOMINAL LIGATION         Social History     Socioeconomic History    Marital status:      Spouse name: Kedar    Number of children: 2   Tobacco Use    Smoking status: Never     Passive exposure: Never    Smokeless tobacco: Never   Vaping Use    Vaping status: Never Used   Substance and Sexual Activity    Alcohol use: No     Comment: Caffeine use 3-4 cups daily    Drug use: No    Sexual activity: Yes     Partners: Male       Family History   Problem Relation Age of Onset    Heart attack Other     Breast cancer Other     Heart disease Other     Heart disease Mother         Mother with MI at 63, and later  from CHF    Heart disease Father         Father  from complications after valve replacement at 73    Stroke Father     Breast cancer Sister     Malig Hyperthermia Neg Hx        Review of Systems   Constitutional: Positive for malaise/fatigue.   HENT: Negative.     Eyes: Negative.    Cardiovascular:  Positive for palpitations. Negative for chest pain, dyspnea on exertion, leg swelling and near-syncope.   Respiratory:  Negative for cough and shortness of breath.    Endocrine: Negative.    Hematologic/Lymphatic: Negative.    Skin: Negative.    Musculoskeletal: Negative.    Gastrointestinal: Negative.    Genitourinary: Negative.    Neurological: Negative.  Negative for weakness.   Psychiatric/Behavioral: Negative.     Allergic/Immunologic: Negative.        Allergies   Allergen Reactions    Sulfa Antibiotics Diarrhea and Rash    Augmentin [Amoxicillin-Pot Clavulanate] Diarrhea and Other (See Comments)    Bactrim [Sulfamethoxazole-Trimethoprim] Diarrhea and Other (See Comments)         Current Outpatient Medications:     albuterol  "sulfate  (90 Base) MCG/ACT inhaler, Inhale 2 puffs 4 (Four) Times a Day., Disp: 8 g, Rfl: 0    apixaban (Eliquis) 5 MG tablet tablet, Take 1 tablet by mouth Every 12 (Twelve) Hours., Disp: 180 tablet, Rfl: 3    atenolol (TENORMIN) 25 MG tablet, Take 1 tablet by mouth Every 12 (Twelve) Hours., Disp: 180 tablet, Rfl: 3    cetirizine (zyrTEC) 10 MG tablet, 1 OTC tab po QDay for allergies, Disp: , Rfl:     cholecalciferol (VITAMIN D3) 25 MCG (1000 UT) tablet, Take 1 tablet by mouth 2 (Two) Times a Day., Disp: , Rfl:     digoxin (LANOXIN) 125 MCG tablet, Take 1 tablet by mouth Daily., Disp: 90 tablet, Rfl: 3    fluticasone (FLONASE) 50 MCG/ACT nasal spray, into the nostril(s) as directed by provider Daily As Needed., Disp: , Rfl:     furosemide (LASIX) 40 MG tablet, Take 1 tablet by mouth Daily., Disp: 90 tablet, Rfl: 3    losartan (COZAAR) 25 MG tablet, Take 1 tablet by mouth Daily., Disp: 90 tablet, Rfl: 3    metFORMIN ER (GLUCOPHAGE-XR) 500 MG 24 hr tablet, Take 1 tablet by mouth Daily With Breakfast., Disp: 90 tablet, Rfl: 0    metoprolol tartrate (LOPRESSOR) 50 MG tablet, Take 1 tablet by mouth 2 (Two) Times a Day As Needed (2 Times Daily PRN). (Patient taking differently: Take 1 tablet by mouth 2 (Two) Times a Day As Needed (2 Times Daily PRN). PRN ONLY), Disp: 180 tablet, Rfl: 0    Pitavastatin Calcium 4 MG tablet, Take 1 tablet by mouth every night at bedtime., Disp: 90 tablet, Rfl: 3      Objective:     Vitals:    05/21/24 1039   BP: 118/74   Pulse: 61   Weight: 69.9 kg (154 lb)   Height: 165.1 cm (65\")     Body mass index is 25.63 kg/m².    PHYSICAL EXAM:    Vitals and nursing note reviewed.   Constitutional:       General: Not in acute distress.     Appearance: Healthy appearance. Well-developed and not in distress. Not diaphoretic.   Eyes:      General:         Right eye: No discharge.         Left eye: No discharge.   HENT:      Head: Normocephalic and atraumatic.    Mouth/Throat:      Pharynx: No " oropharyngeal exudate.   Neck:      Thyroid: No thyromegaly.   Pulmonary:      Effort: Pulmonary effort is normal. No respiratory distress.      Breath sounds: Normal breath sounds.   Cardiovascular:      Normal rate. Irregular rhythm.   Edema:     Peripheral edema absent.   Abdominal:      General: There is no distension.      Palpations: Abdomen is soft.      Tenderness: There is no abdominal tenderness.   Musculoskeletal:         General: No deformity.      Cervical back: Normal range of motion and neck supple. Skin:     General: Skin is warm and dry.   Neurological:      Mental Status: Alert and oriented to person, place, and time.      Deep Tendon Reflexes: Reflexes are normal and symmetric.   Psychiatric:         Attention and Perception: Attention and perception normal.         Behavior: Behavior normal. Behavior is cooperative.         Thought Content: Thought content normal.         Judgment: Judgment normal.             ECG 12 Lead    Date/Time: 5/21/2024 11:33 AM  Performed by: Ray Suarez MD    Authorized by: Ray Suarez MD  Comparison: compared with previous ECG from 3/26/2024  Similar to previous ECG  Rhythm: atrial fibrillation        I reviewed her recent echo and prior PATRICA.  Recent PATRICA did not see mitral regurgitation very well.  On PATRICA in 2022 she had severe eccentric mitral regurgitation.  Assessment:       Diagnosis Plan   1. Paroxysmal A-fib        2. Mitral valve insufficiency, unspecified etiology        3. Aortic stenosis, moderate               Plan:       I am worried about the contribution of her mitral regurgitation to her atrial fibrillation.    I think we should repeat a PATRICA.    She has severe left atrial enlargement, and progression of her A-fib.    And now feels that she has dyspnea on exertion.    Is seen to have severe MR on PATRICA, I would recommend strong consideration of mitral valve repair/replacement with concomitant surgical atrial fibrillation ablation and  appendage closure    If MR not felt to be less significant, will consider for catheter ablation    I do not feel that this is an atrial flutter, just coarse A-fib.    As always, it has been a pleasure to participate in your patient's care.      Sincerely,         Ray Suarez MD

## 2024-05-31 ENCOUNTER — TRANSCRIBE ORDERS (OUTPATIENT)
Dept: CARDIOLOGY | Facility: CLINIC | Age: 64
End: 2024-05-31
Payer: COMMERCIAL

## 2024-05-31 DIAGNOSIS — Z13.6 SCREENING FOR ISCHEMIC HEART DISEASE: ICD-10-CM

## 2024-05-31 DIAGNOSIS — Z01.810 PRE-OPERATIVE CARDIOVASCULAR EXAMINATION: Primary | ICD-10-CM

## 2024-06-06 ENCOUNTER — LAB (OUTPATIENT)
Dept: LAB | Facility: HOSPITAL | Age: 64
End: 2024-06-06
Payer: COMMERCIAL

## 2024-06-06 DIAGNOSIS — Z01.810 PRE-OPERATIVE CARDIOVASCULAR EXAMINATION: ICD-10-CM

## 2024-06-06 DIAGNOSIS — Z13.6 SCREENING FOR ISCHEMIC HEART DISEASE: ICD-10-CM

## 2024-06-06 LAB
ANION GAP SERPL CALCULATED.3IONS-SCNC: 9.3 MMOL/L (ref 5–15)
BASOPHILS # BLD AUTO: 0.04 10*3/MM3 (ref 0–0.2)
BASOPHILS NFR BLD AUTO: 0.4 % (ref 0–1.5)
BUN SERPL-MCNC: 19 MG/DL (ref 8–23)
BUN/CREAT SERPL: 23.5 (ref 7–25)
CALCIUM SPEC-SCNC: 9 MG/DL (ref 8.6–10.5)
CHLORIDE SERPL-SCNC: 104 MMOL/L (ref 98–107)
CO2 SERPL-SCNC: 25.7 MMOL/L (ref 22–29)
CREAT SERPL-MCNC: 0.81 MG/DL (ref 0.57–1)
DEPRECATED RDW RBC AUTO: 42.8 FL (ref 37–54)
EGFRCR SERPLBLD CKD-EPI 2021: 81.7 ML/MIN/1.73
EOSINOPHIL # BLD AUTO: 0.2 10*3/MM3 (ref 0–0.4)
EOSINOPHIL NFR BLD AUTO: 2.2 % (ref 0.3–6.2)
ERYTHROCYTE [DISTWIDTH] IN BLOOD BY AUTOMATED COUNT: 13.6 % (ref 12.3–15.4)
GLUCOSE SERPL-MCNC: 117 MG/DL (ref 65–99)
HCT VFR BLD AUTO: 38.3 % (ref 34–46.6)
HGB BLD-MCNC: 12.5 G/DL (ref 12–15.9)
IMM GRANULOCYTES # BLD AUTO: 0.06 10*3/MM3 (ref 0–0.05)
IMM GRANULOCYTES NFR BLD AUTO: 0.7 % (ref 0–0.5)
LYMPHOCYTES # BLD AUTO: 1.96 10*3/MM3 (ref 0.7–3.1)
LYMPHOCYTES NFR BLD AUTO: 21.9 % (ref 19.6–45.3)
MCH RBC QN AUTO: 28.3 PG (ref 26.6–33)
MCHC RBC AUTO-ENTMCNC: 32.6 G/DL (ref 31.5–35.7)
MCV RBC AUTO: 86.8 FL (ref 79–97)
MONOCYTES # BLD AUTO: 0.62 10*3/MM3 (ref 0.1–0.9)
MONOCYTES NFR BLD AUTO: 6.9 % (ref 5–12)
NEUTROPHILS NFR BLD AUTO: 6.08 10*3/MM3 (ref 1.7–7)
NEUTROPHILS NFR BLD AUTO: 67.9 % (ref 42.7–76)
NRBC BLD AUTO-RTO: 0 /100 WBC (ref 0–0.2)
PLATELET # BLD AUTO: 292 10*3/MM3 (ref 140–450)
PMV BLD AUTO: 9.7 FL (ref 6–12)
POTASSIUM SERPL-SCNC: 4.6 MMOL/L (ref 3.5–5.2)
RBC # BLD AUTO: 4.41 10*6/MM3 (ref 3.77–5.28)
SODIUM SERPL-SCNC: 139 MMOL/L (ref 136–145)
WBC NRBC COR # BLD AUTO: 8.96 10*3/MM3 (ref 3.4–10.8)

## 2024-06-06 PROCEDURE — 85025 COMPLETE CBC W/AUTO DIFF WBC: CPT

## 2024-06-06 PROCEDURE — 80048 BASIC METABOLIC PNL TOTAL CA: CPT

## 2024-06-06 PROCEDURE — 36415 COLL VENOUS BLD VENIPUNCTURE: CPT

## 2024-06-19 ENCOUNTER — HOSPITAL ENCOUNTER (OUTPATIENT)
Dept: CARDIOLOGY | Facility: HOSPITAL | Age: 64
Discharge: HOME | End: 2024-06-19
Admitting: INTERNAL MEDICINE
Payer: COMMERCIAL

## 2024-06-19 VITALS
RESPIRATION RATE: 16 BRPM | DIASTOLIC BLOOD PRESSURE: 66 MMHG | OXYGEN SATURATION: 96 % | HEART RATE: 92 BPM | SYSTOLIC BLOOD PRESSURE: 137 MMHG

## 2024-06-19 DIAGNOSIS — I48.0 PAROXYSMAL A-FIB: ICD-10-CM

## 2024-06-19 DIAGNOSIS — I35.0 AORTIC STENOSIS, MODERATE: ICD-10-CM

## 2024-06-19 DIAGNOSIS — I34.0 MITRAL VALVE INSUFFICIENCY, UNSPECIFIED ETIOLOGY: ICD-10-CM

## 2024-06-19 LAB
BH CV ECHO MEAS - AO MAX PG: 37.9 MMHG
BH CV ECHO MEAS - AO MEAN PG: 18 MMHG
BH CV ECHO MEAS - AO V2 MAX: 307.8 CM/SEC
BH CV ECHO MEAS - AO V2 VTI: 73.2 CM
BH CV ECHO MEAS - MR MAX PG: 145.4 MMHG
BH CV ECHO MEAS - MR MAX VEL: 602.8 CM/SEC
BH CV ECHO MEAS - RAP SYSTOLE: 3 MMHG
BH CV ECHO MEAS - RVSP: 41 MMHG
BH CV ECHO MEAS - TR MAX PG: 38.4 MMHG
BH CV ECHO MEAS - TR MAX VEL: 310 CM/SEC
BH CV ECHO SHUNT ASSESSMENT PERFORMED (HIDDEN SCRIPTING): 1

## 2024-06-19 PROCEDURE — 25010000002 MIDAZOLAM PER 1 MG: Performed by: INTERNAL MEDICINE

## 2024-06-19 PROCEDURE — 93320 DOPPLER ECHO COMPLETE: CPT

## 2024-06-19 PROCEDURE — 25010000002 FENTANYL CITRATE (PF) 50 MCG/ML SOLUTION: Performed by: INTERNAL MEDICINE

## 2024-06-19 PROCEDURE — 93312 ECHO TRANSESOPHAGEAL: CPT

## 2024-06-19 PROCEDURE — 93325 DOPPLER ECHO COLOR FLOW MAPG: CPT

## 2024-06-19 RX ORDER — FENTANYL CITRATE 50 UG/ML
INJECTION, SOLUTION INTRAMUSCULAR; INTRAVENOUS
Status: COMPLETED | OUTPATIENT
Start: 2024-06-19 | End: 2024-06-19

## 2024-06-19 RX ORDER — LIDOCAINE HYDROCHLORIDE 20 MG/ML
SOLUTION OROPHARYNGEAL
Status: COMPLETED | OUTPATIENT
Start: 2024-06-19 | End: 2024-06-19

## 2024-06-19 RX ORDER — SODIUM CHLORIDE 9 MG/ML
INJECTION, SOLUTION INTRAVENOUS
Status: COMPLETED | OUTPATIENT
Start: 2024-06-19 | End: 2024-06-19

## 2024-06-19 RX ORDER — MIDAZOLAM HYDROCHLORIDE 5 MG/ML
INJECTION INTRAMUSCULAR; INTRAVENOUS
Status: COMPLETED | OUTPATIENT
Start: 2024-06-19 | End: 2024-06-19

## 2024-06-19 RX ADMIN — MIDAZOLAM 1 MG: 5 INJECTION INTRAMUSCULAR; INTRAVENOUS at 11:40

## 2024-06-19 RX ADMIN — SODIUM CHLORIDE 50 ML/HR: 9 INJECTION, SOLUTION INTRAVENOUS at 11:24

## 2024-06-19 RX ADMIN — FENTANYL CITRATE 25 MCG: 50 INJECTION, SOLUTION INTRAMUSCULAR; INTRAVENOUS at 11:39

## 2024-06-19 RX ADMIN — LIDOCAINE HYDROCHLORIDE 10 ML: 20 SOLUTION ORAL at 11:25

## 2024-06-19 RX ADMIN — MIDAZOLAM 2 MG: 5 INJECTION INTRAMUSCULAR; INTRAVENOUS at 11:38

## 2024-06-19 RX ADMIN — FENTANYL CITRATE 25 MCG: 50 INJECTION, SOLUTION INTRAMUSCULAR; INTRAVENOUS at 11:37

## 2024-06-19 RX ADMIN — MIDAZOLAM 2 MG: 5 INJECTION INTRAMUSCULAR; INTRAVENOUS at 11:37

## 2024-06-19 RX ADMIN — MIDAZOLAM 1 MG: 5 INJECTION INTRAMUSCULAR; INTRAVENOUS at 11:58

## 2024-06-21 ENCOUNTER — TELEPHONE (OUTPATIENT)
Dept: CARDIOLOGY | Facility: CLINIC | Age: 64
End: 2024-06-21
Payer: COMMERCIAL

## 2024-06-21 NOTE — TELEPHONE ENCOUNTER
Patient called and LVM. She stated that she had a PATRICA done 06/19/24.  was suppose to speak with you and  about the results. She has an apt with you 06/24/24. She just wanted you to be aware and see if you two can discuss this Monday.     She can be reached at [phone]

## 2024-06-24 ENCOUNTER — OFFICE VISIT (OUTPATIENT)
Age: 64
End: 2024-06-24
Payer: COMMERCIAL

## 2024-06-24 VITALS
SYSTOLIC BLOOD PRESSURE: 120 MMHG | HEART RATE: 50 BPM | DIASTOLIC BLOOD PRESSURE: 80 MMHG | WEIGHT: 156 LBS | OXYGEN SATURATION: 98 % | HEIGHT: 65 IN | BODY MASS INDEX: 25.99 KG/M2

## 2024-06-24 DIAGNOSIS — I48.0 PAROXYSMAL A-FIB: Primary | ICD-10-CM

## 2024-06-24 DIAGNOSIS — I48.0 PAROXYSMAL ATRIAL FIBRILLATION: Primary | ICD-10-CM

## 2024-06-24 PROCEDURE — 99213 OFFICE O/P EST LOW 20 MIN: CPT | Performed by: INTERNAL MEDICINE

## 2024-06-24 NOTE — PROGRESS NOTES
Subjective:     Encounter Date: 06/24/24        Patient ID: Re Sheldon is a 64 y.o. female.    Chief Complaint: Moderate to severe MR, moderate aortic stenosis, atrial fibrillation, hypertension, central retinal artery occlusion  HPI:   This is a pleasant 64-year-old woman who was diagnosed with atrial fibrillation in April 2020.  In August 2020 she had a transesophageal echo which showed moderate aortic stenosis with moderate to severe MR.  She also had severe aortic plaque at that time and was placed on Livalo.  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 At that time she was anticoagulated with Eliquis for AF.  Her echo at that time was unchanged with a preserved systolic function, moderate aortic stenosis and moderate to severe MR and a PFO.  In August 22 she was admitted again with CHF.  Right and left heart catheterization showed mild nonobstructive disease of the LAD and RCA, severely elevated wedge pressure of 28 and mean PA pressure of 36.  She was diuresed.  2024- she was seen by EP given ongoing AF symptoms. A PATRICA was repeated 6/2024 to verify that MR had not worsened; it remains in the moderate range, with moderate AS, mild PH and preserved systolic EF.     Today she returns. She is considering ablation and likely will proceed given her ongoing fatigue. She recognizes that fatigue is likely multifactorial. She does have near-daily palpitations, often feels herself going in and out of rhythm.     The following portions of the patient's history were reviewed and updated as appropriate: allergies, current medications, past family history, past medical history, past social history, past surgical history and problem list.     REVIEW OF SYSTEMS:   All systems reviewed.  Pertinent positives identified in HPI.  All other systems are negative.    Past Medical History:   Diagnosis Date    Abnormal ECG     Inferolateral ST-T  changes at baseline    Anemia     Atrial fibrillation     Calcification of aortic valve     Coronary artery disease     COVID-19 2022    Hyperlipidemia     Hypertension     Low back pain     Mitral regurgitation     PAF (paroxysmal atrial fibrillation)     Palpitations     Premature atrial contractions     Retinal vein occlusion     Spinal stenosis     Systolic murmur     Tonsillitis     Vitamin D deficiency        Family History   Problem Relation Age of Onset    Heart attack Other     Breast cancer Other     Heart disease Other     Heart disease Mother         Mother with MI at 63, and later  from CHF    Heart disease Father         Father  from complications after valve replacement at 73    Stroke Father     Breast cancer Sister     Anna Hyperthermia Neg Hx        Social History     Socioeconomic History    Marital status:      Spouse name: Kedar    Number of children: 2   Tobacco Use    Smoking status: Never     Passive exposure: Never    Smokeless tobacco: Never   Vaping Use    Vaping status: Never Used   Substance and Sexual Activity    Alcohol use: No     Comment: Caffeine use 3-4 cups daily    Drug use: No    Sexual activity: Yes     Partners: Male       Allergies   Allergen Reactions    Sulfa Antibiotics Diarrhea and Rash    Augmentin [Amoxicillin-Pot Clavulanate] Diarrhea and Other (See Comments)    Bactrim [Sulfamethoxazole-Trimethoprim] Diarrhea and Other (See Comments)       Past Surgical History:   Procedure Laterality Date    ADENOIDECTOMY      CARDIAC CATHETERIZATION N/A 2022    Procedure: Right and Left Heart Cath;  Surgeon: Josemanuel Schmitz MD;  Location:  [facility];  Service: Cardiovascular;  Laterality: N/A;    CARDIAC CATHETERIZATION N/A 2022    Procedure: Coronary angiography;  Surgeon: Josemanuel Schmitz MD;  Location:  [facility];  Service: Cardiovascular;  Laterality: N/A;    CARPAL TUNNEL RELEASE Bilateral      COLONOSCOPY  08/04/2014    repeat 10 years , Dr. Karen Spear    COLONOSCOPY N/A 1/22/2021    Procedure: COLONOSCOPY to cecum with cold biopsy polypectomy;  Surgeon: Ray Hairston MD;  Location: [facility] ENDOSCOPY;  Service: Gastroenterology;  Laterality: N/A;  pre: screening  post: diverticulosis, polyp, internal hemorrhoids    FOOT SURGERY      HAND SURGERY      TONSILLECTOMY      TONSILLECTOMY AND ADENOIDECTOMY      TUBAL ABDOMINAL LIGATION         Procedures       Objective:         PHYSICAL EXAM:  GEN: VSS, no distress,   Eyes: normal sclera, normal lids and lashes  HENT: moist mucus membranes,   Respiratory: CTAB, no rales or wheezes  CV: RRR, 3 out of 6 systolic murmur rating to the carotids, 3 out of 6 apical blowing harsh murmur at the apex, +2 DP and 2+ carotid pulses b/l  GI: NABS, soft,  Nontender, nondistended  MSK: no edema, no scoliosis or kyphosis  Skin: no rash, warm, dry  Heme/Lymph: no bruising or bleeding  Psych: organized thought, normal behavior and affect  Neuro: Cranial nerves grossly intact, Alert and Oriented x 3.         Assessment:          Diagnosis Plan   1. Paroxysmal atrial fibrillation           Plan:       1.  Moderate MR by PATRICA 6/2024: Asymptomatic at this time.   2.  Moderate aortic stenosis  3.  PFO: Unclear if this was related to her CRAO.  4.  Heart failure with preserved EF: Euvolemic  5.  Hypertension: Well-controlled  6.  Atrial fibrillation: Controlled on digoxin and atenolol.  Eliquis. Will plan catheter ablation with Dr. Wade per his recommendation  7.  Central retinal artery occlusion: On apixaban, Plavix  8.  Moderate carotid disease    Dr. Vital thank you very much for referring this kind patient to me. Please call me with any questions or concerns. I will see the patient again in the office in 6 months.         Jennifer Ordonez MD  06/24/24  [facility]    Outpatient Encounter Medications as of 6/24/2024   Medication Sig Dispense Refill    albuterol  sulfate  (90 Base) MCG/ACT inhaler Inhale 2 puffs 4 (Four) Times a Day. 8 g 0    apixaban (Eliquis) 5 MG tablet tablet Take 1 tablet by mouth Every 12 (Twelve) Hours. 180 tablet 3    atenolol (TENORMIN) 25 MG tablet Take 1 tablet by mouth Every 12 (Twelve) Hours. 180 tablet 3    cetirizine (zyrTEC) 10 MG tablet 1 OTC tab po QDay for allergies      cholecalciferol (VITAMIN D3) 25 MCG (1000 UT) tablet Take 1 tablet by mouth 2 (Two) Times a Day.      digoxin (LANOXIN) 125 MCG tablet Take 1 tablet by mouth Daily. 90 tablet 3    fluticasone (FLONASE) 50 MCG/ACT nasal spray into the nostril(s) as directed by provider Daily As Needed.      furosemide (LASIX) 40 MG tablet Take 1 tablet by mouth Daily. 90 tablet 3    losartan (COZAAR) 25 MG tablet Take 1 tablet by mouth Daily. 90 tablet 3    metFORMIN ER (GLUCOPHAGE-XR) 500 MG 24 hr tablet Take 1 tablet by mouth Daily With Breakfast. 90 tablet 0    metoprolol tartrate (LOPRESSOR) 50 MG tablet Take 1 tablet by mouth 2 (Two) Times a Day As Needed (2 Times Daily PRN). (Patient taking differently: Take 1 tablet by mouth 2 (Two) Times a Day As Needed (2 Times Daily PRN). PRN ONLY) 180 tablet 0    Pitavastatin Calcium 4 MG tablet Take 1 tablet by mouth every night at bedtime. 90 tablet 3     No facility-administered encounter medications on file as of 6/24/2024.

## 2024-07-30 RX ORDER — METFORMIN HYDROCHLORIDE 500 MG/1
500 TABLET, EXTENDED RELEASE ORAL
Qty: 90 TABLET | Refills: 0 | Status: SHIPPED | OUTPATIENT
Start: 2024-07-30

## 2024-08-22 ENCOUNTER — LAB (OUTPATIENT)
Dept: LAB | Facility: HOSPITAL | Age: 64
End: 2024-08-22
Payer: COMMERCIAL

## 2024-08-22 DIAGNOSIS — Z01.810 PREOP CARDIOVASCULAR EXAM: Primary | ICD-10-CM

## 2024-08-22 DIAGNOSIS — Z01.810 PREOP CARDIOVASCULAR EXAM: ICD-10-CM

## 2024-08-22 LAB
ANION GAP SERPL CALCULATED.3IONS-SCNC: 11 MMOL/L (ref 5–15)
BASOPHILS # BLD AUTO: 0.04 10*3/MM3 (ref 0–0.2)
BASOPHILS NFR BLD AUTO: 0.6 % (ref 0–1.5)
BUN SERPL-MCNC: 20 MG/DL (ref 8–23)
BUN/CREAT SERPL: 23.8 (ref 7–25)
CALCIUM SPEC-SCNC: 9.7 MG/DL (ref 8.6–10.5)
CHLORIDE SERPL-SCNC: 102 MMOL/L (ref 98–107)
CO2 SERPL-SCNC: 28 MMOL/L (ref 22–29)
CREAT SERPL-MCNC: 0.84 MG/DL (ref 0.57–1)
DEPRECATED RDW RBC AUTO: 42.3 FL (ref 37–54)
EGFRCR SERPLBLD CKD-EPI 2021: 78.2 ML/MIN/1.73
EOSINOPHIL # BLD AUTO: 0.24 10*3/MM3 (ref 0–0.4)
EOSINOPHIL NFR BLD AUTO: 3.4 % (ref 0.3–6.2)
ERYTHROCYTE [DISTWIDTH] IN BLOOD BY AUTOMATED COUNT: 13.3 % (ref 12.3–15.4)
GLUCOSE SERPL-MCNC: 115 MG/DL (ref 65–99)
HCT VFR BLD AUTO: 37.1 % (ref 34–46.6)
HGB BLD-MCNC: 12.4 G/DL (ref 12–15.9)
IMM GRANULOCYTES # BLD AUTO: 0.05 10*3/MM3 (ref 0–0.05)
IMM GRANULOCYTES NFR BLD AUTO: 0.7 % (ref 0–0.5)
INR PPP: 1.25 (ref 0.9–1.1)
LYMPHOCYTES # BLD AUTO: 1.53 10*3/MM3 (ref 0.7–3.1)
LYMPHOCYTES NFR BLD AUTO: 21.4 % (ref 19.6–45.3)
MCH RBC QN AUTO: 29.4 PG (ref 26.6–33)
MCHC RBC AUTO-ENTMCNC: 33.4 G/DL (ref 31.5–35.7)
MCV RBC AUTO: 87.9 FL (ref 79–97)
MONOCYTES # BLD AUTO: 0.48 10*3/MM3 (ref 0.1–0.9)
MONOCYTES NFR BLD AUTO: 6.7 % (ref 5–12)
NEUTROPHILS NFR BLD AUTO: 4.81 10*3/MM3 (ref 1.7–7)
NEUTROPHILS NFR BLD AUTO: 67.2 % (ref 42.7–76)
NRBC BLD AUTO-RTO: 0 /100 WBC (ref 0–0.2)
PLATELET # BLD AUTO: 258 10*3/MM3 (ref 140–450)
PMV BLD AUTO: 9.4 FL (ref 6–12)
POTASSIUM SERPL-SCNC: 4 MMOL/L (ref 3.5–5.2)
PROTHROMBIN TIME: 16 SECONDS (ref 11.7–14.2)
RBC # BLD AUTO: 4.22 10*6/MM3 (ref 3.77–5.28)
SODIUM SERPL-SCNC: 141 MMOL/L (ref 136–145)
WBC NRBC COR # BLD AUTO: 7.15 10*3/MM3 (ref 3.4–10.8)

## 2024-08-22 PROCEDURE — 36415 COLL VENOUS BLD VENIPUNCTURE: CPT

## 2024-08-22 PROCEDURE — 80048 BASIC METABOLIC PNL TOTAL CA: CPT

## 2024-08-22 PROCEDURE — 85610 PROTHROMBIN TIME: CPT

## 2024-08-22 PROCEDURE — 85025 COMPLETE CBC W/AUTO DIFF WBC: CPT

## 2024-08-30 ENCOUNTER — HOSPITAL ENCOUNTER (OUTPATIENT)
Facility: HOSPITAL | Age: 64
Setting detail: HOSPITAL OUTPATIENT SURGERY
Discharge: HOME | End: 2024-08-30
Attending: INTERNAL MEDICINE | Admitting: INTERNAL MEDICINE
Payer: COMMERCIAL

## 2024-08-30 ENCOUNTER — ANESTHESIA (OUTPATIENT)
Dept: CARDIOLOGY | Facility: HOSPITAL | Age: 64
End: 2024-08-30
Payer: COMMERCIAL

## 2024-08-30 ENCOUNTER — ANESTHESIA EVENT (OUTPATIENT)
Dept: CARDIOLOGY | Facility: HOSPITAL | Age: 64
End: 2024-08-30
Payer: COMMERCIAL

## 2024-08-30 VITALS
DIASTOLIC BLOOD PRESSURE: 61 MMHG | RESPIRATION RATE: 18 BRPM | WEIGHT: 155 LBS | HEART RATE: 55 BPM | BODY MASS INDEX: 27.46 KG/M2 | HEIGHT: 63 IN | SYSTOLIC BLOOD PRESSURE: 106 MMHG | OXYGEN SATURATION: 97 % | TEMPERATURE: 98.7 F

## 2024-08-30 DIAGNOSIS — I48.0 PAROXYSMAL A-FIB: ICD-10-CM

## 2024-08-30 LAB
GLUCOSE BLDC GLUCOMTR-MCNC: 104 MG/DL (ref 70–130)
QT INTERVAL: 466 MS
QTC INTERVAL: 446 MS

## 2024-08-30 PROCEDURE — 93010 ELECTROCARDIOGRAM REPORT: CPT | Performed by: INTERNAL MEDICINE

## 2024-08-30 PROCEDURE — 25010000002 HEPARIN (PORCINE) PER 1000 UNITS: Performed by: INTERNAL MEDICINE

## 2024-08-30 PROCEDURE — 93005 ELECTROCARDIOGRAM TRACING: CPT | Performed by: INTERNAL MEDICINE

## 2024-08-30 PROCEDURE — 93655 ICAR CATH ABLTJ DSCRT ARRHYT: CPT | Performed by: INTERNAL MEDICINE

## 2024-08-30 PROCEDURE — C1894 INTRO/SHEATH, NON-LASER: HCPCS | Performed by: INTERNAL MEDICINE

## 2024-08-30 PROCEDURE — C1760 CLOSURE DEV, VASC: HCPCS | Performed by: INTERNAL MEDICINE

## 2024-08-30 PROCEDURE — 93656 COMPRE EP EVAL ABLTJ ATR FIB: CPT | Performed by: INTERNAL MEDICINE

## 2024-08-30 PROCEDURE — 25010000002 DEXAMETHASONE SODIUM PHOSPHATE 20 MG/5ML SOLUTION: Performed by: ANESTHESIOLOGY

## 2024-08-30 PROCEDURE — 25010000002 PROTAMINE SULFATE PER 10 MG: Performed by: INTERNAL MEDICINE

## 2024-08-30 PROCEDURE — 25010000002 HYDRALAZINE PER 20 MG: Performed by: ANESTHESIOLOGY

## 2024-08-30 PROCEDURE — C1766 INTRO/SHEATH,STRBLE,NON-PEEL: HCPCS | Performed by: INTERNAL MEDICINE

## 2024-08-30 PROCEDURE — S0260 H&P FOR SURGERY: HCPCS | Performed by: INTERNAL MEDICINE

## 2024-08-30 PROCEDURE — 82948 REAGENT STRIP/BLOOD GLUCOSE: CPT

## 2024-08-30 PROCEDURE — 25010000002 SUGAMMADEX 200 MG/2ML SOLUTION: Performed by: ANESTHESIOLOGY

## 2024-08-30 PROCEDURE — C1733 CATH, EP, OTHR THAN COOL-TIP: HCPCS | Performed by: INTERNAL MEDICINE

## 2024-08-30 PROCEDURE — C1732 CATH, EP, DIAG/ABL, 3D/VECT: HCPCS | Performed by: INTERNAL MEDICINE

## 2024-08-30 PROCEDURE — 25810000003 SODIUM CHLORIDE 0.9 % SOLUTION: Performed by: INTERNAL MEDICINE

## 2024-08-30 PROCEDURE — 25010000002 ONDANSETRON PER 1 MG: Performed by: ANESTHESIOLOGY

## 2024-08-30 PROCEDURE — 25010000002 PROPOFOL 10 MG/ML EMULSION: Performed by: ANESTHESIOLOGY

## 2024-08-30 PROCEDURE — C1759 CATH, INTRA ECHOCARDIOGRAPHY: HCPCS | Performed by: INTERNAL MEDICINE

## 2024-08-30 PROCEDURE — C1769 GUIDE WIRE: HCPCS | Performed by: INTERNAL MEDICINE

## 2024-08-30 PROCEDURE — C1893 INTRO/SHEATH, FIXED,NON-PEEL: HCPCS | Performed by: INTERNAL MEDICINE

## 2024-08-30 RX ORDER — NALOXONE HCL 0.4 MG/ML
0.2 VIAL (ML) INJECTION AS NEEDED
Status: DISCONTINUED | OUTPATIENT
Start: 2024-08-30 | End: 2024-08-30 | Stop reason: HOSPADM

## 2024-08-30 RX ORDER — DROPERIDOL 2.5 MG/ML
0.62 INJECTION, SOLUTION INTRAMUSCULAR; INTRAVENOUS
Status: DISCONTINUED | OUTPATIENT
Start: 2024-08-30 | End: 2024-08-30 | Stop reason: HOSPADM

## 2024-08-30 RX ORDER — PROTAMINE SULFATE 10 MG/ML
INJECTION, SOLUTION INTRAVENOUS
Status: DISCONTINUED | OUTPATIENT
Start: 2024-08-30 | End: 2024-08-30 | Stop reason: HOSPADM

## 2024-08-30 RX ORDER — ONDANSETRON 2 MG/ML
4 INJECTION INTRAMUSCULAR; INTRAVENOUS EVERY 6 HOURS PRN
Status: DISCONTINUED | OUTPATIENT
Start: 2024-08-30 | End: 2024-08-30 | Stop reason: HOSPADM

## 2024-08-30 RX ORDER — IPRATROPIUM BROMIDE AND ALBUTEROL SULFATE 2.5; .5 MG/3ML; MG/3ML
3 SOLUTION RESPIRATORY (INHALATION) ONCE AS NEEDED
Status: DISCONTINUED | OUTPATIENT
Start: 2024-08-30 | End: 2024-08-30 | Stop reason: HOSPADM

## 2024-08-30 RX ORDER — PROMETHAZINE HYDROCHLORIDE 25 MG/1
25 SUPPOSITORY RECTAL ONCE AS NEEDED
Status: DISCONTINUED | OUTPATIENT
Start: 2024-08-30 | End: 2024-08-30 | Stop reason: HOSPADM

## 2024-08-30 RX ORDER — PROPOFOL 10 MG/ML
VIAL (ML) INTRAVENOUS AS NEEDED
Status: DISCONTINUED | OUTPATIENT
Start: 2024-08-30 | End: 2024-08-30 | Stop reason: SURG

## 2024-08-30 RX ORDER — ACETAMINOPHEN 325 MG/1
650 TABLET ORAL EVERY 4 HOURS PRN
Status: DISCONTINUED | OUTPATIENT
Start: 2024-08-30 | End: 2024-08-30 | Stop reason: HOSPADM

## 2024-08-30 RX ORDER — DEXAMETHASONE SODIUM PHOSPHATE 4 MG/ML
INJECTION, SOLUTION INTRA-ARTICULAR; INTRALESIONAL; INTRAMUSCULAR; INTRAVENOUS; SOFT TISSUE AS NEEDED
Status: DISCONTINUED | OUTPATIENT
Start: 2024-08-30 | End: 2024-08-30 | Stop reason: SURG

## 2024-08-30 RX ORDER — FAMOTIDINE 10 MG/ML
20 INJECTION, SOLUTION INTRAVENOUS ONCE
Status: COMPLETED | OUTPATIENT
Start: 2024-08-30 | End: 2024-08-30

## 2024-08-30 RX ORDER — OXYCODONE AND ACETAMINOPHEN 7.5; 325 MG/1; MG/1
1 TABLET ORAL EVERY 4 HOURS PRN
Status: DISCONTINUED | OUTPATIENT
Start: 2024-08-30 | End: 2024-08-30 | Stop reason: HOSPADM

## 2024-08-30 RX ORDER — HYDRALAZINE HYDROCHLORIDE 20 MG/ML
INJECTION INTRAMUSCULAR; INTRAVENOUS AS NEEDED
Status: DISCONTINUED | OUTPATIENT
Start: 2024-08-30 | End: 2024-08-30 | Stop reason: SURG

## 2024-08-30 RX ORDER — LIDOCAINE HYDROCHLORIDE 10 MG/ML
0.5 INJECTION, SOLUTION INFILTRATION; PERINEURAL ONCE AS NEEDED
Status: DISCONTINUED | OUTPATIENT
Start: 2024-08-30 | End: 2024-08-30 | Stop reason: HOSPADM

## 2024-08-30 RX ORDER — EPHEDRINE SULFATE 50 MG/ML
5 INJECTION, SOLUTION INTRAVENOUS ONCE AS NEEDED
Status: DISCONTINUED | OUTPATIENT
Start: 2024-08-30 | End: 2024-08-30 | Stop reason: HOSPADM

## 2024-08-30 RX ORDER — SODIUM CHLORIDE 9 MG/ML
100 INJECTION, SOLUTION INTRAVENOUS CONTINUOUS
Status: DISCONTINUED | OUTPATIENT
Start: 2024-08-30 | End: 2024-08-30 | Stop reason: HOSPADM

## 2024-08-30 RX ORDER — ACETAMINOPHEN 650 MG/1
650 SUPPOSITORY RECTAL EVERY 4 HOURS PRN
Status: DISCONTINUED | OUTPATIENT
Start: 2024-08-30 | End: 2024-08-30 | Stop reason: HOSPADM

## 2024-08-30 RX ORDER — HYDRALAZINE HYDROCHLORIDE 20 MG/ML
5 INJECTION INTRAMUSCULAR; INTRAVENOUS
Status: DISCONTINUED | OUTPATIENT
Start: 2024-08-30 | End: 2024-08-30 | Stop reason: HOSPADM

## 2024-08-30 RX ORDER — ONDANSETRON 2 MG/ML
INJECTION INTRAMUSCULAR; INTRAVENOUS AS NEEDED
Status: DISCONTINUED | OUTPATIENT
Start: 2024-08-30 | End: 2024-08-30 | Stop reason: SURG

## 2024-08-30 RX ORDER — HYDROMORPHONE HYDROCHLORIDE 1 MG/ML
0.5 INJECTION, SOLUTION INTRAMUSCULAR; INTRAVENOUS; SUBCUTANEOUS
Status: DISCONTINUED | OUTPATIENT
Start: 2024-08-30 | End: 2024-08-30 | Stop reason: HOSPADM

## 2024-08-30 RX ORDER — SODIUM CHLORIDE 0.9 % (FLUSH) 0.9 %
10 SYRINGE (ML) INJECTION AS NEEDED
Status: DISCONTINUED | OUTPATIENT
Start: 2024-08-30 | End: 2024-08-30 | Stop reason: HOSPADM

## 2024-08-30 RX ORDER — HYDROCODONE BITARTRATE AND ACETAMINOPHEN 5; 325 MG/1; MG/1
1 TABLET ORAL ONCE AS NEEDED
Status: DISCONTINUED | OUTPATIENT
Start: 2024-08-30 | End: 2024-08-30 | Stop reason: HOSPADM

## 2024-08-30 RX ORDER — FENTANYL CITRATE 50 UG/ML
50 INJECTION, SOLUTION INTRAMUSCULAR; INTRAVENOUS ONCE AS NEEDED
Status: DISCONTINUED | OUTPATIENT
Start: 2024-08-30 | End: 2024-08-30 | Stop reason: HOSPADM

## 2024-08-30 RX ORDER — ROCURONIUM BROMIDE 10 MG/ML
INJECTION, SOLUTION INTRAVENOUS AS NEEDED
Status: DISCONTINUED | OUTPATIENT
Start: 2024-08-30 | End: 2024-08-30 | Stop reason: SURG

## 2024-08-30 RX ORDER — LABETALOL HYDROCHLORIDE 5 MG/ML
5 INJECTION, SOLUTION INTRAVENOUS
Status: DISCONTINUED | OUTPATIENT
Start: 2024-08-30 | End: 2024-08-30 | Stop reason: HOSPADM

## 2024-08-30 RX ORDER — FENTANYL CITRATE 50 UG/ML
50 INJECTION, SOLUTION INTRAMUSCULAR; INTRAVENOUS
Status: DISCONTINUED | OUTPATIENT
Start: 2024-08-30 | End: 2024-08-30 | Stop reason: HOSPADM

## 2024-08-30 RX ORDER — ONDANSETRON 2 MG/ML
4 INJECTION INTRAMUSCULAR; INTRAVENOUS ONCE AS NEEDED
Status: DISCONTINUED | OUTPATIENT
Start: 2024-08-30 | End: 2024-08-30 | Stop reason: HOSPADM

## 2024-08-30 RX ORDER — NITROGLYCERIN 0.4 MG/1
0.4 TABLET SUBLINGUAL
Status: DISCONTINUED | OUTPATIENT
Start: 2024-08-30 | End: 2024-08-30 | Stop reason: HOSPADM

## 2024-08-30 RX ORDER — PROMETHAZINE HYDROCHLORIDE 25 MG/1
25 TABLET ORAL ONCE AS NEEDED
Status: DISCONTINUED | OUTPATIENT
Start: 2024-08-30 | End: 2024-08-30 | Stop reason: HOSPADM

## 2024-08-30 RX ORDER — HEPARIN SODIUM 1000 [USP'U]/ML
INJECTION, SOLUTION INTRAVENOUS; SUBCUTANEOUS
Status: DISCONTINUED | OUTPATIENT
Start: 2024-08-30 | End: 2024-08-30 | Stop reason: HOSPADM

## 2024-08-30 RX ORDER — FLUMAZENIL 0.1 MG/ML
0.2 INJECTION INTRAVENOUS AS NEEDED
Status: DISCONTINUED | OUTPATIENT
Start: 2024-08-30 | End: 2024-08-30 | Stop reason: HOSPADM

## 2024-08-30 RX ORDER — MIDAZOLAM HYDROCHLORIDE 1 MG/ML
1 INJECTION INTRAMUSCULAR; INTRAVENOUS
Status: DISCONTINUED | OUTPATIENT
Start: 2024-08-30 | End: 2024-08-30 | Stop reason: HOSPADM

## 2024-08-30 RX ORDER — METOPROLOL TARTRATE 50 MG
50 TABLET ORAL 2 TIMES DAILY PRN
Start: 2024-08-30

## 2024-08-30 RX ORDER — DIPHENHYDRAMINE HYDROCHLORIDE 50 MG/ML
12.5 INJECTION INTRAMUSCULAR; INTRAVENOUS
Status: DISCONTINUED | OUTPATIENT
Start: 2024-08-30 | End: 2024-08-30 | Stop reason: HOSPADM

## 2024-08-30 RX ORDER — NALOXONE HCL 0.4 MG/ML
0.4 VIAL (ML) INJECTION
Status: DISCONTINUED | OUTPATIENT
Start: 2024-08-30 | End: 2024-08-30 | Stop reason: HOSPADM

## 2024-08-30 RX ORDER — SODIUM CHLORIDE 0.9 % (FLUSH) 0.9 %
3-10 SYRINGE (ML) INJECTION AS NEEDED
Status: DISCONTINUED | OUTPATIENT
Start: 2024-08-30 | End: 2024-08-30 | Stop reason: HOSPADM

## 2024-08-30 RX ORDER — SODIUM CHLORIDE 0.9 % (FLUSH) 0.9 %
3 SYRINGE (ML) INJECTION EVERY 12 HOURS SCHEDULED
Status: DISCONTINUED | OUTPATIENT
Start: 2024-08-30 | End: 2024-08-30 | Stop reason: HOSPADM

## 2024-08-30 RX ORDER — SODIUM CHLORIDE, SODIUM LACTATE, POTASSIUM CHLORIDE, CALCIUM CHLORIDE 600; 310; 30; 20 MG/100ML; MG/100ML; MG/100ML; MG/100ML
9 INJECTION, SOLUTION INTRAVENOUS CONTINUOUS
Status: DISCONTINUED | OUTPATIENT
Start: 2024-08-30 | End: 2024-08-30 | Stop reason: HOSPADM

## 2024-08-30 RX ORDER — LIDOCAINE HYDROCHLORIDE 20 MG/ML
INJECTION, SOLUTION INFILTRATION; PERINEURAL AS NEEDED
Status: DISCONTINUED | OUTPATIENT
Start: 2024-08-30 | End: 2024-08-30 | Stop reason: SURG

## 2024-08-30 RX ADMIN — SODIUM CHLORIDE 100 ML/HR: 9 INJECTION, SOLUTION INTRAVENOUS at 07:43

## 2024-08-30 RX ADMIN — PROPOFOL 50 MG: 10 INJECTION, EMULSION INTRAVENOUS at 08:35

## 2024-08-30 RX ADMIN — LIDOCAINE HYDROCHLORIDE 100 MG: 20 INJECTION, SOLUTION INFILTRATION; PERINEURAL at 08:30

## 2024-08-30 RX ADMIN — ROCURONIUM BROMIDE 50 MG: 10 INJECTION, SOLUTION INTRAVENOUS at 08:30

## 2024-08-30 RX ADMIN — DEXAMETHASONE SODIUM PHOSPHATE 4 MG: 4 INJECTION, SOLUTION INTRAMUSCULAR; INTRAVENOUS at 08:48

## 2024-08-30 RX ADMIN — ONDANSETRON 4 MG: 2 INJECTION INTRAMUSCULAR; INTRAVENOUS at 08:48

## 2024-08-30 RX ADMIN — PROPOFOL 150 MG: 10 INJECTION, EMULSION INTRAVENOUS at 08:30

## 2024-08-30 RX ADMIN — ROCURONIUM BROMIDE 30 MG: 10 INJECTION, SOLUTION INTRAVENOUS at 10:01

## 2024-08-30 RX ADMIN — HYDRALAZINE HYDROCHLORIDE 5 MG: 20 INJECTION, SOLUTION INTRAMUSCULAR; INTRAVENOUS at 09:23

## 2024-08-30 RX ADMIN — ROCURONIUM BROMIDE 20 MG: 10 INJECTION, SOLUTION INTRAVENOUS at 09:10

## 2024-08-30 RX ADMIN — SUGAMMADEX 200 MG: 100 INJECTION, SOLUTION INTRAVENOUS at 10:59

## 2024-08-30 RX ADMIN — FAMOTIDINE 20 MG: 10 INJECTION INTRAVENOUS at 08:19

## 2024-08-30 NOTE — ANESTHESIA PROCEDURE NOTES
Airway  Date/Time: 8/30/2024 8:34 AM  Airway not difficult    General Information and Staff    Anesthesiologist: Al Booker MD    Indications and Patient Condition    Preoxygenated: yes  Mask difficulty assessment: 1 - vent by mask    Final Airway Details  Final airway type: endotracheal airway      Successful airway: ETT  Cuffed: yes   Successful intubation technique: direct laryngoscopy  Facilitating devices/methods: anterior pressure/BURP  Endotracheal tube insertion site: oral  Blade: Graham  Blade size: 2  ETT size (mm): 7.5  Cormack-Lehane Classification: grade IIa - partial view of glottis  Placement verified by: chest auscultation and capnometry   Measured from: teeth  ETT/EBT  to teeth (cm): 20  Assessment: atraumatic intubation

## 2024-08-30 NOTE — ANESTHESIA PREPROCEDURE EVALUATION
Anesthesia Evaluation     NPO Solid Status: > 8 hours             Airway   Mallampati: II  Dental      Pulmonary    (+) asthma,  (-) sleep apnea, not a smoker    ROS comment: Negative patient screen for [place]    Cardiovascular     (+) hypertension, valvular problems/murmurs AS, dysrhythmias Atrial Fib      Neuro/Psych  GI/Hepatic/Renal/Endo    (+) diabetes mellitus type 2    Musculoskeletal     Abdominal    Substance History      OB/GYN          Other                      Anesthesia Plan    ASA 3     general       Anesthetic plan, risks, benefits, and alternatives have been provided, discussed and informed consent has been obtained with: patient.    CODE STATUS:

## 2024-08-30 NOTE — ANESTHESIA POSTPROCEDURE EVALUATION
"Patient: eR Sheldon    Procedure Summary       Date: 08/30/24 Room / Location: [facility] /  [facility] C[facility]VASIVE LOC[facility]    Anesthesia Start: 0824 Anesthesia Stop: 1118    Procedures:       Ablation atrial fibrillation      3D Mapping EP Diagnosis:       Paroxysmal A-fib      (paroxsymal atrial fibrillation)    Providers: Ray Suarez MD Provider: lA Booker MD    Anesthesia Type: general ASA Status: 3            Anesthesia Type: general    Vitals  Vitals Value Taken Time   /61 08/30/24 1251   Temp 37.1 °C (98.7 °F) 08/30/24 1130   Pulse 55 08/30/24 1256   Resp 18 08/30/24 1215   SpO2 98 % 08/30/24 1256   Vitals shown include unfiled device data.        Post Anesthesia Care and Evaluation    Anesthetic complications: No anesthetic complications    Comments: /61   Pulse 55   Temp 37.1 °C (98.7 °F) (Oral)   Resp 18   Ht 160 cm (63\")   Wt 70.3 kg (155 lb)   SpO2 97%   BMI 27.46 kg/m²     No anesthesia complications reported to me.    "

## 2024-08-30 NOTE — Clinical Note
Hemostasis started on the right femoral vein. Perclose was used in achieving hemostasis. Closure device deployed in the vessel and manual pressure applied to vessel. Manual pressure was held by RESHMA Restrepo. Manual pressure was held for 10 min. Hemostasis achieved successfully.

## 2024-08-30 NOTE — Clinical Note
The DP pulses are +2 bilaterally. The PT pulses are +2 bilaterally. Heels dry, intact & elevated. Cocoon warmer applied. Clear Sight Cuff applied for con hemodynamic monitoring.

## 2024-08-30 NOTE — Clinical Note
Hemostasis started on the left femoral vein. Perclose was used in achieving hemostasis. Closure device deployed in the vessel and manual pressure applied to vessel. Manual pressure was held by RESHMA Restrepo. Manual pressure was held for 10 min. Hemostasis achieved successfully.

## 2024-08-30 NOTE — DISCHARGE INSTRUCTIONS
[facility]  [address] [place] [address]    Dr. Suarez's Discharge Instructions for Atrial Fibrillation or Atrial Flutter     Refer to this sheet in the next few weeks. These instructions provide you with information on caring for yourself after your procedure.     Make arrangements to have someone stay with you for 24 hours following the procedure.  This is due to the sedation you received and for your safety in the event of any complications.      About your Procedure:  You have had a procedure called a catheter ablation.  It is used to treat abnormal heartbeats (arrhythmia). The procedure destroyed (ablated) the cells in your heart that were causing your heart rhythm problem. During the procedure, the healthcare provider placed a thin, flexible wire (catheter) into a blood vessel in your groin.  The provider then threaded the catheter to your heart and destroyed the problematic cells.      Goal of Procedure:  The goal of this procedure is to regain a normal heart rhythm (sinus rhythm) and reduce the symptoms associated with your irregular heart rhythm. In many cases, one ablation is enough to treat the arrythmia, but sometimes the problem returns, and a second ablation may be necessary.      What to Expect After the Procedure:  After your procedure, it is typical to have the following sensations:  Minor discomfort or soreness in the chest or a small bump at the insertion site (groin). The bump should usually decrease in size and tenderness within 1 to 2 weeks.  Mild bruising which will usually fade within 2 to 4 weeks.  A mild sore throat  Sometimes the irregular heartbeat goes away immediately after the procedure.  Other times, it may take longer.  As your heart heals, it is common to have some Atrial Fibrillation symptoms and you may even go into atrial fibrillation up to a few months after the procedure.    Do not miss a dose of your blood thinner (Apixaban/Eliquis,  rivaroxaban/Xarelto, Warfarin/Coumadin).   You will have a follow up appointment in approximately 1 month.      Home Care Instructions:  Take your medications exactly as directed.  Do not skip doses unless directed to do so by your healthcare provider.   You should be able to return to most of your normal activities in the next 1-2 days. These include walking, climbing stairs, and doing household chores.   You may remove the dressings in your groin in 24 hours.    You may then shower and gently wash the area with plain soap and water after removing the dressings.     Do not apply powder or lotion to the site.  Do not take baths, swim, or use a hot tub until your health care provider approves and the site is completely healed.  Hold direct pressure to your groin sites any time you laugh, cough, sneeze or change positions.  Do this for the next 48 hours.   Do not bend, squat, or lift anything over 20 lb. (9 kg) for 7 days after your ablation. Do not do any other heavy physical activity for 7 days.  This allows your body time to heal properly.    Inspect the groin sites daily for signs of infection for 7 days. Those signs include: redness, swelling, drainage, or warmth at the groin sites.     Follow instructions about when you can drive or return to work as directed by your physician.    If you experience bleeding or swelling (larger than the size of a golf ball) at the groin sites after you go home, do not remove the dressing. Lie down flat and hold pressure over the sites for at last 10-15 minutes. You or the person with you should call the office at [phone] for further instructions. If the bleeding does not stop after 10-15 minutes, call [phone] and continue holding pressure.  You need to come to the emergency room for evaluation.     Call Your Doctor If:  You experience the symptoms you had before the procedure for more than 24 hours. You have drainage (other than a small amount of blood on the dressing).  You  have chills or a fever > 101.  You have redness, warmth, swelling (larger than a walnut), drainage or severe pain at the insertion site  You develop chest pain or shortness of breath, feel faint, or pass out.  You develop pain, discoloration, coldness, numbness, tingling, or severe bruising in the leg that held the catheter.  You develop bleeding from any other place, such as the bowels.  You have difficulty swallowing, excessive pain when swallowing, difficulty breathing or vomiting of blood  You have unstable vital signs such as blood pressure less than 90/60 or a heart rate greater than 130 beats per minute for more than 1 hour.   You have any symptoms of a stroke.  Remember BE FAST  B-balance. Sudden trouble walking or loss of balance.  E-eyes.  Sudden changes in how you see or a sudden onset of a very bad headache.   F-face. Sudden weakness or loss of feeling of the face or facial droop on one side.   A-arms Sudden weakness or numbness in one arm.  One arm drifts down if they are both held out in front of you. This happens suddenly and usually on one side of the body.  S-speech.  Sudden trouble speaking, slurred speech or trouble understanding what people are saying.   T-time  Time to call emergency services.  Write down the symptoms and the time they started.

## 2024-08-30 NOTE — H&P
[facility]   HISTORY AND PHYSICAL    Patient Name:Re Sheldon  : 1960  MRN: [id number]  Primary Care Physician: Angelita Vital MD  Date of admission: 2024    Subjective   Subjective     Chief Complaint:   Atrial Fibrillation    History of Present Illness   Re Sheldon is a 64 y.o. female who presents today for atrial fibrillation.      She was first diagnosed with atrial fibrillation in .       It has been paroxysmal, but is progressing and becoming more frequent.       She has symptoms of palpitations and dyspnea.      She has also been noted to have moderate aortic stenosis and mitral regurgitation.      The MR was felt to be moderate to severe on PATRICA in .       TTE last week demonstrated moderate AS, but only reports mild MR          Review of Systems   Constitutional:  Negative for activity change and fatigue.   Eyes: Negative.    Respiratory:  Negative for chest tightness and shortness of breath.    Cardiovascular:  Positive for palpitations. Negative for chest pain and leg swelling.   Gastrointestinal: Negative.    Endocrine: Negative.    Genitourinary: Negative.    Musculoskeletal: Negative.    Skin: Negative.    Neurological:  Negative for dizziness and syncope.   Hematological: Negative.    Psychiatric/Behavioral: Negative.           Personal History     Past Medical History:   Diagnosis Date    Abnormal ECG     Inferolateral ST-T changes at baseline    Anemia     Arrhythmia     Atrial fibrillation     Calcification of aortic valve     Coronary artery disease     COVID-19 2022    Diabetes mellitus     Hyperlipidemia     Hypertension     Low back pain     Mitral regurgitation     PAF (paroxysmal atrial fibrillation)     Palpitations     Premature atrial contractions     Retinal vein occlusion     Spinal stenosis     Systolic murmur     Tonsillitis     Vitamin D deficiency        Past Surgical History:   Procedure Laterality Date    ADENOIDECTOMY      CARDIAC  CATHETERIZATION N/A 8/9/2022    Procedure: Right and Left Heart Cath;  Surgeon: Josemanuel Schmitz MD;  Location:  [facility];  Service: Cardiovascular;  Laterality: N/A;    CARDIAC CATHETERIZATION N/A 8/9/2022    Procedure: Coronary angiography;  Surgeon: Josemanuel Schmitz MD;  Location:  [facility];  Service: Cardiovascular;  Laterality: N/A;    CARPAL TUNNEL RELEASE Bilateral     COLONOSCOPY  08/04/2014    repeat 10 years , Dr. Karen Spear    COLONOSCOPY N/A 1/22/2021    Procedure: COLONOSCOPY to cecum with cold biopsy polypectomy;  Surgeon: Ray Hairston MD;  Location:  [facility];  Service: Gastroenterology;  Laterality: N/A;  pre: screening  post: diverticulosis, polyp, internal hemorrhoids    FOOT SURGERY      HAND SURGERY      TONSILLECTOMY      TONSILLECTOMY AND ADENOIDECTOMY      TUBAL ABDOMINAL LIGATION         Family History: Her family history includes Breast cancer in her sister and another family member; Heart attack in an other family member; Heart disease in her father, mother, and another family member; Stroke in her father.     Social History: She  reports that she has never smoked. She has never been exposed to tobacco smoke. She has never used smokeless tobacco. She reports current alcohol use. She reports that she does not use drugs.    Home Medications:  Pitavastatin Calcium, albuterol sulfate HFA, apixaban, atenolol, cetirizine, cholecalciferol, digoxin, fluticasone, furosemide, losartan, metFORMIN ER, and metoprolol tartrate    Allergies:  She is allergic to sulfa antibiotics, augmentin [amoxicillin-pot clavulanate], and bactrim [sulfamethoxazole-trimethoprim].    Objective    Objective     Vitals:    Temp:  [97.3 °F (36.3 °C)] 97.3 °F (36.3 °C)  Heart Rate:  [68] 68  Resp:  [18] 18  BP: (120)/(82) 120/82    Physical Exam  Vitals and nursing note reviewed.   Constitutional:       General: She is not in acute distress.     Appearance: She is not  diaphoretic.   HENT:      Mouth/Throat:      Pharynx: No oropharyngeal exudate.   Eyes:      General: No scleral icterus.  Neck:      Trachea: No tracheal deviation.   Cardiovascular:      Rate and Rhythm: Normal rate. Rhythm irregular.   Pulmonary:      Effort: Pulmonary effort is normal. No respiratory distress.      Breath sounds: Normal breath sounds.   Abdominal:      General: There is no distension.      Palpations: Abdomen is soft.   Skin:     General: Skin is warm and dry.   Neurological:      Mental Status: She is alert and oriented to person, place, and time.   Psychiatric:         Behavior: Behavior normal.         Thought Content: Thought content normal.         Judgment: Judgment normal.          Result Review    Result Review:  I have personally reviewed the results from the time of this admission to 8/30/2024 07:55 EDT and agree with these findings:  []  Laboratory list / accordion  []  Microbiology  []  Radiology  []  EKG/Telemetry   []  Cardiology/Vascular   []  Pathology  []  Old records  []  Other:  Most notable findings include: atrial fibrillation      Assessment & Plan   Assessment / Plan     Brief Patient Summary:  Re Sheldon is a 64 y.o. female with early persistent atrial fibrillation presenting for ablation    Active Hospital Problems:  Active Hospital Problems    Diagnosis     **Paroxysmal A-fib      Plan:   Proceed with ablation    Ray Suarez MD

## 2024-08-30 NOTE — Clinical Note
A sheath was successfully inserted with ultrasound guidance into the left femoral vein. Sheath insertion not delayed.

## 2024-08-31 LAB
QT INTERVAL: 386 MS
QTC INTERVAL: 437 MS

## 2024-09-01 ENCOUNTER — TELEPHONE (OUTPATIENT)
Dept: CARDIOLOGY | Facility: CLINIC | Age: 64
End: 2024-09-01
Payer: COMMERCIAL

## 2024-09-01 NOTE — TELEPHONE ENCOUNTER
I spoke with Mr. Sheldon on behalf of his wife, Re. She underwent an ablation for atrial fibrillation on 8/30/24 with Dr. Suarez.     She has felt fatigued since the procedure. Yesterday she did have some chills. She took her temperature and it was never higher than 100 degrees. Today she has noticed that her heart rate has been between . Her  was able to look at an EKG tracing on her apple watch and said he felt her rhythm looked regular.     We discussed staying well hydrated and making sure that she is eating.     No changes to management at this time but we did discuss when to call back or when to proceed to the ED.     Aaliyah Clements, APRN

## 2024-09-03 ENCOUNTER — TELEPHONE (OUTPATIENT)
Dept: CARDIOLOGY | Facility: CLINIC | Age: 64
End: 2024-09-03

## 2024-09-03 NOTE — TELEPHONE ENCOUNTER
Caller: Kedar Sheldon    Relationship to patient: Emergency Contact    Best call back number: [phone]    Patient is needing: PATIENT IS EXPERIENCING TACHYCARDIA AT REST. PATIENT'S SPOUSE SAYS IT  AT REST. NO SPIKE OR DROP IN BP. PATIENT'S SPOUSE STATES WHEN PATIENT STARTS TO WALK ACROSS FLOOR HER HR SPIKES TO AROUND 140.     ALSO HAS SCHEDULING DIRECTIONS IN DISCHARGE NOTES THAT STATE PATIENT NEEDS A HOSPITAL F/U IN A WK. NO APPOINTMENT WITHIN SPECIFIED TIMEFRAME. PLEASE CALL TO ADVISE AND SCHEDULE.

## 2024-09-03 NOTE — TELEPHONE ENCOUNTER
Called and spoke with patient. She states her HR went up to 224 Sunday. Yesterday she had 8 episodes of HR in the 200s. Taking a Metoprolol makes it mildly tolerable. Patient denies any chest pains but states when she has these episodes they are awful and almost unbearable. She is scheduled for her 1 week appointment with you in Thursday.

## 2024-09-05 ENCOUNTER — OFFICE VISIT (OUTPATIENT)
Age: 64
End: 2024-09-05
Payer: COMMERCIAL

## 2024-09-05 VITALS
WEIGHT: 156 LBS | DIASTOLIC BLOOD PRESSURE: 70 MMHG | BODY MASS INDEX: 27.64 KG/M2 | HEIGHT: 63 IN | HEART RATE: 86 BPM | SYSTOLIC BLOOD PRESSURE: 122 MMHG

## 2024-09-05 DIAGNOSIS — I48.91 ATRIAL FIBRILLATION WITH RAPID VENTRICULAR RESPONSE: Primary | ICD-10-CM

## 2024-09-05 NOTE — PROGRESS NOTES
Date of Office Visit: 2024  Encounter Provider: JONATHAN Araiza  Place of Service: [facility] CARDIOLOGY  Patient Name: Re Sheldon  :1960    Chief Complaint   Patient presents with    Atrial Fibrillation    Pacemaker Check     1 week follow up / Fib Ablation / patient having problems   :     HPI: Re Sheldon is a 64 y.o. female who follows with Dr. Ordonez, referred to Dr. Suarez. She has moderate aortic stenosis and paroxsymal atrial fibrillation.     In  she was noted to have moderate AS and mod to severe MR.     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 She was on apixaban for AF.     She continued to have frequent episodes of AF. Her AS and MR were felt to be stable on recent PATRICA. No intervention recommended.     Saw Dr. Suarez in may and discussed ablation for her atrial fibrillation. She elected to proceed with ablation.     Patient had PVI PFA and ablation of mitral flutter.     She called the office a few days later with elevated HR.     I added her onto my schedule.                   She presents today for palpitations.     Unfortunately she is back in atrial flutter.     Suspect mitral isthmus flutter.     I increased her metoprolol to 50mg Bid and her symptoms are better.     EKG shows AFL.     Until metoprolol was increased she felt pretty bad. Dyspnea and fatigue.     She is on apixaban for AC. No missed doses.   Past Medical History:   Diagnosis Date    Abnormal ECG     Inferolateral ST-T changes at baseline    Anemia     Arrhythmia     Atrial fibrillation     Calcification of aortic valve     Coronary artery disease     COVID-19 2022    Diabetes mellitus     Hyperlipidemia     Hypertension     Low back pain     Mitral regurgitation     PAF (paroxysmal atrial fibrillation)     Palpitations     Premature atrial contractions     Retinal vein occlusion     Spinal stenosis     Systolic murmur     Tonsillitis      Vitamin D deficiency        Past Surgical History:   Procedure Laterality Date    ADENOIDECTOMY      CARDIAC CATHETERIZATION N/A 2022    Procedure: Right and Left Heart Cath;  Surgeon: Josemanuel Schmitz MD;  Location:  [facility];  Service: Cardiovascular;  Laterality: N/A;    CARDIAC CATHETERIZATION N/A 2022    Procedure: Coronary angiography;  Surgeon: Josemanuel Schmitz MD;  Location:  [facility];  Service: Cardiovascular;  Laterality: N/A;    CARDIAC ELECTROPHYSIOLOGY PROCEDURE N/A 2024    Procedure: Ablation atrial fibrillation;  Surgeon: Ray Suarez MD;  Location:  [facility];  Service: Cardiovascular;  Laterality: N/A;    CARPAL TUNNEL RELEASE Bilateral     COLONOSCOPY  2014    repeat 10 years , Dr. Karen Spear    COLONOSCOPY N/A 2021    Procedure: COLONOSCOPY to cecum with cold biopsy polypectomy;  Surgeon: Ray Hairston MD;  Location: [facility];  Service: Gastroenterology;  Laterality: N/A;  pre: screening  post: diverticulosis, polyp, internal hemorrhoids    FOOT SURGERY      HAND SURGERY      TONSILLECTOMY      TONSILLECTOMY AND ADENOIDECTOMY      TUBAL ABDOMINAL LIGATION         Social History     Socioeconomic History    Marital status:      Spouse name: Kedar    Number of children: 2   Tobacco Use    Smoking status: Never     Passive exposure: Never    Smokeless tobacco: Never   Vaping Use    Vaping status: Never Used   Substance and Sexual Activity    Alcohol use: Yes     Comment: Caffeine use 3-4 cups daily, drinks socially    Drug use: No    Sexual activity: Defer     Partners: Male       Family History   Problem Relation Age of Onset    Heart attack Other     Breast cancer Other     Heart disease Other     Heart disease Mother         Mother with MI at 63, and later  from CHF    Heart disease Father         Father  from complications after valve replacement at 73    Stroke Father      Breast cancer Sister     Anna Hyperthermia Neg Hx        Review of Systems   Constitutional: Negative for chills, fever and malaise/fatigue.   Cardiovascular:  Negative for chest pain, dyspnea on exertion, leg swelling, near-syncope, orthopnea, palpitations, paroxysmal nocturnal dyspnea and syncope.   Respiratory:  Negative for cough and shortness of breath.    Hematologic/Lymphatic: Negative.    Musculoskeletal:  Negative for joint pain, joint swelling and myalgias.   Gastrointestinal:  Negative for abdominal pain, diarrhea, melena, nausea and vomiting.   Genitourinary:  Negative for frequency and hematuria.   Neurological:  Negative for light-headedness, numbness, paresthesias and seizures.   Allergic/Immunologic: Negative.    All other systems reviewed and are negative.      Allergies   Allergen Reactions    Sulfa Antibiotics Diarrhea and Rash    Augmentin [Amoxicillin-Pot Clavulanate] Diarrhea and Other (See Comments)    Bactrim [Sulfamethoxazole-Trimethoprim] Diarrhea and Other (See Comments)         Current Outpatient Medications:     albuterol sulfate  (90 Base) MCG/ACT inhaler, Inhale 2 puffs 4 (Four) Times a Day., Disp: 8 g, Rfl: 0    apixaban (Eliquis) 5 MG tablet tablet, Take 1 tablet by mouth Every 12 (Twelve) Hours., Disp: 180 tablet, Rfl: 3    cetirizine (zyrTEC) 10 MG tablet, 1 OTC tab po QDay for allergies, Disp: , Rfl:     cholecalciferol (VITAMIN D3) 25 MCG (1000 UT) tablet, Take 1 tablet by mouth 2 (Two) Times a Day., Disp: , Rfl:     fluticasone (FLONASE) 50 MCG/ACT nasal spray, into the nostril(s) as directed by provider Daily As Needed., Disp: , Rfl:     furosemide (LASIX) 40 MG tablet, Take 1 tablet by mouth Daily., Disp: 90 tablet, Rfl: 3    losartan (COZAAR) 25 MG tablet, Take 1 tablet by mouth Daily., Disp: 90 tablet, Rfl: 3    metFORMIN ER (GLUCOPHAGE-XR) 500 MG 24 hr tablet, TAKE 1 TABLET BY MOUTH DAILY WITH BREAKFAST, Disp: 90 tablet, Rfl: 0    metoprolol tartrate (LOPRESSOR)  "50 MG tablet, Take 1 tablet by mouth 2 (Two) Times a Day As Needed (2 Times Daily PRN). PRN ONLY, Disp: , Rfl:     Pitavastatin Calcium 4 MG tablet, Take 1 tablet by mouth every night at bedtime., Disp: 90 tablet, Rfl: 3      Objective:     Vitals:    09/05/24 1331   BP: 122/70   Pulse: 86   Weight: 70.8 kg (156 lb)   Height: 160 cm (63\")     Body mass index is 27.63 kg/m².    PHYSICAL EXAM:    Vitals Reviewed.   General Appearance: No acute distress, well developed and well nourished.   Eyes: Conjunctiva and lids: No erythema, swelling, or discharge. Sclera non-icteric.   HENT: Atraumatic, normocephalic. External eyes, ears, and nose normal.   Respiratory: No signs of respiratory distress. Respiration rhythm and depth normal.   Clear to auscultation. No rales, crackles, rhonchi, or wheezing auscultated.   Cardiovascular:  Heart Rate and Rhythm: Normal, Heart Sounds: Normal S1 and S2. No S3 or S4 noted.  Gastrointestinal:  Abdomen soft, non-distended, non-tender.   Musculoskeletal: Normal movement of extremities  Skin: Warm and dry.   Psychiatric: Patient alert and oriented to person, place, and time. Speech and behavior appropriate. Normal mood and affect.       ECG 12 Lead    Date/Time: 9/5/2024 4:01 PM  Performed by: Raj Lam APRN    Authorized by: Raj Lam APRN  Comparison: compared with previous ECG   Similar to previous ECG  Rhythm: atrial flutter  BPM: 78            Assessment:       Diagnosis Plan   1. Atrial fibrillation with rapid ventricular response  Cardioversion External in Cardiology Department             Plan:   1-2. Atrial fibrillation, atrial flutter----recent ablation (2024)---- she is a few weeks post ablation and back in what I suspect a MI flutter. We discussed options and elected to proceed with cardioversion for now.       If AFL recurs post CV then consider more ablation.         Cardioversion next week.       As always, it has been a pleasure to participate in your patient's " care.      Sincerely,         JONATHAN Araiza

## 2024-09-05 NOTE — H&P (VIEW-ONLY)
Date of Office Visit: 2024  Encounter Provider: JONATHAN Araiza  Place of Service: [facility] CARDIOLOGY  Patient Name: eR Sheldon  :1960    Chief Complaint   Patient presents with    Atrial Fibrillation    Pacemaker Check     1 week follow up / Fib Ablation / patient having problems   :     HPI: Re Sheldon is a 64 y.o. female who follows with Dr. Ordonez, referred to Dr. Suarez. She has moderate aortic stenosis and paroxsymal atrial fibrillation.     In  she was noted to have moderate AS and mod to severe MR.     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 She was on apixaban for AF.     She continued to have frequent episodes of AF. Her AS and MR were felt to be stable on recent PATRICA. No intervention recommended.     Saw Dr. Suarez in may and discussed ablation for her atrial fibrillation. She elected to proceed with ablation.     Patient had PVI PFA and ablation of mitral flutter.     She called the office a few days later with elevated HR.     I added her onto my schedule.                   She presents today for palpitations.     Unfortunately she is back in atrial flutter.     Suspect mitral isthmus flutter.     I increased her metoprolol to 50mg Bid and her symptoms are better.     EKG shows AFL.     Until metoprolol was increased she felt pretty bad. Dyspnea and fatigue.     She is on apixaban for AC. No missed doses.   Past Medical History:   Diagnosis Date    Abnormal ECG     Inferolateral ST-T changes at baseline    Anemia     Arrhythmia     Atrial fibrillation     Calcification of aortic valve     Coronary artery disease     COVID-19 2022    Diabetes mellitus     Hyperlipidemia     Hypertension     Low back pain     Mitral regurgitation     PAF (paroxysmal atrial fibrillation)     Palpitations     Premature atrial contractions     Retinal vein occlusion     Spinal stenosis     Systolic murmur     Tonsillitis      Vitamin D deficiency        Past Surgical History:   Procedure Laterality Date    ADENOIDECTOMY      CARDIAC CATHETERIZATION N/A 2022    Procedure: Right and Left Heart Cath;  Surgeon: Josemanuel Schmitz MD;  Location:  [facility];  Service: Cardiovascular;  Laterality: N/A;    CARDIAC CATHETERIZATION N/A 2022    Procedure: Coronary angiography;  Surgeon: Josemanuel Schmitz MD;  Location:  [facility];  Service: Cardiovascular;  Laterality: N/A;    CARDIAC ELECTROPHYSIOLOGY PROCEDURE N/A 2024    Procedure: Ablation atrial fibrillation;  Surgeon: Ray Suarez MD;  Location:  [facility];  Service: Cardiovascular;  Laterality: N/A;    CARPAL TUNNEL RELEASE Bilateral     COLONOSCOPY  2014    repeat 10 years , Dr. Karen Spear    COLONOSCOPY N/A 2021    Procedure: COLONOSCOPY to cecum with cold biopsy polypectomy;  Surgeon: Ray Hairston MD;  Location[facility];  Service: Gastroenterology;  Laterality: N/A;  pre: screening  post: diverticulosis, polyp, internal hemorrhoids    FOOT SURGERY      HAND SURGERY      TONSILLECTOMY      TONSILLECTOMY AND ADENOIDECTOMY      TUBAL ABDOMINAL LIGATION         Social History     Socioeconomic History    Marital status:      Spouse name: Kedar    Number of children: 2   Tobacco Use    Smoking status: Never     Passive exposure: Never    Smokeless tobacco: Never   Vaping Use    Vaping status: Never Used   Substance and Sexual Activity    Alcohol use: Yes     Comment: Caffeine use 3-4 cups daily, drinks socially    Drug use: No    Sexual activity: Defer     Partners: Male       Family History   Problem Relation Age of Onset    Heart attack Other     Breast cancer Other     Heart disease Other     Heart disease Mother         Mother with MI at 63, and later  from CHF    Heart disease Father         Father  from complications after valve replacement at 73    Stroke Father      Breast cancer Sister     Anna Hyperthermia Neg Hx        Review of Systems   Constitutional: Negative for chills, fever and malaise/fatigue.   Cardiovascular:  Negative for chest pain, dyspnea on exertion, leg swelling, near-syncope, orthopnea, palpitations, paroxysmal nocturnal dyspnea and syncope.   Respiratory:  Negative for cough and shortness of breath.    Hematologic/Lymphatic: Negative.    Musculoskeletal:  Negative for joint pain, joint swelling and myalgias.   Gastrointestinal:  Negative for abdominal pain, diarrhea, melena, nausea and vomiting.   Genitourinary:  Negative for frequency and hematuria.   Neurological:  Negative for light-headedness, numbness, paresthesias and seizures.   Allergic/Immunologic: Negative.    All other systems reviewed and are negative.      Allergies   Allergen Reactions    Sulfa Antibiotics Diarrhea and Rash    Augmentin [Amoxicillin-Pot Clavulanate] Diarrhea and Other (See Comments)    Bactrim [Sulfamethoxazole-Trimethoprim] Diarrhea and Other (See Comments)         Current Outpatient Medications:     albuterol sulfate  (90 Base) MCG/ACT inhaler, Inhale 2 puffs 4 (Four) Times a Day., Disp: 8 g, Rfl: 0    apixaban (Eliquis) 5 MG tablet tablet, Take 1 tablet by mouth Every 12 (Twelve) Hours., Disp: 180 tablet, Rfl: 3    cetirizine (zyrTEC) 10 MG tablet, 1 OTC tab po QDay for allergies, Disp: , Rfl:     cholecalciferol (VITAMIN D3) 25 MCG (1000 UT) tablet, Take 1 tablet by mouth 2 (Two) Times a Day., Disp: , Rfl:     fluticasone (FLONASE) 50 MCG/ACT nasal spray, into the nostril(s) as directed by provider Daily As Needed., Disp: , Rfl:     furosemide (LASIX) 40 MG tablet, Take 1 tablet by mouth Daily., Disp: 90 tablet, Rfl: 3    losartan (COZAAR) 25 MG tablet, Take 1 tablet by mouth Daily., Disp: 90 tablet, Rfl: 3    metFORMIN ER (GLUCOPHAGE-XR) 500 MG 24 hr tablet, TAKE 1 TABLET BY MOUTH DAILY WITH BREAKFAST, Disp: 90 tablet, Rfl: 0    metoprolol tartrate (LOPRESSOR)  "50 MG tablet, Take 1 tablet by mouth 2 (Two) Times a Day As Needed (2 Times Daily PRN). PRN ONLY, Disp: , Rfl:     Pitavastatin Calcium 4 MG tablet, Take 1 tablet by mouth every night at bedtime., Disp: 90 tablet, Rfl: 3      Objective:     Vitals:    09/05/24 1331   BP: 122/70   Pulse: 86   Weight: 70.8 kg (156 lb)   Height: 160 cm (63\")     Body mass index is 27.63 kg/m².    PHYSICAL EXAM:    Vitals Reviewed.   General Appearance: No acute distress, well developed and well nourished.   Eyes: Conjunctiva and lids: No erythema, swelling, or discharge. Sclera non-icteric.   HENT: Atraumatic, normocephalic. External eyes, ears, and nose normal.   Respiratory: No signs of respiratory distress. Respiration rhythm and depth normal.   Clear to auscultation. No rales, crackles, rhonchi, or wheezing auscultated.   Cardiovascular:  Heart Rate and Rhythm: Normal, Heart Sounds: Normal S1 and S2. No S3 or S4 noted.  Gastrointestinal:  Abdomen soft, non-distended, non-tender.   Musculoskeletal: Normal movement of extremities  Skin: Warm and dry.   Psychiatric: Patient alert and oriented to person, place, and time. Speech and behavior appropriate. Normal mood and affect.       ECG 12 Lead    Date/Time: 9/5/2024 4:01 PM  Performed by: Raj Lam APRN    Authorized by: Raj Lam APRN  Comparison: compared with previous ECG   Similar to previous ECG  Rhythm: atrial flutter  BPM: 78            Assessment:       Diagnosis Plan   1. Atrial fibrillation with rapid ventricular response  Cardioversion External in Cardiology Department             Plan:   1-2. Atrial fibrillation, atrial flutter----recent ablation (2024)---- she is a few weeks post ablation and back in what I suspect a MI flutter. We discussed options and elected to proceed with cardioversion for now.       If AFL recurs post CV then consider more ablation.         Cardioversion next week.       As always, it has been a pleasure to participate in your patient's " care.      Sincerely,         JONATHAN Araiza

## 2024-09-06 ENCOUNTER — TRANSCRIBE ORDERS (OUTPATIENT)
Dept: CARDIOLOGY | Facility: CLINIC | Age: 64
End: 2024-09-06
Payer: COMMERCIAL

## 2024-09-06 DIAGNOSIS — Z01.810 PRE-OPERATIVE CARDIOVASCULAR EXAMINATION: Primary | ICD-10-CM

## 2024-09-06 DIAGNOSIS — Z13.6 SCREENING FOR ISCHEMIC HEART DISEASE: ICD-10-CM

## 2024-09-13 ENCOUNTER — HOSPITAL ENCOUNTER (OUTPATIENT)
Dept: CARDIOLOGY | Facility: HOSPITAL | Age: 64
Discharge: HOME | End: 2024-09-13
Admitting: INTERNAL MEDICINE
Payer: COMMERCIAL

## 2024-09-13 VITALS
HEIGHT: 63 IN | OXYGEN SATURATION: 95 % | DIASTOLIC BLOOD PRESSURE: 51 MMHG | TEMPERATURE: 96.4 F | BODY MASS INDEX: 27.46 KG/M2 | HEART RATE: 67 BPM | WEIGHT: 155 LBS | RESPIRATION RATE: 16 BRPM | SYSTOLIC BLOOD PRESSURE: 101 MMHG

## 2024-09-13 DIAGNOSIS — I48.91 ATRIAL FIBRILLATION WITH RAPID VENTRICULAR RESPONSE: ICD-10-CM

## 2024-09-13 LAB
GLUCOSE BLDC GLUCOMTR-MCNC: 105 MG/DL (ref 70–130)
QT INTERVAL: 388 MS
QT INTERVAL: 463 MS
QTC INTERVAL: 447 MS
QTC INTERVAL: 456 MS

## 2024-09-13 PROCEDURE — 82948 REAGENT STRIP/BLOOD GLUCOSE: CPT

## 2024-09-13 PROCEDURE — 92960 CARDIOVERSION ELECTRIC EXT: CPT

## 2024-09-13 PROCEDURE — 25810000003 SODIUM CHLORIDE 0.9 % SOLUTION: Performed by: INTERNAL MEDICINE

## 2024-09-13 PROCEDURE — 93005 ELECTROCARDIOGRAM TRACING: CPT | Performed by: INTERNAL MEDICINE

## 2024-09-13 RX ORDER — SODIUM CHLORIDE 0.9 % (FLUSH) 0.9 %
10 SYRINGE (ML) INJECTION AS NEEDED
Status: DISCONTINUED | OUTPATIENT
Start: 2024-09-13 | End: 2024-09-14 | Stop reason: HOSPADM

## 2024-09-13 RX ORDER — METHOHEXITAL IN WATER/PF 100MG/10ML
SYRINGE (ML) INTRAVENOUS
Status: COMPLETED | OUTPATIENT
Start: 2024-09-13 | End: 2024-09-13

## 2024-09-13 RX ORDER — SODIUM CHLORIDE 9 MG/ML
75 INJECTION, SOLUTION INTRAVENOUS CONTINUOUS
Status: DISCONTINUED | OUTPATIENT
Start: 2024-09-13 | End: 2024-09-14 | Stop reason: HOSPADM

## 2024-09-13 RX ORDER — SODIUM CHLORIDE 9 MG/ML
40 INJECTION, SOLUTION INTRAVENOUS AS NEEDED
Status: DISCONTINUED | OUTPATIENT
Start: 2024-09-13 | End: 2024-09-14 | Stop reason: HOSPADM

## 2024-09-13 RX ORDER — SODIUM CHLORIDE 0.9 % (FLUSH) 0.9 %
10 SYRINGE (ML) INJECTION EVERY 12 HOURS SCHEDULED
Status: DISCONTINUED | OUTPATIENT
Start: 2024-09-13 | End: 2024-09-14 | Stop reason: HOSPADM

## 2024-09-13 RX ORDER — NITROGLYCERIN 0.4 MG/1
0.4 TABLET SUBLINGUAL
Status: DISCONTINUED | OUTPATIENT
Start: 2024-09-13 | End: 2024-09-14 | Stop reason: HOSPADM

## 2024-09-13 RX ADMIN — SODIUM CHLORIDE 75 ML/HR: 9 INJECTION, SOLUTION INTRAVENOUS at 08:20

## 2024-09-13 RX ADMIN — Medication 50 MG: at 08:47

## 2024-09-26 ENCOUNTER — CLINICAL SUPPORT (OUTPATIENT)
Dept: CARDIOLOGY | Facility: CLINIC | Age: 64
End: 2024-09-26
Payer: COMMERCIAL

## 2024-09-26 ENCOUNTER — APPOINTMENT (OUTPATIENT)
Dept: WOMENS IMAGING | Facility: HOSPITAL | Age: 64
End: 2024-09-26
Payer: COMMERCIAL

## 2024-09-26 DIAGNOSIS — I48.0 PAROXYSMAL A-FIB: Primary | ICD-10-CM

## 2024-09-26 PROCEDURE — 77067 SCR MAMMO BI INCL CAD: CPT | Performed by: RADIOLOGY

## 2024-09-26 PROCEDURE — 77063 BREAST TOMOSYNTHESIS BI: CPT | Performed by: RADIOLOGY

## 2024-09-26 PROCEDURE — 93000 ELECTROCARDIOGRAM COMPLETE: CPT

## 2024-10-10 DIAGNOSIS — E55.9 VITAMIN D DEFICIENCY: ICD-10-CM

## 2024-10-10 DIAGNOSIS — R73.03 PREDIABETES: ICD-10-CM

## 2024-10-10 DIAGNOSIS — E78.00 PURE HYPERCHOLESTEROLEMIA: Primary | ICD-10-CM

## 2024-10-11 ENCOUNTER — TELEPHONE (OUTPATIENT)
Dept: FAMILY MEDICINE CLINIC | Facility: CLINIC | Age: 64
End: 2024-10-11
Payer: COMMERCIAL

## 2024-10-11 DIAGNOSIS — R73.03 PREDIABETES: ICD-10-CM

## 2024-10-11 DIAGNOSIS — E78.00 PURE HYPERCHOLESTEROLEMIA: ICD-10-CM

## 2024-10-11 DIAGNOSIS — E55.9 VITAMIN D DEFICIENCY: ICD-10-CM

## 2024-10-12 LAB
25(OH)D3+25(OH)D2 SERPL-MCNC: 52.8 NG/ML (ref 30–100)
ALBUMIN SERPL-MCNC: 4.3 G/DL (ref 3.9–4.9)
ALP SERPL-CCNC: 79 IU/L (ref 44–121)
ALT SERPL-CCNC: 27 IU/L (ref 0–32)
AST SERPL-CCNC: 25 IU/L (ref 0–40)
BASOPHILS # BLD AUTO: 0 X10E3/UL (ref 0–0.2)
BASOPHILS NFR BLD AUTO: 0 %
BILIRUB SERPL-MCNC: 0.4 MG/DL (ref 0–1.2)
BUN SERPL-MCNC: 21 MG/DL (ref 8–27)
BUN/CREAT SERPL: 28 (ref 12–28)
CALCIUM SERPL-MCNC: 9.2 MG/DL (ref 8.7–10.3)
CHLORIDE SERPL-SCNC: 102 MMOL/L (ref 96–106)
CHOLEST SERPL-MCNC: 218 MG/DL (ref 100–199)
CO2 SERPL-SCNC: 24 MMOL/L (ref 20–29)
CREAT SERPL-MCNC: 0.76 MG/DL (ref 0.57–1)
EGFRCR SERPLBLD CKD-EPI 2021: 88 ML/MIN/1.73
EOSINOPHIL # BLD AUTO: 0.2 X10E3/UL (ref 0–0.4)
EOSINOPHIL NFR BLD AUTO: 3 %
ERYTHROCYTE [DISTWIDTH] IN BLOOD BY AUTOMATED COUNT: 13.6 % (ref 11.7–15.4)
GLOBULIN SER CALC-MCNC: 2.6 G/DL (ref 1.5–4.5)
GLUCOSE SERPL-MCNC: 104 MG/DL (ref 70–99)
HBA1C MFR BLD: 6.3 % (ref 4.8–5.6)
HCT VFR BLD AUTO: 35.4 % (ref 34–46.6)
HDLC SERPL-MCNC: 56 MG/DL
HGB BLD-MCNC: 11.4 G/DL (ref 11.1–15.9)
IMM GRANULOCYTES # BLD AUTO: 0 X10E3/UL (ref 0–0.1)
IMM GRANULOCYTES NFR BLD AUTO: 0 %
LDLC SERPL CALC-MCNC: 139 MG/DL (ref 0–99)
LDLC/HDLC SERPL: 2.5 RATIO (ref 0–3.2)
LYMPHOCYTES # BLD AUTO: 2 X10E3/UL (ref 0.7–3.1)
LYMPHOCYTES NFR BLD AUTO: 26 %
MCH RBC QN AUTO: 29.2 PG (ref 26.6–33)
MCHC RBC AUTO-ENTMCNC: 32.2 G/DL (ref 31.5–35.7)
MCV RBC AUTO: 91 FL (ref 79–97)
MONOCYTES # BLD AUTO: 0.6 X10E3/UL (ref 0.1–0.9)
MONOCYTES NFR BLD AUTO: 7 %
NEUTROPHILS # BLD AUTO: 4.9 X10E3/UL (ref 1.4–7)
NEUTROPHILS NFR BLD AUTO: 64 %
PLATELET # BLD AUTO: 267 X10E3/UL (ref 150–450)
POTASSIUM SERPL-SCNC: 4.9 MMOL/L (ref 3.5–5.2)
PROT SERPL-MCNC: 6.9 G/DL (ref 6–8.5)
RBC # BLD AUTO: 3.91 X10E6/UL (ref 3.77–5.28)
SODIUM SERPL-SCNC: 141 MMOL/L (ref 134–144)
T4 FREE SERPL-MCNC: 0.95 NG/DL (ref 0.82–1.77)
TRIGL SERPL-MCNC: 130 MG/DL (ref 0–149)
TSH SERPL DL<=0.005 MIU/L-ACNC: 1.24 UIU/ML (ref 0.45–4.5)
VLDLC SERPL CALC-MCNC: 23 MG/DL (ref 5–40)
WBC # BLD AUTO: 7.7 X10E3/UL (ref 3.4–10.8)

## 2024-10-15 ENCOUNTER — OFFICE VISIT (OUTPATIENT)
Dept: FAMILY MEDICINE CLINIC | Facility: CLINIC | Age: 64
End: 2024-10-15
Payer: COMMERCIAL

## 2024-10-15 VITALS
TEMPERATURE: 98 F | BODY MASS INDEX: 28.17 KG/M2 | SYSTOLIC BLOOD PRESSURE: 126 MMHG | OXYGEN SATURATION: 98 % | HEIGHT: 63 IN | DIASTOLIC BLOOD PRESSURE: 72 MMHG | WEIGHT: 159 LBS | HEART RATE: 59 BPM

## 2024-10-15 DIAGNOSIS — I10 HYPERTENSION, UNSPECIFIED TYPE: ICD-10-CM

## 2024-10-15 DIAGNOSIS — I34.0 MODERATE MITRAL REGURGITATION: ICD-10-CM

## 2024-10-15 DIAGNOSIS — E55.9 VITAMIN D DEFICIENCY: Primary | ICD-10-CM

## 2024-10-15 DIAGNOSIS — Z00.00 WELL ADULT EXAM: ICD-10-CM

## 2024-10-15 DIAGNOSIS — Z91.89 AT HIGH RISK FOR BREAST CANCER: ICD-10-CM

## 2024-10-15 DIAGNOSIS — E78.5 HYPERLIPIDEMIA, UNSPECIFIED HYPERLIPIDEMIA TYPE: ICD-10-CM

## 2024-10-15 DIAGNOSIS — R73.01 ABNORMAL FASTING GLUCOSE: ICD-10-CM

## 2024-10-15 DIAGNOSIS — I35.0 AORTIC STENOSIS, MODERATE: ICD-10-CM

## 2024-10-15 PROCEDURE — 99396 PREV VISIT EST AGE 40-64: CPT | Performed by: INTERNAL MEDICINE

## 2024-10-15 RX ORDER — METFORMIN HCL 500 MG
500 TABLET, EXTENDED RELEASE 24 HR ORAL
Qty: 90 TABLET | Refills: 1 | Status: SHIPPED | OUTPATIENT
Start: 2024-10-15

## 2024-10-15 NOTE — PROGRESS NOTES
Chief Complaint  Annual Exam    Elizabeth Sheldon presents to [facility]    History of Present Illness  The patient presents for a CPE    She recently underwent a cardiac ablation followed by a successful cardioversion due to AF. Over the past 6 months, she experienced intermittent atrial fibrillation accompanied by shortness of breath. Currently, she is in good health and has resumed her treadmill exercises since the cardioversion. She had one episode of atrial fibrillation post-ablation, which led to the initiation of metoprolol. A few weeks later, she underwent cardioversion. Her medication regimen includes half a tablet of metoprolol tartrate twice daily and Dr. Wade will consider duration of treatment.    She has gained approximately 6 pounds. She ran out of metformin and cholesterol medication 4 days prior to her blood work. She is currently taking vitamin D twice daily. Her cholesterol levels decrease with regular treadmill exercise. She is on pitavastatin 4 mg qd.  She also takes losartan 25 mg qd for HTN which is well controlled.      She had a mammogram 3 weeks ago, which showed no abnormalities. She received her influenza vaccine yesterday and had a Pap smear today. Her colonoscopy is not due yet. Her gynecologist suggested a breast MRI today due to fhx of breast cancer with two sisters and an aunt with breast CA    Her mitral and aortic valves were checked again in 2024 and remains under annual surveillance due to moderate aortic stenosis and moderate mitral regurgitation.      SOCIAL HISTORY  She is retired.    FAMILY HISTORY  Her sister was diagnosed with breast cancer in 2023. Her oldest sister  from breast cancer. Her aunt  from breast cancer. Her mother  at 63.     Comprehensive Metabolic Panel (10/11/2024 11:08)  Lipid Panel With LDL / HDL Ratio (10/11/2024 11:08)  CBC & Differential (10/11/2024 11:08)  TSH (10/11/2024 11:08)  T4,  "Free (10/11/2024 11:08)  Vitamin D,25-Hydroxy (10/11/2024 11:08)  Hemoglobin A1c (10/11/2024 11:08)  Objective   Vital Signs:  /72   Pulse 59   Temp 98 °F (36.7 °C) (Oral)   Ht 160 cm (62.99\")   Wt 72.1 kg (159 lb)   SpO2 98%   BMI 28.17 kg/m²   Estimated body mass index is 28.17 kg/m² as calculated from the following:    Height as of this encounter: 160 cm (62.99\").    Weight as of this encounter: 72.1 kg (159 lb).    BMI is >= 25 and <30. (Overweight) The following options were offered after discussion;: weight loss educational material (shared in after visit summary), exercise counseling/recommendations, and nutrition counseling/recommendations      Physical Exam  Vitals and nursing note reviewed.   Constitutional:       Appearance: Normal appearance. She is well-developed.   HENT:      Head: Normocephalic and atraumatic.      Right Ear: Tympanic membrane, ear canal and external ear normal.      Left Ear: Tympanic membrane, ear canal and external ear normal.      Mouth/Throat:      Mouth: Mucous membranes are moist.   Eyes:      Extraocular Movements: Extraocular movements intact.      Conjunctiva/sclera: Conjunctivae normal.   Neck:      Vascular: No carotid bruit.   Cardiovascular:      Rate and Rhythm: Normal rate and regular rhythm.      Heart sounds: Normal heart sounds.      Comments: No bruits  Pulmonary:      Effort: Pulmonary effort is normal. No respiratory distress.      Breath sounds: Normal breath sounds. No stridor. No wheezing, rhonchi or rales.   Chest:      Chest wall: No tenderness.   Abdominal:      General: Bowel sounds are normal. There is no distension.      Palpations: Abdomen is soft. There is no mass.      Tenderness: There is no abdominal tenderness. There is no guarding or rebound.      Hernia: No hernia is present.   Musculoskeletal:      Cervical back: Neck supple.      Right lower leg: No edema.      Left lower leg: No edema.   Lymphadenopathy:      Cervical: No cervical " adenopathy.   Skin:     General: Skin is warm.   Neurological:      General: No focal deficit present.      Mental Status: She is alert and oriented to person, place, and time. Mental status is at baseline.   Psychiatric:         Mood and Affect: Mood normal.         Behavior: Behavior normal.         Thought Content: Thought content normal.         Judgment: Judgment normal.        Result Review :           Results  Laboratory Studies  Blood sugar 104. A1c increased from 6 to 6.3. Vitamin D levels were excellent. Thyroid function was normal. Blood count was good, no anemia or abnormality. Cholesterol levels were normal for 2-3 years, but increased recently.                Assessment and Plan   Diagnoses and all orders for this visit:    1. Vitamin D deficiency (Primary)    2. Hyperlipidemia, unspecified hyperlipidemia type    3. Hypertension, unspecified type    4. Abnormal fasting glucose    5. Aortic stenosis, moderate    6. Moderate mitral regurgitation    7. Well adult exam    8. At high risk for breast cancer  -     Ambulatory Referral to Breast Surgery    Other orders  -     metFORMIN ER (GLUCOPHAGE-XR) 500 MG 24 hr tablet; Take 1 tablet by mouth Daily With Breakfast.  Dispense: 90 tablet; Refill: 1             Assessment & Plan  1. Atrial Fibrillation.  She underwent a successful ablation followed by cardioversion, which resolved her symptoms. She is currently on metoprolol tartrate, taking half a tablet twice a day. She will discuss with Raj at [facility] [place] about potentially discontinuing metoprolol.     2. Prediabetes  Her A1c has increased from 6 to 6.3, likely due to decreased physical activity over recent months and weight gain. She is advised to resume regular exercise, particularly using the treadmill, and to monitor her diet, focusing on reducing sugar, carbs, starches, and desserts. She will continue taking metformin. A prescription for metformin will be sent to [facility] . Weight loss would be  beneficial.     3. Hyperlipidemia.  Her cholesterol levels have been well-managed for the past 2-3 years. The recent elevation is likely due to a brief lapse in taking her cholesterol medication. She is currently on pitavastatin 4 mg. She is advised to continue her current medication regimen and resume regular exercise.    4. Health Maintenance.  Her recent mammogram from three weeks ago was normal. She had a flu shot yesterday. A Pap smear was done today. Her next colonoscopy is due in 2026. She is due for a tetanus booster. Given her family history of breast cancer, a referral to Dr. George Grande for high-risk breast cancer management will be made. He will conduct biannual breast exams and imaging, including a breast MRI, to be scheduled six months apart from her mammogram.    Continue Vit D therapy    Follow-up  Return in 6 months for follow-up.         Follow Up   Return in about 6 months (around 4/15/2025) for Recheck.  Patient was given instructions and counseling regarding her condition or for health maintenance advice. Please see specific information pulled into the AVS if appropriate.         Patient or patient representative verbalized consent for the use of Ambient Listening during the visit with  Angelita Vital MD for chart documentation. 10/15/2024  14:43 EDT    Angelita Vital MD   14:47 EDT   [unfilled]

## 2024-10-16 ENCOUNTER — OFFICE VISIT (OUTPATIENT)
Age: 64
End: 2024-10-16
Payer: COMMERCIAL

## 2024-10-16 VITALS
DIASTOLIC BLOOD PRESSURE: 80 MMHG | HEIGHT: 63 IN | BODY MASS INDEX: 28.17 KG/M2 | HEART RATE: 60 BPM | WEIGHT: 159 LBS | SYSTOLIC BLOOD PRESSURE: 126 MMHG

## 2024-10-16 DIAGNOSIS — I48.0 PAROXYSMAL A-FIB: ICD-10-CM

## 2024-10-16 DIAGNOSIS — I48.91 ATRIAL FIBRILLATION WITH RAPID VENTRICULAR RESPONSE: Primary | ICD-10-CM

## 2024-10-16 NOTE — PROGRESS NOTES
Date of Office Visit: 10/16/2024  Encounter Provider: JONATHAN Araiza  Place of Service: [facility] CARDIOLOGY  Patient Name: Re Sheldon  :1960    Chief Complaint   Patient presents with    Atrial Fibrillation w/RVR    s/p cardioversion    :     HPI: Re Sheldon is a 64 y.o. female who follows with Dr. Ordonez, referred to Dr. Suarez. She has moderate aortic stenosis and paroxsymal atrial fibrillation.      In  she was noted to have moderate AS and mod to severe MR.      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 She was on apixaban for AF.      She continued to have frequent episodes of AF. Her AS and MR were felt to be stable on recent PATRICA. No intervention recommended.      Saw Dr. Suarez in may and discussed ablation for her atrial fibrillation. She elected to proceed with ablation.      Patient had PVI PFA and ablation of mitral flutter.      She called the office a few days later with elevated HR. She back back in AFL.     She underwent CV on .              She presents today for follow up appt.     Since CV she has been doing well.     She does not feel like she has had any recurrent AFL.     Her symptoms significantly improved.     She was able to walk in the [employer] walk this weekend and did well.     eKG shows NSR.     She is not on any AAD.     Apixban for AC.     She had pvi and atpical mitral flutter ablation.     Past Medical History:   Diagnosis Date    Abnormal ECG     Inferolateral ST-T changes at baseline    Anemia     Arrhythmia     Atrial fibrillation     Calcification of aortic valve     Coronary artery disease     COVID-19 2022    Diabetes mellitus     Hyperlipidemia     Hypertension     Low back pain     Mitral regurgitation     PAF (paroxysmal atrial fibrillation)     Palpitations     Premature atrial contractions     Retinal vein occlusion     Spinal stenosis     Systolic murmur      Tonsillitis     Vitamin D deficiency        Past Surgical History:   Procedure Laterality Date    ADENOIDECTOMY      CARDIAC CATHETERIZATION N/A 2022    Procedure: Right and Left Heart Cath;  Surgeon: Josemanuel Schmitz MD;  Location:  [facility];  Service: Cardiovascular;  Laterality: N/A;    CARDIAC CATHETERIZATION N/A 2022    Procedure: Coronary angiography;  Surgeon: Josemanuel Schmitz MD;  Location:  [facility];  Service: Cardiovascular;  Laterality: N/A;    CARDIAC ELECTROPHYSIOLOGY PROCEDURE N/A 2024    Procedure: Ablation atrial fibrillation;  Surgeon: Ray Suarez MD;  Location:  [facility];  Service: Cardiovascular;  Laterality: N/A;    CARPAL TUNNEL RELEASE Bilateral     COLONOSCOPY  2014    repeat 10 years , Dr. Karen Spear    COLONOSCOPY N/A 2021    Procedure: COLONOSCOPY to cecum with cold biopsy polypectomy;  Surgeon: Ray Hairston MD;  Location:  [facility];  Service: Gastroenterology;  Laterality: N/A;  pre: screening  post: diverticulosis, polyp, internal hemorrhoids    FOOT SURGERY      HAND SURGERY      TONSILLECTOMY      TONSILLECTOMY AND ADENOIDECTOMY      TUBAL ABDOMINAL LIGATION         Social History     Socioeconomic History    Marital status:      Spouse name: Kedar    Number of children: 2   Tobacco Use    Smoking status: Never     Passive exposure: Never    Smokeless tobacco: Never   Vaping Use    Vaping status: Never Used   Substance and Sexual Activity    Alcohol use: Yes     Comment: Caffeine use 3-4 cups daily, drinks socially    Drug use: No    Sexual activity: Defer     Partners: Male       Family History   Problem Relation Age of Onset    Heart attack Other     Breast cancer Other     Heart disease Other     Heart disease Mother         Mother with MI at 63, and later  from CHF    Heart disease Father         Father  from complications after valve replacement at 73     Stroke Father     Breast cancer Sister     Malig Hyperthermia Neg Hx        Review of Systems   Constitutional: Negative for chills, fever and malaise/fatigue.   Cardiovascular:  Negative for chest pain, dyspnea on exertion, leg swelling, near-syncope, orthopnea, palpitations, paroxysmal nocturnal dyspnea and syncope.   Respiratory:  Negative for cough and shortness of breath.    Hematologic/Lymphatic: Negative.    Musculoskeletal:  Negative for joint pain, joint swelling and myalgias.   Gastrointestinal:  Negative for abdominal pain, diarrhea, melena, nausea and vomiting.   Genitourinary:  Negative for frequency and hematuria.   Neurological:  Negative for light-headedness, numbness, paresthesias and seizures.   Allergic/Immunologic: Negative.    All other systems reviewed and are negative.      Allergies   Allergen Reactions    Sulfa Antibiotics Diarrhea and Rash    Augmentin [Amoxicillin-Pot Clavulanate] Diarrhea and Other (See Comments)    Bactrim [Sulfamethoxazole-Trimethoprim] Diarrhea and Other (See Comments)         Current Outpatient Medications:     albuterol sulfate  (90 Base) MCG/ACT inhaler, Inhale 2 puffs 4 (Four) Times a Day., Disp: 8 g, Rfl: 0    apixaban (Eliquis) 5 MG tablet tablet, Take 1 tablet by mouth Every 12 (Twelve) Hours., Disp: 180 tablet, Rfl: 3    cetirizine (zyrTEC) 10 MG tablet, 1 OTC tab po QDay for allergies, Disp: , Rfl:     cholecalciferol (VITAMIN D3) 25 MCG (1000 UT) tablet, Take 1 tablet by mouth 2 (Two) Times a Day., Disp: , Rfl:     fluticasone (FLONASE) 50 MCG/ACT nasal spray, into the nostril(s) as directed by provider Daily As Needed., Disp: , Rfl:     furosemide (LASIX) 40 MG tablet, Take 1 tablet by mouth Daily., Disp: 90 tablet, Rfl: 3    losartan (COZAAR) 25 MG tablet, Take 1 tablet by mouth Daily., Disp: 90 tablet, Rfl: 3    metFORMIN ER (GLUCOPHAGE-XR) 500 MG 24 hr tablet, Take 1 tablet by mouth Daily With Breakfast., Disp: 90 tablet, Rfl: 1    metoprolol  "tartrate (LOPRESSOR) 50 MG tablet, Take 1 tablet by mouth 2 (Two) Times a Day As Needed (2 Times Daily PRN). PRN ONLY (Patient taking differently: Take 0.5 tablets by mouth 2 (Two) Times a Day. 25 MG TWICE DAILY), Disp: , Rfl:     Pitavastatin Calcium 4 MG tablet, Take 1 tablet by mouth every night at bedtime., Disp: 90 tablet, Rfl: 3      Objective:     Vitals:    10/16/24 1249   BP: 126/80   BP Location: Left arm   Patient Position: Sitting   Pulse: 60   Weight: 72.1 kg (159 lb)   Height: 160 cm (63\")     Body mass index is 28.17 kg/m².    PHYSICAL EXAM:    Vitals Reviewed.   General Appearance: No acute distress, well developed and well nourished.   Eyes: Conjunctiva and lids: No erythema, swelling, or discharge. Sclera non-icteric.   HENT: Atraumatic, normocephalic. External eyes, ears, and nose normal.   Respiratory: No signs of respiratory distress. Respiration rhythm and depth normal.   Clear to auscultation. No rales, crackles, rhonchi, or wheezing auscultated.   Cardiovascular:  Heart Rate and Rhythm: Normal, Heart Sounds: Normal S1 and S2. No S3 or S4 noted.  Gastrointestinal:  Abdomen soft, non-distended, non-tender.   Musculoskeletal: Normal movement of extremities  Skin: Warm and dry.   Psychiatric: Patient alert and oriented to person, place, and time. Speech and behavior appropriate. Normal mood and affect.       ECG 12 Lead    Date/Time: 10/16/2024 3:23 PM  Performed by: Raj Lam APRN    Authorized by: Raj Lam APRN  Comparison: compared with previous ECG   Similar to previous ECG  Rhythm: sinus rhythm            Assessment:       Diagnosis Plan   1. Atrial fibrillation with rapid ventricular response        2. Paroxysmal A-fib               Plan:   1-2. PAF, mitral isthmus flutter---s/p ablation---- she had to be CV in the blanking period but has done well since then without recurrence. Her symptoms are significantly better. EKG shows NSR, she is not on any AAD. Continue AC, continue " metoprolol.           Follow up in 6 months.             As always, it has been a pleasure to participate in your patient's care.      Sincerely,         JONATHAN Araiza

## 2024-10-23 ENCOUNTER — TELEPHONE (OUTPATIENT)
Dept: SURGERY | Facility: CLINIC | Age: 64
End: 2024-10-23
Payer: COMMERCIAL

## 2024-11-14 ENCOUNTER — TELEPHONE (OUTPATIENT)
Dept: FAMILY MEDICINE CLINIC | Facility: CLINIC | Age: 64
End: 2024-11-14

## 2024-11-14 NOTE — TELEPHONE ENCOUNTER
Caller: Re Sheldon    Relationship: Self    Best call back number: [phone]     What medication are you requesting: ALL MEDICATIONS DR. CLARK PRESCRIBES TO BE FILLED FOR 90 DAY SUPPLY     If a prescription is needed, what is your preferred pharmacy and phone number: [id number] [place], [place] - [address] AT [facility] [address] - [phone]  - [phone]     Additional notes:  IS LOSING INSURANCE AT END OF YEAR AND ASKING TO GET THE MEDICATION FOR 90 DAYS UNTIL SHE GETS EVERYTHING FIGURED OUT PLEASE CALL AND ADVISE

## 2024-11-14 NOTE — TELEPHONE ENCOUNTER
Pt only has one rx with [facility] and that was sent over last month for a 90 day supply. Insurance will not cover another prescription with the same sig.

## 2024-11-15 RX ORDER — FUROSEMIDE 40 MG/1
40 TABLET ORAL DAILY
Qty: 90 TABLET | Refills: 1 | Status: SHIPPED | OUTPATIENT
Start: 2024-11-15

## 2024-11-15 RX ORDER — PITAVASTATIN CALCIUM 4.18 MG/1
1 TABLET, FILM COATED ORAL
Qty: 90 TABLET | Refills: 1 | Status: SHIPPED | OUTPATIENT
Start: 2024-11-15

## 2024-11-15 RX ORDER — LOSARTAN POTASSIUM 25 MG/1
25 TABLET ORAL DAILY
Qty: 90 TABLET | Refills: 1 | Status: SHIPPED | OUTPATIENT
Start: 2024-11-15

## 2024-11-26 ENCOUNTER — PATIENT MESSAGE (OUTPATIENT)
Dept: FAMILY MEDICINE CLINIC | Facility: CLINIC | Age: 64
End: 2024-11-26
Payer: COMMERCIAL

## 2024-11-27 RX ORDER — METFORMIN HYDROCHLORIDE 500 MG/1
500 TABLET, EXTENDED RELEASE ORAL
Qty: 90 TABLET | Refills: 1 | Status: SHIPPED | OUTPATIENT
Start: 2024-11-27

## 2024-11-27 NOTE — TELEPHONE ENCOUNTER
Rx Refill Note  Requested Prescriptions     Pending Prescriptions Disp Refills    metFORMIN ER (GLUCOPHAGE-XR) 500 MG 24 hr tablet 90 tablet 1     Sig: Take 1 tablet by mouth Daily With Breakfast.      Last office visit with prescribing clinician: 10/15/2024   Last telemedicine visit with prescribing clinician: Visit date not found   Next office visit with prescribing clinician: 4/16/2025                         Would you like a call back once the refill request has been completed: [] Yes [] No    If the office needs to give you a call back, can they leave a voicemail: [] Yes [] No    Lavonne Carroll LPN  11/27/24, 06:40 EST

## 2024-12-16 NOTE — PROGRESS NOTES
[facility]     Referring Provider: Angelita Vital MD     Chief complaint: management of breast cancer risk     HPI: Ms. Re Sheldon is a 65 yo woman, seen at the request of Angelita Vital MD, for management of breast cancer risk    I personally reviewed her records and summarized her relevant breast history/imagin2021 bilateral screening mammogram  Scattered fibroglandular density  Finding 1 there are a few intramammary lymph node seen in the upper outer region of the right breast there are no significant needs for the prior exam.  There are no suspicious masses calcifications or areas of architectural distortion.  Finding 2 there is an oval mass with associated biopsy clip seen in the left breast at 12:00 there are no significant changes from the prior exam this finding represents biopsy-proven benign breast disease.  Impression  Finding 1 intramammary lymph nodes in the right breast are benign negative.  Finding 2 mass in left breast is benign negative.  BI-RADS 2    2022 bilateral screening mammogram  Scattered fibroglandular density  B1    2023 bilateral screening mammogram  Scattered areas of Fibroglandular density.  B1    2024 bilateral screening mammogram  Scattered areas of fibroglandular density.  B1      She has a personal history of  2013 normal left breast biopsy     She has a family history of breast cancer in her sister dx age 45  age 50 from breast cancer, sister dx age 65, maternal aunt dx age 75  age 75 from breast cancer.  She denies any family history of ovarian cancer.     Today she presents with parents regarding her family history of breast cancer and if she is high risk herself.  She denies any breast lumps, pain, skin changes, or nipple discharge.  She denies any prior history of abnormal mammograms or breast biopsies.       Review of Systems - Oncology    Medications:    Current Outpatient Medications:     albuterol sulfate  (90 Base)  MCG/ACT inhaler, Inhale 2 puffs 4 (Four) Times a Day., Disp: 8 g, Rfl: 0    apixaban (Eliquis) 5 MG tablet tablet, Take 1 tablet by mouth Every 12 (Twelve) Hours., Disp: 180 tablet, Rfl: 1    cetirizine (zyrTEC) 10 MG tablet, 1 OTC tab po QDay for allergies, Disp: , Rfl:     cholecalciferol (VITAMIN D3) 25 MCG (1000 UT) tablet, Take 1 tablet by mouth 2 (Two) Times a Day., Disp: , Rfl:     fluticasone (FLONASE) 50 MCG/ACT nasal spray, into the nostril(s) as directed by provider Daily As Needed., Disp: , Rfl:     furosemide (LASIX) 40 MG tablet, Take 1 tablet by mouth Daily., Disp: 90 tablet, Rfl: 1    losartan (COZAAR) 25 MG tablet, Take 1 tablet by mouth Daily., Disp: 90 tablet, Rfl: 1    metFORMIN ER (GLUCOPHAGE-XR) 500 MG 24 hr tablet, Take 1 tablet by mouth Daily With Breakfast., Disp: 90 tablet, Rfl: 1    metoprolol tartrate (LOPRESSOR) 50 MG tablet, Take 1 tablet by mouth 2 (Two) Times a Day As Needed (2 Times Daily PRN). PRN ONLY (Patient taking differently: Take 0.5 tablets by mouth 2 (Two) Times a Day. 25 MG TWICE DAILY), Disp: , Rfl:     Pitavastatin Calcium 4 MG tablet, Take 1 tablet by mouth every night at bedtime., Disp: 90 tablet, Rfl: 1    Allergies:  Allergies   Allergen Reactions    Sulfa Antibiotics Diarrhea and Rash    Augmentin [Amoxicillin-Pot Clavulanate] Diarrhea and Other (See Comments)    Bactrim [Sulfamethoxazole-Trimethoprim] Diarrhea and Other (See Comments)       Medical history:  Past Medical History:   Diagnosis Date    Abnormal ECG     Inferolateral ST-T changes at baseline    Anemia     Arrhythmia     Atrial fibrillation     Calcification of aortic valve 2020    Coronary artery disease     COVID-19 07/05/2022    Diabetes mellitus     Hyperlipidemia     Hypertension     Low back pain     Mitral regurgitation     PAF (paroxysmal atrial fibrillation)     Palpitations     Premature atrial contractions     Retinal vein occlusion     Spinal stenosis     Systolic murmur     Tonsillitis      Vitamin D deficiency        Surgical History:  Past Surgical History:   Procedure Laterality Date    ADENOIDECTOMY      CARDIAC CATHETERIZATION N/A 2022    Procedure: Right and Left Heart Cath;  Surgeon: Josemanuel Schmitz MD;  [facility];  Service: Cardiovascular;  Laterality: N/A;    CARDIAC CATHETERIZATION N/A 2022    Procedure: Coronary angiography;  Surgeon: Josemanuel Schmitz MD;  [facility];  Service: Cardiovascular;  Laterality: N/A;    CARDIAC ELECTROPHYSIOLOGY PROCEDURE N/A 2024    Procedure: Ablation atrial fibrillation;  Surgeon: Ray Suarez MD;  [facility];  Service: Cardiovascular;  Laterality: N/A;    CARPAL TUNNEL RELEASE Bilateral     COLONOSCOPY  2014    repeat 10 years , Dr. Karen Spear    COLONOSCOPY N/A 2021    Procedure: COLONOSCOPY to cecum with cold biopsy polypectomy;  Surgeon: Ray Hairston MD;  Location:  [facility];  Service: Gastroenterology;  Laterality: N/A;  pre: screening  post: diverticulosis, polyp, internal hemorrhoids    FOOT SURGERY      HAND SURGERY      TONSILLECTOMY      TONSILLECTOMY AND ADENOIDECTOMY      TUBAL ABDOMINAL LIGATION         Family History:  Family History   Problem Relation Age of Onset    Heart attack Other     Breast cancer Other     Heart disease Other     Heart disease Mother         Mother with MI at 63, and later  from CHF    Heart disease Father         Father  from complications after valve replacement at 73    Stroke Father     Breast cancer Sister     Malig Hyperthermia Neg Hx        Social History:   Social History     Socioeconomic History    Marital status:      Spouse name: Kedar    Number of children: 2   Tobacco Use    Smoking status: Never     Passive exposure: Never    Smokeless tobacco: Never   Vaping Use    Vaping status: Never Used   Substance and Sexual Activity    Alcohol use: Yes      Comment: Caffeine use 3-4 cups daily, drinks socially    Drug use: No    Sexual activity: Defer     Partners: Male     Patient drinks 2-3 servings of caffeine per day.       GYNECOLOGIC HISTORY:   . P: 2. AB: 0.  Last menstrual period:  pt had an ablation  Age at menarche: 11  Age at first childbirth: 26  Lactation/How long: N/A  Age at menopause: 56  Total years of oral contraceptive use: 40 years  Total years of hormone replacement therapy: N/A      Physical Exam  There were no vitals filed for this visit.  ECOG 0 - Asymptomatic  General: NAD, well appearing  Psych: a&o x 3, normal mood and affect  Eyes: EOMI, no scleral icterus  ENMT: neck supple without masses or thyromegaly, mucus membranes moist  Resp: normal effort, CTAB  CV: RRR, no murmurs, no edema   GI: soft, NT, ND  MSK: normal gait, normal ROM in bilateral shoulders  Lymph nodes: no cervical, supraclavicular or axillary lymphadenopathy  Breast: symmetric, large  Right: No visible abnormalities on inspection while seated, with arms raised or hands on hips. No masses, skin changes, or nipple abnormalities.  Left: No visible abnormalities on inspection while seated, with arms raised or hands on hips. No masses, skin changes, or nipple abnormalities.      Assessment:    Family history of breast cancer    Discussion:  According to [facility] she has a lifetime risk of 17.3% and a 10-year risk of 3.4% and her May risk is 7%  With a lifetime risk of 17.3% she does not meet the criteria to have MRIs covered.  However with a May risk of 7% she does meet the criteria to go to medical oncologist to see if the medication would benefit her and decrease her chances of getting breast cancer.  At this time she would not like to go to a medical oncologist  2. In reviewing the patient's personal and family history of cancer, including the number, types and age of diagnosis, we found that the patient is eligible for genetic testing based on the National [facility]  cancer network (NCCN) guidelines.  The patient is aware that testing may reveal a pathogenic variant in any 1 of several genes, and that a pathogenic variant can markedly increase the risk of variety of cancers.  The types of cancers in the level of risk is dependent on which gene is affected.  The patient is also aware that we may find no pathogenic variants or that we could find what is called a variant of uncertain significance(VUS), which at this point in time has no direct impact on medical care.  In either these cases, risk would be determined by the family history alone, which would determine further management.  The patient knows that if we find a pathogenic variant, that her family members are also at risk and they should be informed of this information.    The purpose of the test is to identify whether you carry a gene variant (mutation) that might increase your risk of developing cancer or other diseases.    The reliability of positive or negative test results and the level of certainty that a positive test result for that disease or condition serves as a predictor of such disease: This test is not perfect.  If you carry a gene variant (mutation) and a cancer causing gene and increases your risk of cancer but does not mean you develop cancer.  Also, if you do not carry a gene variant (mutation), it does not mean you will not get cancer.  This test will help us better manage you in order to help find cancer earlier or prevent it.  If you develop or have cancer, it may help us better treat that disease.    If you do carry a pathogenic gene variant (mutation), you will be given a management strategy to help minimize your risk and we will discuss what it means for you.  You are being tested for multiple genes, and each has its own set of cancers that it predisposes to in each cancer gene combination shows a different level of risk.  If you carry a gene variant(mutation) we have discussed the specific risk of  cancer for that gene and arrange management that minimizes your future risk.    You may also be found to have a change in imaging that we do not understand (variant of uncertain significance (VUS)).  We will let you know and then manage this is a negative result.  Most of these turn out to be benign as we learn more.     For those patients who do not have cancer, a positive test can compromise the ability to obtain health and disability insurance, so your coverage should be satisfactory to you prior to testing.    Cost: Your insurance will likely cover the cost of genetic testing.  If you are insured denies payment, the testing lab will contact you to see if you are willing to pay out-of-pocket.  If so, the maximum cost is around $200-$250.  If you are not willing to pay in your insurance or denies payment, the test will be canceled.    The patient agreed to proceed with testing and a 74 gene [employer] panel was ordered.      Plan:  Genetic testing today, call with results-if test results are normal she can go back to her PCP or OB/GYN for yearly mammograms  Monthly self breast exams  She does not want a referral to med onc today, will call if changes her mind.  She loses her insurance in Jan.         Augustin Akins, JONATHAN    I have spent 45 mins in face to face time with the patient and in chart review.    CC:  MD Zahraa Adamson Tonya M, MD    EMR Dragon/transcription disclaimer:  Dictated using Dragon dictation

## 2024-12-17 ENCOUNTER — OFFICE VISIT (OUTPATIENT)
Dept: SURGERY | Facility: CLINIC | Age: 64
End: 2024-12-17
Payer: COMMERCIAL

## 2024-12-17 ENCOUNTER — OFFICE VISIT (OUTPATIENT)
Age: 64
End: 2024-12-17
Payer: COMMERCIAL

## 2024-12-17 VITALS
DIASTOLIC BLOOD PRESSURE: 82 MMHG | BODY MASS INDEX: 28 KG/M2 | WEIGHT: 158 LBS | HEIGHT: 63 IN | SYSTOLIC BLOOD PRESSURE: 122 MMHG | HEART RATE: 88 BPM | OXYGEN SATURATION: 98 %

## 2024-12-17 VITALS
HEIGHT: 63 IN | WEIGHT: 158 LBS | SYSTOLIC BLOOD PRESSURE: 124 MMHG | BODY MASS INDEX: 28 KG/M2 | HEART RATE: 63 BPM | DIASTOLIC BLOOD PRESSURE: 70 MMHG

## 2024-12-17 DIAGNOSIS — Z80.3 FAMILY HISTORY OF BREAST CANCER: Primary | ICD-10-CM

## 2024-12-17 DIAGNOSIS — I35.0 AORTIC VALVE STENOSIS, ETIOLOGY OF CARDIAC VALVE DISEASE UNSPECIFIED: Primary | ICD-10-CM

## 2024-12-17 PROCEDURE — 99213 OFFICE O/P EST LOW 20 MIN: CPT | Performed by: INTERNAL MEDICINE

## 2024-12-17 NOTE — PROGRESS NOTES
Subjective:     Encounter Date: 12/17/24        Patient ID: Re Sheldon is a 64 y.o. female.    Chief Complaint: Moderate to severe MR, moderate aortic stenosis, atrial fibrillation, hypertension, central retinal artery occlusion  HPI:   This is a pleasant 64-year-old woman who was diagnosed with atrial fibrillation in April 2020.  In August 2020 she had a transesophageal echo which showed moderate aortic stenosis with moderate to severe MR.  She also had severe aortic plaque at that time and was placed on Livalo.  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 At that time she was anticoagulated with Eliquis for AF.  Her echo at that time was unchanged with a preserved systolic function, moderate aortic stenosis and moderate to severe MR and a PFO.  In August 22 she was admitted again with CHF.  Right and left heart catheterization showed mild nonobstructive disease of the LAD and RCA, severely elevated wedge pressure of 28 and mean PA pressure of 36.  She was diuresed.  2024- she was seen by EP given ongoing AF symptoms. A PATRICA was repeated 6/2024 to verify that MR had not worsened; it remains in the moderate range, with moderate AS, mild PH and preserved systolic EF. She ultimately underwent PVI and flutter ablation. She had a recurrence shortly thereafter of flutter and had a CV which was successful. Since then she has had intermittent tachycardia which resolves with prn metoprolol. She has no other cardiac complaints. No angina or dyspnea.       The following portions of the patient's history were reviewed and updated as appropriate: allergies, current medications, past family history, past medical history, past social history, past surgical history and problem list.     REVIEW OF SYSTEMS:   All systems reviewed.  Pertinent positives identified in HPI.  All other systems are negative.    Past Medical History:   Diagnosis Date     Abnormal ECG     Inferolateral ST-T changes at baseline    Anemia     Arrhythmia     Atrial fibrillation     Calcification of aortic valve     Coronary artery disease     COVID-19 2022    Diabetes mellitus     Hyperlipidemia     Hypertension     Low back pain     Mitral regurgitation     PAF (paroxysmal atrial fibrillation)     Palpitations     Premature atrial contractions     Retinal vein occlusion     Spinal stenosis     Systolic murmur     Tonsillitis     Vitamin D deficiency        Family History   Problem Relation Age of Onset    Heart attack Other     Breast cancer Other     Heart disease Other     Heart disease Mother         Mother with MI at 63, and later  from CHF    Heart disease Father         Father  from complications after valve replacement at 73    Stroke Father     Breast cancer Sister     Malig Hyperthermia Neg Hx        Social History     Socioeconomic History    Marital status:      Spouse name: Kedar    Number of children: 2   Tobacco Use    Smoking status: Never     Passive exposure: Never    Smokeless tobacco: Never   Vaping Use    Vaping status: Never Used   Substance and Sexual Activity    Alcohol use: Yes     Comment: Caffeine use 3-4 cups daily, drinks socially    Drug use: No    Sexual activity: Defer     Partners: Male       Allergies   Allergen Reactions    Sulfa Antibiotics Diarrhea and Rash    Augmentin [Amoxicillin-Pot Clavulanate] Diarrhea and Other (See Comments)    Bactrim [Sulfamethoxazole-Trimethoprim] Diarrhea and Other (See Comments)       Past Surgical History:   Procedure Laterality Date    ADENOIDECTOMY      BREAST BIOPSY      CARDIAC CATHETERIZATION N/A 2022    Procedure: Right and Left Heart Cath;  Surgeon: Josemanuel Schmitz MD;  [facility];  Service: Cardiovascular;  Laterality: N/A;    CARDIAC CATHETERIZATION N/A 2022    Procedure: Coronary angiography;  Surgeon: Josemanuel Schmitz MD;  [facility];  Service: Cardiovascular;  Laterality: N/A;    CARDIAC ELECTROPHYSIOLOGY PROCEDURE N/A 08/30/2024    Procedure: Ablation atrial fibrillation;  Surgeon: Ray Suarez MD;  [facility];  Service: Cardiovascular;  Laterality: N/A;    CARPAL TUNNEL RELEASE Bilateral     COLONOSCOPY  08/04/2014    repeat 10 years , Dr. Karen Spear    COLONOSCOPY N/A 01/22/2021    Procedure: COLONOSCOPY to cecum with cold biopsy polypectomy;  Surgeon: Ray Hairston MD;  Location:  [facility];  Service: Gastroenterology;  Laterality: N/A;  pre: screening  post: diverticulosis, polyp, internal hemorrhoids    FOOT SURGERY      HAND SURGERY      TONSILLECTOMY      TONSILLECTOMY AND ADENOIDECTOMY      TUBAL ABDOMINAL LIGATION         Procedures       Objective:         PHYSICAL EXAM:  GEN: VSS, no distress,   Eyes: normal sclera, normal lids and lashes  HENT: moist mucus membranes,   Respiratory: CTAB, no rales or wheezes  CV: RRR, 3 out of 6 systolic murmur rating to the carotids, 3 out of 6 apical blowing harsh murmur at the apex, +2 DP and 2+ carotid pulses b/l  GI: NABS, soft,  Nontender, nondistended  MSK: no edema, no scoliosis or kyphosis  Skin: no rash, warm, dry  Heme/Lymph: no bruising or bleeding  Psych: organized thought, normal behavior and affect  Neuro: Cranial nerves grossly intact, Alert and Oriented x 3.         Assessment:          Diagnosis Plan   1. Aortic valve stenosis, etiology of cardiac valve disease unspecified           Plan:       1.  Moderate MR by PATRICA 6/2024: Asymptomatic at this time.   2.  Moderate aortic stenosis  3.  PFO: Unclear if this was related to her CRAO.  4.  Heart failure with preserved EF: Euvolemic  5.  Hypertension: Well-controlled  6.  Atrial fibrillation: s/p PVI and flutter ablation. Continue eliquis, BID metoprolol plus prn for palps.   7.  Central retinal artery occlusion: On apixaban, Plavix  8.  Moderate carotid  disease    Dr. Vital thank you very much for referring this kind patient to me. Please call me with any questions or concerns. I will see the patient again in the office in 6 months.         Jennifer Ordonez MD  12/17/24  [facility]    Outpatient Encounter Medications as of 12/17/2024   Medication Sig Dispense Refill    albuterol sulfate  (90 Base) MCG/ACT inhaler Inhale 2 puffs 4 (Four) Times a Day. 8 g 0    apixaban (Eliquis) 5 MG tablet tablet Take 1 tablet by mouth Every 12 (Twelve) Hours. 180 tablet 1    cetirizine (zyrTEC) 10 MG tablet 1 OTC tab po QDay for allergies      cholecalciferol (VITAMIN D3) 25 MCG (1000 UT) tablet Take 1 tablet by mouth 2 (Two) Times a Day.      fluticasone (FLONASE) 50 MCG/ACT nasal spray into the nostril(s) as directed by provider Daily As Needed.      furosemide (LASIX) 40 MG tablet Take 1 tablet by mouth Daily. 90 tablet 1    losartan (COZAAR) 25 MG tablet Take 1 tablet by mouth Daily. 90 tablet 1    metFORMIN ER (GLUCOPHAGE-XR) 500 MG 24 hr tablet Take 1 tablet by mouth Daily With Breakfast. 90 tablet 1    metoprolol tartrate (LOPRESSOR) 50 MG tablet Take 1 tablet by mouth 2 (Two) Times a Day As Needed (2 Times Daily PRN). PRN ONLY (Patient taking differently: Take 0.5 tablets by mouth 2 (Two) Times a Day. 25 MG TWICE DAILY)      Pitavastatin Calcium 4 MG tablet Take 1 tablet by mouth every night at bedtime. 90 tablet 1     No facility-administered encounter medications on file as of 12/17/2024.

## 2024-12-23 ENCOUNTER — TELEPHONE (OUTPATIENT)
Dept: SURGERY | Facility: CLINIC | Age: 64
End: 2024-12-23
Payer: COMMERCIAL

## 2024-12-23 NOTE — TELEPHONE ENCOUNTER
Spoke to pt and let her know tht her genetic testing came back normal and she can follow up with her PCP   Pt stated understanding

## 2024-12-27 ENCOUNTER — TELEPHONE (OUTPATIENT)
Dept: SURGERY | Facility: CLINIC | Age: 64
End: 2024-12-27

## 2024-12-27 NOTE — TELEPHONE ENCOUNTER
Hub staff attempted to follow warm transfer process and was unsuccessful     Caller: Re Sheldon    Relationship to patient: Self    Best call back number: [phone]    Patient is needing: PATIENT WANTED TO BE SURE SHE WAS TESTED FOR BRCA GENE IN HER GENETIC TESTING.

## 2025-01-24 ENCOUNTER — TELEPHONE (OUTPATIENT)
Age: 65
End: 2025-01-24
Payer: COMMERCIAL

## 2025-01-24 NOTE — TELEPHONE ENCOUNTER
Pt called, lvm asking about her HR. It's been high. Running from 114,118,110,102 during th day, but was running 80 at 2:30am on Wednesday.  She wanted to report the randomness, no physical ailments.    [phone]    Klaudia

## 2025-01-27 NOTE — TELEPHONE ENCOUNTER
Is she symptomatic? Does it feel irregular? Or like her prior AF/flutter? If so, id like her to come in either for an EKG or a holter. When did it start?

## 2025-01-28 DIAGNOSIS — I48.92 ATRIAL FLUTTER WITH CONTROLLED RESPONSE: Primary | ICD-10-CM

## 2025-01-28 RX ORDER — METOPROLOL TARTRATE 50 MG
50 TABLET ORAL 2 TIMES DAILY
Start: 2025-01-28

## 2025-01-28 NOTE — TELEPHONE ENCOUNTER
Thanks for the update. Please stay on the higher metoprolol dose. I ordered a 48 hours holter if she can come in soon and get it placed we'll have a better idea of what's going on

## 2025-01-28 NOTE — TELEPHONE ENCOUNTER
Pt called back in to triage.  Pt states that the increased HR started last Wednesday.  She states that she was mostly running in 100-120 range (which she states Raj has told her in the past was her A Flutter).  She states that she also had about 5 episodes of A Fib, with - as high as 191.  Pt states that until yesterday, she had been taking her usual dose of metoprolol 25mg twice daily (different from what is on med list, she states that Raj decreased this), and she was taking an additional 25mg 1-2 times per day to try to control her increased HR.  She states that yesterday, she increased her metoprolol to 50mg twice daily, and finally this AM, her HR is down to 75 and she feels like she is back in her normal rhythm.  Pt states that she has remained asymptomatic throughout, and BP running 120-130's/70.    Recommendations?    Thanks so much,  Radha Herrera, RN  Triage RN  01/28/25 09:50 EST

## 2025-01-28 NOTE — TELEPHONE ENCOUNTER
Reviewed recommendations with patient, verbalized understanding, will call with any further questions or complaints.  Pt states that she will continue taking metoprolol 50mg twice daily, and knows to expect call to schedule holter monitor.  Med list updated.    Jake- please contact pt ASAP to schedule holter per Dr. Ordonez's request.  Thanks so much!    Radha Herrera RN  Triage Nurse  01/28/25 10:30 EST

## 2025-02-05 ENCOUNTER — TELEPHONE (OUTPATIENT)
Dept: CARDIOLOGY | Facility: CLINIC | Age: 65
End: 2025-02-05
Payer: COMMERCIAL

## 2025-02-05 NOTE — TELEPHONE ENCOUNTER
Caller: Kedar Sheldon    Relationship to patient: Emergency Contact    Best call back number: [phone]    Patient is needing: PT'S SPOUSE CALLING TO RESCHEDULE PT'S HOLTER MONITOR. HUB TRANSFERRED. CALL BACK IF UNABLE TO GET RESCHEDULED.

## 2025-02-05 NOTE — TELEPHONE ENCOUNTER
Pt called in with concern over worsening symptoms.  Her metop was increased about a week ago.  Over the past couple of days, she's been experiencing fatigue and some ROWLAND.  She notes her HR will sometimes drop down to 50 or 49 when resting.  Her HR is normally 69-80.  BP has been ~135/65-70.  She denies any palpitations, dizziness, or lightheadedness.    She wasn't sure if she needed to see someone before she has her Holter appt on 2/11, or if there were any recommendations for her?    Thank you,    Ingrid RODRIGUEZ RN  [place] [facility]  02/05/25 16:29 EST

## 2025-02-12 ENCOUNTER — TRANSCRIBE ORDERS (OUTPATIENT)
Dept: ADMINISTRATIVE | Facility: HOSPITAL | Age: 65
End: 2025-02-12
Payer: COMMERCIAL

## 2025-02-12 DIAGNOSIS — Z80.3 FAMILY HISTORY OF BREAST CANCER: Primary | ICD-10-CM

## 2025-03-17 RX ORDER — METOPROLOL TARTRATE 50 MG
50 TABLET ORAL 2 TIMES DAILY
Qty: 60 TABLET | Refills: 5 | Status: SHIPPED | OUTPATIENT
Start: 2025-03-17

## 2025-04-17 ENCOUNTER — TELEPHONE (OUTPATIENT)
Dept: CARDIOLOGY | Age: 65
End: 2025-04-17
Payer: COMMERCIAL

## 2025-04-17 RX ORDER — PITAVASTATIN CALCIUM 4.18 MG/1
1 TABLET, FILM COATED ORAL
Qty: 90 TABLET | Refills: 1 | Status: SHIPPED | OUTPATIENT
Start: 2025-04-17

## 2025-04-24 RX ORDER — PITAVASTATIN CALCIUM 4.18 MG/1
1 TABLET, FILM COATED ORAL
Qty: 90 TABLET | Refills: 1 | Status: CANCELLED | OUTPATIENT
Start: 2025-04-24

## 2025-04-24 NOTE — TELEPHONE ENCOUNTER
Caller: ALFRED    Relationship: SPOUSE    Best call back number: [phone]       Requested Prescriptions:   Requested Prescriptions     Pending Prescriptions Disp Refills    Pitavastatin Calcium 4 MG tablet 90 tablet 1     Sig: Take 1 tablet by mouth every night at bedtime.        Pharmacy where request should be sent: [facility]     Last office visit with prescribing clinician: 3/26/2024   Last telemedicine visit with prescribing clinician: Visit date not found   Next office visit with prescribing clinician: 6/24/2025     Additional details provided by patient: MEDICATION WAS FINALLY APPROVED BY INSURANCE: [id number]    ASKING TO BE SENT TO THE [facility] IN [place]    Does the patient have less than a 3 day supply:  [] Yes  [] No    Would you like a call back once the refill request has been completed: [] Yes [] No    If the office needs to give you a call back, can they leave a voicemail: [] Yes [] No    Anurag Bedoya Rep   04/24/25 12:24 EDT         DELETE AFTER READING TO PATIENT: “Thank you for sharing this information with me. I will send a message to the clinical team. Please allow 48 hours for the clinical staff to follow up on this request.”

## 2025-04-29 ENCOUNTER — OFFICE VISIT (OUTPATIENT)
Age: 65
End: 2025-04-29
Payer: COMMERCIAL

## 2025-04-29 VITALS
HEIGHT: 63 IN | WEIGHT: 164 LBS | HEART RATE: 62 BPM | DIASTOLIC BLOOD PRESSURE: 80 MMHG | SYSTOLIC BLOOD PRESSURE: 118 MMHG | BODY MASS INDEX: 29.06 KG/M2

## 2025-04-29 DIAGNOSIS — I48.19 ATRIAL FIBRILLATION, PERSISTENT: ICD-10-CM

## 2025-04-29 DIAGNOSIS — I35.0 AORTIC STENOSIS, SEVERE: Primary | ICD-10-CM

## 2025-04-29 PROCEDURE — 93000 ELECTROCARDIOGRAM COMPLETE: CPT | Performed by: INTERNAL MEDICINE

## 2025-04-29 PROCEDURE — 99214 OFFICE O/P EST MOD 30 MIN: CPT | Performed by: INTERNAL MEDICINE

## 2025-04-29 NOTE — PROGRESS NOTES
Date of Office Visit: 2025  Encounter Provider: Ray Suarez MD  Place of Service: [facility] CARDIOLOGY  Patient Name: Re Sheldon  :1960    Chief Complaint   Patient presents with    Atrial Fibrillation w/RVR    paroxysmal AFIB   :     HPI: Re Sheldon is a 65 y.o. female who presents today for follow-up of atrial fibrillation.     She had persistent atrial fibrillation with ablation last August.  During ablation she developed a mitral flutter, which we were able to terminate, but difficult to get directional block.      She ablation, she did have some atrial fibrillation right after the ablation, and was cardioverted.     She has feels that she has a few episodes of paroxysmal atrial fibrillation since then.     Her main compliant today is of dyspnea on exertion which is a pretty consistent symptom.   She had it today, while walking across the parking lot despite apparently being in sinus rhythm.     She has a history of moderate aortic stenosis.           Past Medical History:   Diagnosis Date    Abnormal ECG     Inferolateral ST-T changes at baseline    Anemia     Arrhythmia     Atrial fibrillation     Calcification of aortic valve     Coronary artery disease     COVID-19 2022    Diabetes mellitus     Hyperlipidemia     Hypertension     Low back pain     Mitral regurgitation     PAF (paroxysmal atrial fibrillation)     Palpitations     Premature atrial contractions     Retinal vein occlusion     Spinal stenosis     Systolic murmur     Tonsillitis     Vitamin D deficiency        Past Surgical History:   Procedure Laterality Date    ADENOIDECTOMY      BREAST BIOPSY      CARDIAC CATHETERIZATION N/A 2022    Procedure: Right and Left Heart Cath;  Surgeon: Josemanuel Schmitz MD;  Location: [facility] CATH INVASIVE LOCATION;  Service: Cardiovascular;  Laterality: N/A;    CARDIAC CATHETERIZATION N/A 2022    Procedure: Coronary angiography;  Surgeon:  Josemanuel Schmitz MD;  Location:  [facility] CATH INVASIVE LOCATION;  Service: Cardiovascular;  Laterality: N/A;    CARDIAC ELECTROPHYSIOLOGY PROCEDURE N/A 2024    Procedure: Ablation atrial fibrillation;  Surgeon: Ray Suarez MD;  Location:  [facility] CATH INVASIVE LOCATION;  Service: Cardiovascular;  Laterality: N/A;    CARPAL TUNNEL RELEASE Bilateral     COLONOSCOPY  2014    repeat 10 years , Dr. Karen Spear    COLONOSCOPY N/A 2021    Procedure: COLONOSCOPY to cecum with cold biopsy polypectomy;  Surgeon: Ray Hairston MD;  [facility];  Service: Gastroenterology;  Laterality: N/A;  pre: screening  post: diverticulosis, polyp, internal hemorrhoids    FOOT SURGERY      HAND SURGERY      TONSILLECTOMY      TONSILLECTOMY AND ADENOIDECTOMY      TUBAL ABDOMINAL LIGATION         Social History     Socioeconomic History    Marital status:      Spouse name: Kedar    Number of children: 2   Tobacco Use    Smoking status: Never     Passive exposure: Never    Smokeless tobacco: Never   Vaping Use    Vaping status: Never Used   Substance and Sexual Activity    Alcohol use: Yes     Comment: Caffeine use 3-4 cups daily, drinks socially    Drug use: No    Sexual activity: Defer     Partners: Male       Family History   Problem Relation Age of Onset    Heart attack Other     Breast cancer Other     Heart disease Other     Heart disease Mother         Mother with MI at 63, and later  from CHF    Heart disease Father         Father  from complications after valve replacement at 73    Stroke Father     Breast cancer Sister     Malig Hyperthermia Neg Hx        Review of Systems   Constitutional: Negative.   Cardiovascular: Negative.    Respiratory: Negative.     Gastrointestinal: Negative.        Allergies   Allergen Reactions    Sulfa Antibiotics Diarrhea and Rash    Augmentin [Amoxicillin-Pot Clavulanate] Diarrhea and Other (See Comments)    Bactrim  "[Sulfamethoxazole-Trimethoprim] Diarrhea and Other (See Comments)         Current Outpatient Medications:     albuterol sulfate  (90 Base) MCG/ACT inhaler, Inhale 2 puffs 4 (Four) Times a Day., Disp: 8 g, Rfl: 0    apixaban (Eliquis) 5 MG tablet tablet, Take 1 tablet by mouth Every 12 (Twelve) Hours., Disp: 180 tablet, Rfl: 1    cetirizine (zyrTEC) 10 MG tablet, 1 OTC tab po QDay for allergies, Disp: , Rfl:     cholecalciferol (VITAMIN D3) 25 MCG (1000 UT) tablet, Take 1 tablet by mouth 2 (Two) Times a Day., Disp: , Rfl:     fluticasone (FLONASE) 50 MCG/ACT nasal spray, into the nostril(s) as directed by provider Daily As Needed., Disp: , Rfl:     furosemide (LASIX) 40 MG tablet, Take 1 tablet by mouth Daily., Disp: 90 tablet, Rfl: 1    losartan (COZAAR) 25 MG tablet, Take 1 tablet by mouth Daily., Disp: 90 tablet, Rfl: 1    metFORMIN ER (GLUCOPHAGE-XR) 500 MG 24 hr tablet, Take 1 tablet by mouth Daily With Breakfast., Disp: 90 tablet, Rfl: 1    metoprolol tartrate (LOPRESSOR) 50 MG tablet, Take 1 tablet by mouth 2 (Two) Times a Day., Disp: 60 tablet, Rfl: 5    Pitavastatin Calcium 4 MG tablet, TAKE 1 TABLET BY MOUTH EVERY NIGHT AT BEDTIME, Disp: 90 tablet, Rfl: 1      Objective:     Vitals:    04/29/25 1132   BP: 118/80   BP Location: Right arm   Patient Position: Sitting   Pulse: 62   Weight: 74.4 kg (164 lb)   Height: 160 cm (62.99\")     Body mass index is 29.06 kg/m².    PHYSICAL EXAM:    Vitals and nursing note reviewed.   Constitutional:       General: Not in acute distress.     Appearance: Normal and healthy appearance.   Pulmonary:      Effort: Pulmonary effort is normal. No respiratory distress.   Cardiovascular:      Normal rate. Regular rhythm.      Murmurs: There is a harsh midsystolic murmur at the URSB, radiating to the neck.   Edema:     Peripheral edema absent.   Skin:     General: Skin is warm and dry.   Neurological:      Mental Status: Alert and oriented to person, place, and time. "   Psychiatric:         Behavior: Behavior normal. Behavior is cooperative.         Thought Content: Thought content normal.         Judgment: Judgment normal.             ECG 12 Lead    Date/Time: 4/29/2025 12:25 PM  Performed by: Ray Suarez MD    Authorized by: Ray Suarez MD  Comparison: compared with previous ECG from 10/16/2024  Similar to previous ECG  Rhythm: sinus rhythm            Assessment:       Diagnosis Plan   1. Aortic stenosis, severe  Adult Transthoracic Echo Complete w/ Color, Spectral and Contrast if Necessary Per Protocol      2. Atrial fibrillation, persistent               Plan:       I asked her to perform an echo to evaluate her aortic stenosis.      If no significant change, I would consider a monitor to evaluate for atrial fibrillation.     She is having an issue with insurance coverage for her statin.  I going to refer to her general cardiology team.  She is currently on pivastatin.     As always, it has been a pleasure to participate in your patient's care.      Sincerely,         Ray Suarez MD

## 2025-04-30 ENCOUNTER — TELEPHONE (OUTPATIENT)
Dept: CARDIOLOGY | Age: 65
End: 2025-04-30
Payer: COMMERCIAL

## 2025-04-30 NOTE — TELEPHONE ENCOUNTER
Dr. Ordonez/Louann,    Pt needs refills of pitavastin. Is it ok to refill?    Thank you,    Magnolia Evans, RN  Triage [facility]  04/30/25 16:28 EDT

## 2025-05-01 NOTE — TELEPHONE ENCOUNTER
Called and left a VM. Will continue to try to reach pt.    Magnolia Evans RN  Triage [facility]  05/01/25 10:34 EDT

## 2025-05-01 NOTE — TELEPHONE ENCOUNTER
Reviewed recommendations with patient, verbalized understanding, will call with any further questions or complaints.  Pt states that her pharmacy told her that the insurance company was requesting more information prior to covering this prescription.  I called and spoke directly to her [facility]'s pharmacy staff, and apparently when prescription was sent on 4/17, insurance was requesting a PA, but she states that when she looked at it today, a 90 day supply was going to be $54.96 copay.  I called pt back and let her know this.  I also let her know that there may also be help with a copay card, and she states that she will look into that at well, but will go ahead and  prescription.    Radha Herrera RN  Triage Nurse  05/01/25 11:00 EDT

## 2025-05-05 RX ORDER — PITAVASTATIN CALCIUM 4.18 MG/1
1 TABLET, FILM COATED ORAL
Qty: 90 TABLET | Refills: 1 | Status: SHIPPED | OUTPATIENT
Start: 2025-05-05

## 2025-05-05 NOTE — TELEPHONE ENCOUNTER
Patient called and he wants the Livalo sent to [facility].  I advised he called [facility] have have them cancel the current fill.  He will do that.  Advised he let us know if there are any other issues.    Yue Craig RN  [facility] Triage  05/05/25 11:48 EDT

## 2025-05-12 ENCOUNTER — TELEPHONE (OUTPATIENT)
Dept: CARDIOLOGY | Age: 65
End: 2025-05-12

## 2025-05-12 NOTE — TELEPHONE ENCOUNTER
Caller: Re Sheldon    Relationship to patient: Self    Best call back number: [phone]    Patient is needing: PT IS NEEDING TO R/S ECHO TMR, PT WAS TRANSFERRED PREVIOUSLY AND REACHED NOBODY. PLS CALL PT TO R/S, SHE WILL NOT BE THERE TMR 5/13.

## 2025-05-16 RX ORDER — LOSARTAN POTASSIUM 25 MG/1
25 TABLET ORAL DAILY
Qty: 90 TABLET | Refills: 1 | Status: SHIPPED | OUTPATIENT
Start: 2025-05-16

## 2025-05-16 RX ORDER — FUROSEMIDE 40 MG/1
40 TABLET ORAL DAILY
Qty: 90 TABLET | Refills: 1 | Status: SHIPPED | OUTPATIENT
Start: 2025-05-16

## 2025-06-02 ENCOUNTER — TELEPHONE (OUTPATIENT)
Dept: CARDIOLOGY | Age: 65
End: 2025-06-02

## 2025-06-02 NOTE — TELEPHONE ENCOUNTER
Caller: Alfred Sheldon    Relationship: Emergency Contact    Best call back number: [phone]     What was the call regarding: PT  CALLING AS PT HAS ECHO COMING UP AND DUE TO PT'S HISTORY OF ECHOS, THEY USUALLY NEED TO DO A PATRICA AND GO THROUGH HER THROAT TO SEE HER HEART FROM THE BACK - PT'S  ALFRED STATES THEY HAVE ALWAYS ADVISED THAT THE PATRICA IS THE BEST WAY TO GET THE FULL PICTURE - HE IS WANTING TO CONFIRM IF THAT'S WHAT'S NEEDED OR IF CURRENT ECHO IS CORRECT - PLEASE ADVISE -

## 2025-06-05 ENCOUNTER — HOSPITAL ENCOUNTER (OUTPATIENT)
Dept: CARDIOLOGY | Facility: HOSPITAL | Age: 65
Discharge: HOME | End: 2025-06-05
Admitting: INTERNAL MEDICINE
Payer: MEDICAID

## 2025-06-05 VITALS
DIASTOLIC BLOOD PRESSURE: 85 MMHG | HEART RATE: 68 BPM | HEIGHT: 62 IN | OXYGEN SATURATION: 95 % | WEIGHT: 164 LBS | SYSTOLIC BLOOD PRESSURE: 168 MMHG | BODY MASS INDEX: 30.18 KG/M2

## 2025-06-05 DIAGNOSIS — I35.0 AORTIC STENOSIS, SEVERE: ICD-10-CM

## 2025-06-05 LAB
AORTIC ARCH: 2.9 CM
AORTIC DIMENSIONLESS INDEX: 0.27 (DI)
ASCENDING AORTA: 2.6 CM
AV MEAN PRESS GRAD SYS DOP V1V2: 28.5 MMHG
AV VMAX SYS DOP: 339.9 CM/SEC
BH CV ECHO MEAS - ACS: 1.3 CM
BH CV ECHO MEAS - AO MAX PG: 46.2 MMHG
BH CV ECHO MEAS - AO ROOT AREA (BSA CORRECTED): 1.7 CM2
BH CV ECHO MEAS - AO ROOT DIAM: 3 CM
BH CV ECHO MEAS - AO V2 VTI: 85.2 CM
BH CV ECHO MEAS - AVA(I,D): 0.82 CM2
BH CV ECHO MEAS - EDV(CUBED): 142.1 ML
BH CV ECHO MEAS - EDV(MOD-SP2): 81 ML
BH CV ECHO MEAS - EDV(MOD-SP4): 87 ML
BH CV ECHO MEAS - EF(MOD-SP2): 55.6 %
BH CV ECHO MEAS - EF(MOD-SP4): 71.3 %
BH CV ECHO MEAS - ESV(CUBED): 29.9 ML
BH CV ECHO MEAS - ESV(MOD-SP2): 36 ML
BH CV ECHO MEAS - ESV(MOD-SP4): 25 ML
BH CV ECHO MEAS - FS: 40.6 %
BH CV ECHO MEAS - IVS/LVPW: 1.11 CM
BH CV ECHO MEAS - IVSD: 1.11 CM
BH CV ECHO MEAS - LAT PEAK E' VEL: 6.9 CM/SEC
BH CV ECHO MEAS - LV DIASTOLIC VOL/BSA (35-75): 49.5 CM2
BH CV ECHO MEAS - LV MASS(C)D: 210.5 GRAMS
BH CV ECHO MEAS - LV MAX PG: 4 MMHG
BH CV ECHO MEAS - LV MEAN PG: 2.5 MMHG
BH CV ECHO MEAS - LV SYSTOLIC VOL/BSA (12-30): 14.2 CM2
BH CV ECHO MEAS - LV V1 MAX: 99.8 CM/SEC
BH CV ECHO MEAS - LV V1 VTI: 23.2 CM
BH CV ECHO MEAS - LVIDD: 5.2 CM
BH CV ECHO MEAS - LVIDS: 3.1 CM
BH CV ECHO MEAS - LVOT AREA: 3 CM2
BH CV ECHO MEAS - LVOT DIAM: 1.96 CM
BH CV ECHO MEAS - LVPWD: 1 CM
BH CV ECHO MEAS - MED PEAK E' VEL: 6.7 CM/SEC
BH CV ECHO MEAS - MR MAX PG: 125.2 MMHG
BH CV ECHO MEAS - MR MAX VEL: 559.5 CM/SEC
BH CV ECHO MEAS - MR MEAN PG: 89.1 MMHG
BH CV ECHO MEAS - MR MEAN VEL: 453.3 CM/SEC
BH CV ECHO MEAS - MR VTI: 201.1 CM
BH CV ECHO MEAS - MV A DUR: 0.11 SEC
BH CV ECHO MEAS - MV DEC SLOPE: 604.5 CM/SEC2
BH CV ECHO MEAS - MV DEC TIME: 0.18 SEC
BH CV ECHO MEAS - MV E MAX VEL: 147 CM/SEC
BH CV ECHO MEAS - MV MAX PG: 9.7 MMHG
BH CV ECHO MEAS - MV MEAN PG: 3.9 MMHG
BH CV ECHO MEAS - MV P1/2T: 72.5 MSEC
BH CV ECHO MEAS - MV V2 VTI: 28.6 CM
BH CV ECHO MEAS - MVA(P1/2T): 3 CM2
BH CV ECHO MEAS - MVA(VTI): 2.44 CM2
BH CV ECHO MEAS - PA ACC TIME: 0.11 SEC
BH CV ECHO MEAS - PA V2 MAX: 109.8 CM/SEC
BH CV ECHO MEAS - PULM A REVS DUR: 0.12 SEC
BH CV ECHO MEAS - PULM A REVS VEL: 20.2 CM/SEC
BH CV ECHO MEAS - PULM DIAS VEL: 72.6 CM/SEC
BH CV ECHO MEAS - PULM S/D: 0.68
BH CV ECHO MEAS - PULM SYS VEL: 49.3 CM/SEC
BH CV ECHO MEAS - QP/QS: 0.49
BH CV ECHO MEAS - RAP SYSTOLE: 3 MMHG
BH CV ECHO MEAS - RV MAX PG: 1.94 MMHG
BH CV ECHO MEAS - RV V1 MAX: 69.7 CM/SEC
BH CV ECHO MEAS - RV V1 VTI: 10.2 CM
BH CV ECHO MEAS - RVOT DIAM: 2.06 CM
BH CV ECHO MEAS - RVSP: 47 MMHG
BH CV ECHO MEAS - SUP REN AO DIAM: 1.8 CM
BH CV ECHO MEAS - SV(LVOT): 69.9 ML
BH CV ECHO MEAS - SV(MOD-SP2): 45 ML
BH CV ECHO MEAS - SV(MOD-SP4): 62 ML
BH CV ECHO MEAS - SV(RVOT): 34.2 ML
BH CV ECHO MEAS - SVI(LVOT): 39.7 ML/M2
BH CV ECHO MEAS - SVI(MOD-SP2): 25.6 ML/M2
BH CV ECHO MEAS - SVI(MOD-SP4): 35.3 ML/M2
BH CV ECHO MEAS - TAPSE (>1.6): 1.68 CM
BH CV ECHO MEAS - TR MAX PG: 44.4 MMHG
BH CV ECHO MEAS - TR MAX VEL: 333.3 CM/SEC
BH CV ECHO MEASUREMENTS AVERAGE E/E' RATIO: 21.62
BH CV XLRA - RV BASE: 3.3 CM
BH CV XLRA - RV LENGTH: 7.9 CM
BH CV XLRA - RV MID: 2.45 CM
BH CV XLRA - TDI S': 12 CM/SEC
LEFT ATRIUM VOLUME INDEX: 38.5 ML/M2
LV EF BIPLANE MOD: 63 %
MR PISA EROA: 0.06 CM2
PISA ALIASING VEL: 38.5 M/S
PISA RADIUS: 0.5 CM
SINUS: 2.7 CM
STJ: 2.07 CM

## 2025-06-05 PROCEDURE — 93306 TTE W/DOPPLER COMPLETE: CPT

## 2025-06-05 PROCEDURE — 25510000001 PERFLUTREN 6.52 MG/ML SUSPENSION 2 ML VIAL: Performed by: INTERNAL MEDICINE

## 2025-06-05 RX ADMIN — PERFLUTREN 2 ML: 6.52 INJECTION, SUSPENSION INTRAVENOUS at 10:16

## 2025-06-09 ENCOUNTER — TELEPHONE (OUTPATIENT)
Dept: CARDIOLOGY | Age: 65
End: 2025-06-09

## 2025-06-09 ENCOUNTER — TELEPHONE (OUTPATIENT)
Dept: CARDIAC SURGERY | Facility: CLINIC | Age: 65
End: 2025-06-09
Payer: MEDICAID

## 2025-06-09 NOTE — TELEPHONE ENCOUNTER
Caller: Kedar Sheldon    Relationship to patient: Emergency Contact    Best call back number: [phone]     Chief complaint: CONCERNED ABOUT RECENT ECHO.      Type of visit: FOLLOW UP     Requested date: ASAP      PATIENT IS TEMPORARILY COVERED WITH MEDICAID AND WOULD LIKE TO DISCUSS SURGERY SCHEDULING.     If rescheduling, when is the original appointment: NONE      Additional notes:PATIENTS FILI SIMON WOULD LIKE A CALL BACK.

## 2025-06-09 NOTE — TELEPHONE ENCOUNTER
Caller: Re Sheldon    Relationship to patient: Self    Best call back number: [phone]    Chief complaint: N/A    Type of visit: EKG APPT    Requested date: ASAP     If rescheduling, when is the original appointment: N/A     Additional notes:PT HAD PUT IN A CALL WITH SUDHAKAR DURAN AND WERE TOLD THEY NEED TO GET IN FOR AN EKG APPT ASAP W/I NEXT WEEK. PT WAS WT'D, BUT NO RESPONSE AND WERE CUT OFF AFTER 8 MINUTE HOLD. PLEASE CALL PT TO ADVISE.

## 2025-06-11 ENCOUNTER — CLINICAL SUPPORT (OUTPATIENT)
Dept: CARDIOLOGY | Age: 65
End: 2025-06-11
Payer: MEDICAID

## 2025-06-11 DIAGNOSIS — I34.9 NONRHEUMATIC MITRAL VALVE DISORDER: Primary | ICD-10-CM

## 2025-06-11 DIAGNOSIS — I48.19 ATRIAL FIBRILLATION, PERSISTENT: Primary | ICD-10-CM

## 2025-06-11 NOTE — PROGRESS NOTES
She had echo and then went into A fib a few hours later. She remains in Afib.     With recent echo results they would like to discuss potential surgery with Dr. Hamilton. I will place a referral as she has seen him in the past.     If surgery is recommended then I would consider MAZE and SHELL clip for her AF.     If no surgery then she will let us know and consider additional treatment for her A fib.

## 2025-06-11 NOTE — TELEPHONE ENCOUNTER
Spoke with pts  and advised him we needed a referral since pt is on medicaid and hasn't been seen in office since 2022. Pt's spouse verbalized understanding.

## 2025-06-12 PROCEDURE — 93000 ELECTROCARDIOGRAM COMPLETE: CPT

## 2025-06-12 NOTE — PROGRESS NOTES
Procedure           Pt came in today for and EKG    ECG 12 Lead    Date/Time: 6/12/2025 9:21 AM  Performed by: Raj Lam APRN    Authorized by: Raj Lam APRN  Comparison: compared with previous ECG   Similar to previous ECG  Rhythm: atrial fibrillation  Comments: She had an echo last week and a few hours after she went to [facility].  She has been in [facility] since last Friday.  Her symptoms are mild, she does have some awareness that she is back in [facility].  Her today.  Her heart rate is well-controlled.    In the past.  There has been some question whether she possibly needs valve surgery or not.  She had seen Dr. Hamilton.  They would like to discuss with him further if he feels that her mitral regurgitation or aortic stenosis significant enough to operate on.    If there is no plans for surgery then we are going to consider further ablation.

## 2025-06-20 RX ORDER — METOPROLOL TARTRATE 50 MG
50 TABLET ORAL 2 TIMES DAILY
Qty: 180 TABLET | Refills: 3 | Status: SHIPPED | OUTPATIENT
Start: 2025-06-20

## 2025-07-01 ENCOUNTER — OFFICE VISIT (OUTPATIENT)
Dept: CARDIAC SURGERY | Facility: CLINIC | Age: 65
End: 2025-07-01
Payer: MEDICAID

## 2025-07-01 VITALS
WEIGHT: 162 LBS | BODY MASS INDEX: 28.7 KG/M2 | DIASTOLIC BLOOD PRESSURE: 76 MMHG | RESPIRATION RATE: 18 BRPM | SYSTOLIC BLOOD PRESSURE: 127 MMHG | OXYGEN SATURATION: 97 % | TEMPERATURE: 96.8 F | HEART RATE: 51 BPM | HEIGHT: 63 IN

## 2025-07-01 DIAGNOSIS — I34.0 MITRAL VALVE INSUFFICIENCY, UNSPECIFIED ETIOLOGY: ICD-10-CM

## 2025-07-01 DIAGNOSIS — R53.83 FATIGUE, UNSPECIFIED TYPE: ICD-10-CM

## 2025-07-01 DIAGNOSIS — R06.09 EXERTIONAL DYSPNEA: ICD-10-CM

## 2025-07-01 DIAGNOSIS — I48.19 ATRIAL FIBRILLATION, PERSISTENT: Primary | ICD-10-CM

## 2025-07-01 PROCEDURE — 3078F DIAST BP <80 MM HG: CPT | Performed by: THORACIC SURGERY (CARDIOTHORACIC VASCULAR SURGERY)

## 2025-07-01 PROCEDURE — 99024 POSTOP FOLLOW-UP VISIT: CPT | Performed by: THORACIC SURGERY (CARDIOTHORACIC VASCULAR SURGERY)

## 2025-07-01 PROCEDURE — 3074F SYST BP LT 130 MM HG: CPT | Performed by: THORACIC SURGERY (CARDIOTHORACIC VASCULAR SURGERY)

## 2025-07-01 PROCEDURE — 1160F RVW MEDS BY RX/DR IN RCRD: CPT | Performed by: THORACIC SURGERY (CARDIOTHORACIC VASCULAR SURGERY)

## 2025-07-01 PROCEDURE — 1159F MED LIST DOCD IN RCRD: CPT | Performed by: THORACIC SURGERY (CARDIOTHORACIC VASCULAR SURGERY)

## 2025-07-01 NOTE — LETTER
July 5, 2025     JONATHAN Ng  [address]  [address]  [place] [address]    Patient: Re Sheldon   YOB: 1960   Date of Visit: 7/1/2025     Dear JONATHAN Ng:       Thank you for referring Re Sheldon to me for evaluation. Below are the relevant portions of my assessment and plan of care.    If you have questions, please do not hesitate to call me. I look forward to following Re along with you.         Sincerely,        Rayo Hamilton MD        CC: MD Jennifer Adamson MD Pagni, Sebastian, MD  07/05/25 8454  Sign when Signing Visit  [facility] note  I saw Mrs. Sheldon in follow-up from her previous visit 2 years ago.  She is a 65-year-old female who I saw her initially for atrial fibrillation and mitral regurgitation which was believed to be moderate mitral regurgitation and moderate aortic stenosis at that time.  At that time she was stable with low-level symptoms.  The plan was to perform a PATRICA in 6 months to further address the amount of mitral and aortic regurgitation.  The patient was treated for atrial fibrillation and I have lost contact to her.  She comes back to my office because of the echocardiogram that showed severe mitral regurgitation.  She has had atrial fibrillation on and off.  Her vitals are stable, her rhythm is regular, her lungs are clear, her cor is regular with a loud systolic murmur in the apex and no gallops.  The abdomen is benign and she has no lower extremity edema.  I discussed with the patient and  my plan of workup including a PATRICA to address the severity of the mitral and or aortic valve.  If the PATRICA shows severe mitral regurgitation then she will need to have a cardiac cath and then I recommend mitral valve repair or replacement and a Maze procedure.  Depending on the findings the aortic valve should be replaced or not if there is at least severe aortic stenosis.  After the PATRICA is completed I will review the studies and  discussed with the patient and the cardiology team the next steps

## 2025-07-02 ENCOUNTER — OFFICE VISIT (OUTPATIENT)
Dept: FAMILY MEDICINE CLINIC | Facility: CLINIC | Age: 65
End: 2025-07-02
Payer: MEDICAID

## 2025-07-02 VITALS
HEIGHT: 63 IN | DIASTOLIC BLOOD PRESSURE: 72 MMHG | HEART RATE: 52 BPM | OXYGEN SATURATION: 98 % | BODY MASS INDEX: 29.27 KG/M2 | WEIGHT: 165.2 LBS | SYSTOLIC BLOOD PRESSURE: 128 MMHG

## 2025-07-02 DIAGNOSIS — E55.9 VITAMIN D DEFICIENCY: ICD-10-CM

## 2025-07-02 DIAGNOSIS — R73.01 ABNORMAL FASTING GLUCOSE: Primary | ICD-10-CM

## 2025-07-02 DIAGNOSIS — E78.00 PURE HYPERCHOLESTEROLEMIA: ICD-10-CM

## 2025-07-02 DIAGNOSIS — I10 HYPERTENSION, UNSPECIFIED TYPE: ICD-10-CM

## 2025-07-02 DIAGNOSIS — E78.2 MIXED HYPERLIPIDEMIA: ICD-10-CM

## 2025-07-02 PROCEDURE — 99214 OFFICE O/P EST MOD 30 MIN: CPT | Performed by: INTERNAL MEDICINE

## 2025-07-02 PROCEDURE — 3078F DIAST BP <80 MM HG: CPT | Performed by: INTERNAL MEDICINE

## 2025-07-02 PROCEDURE — 1160F RVW MEDS BY RX/DR IN RCRD: CPT | Performed by: INTERNAL MEDICINE

## 2025-07-02 PROCEDURE — 3074F SYST BP LT 130 MM HG: CPT | Performed by: INTERNAL MEDICINE

## 2025-07-02 PROCEDURE — 1159F MED LIST DOCD IN RCRD: CPT | Performed by: INTERNAL MEDICINE

## 2025-07-02 PROCEDURE — 1126F AMNT PAIN NOTED NONE PRSNT: CPT | Performed by: INTERNAL MEDICINE

## 2025-07-02 NOTE — PROGRESS NOTES
Chief Complaint  Follow-up (6 mo), Hypertension, and Hyperlipidemia    Patient or patient representative verbalized consent for the use of Ambient Listening during the visit with  Angelita Vital MD for chart documentation. 7/5/2025  11:20 EDT    Subjective        Re Sheldon presents to [facility]    History of Present Illness  The patient presents for her 6-month follow-up on hypercholesterolemia, hypertension, persistent atrial fibrillation, aortic stenosis, mitral valve insufficiency, and vitamin D deficiency.    She is being followed by cardiology for atrial fibrillation and aortic stenosis and mitral regurgitation. Currently, she is on Eliquis 5 mg twice daily for anticoagulation due to atrial fibrillation. Over the past year, she has experienced intermittent episodes of atrial fibrillation. She underwent an ablation procedure last year, which provided temporary relief. However, in 02/2025, she experienced a prolonged episode of atrial fibrillation that lasted approximately 3 weeks before her heart rhythm normalized. During this period, she also contracted influenza, which significantly impacted her health and required several weeks of recovery. In 05/2025, she began experiencing increased fatigue and occasional shortness of breath, particularly during physical activity or exposure to heat. Her  has expressed concerns about her frequent breathlessness. She had an echocardiogram on 06/05/2025, and her CV surgeon, Dr. Hamilton, has recommended a transesophageal echocardiogram (PATRICA) within the next week to further evaluate her condition. She is considering surgical intervention for her aortic and mitral valves, depending on the results of the PATRICA. She has been informed that her heart murmur has become more pronounced.    She is on Lasix 40 mg daily, losartan 25 mg daily, and Lopressor 50 mg twice daily for hypertension and heart function.    She takes metformin 500 mg XR  "once daily with breakfast for prediabetes.    She takes 1000 IUs of vitamin D twice daily for vitamin D deficiency.    She is on pravastatin 4 mg for hypercholesterolemia.    She had a recent Pap smear in 10/2024 that was negative.      Objective   Vital Signs:  /72 (BP Location: Left arm, Patient Position: Sitting, Cuff Size: Adult)   Pulse 52   Ht 160 cm (62.99\")   Wt 74.9 kg (165 lb 3.2 oz)   SpO2 98%   BMI 29.27 kg/m²   Estimated body mass index is 29.27 kg/m² as calculated from the following:    Height as of this encounter: 160 cm (62.99\").    Weight as of this encounter: 74.9 kg (165 lb 3.2 oz).            Physical Exam  Vitals and nursing note reviewed.   Constitutional:       General: She is not in acute distress.     Appearance: Normal appearance. She is well-developed.   HENT:      Head: Normocephalic and atraumatic.      Right Ear: Tympanic membrane, ear canal and external ear normal.      Left Ear: Tympanic membrane, ear canal and external ear normal.      Mouth/Throat:      Mouth: Mucous membranes are moist.      Pharynx: Oropharynx is clear.   Eyes:      Extraocular Movements: Extraocular movements intact.      Conjunctiva/sclera: Conjunctivae normal.   Neck:      Vascular: No carotid bruit.   Cardiovascular:      Rate and Rhythm: Normal rate and regular rhythm.      Heart sounds: Murmur heard.      Comments: No bruits  Pulmonary:      Effort: Pulmonary effort is normal. No respiratory distress.      Breath sounds: Normal breath sounds. No stridor. No wheezing, rhonchi or rales.   Chest:      Chest wall: No tenderness.   Abdominal:      General: Bowel sounds are normal. There is no distension.      Palpations: Abdomen is soft. There is no mass.      Tenderness: There is no abdominal tenderness. There is no guarding or rebound.      Hernia: No hernia is present.   Musculoskeletal:      Cervical back: Neck supple. No tenderness.      Right lower leg: No edema.      Left lower leg: No edema. "   Lymphadenopathy:      Cervical: No cervical adenopathy.   Skin:     General: Skin is warm.      Findings: No rash.   Neurological:      General: No focal deficit present.      Mental Status: She is alert and oriented to person, place, and time. Mental status is at baseline.   Psychiatric:         Mood and Affect: Mood normal.         Behavior: Behavior normal.         Thought Content: Thought content normal.         Judgment: Judgment normal.        Result Review :                   Assessment and Plan   Diagnoses and all orders for this visit:    1. Abnormal fasting glucose (Primary)  -     Comprehensive Metabolic Panel  -     Hemoglobin A1c  -     CBC & Differential  -     TSH  -     T4, Free  -     Microalbumin / Creatinine Urine Ratio - Urine, Clean Catch  -     Lipid Panel With LDL / HDL Ratio    2. Vitamin D deficiency  -     Vitamin D,25-Hydroxy    3. Mixed hyperlipidemia  -     Comprehensive Metabolic Panel  -     Hemoglobin A1c  -     CBC & Differential  -     TSH  -     T4, Free  -     Microalbumin / Creatinine Urine Ratio - Urine, Clean Catch  -     Lipid Panel With LDL / HDL Ratio    4. Hypertension, unspecified type  -     Comprehensive Metabolic Panel  -     Hemoglobin A1c  -     CBC & Differential  -     TSH  -     T4, Free  -     Microalbumin / Creatinine Urine Ratio - Urine, Clean Catch  -     Lipid Panel With LDL / HDL Ratio    5. Pure hypercholesterolemia  -     Comprehensive Metabolic Panel  -     Hemoglobin A1c  -     CBC & Differential  -     TSH  -     T4, Free  -     Microalbumin / Creatinine Urine Ratio - Urine, Clean Catch  -     Lipid Panel With LDL / HDL Ratio             Assessment & Plan  1. Hypercholesterolemia.  - Previous LDL level was elevated at 139.  - Currently on livalo 4 mg qd   - Lipid panel will be ordered to assess current cholesterol levels.    2. Hypertension.  - On Lasix 40 mg daily, losartan 25 mg daily, and Lopressor 50 mg twice daily for hypertension and heart  function.  BP to goal    3. Persistent atrial fibrillation.  - Currently on Eliquis 5 mg twice daily for anticoagulation due to atrial fibrillation.  - Had an episode of A-fib in February that lasted about 3 weeks.  - Will undergo a transesophageal echocardiogram (PATRICA) within the next week to assess current condition and determine the need for potential surgery.  F/u recommendations per Dr. Hamilton    4. Aortic stenosis.  - Severe calcification of the aortic valve and moderate to severe aortic stenosis.  - Upcoming PATRICA will help determine the severity compared to previous evaluations and guide future treatment plans.    5. Mitral valve insufficiency.  - Moderate bileaflet mitral valve thickening and moderate to severe mitral regurgitation.  - PATRICA will provide further insights into the current state of mitral valve insufficiency.    6. Vitamin D deficiency.  - Takes 1000 IUs of vitamin D twice daily for vitamin D deficiency.    7. Prediabetes.  - A1c was 6.3% in 10/2024, nearing the diabetic range of 6.5%.  - On metformin 500 mg XR once daily with breakfast.  - A1c test will be conducted today to monitor blood sugar levels. If A1c remains stable, discontinuation of metformin may be considered.  8.VDD  Check D3 level         Follow Up   Return in about 6 months (around 1/2/2026) for Recheck.  Patient was given instructions and counseling regarding her condition or for health maintenance advice. Please see specific information pulled into the AVS if appropriate.           Angelita Vital MD   14:09 EDT   [unfilled]

## 2025-07-03 LAB
25(OH)D3+25(OH)D2 SERPL-MCNC: 57 NG/ML (ref 30–100)
ALBUMIN SERPL-MCNC: 4.7 G/DL (ref 3.5–5.2)
ALBUMIN/CREAT UR: <11 MG/G CREAT (ref 0–29)
ALBUMIN/GLOB SERPL: 1.7 G/DL
ALP SERPL-CCNC: 91 U/L (ref 39–117)
ALT SERPL-CCNC: 13 U/L (ref 1–33)
AST SERPL-CCNC: 14 U/L (ref 1–32)
BASOPHILS # BLD AUTO: 0.04 10*3/MM3 (ref 0–0.2)
BASOPHILS NFR BLD AUTO: 0.5 % (ref 0–1.5)
BILIRUB SERPL-MCNC: 0.3 MG/DL (ref 0–1.2)
BUN SERPL-MCNC: 19 MG/DL (ref 8–23)
BUN/CREAT SERPL: 23.5 (ref 7–25)
CALCIUM SERPL-MCNC: 9.3 MG/DL (ref 8.6–10.5)
CHLORIDE SERPL-SCNC: 102 MMOL/L (ref 98–107)
CHOLEST SERPL-MCNC: 156 MG/DL (ref 0–200)
CO2 SERPL-SCNC: 27.5 MMOL/L (ref 22–29)
CREAT SERPL-MCNC: 0.81 MG/DL (ref 0.57–1)
CREAT UR-MCNC: 27.1 MG/DL
EGFRCR SERPLBLD CKD-EPI 2021: 81.2 ML/MIN/1.73
EOSINOPHIL # BLD AUTO: 0.32 10*3/MM3 (ref 0–0.4)
EOSINOPHIL NFR BLD AUTO: 4.2 % (ref 0.3–6.2)
ERYTHROCYTE [DISTWIDTH] IN BLOOD BY AUTOMATED COUNT: 13.8 % (ref 12.3–15.4)
GLOBULIN SER CALC-MCNC: 2.7 GM/DL
GLUCOSE SERPL-MCNC: 98 MG/DL (ref 65–99)
HBA1C MFR BLD: 5.9 % (ref 4.8–5.6)
HCT VFR BLD AUTO: 38.3 % (ref 34–46.6)
HDLC SERPL-MCNC: 51 MG/DL (ref 40–60)
HGB BLD-MCNC: 12.1 G/DL (ref 12–15.9)
IMM GRANULOCYTES # BLD AUTO: 0.01 10*3/MM3 (ref 0–0.05)
IMM GRANULOCYTES NFR BLD AUTO: 0.1 % (ref 0–0.5)
LDLC SERPL CALC-MCNC: 81 MG/DL (ref 0–100)
LDLC/HDLC SERPL: 1.53 {RATIO}
LYMPHOCYTES # BLD AUTO: 2.12 10*3/MM3 (ref 0.7–3.1)
LYMPHOCYTES NFR BLD AUTO: 27.6 % (ref 19.6–45.3)
MCH RBC QN AUTO: 28.1 PG (ref 26.6–33)
MCHC RBC AUTO-ENTMCNC: 31.6 G/DL (ref 31.5–35.7)
MCV RBC AUTO: 89.1 FL (ref 79–97)
MICROALBUMIN UR-MCNC: <3 UG/ML
MONOCYTES # BLD AUTO: 0.59 10*3/MM3 (ref 0.1–0.9)
MONOCYTES NFR BLD AUTO: 7.7 % (ref 5–12)
NEUTROPHILS # BLD AUTO: 4.6 10*3/MM3 (ref 1.7–7)
NEUTROPHILS NFR BLD AUTO: 59.9 % (ref 42.7–76)
NRBC BLD AUTO-RTO: 0 /100 WBC (ref 0–0.2)
PLATELET # BLD AUTO: 294 10*3/MM3 (ref 140–450)
POTASSIUM SERPL-SCNC: 4.9 MMOL/L (ref 3.5–5.2)
PROT SERPL-MCNC: 7.4 G/DL (ref 6–8.5)
RBC # BLD AUTO: 4.3 10*6/MM3 (ref 3.77–5.28)
SODIUM SERPL-SCNC: 142 MMOL/L (ref 136–145)
T4 FREE SERPL-MCNC: 1.03 NG/DL (ref 0.92–1.68)
TRIGL SERPL-MCNC: 135 MG/DL (ref 0–150)
TSH SERPL DL<=0.005 MIU/L-ACNC: 1.56 UIU/ML (ref 0.27–4.2)
VLDLC SERPL CALC-MCNC: 24 MG/DL (ref 5–40)
WBC # BLD AUTO: 7.68 10*3/MM3 (ref 3.4–10.8)

## 2025-07-05 NOTE — PROGRESS NOTES
[facility] note  I saw Mrs. Sheldon in follow-up from her previous visit 2 years ago.  She is a 65-year-old female who I saw her initially for atrial fibrillation and mitral regurgitation which was believed to be moderate mitral regurgitation and moderate aortic stenosis at that time.  At that time she was stable with low-level symptoms.  The plan was to perform a PATRICA in 6 months to further address the amount of mitral and aortic regurgitation.  The patient was treated for atrial fibrillation and I have lost contact to her.  She comes back to my office because of the echocardiogram that showed severe mitral regurgitation.  She has had atrial fibrillation on and off.  Her vitals are stable, her rhythm is regular, her lungs are clear, her cor is regular with a loud systolic murmur in the apex and no gallops.  The abdomen is benign and she has no lower extremity edema.  I discussed with the patient and  my plan of workup including a PATRICA to address the severity of the mitral and or aortic valve.  If the PATRICA shows severe mitral regurgitation then she will need to have a cardiac cath and then I recommend mitral valve repair or replacement and a Maze procedure.  Depending on the findings the aortic valve should be replaced or not if there is at least severe aortic stenosis.  After the PATRICA is completed I will review the studies and discussed with the patient and the cardiology team the next steps

## 2025-07-15 RX ORDER — APIXABAN 5 MG/1
5 TABLET, FILM COATED ORAL
Qty: 180 TABLET | Refills: 1 | Status: SHIPPED | OUTPATIENT
Start: 2025-07-15

## 2025-07-16 DIAGNOSIS — I35.0 AORTIC STENOSIS, SEVERE: Primary | ICD-10-CM

## 2025-07-21 ENCOUNTER — HOSPITAL ENCOUNTER (OUTPATIENT)
Dept: POSTOP/PACU | Facility: HOSPITAL | Age: 65
Discharge: HOME | End: 2025-07-21
Payer: MEDICAID

## 2025-07-21 ENCOUNTER — ANESTHESIA EVENT (OUTPATIENT)
Dept: POSTOP/PACU | Facility: HOSPITAL | Age: 65
End: 2025-07-21
Payer: MEDICAID

## 2025-07-21 ENCOUNTER — ANESTHESIA (OUTPATIENT)
Dept: POSTOP/PACU | Facility: HOSPITAL | Age: 65
End: 2025-07-21
Payer: MEDICAID

## 2025-07-21 VITALS
DIASTOLIC BLOOD PRESSURE: 60 MMHG | SYSTOLIC BLOOD PRESSURE: 139 MMHG | HEART RATE: 47 BPM | RESPIRATION RATE: 16 BRPM | OXYGEN SATURATION: 97 % | TEMPERATURE: 98.1 F

## 2025-07-21 VITALS — OXYGEN SATURATION: 100 % | DIASTOLIC BLOOD PRESSURE: 56 MMHG | SYSTOLIC BLOOD PRESSURE: 123 MMHG | HEART RATE: 51 BPM

## 2025-07-21 DIAGNOSIS — I35.0 AORTIC STENOSIS, SEVERE: ICD-10-CM

## 2025-07-21 LAB
AORTIC DIMENSIONLESS INDEX: 0.31 (DI)
AV MEAN PRESS GRAD SYS DOP V1V2: 25.6 MMHG
AV VMAX SYS DOP: 340.8 CM/SEC
BH CV ECHO MEAS - AO MAX PG: 46.5 MMHG
BH CV ECHO MEAS - AO V2 VTI: 89.1 CM
BH CV ECHO MEAS - AVA(I,D): 0.84 CM2
BH CV ECHO MEAS - LAT PEAK E' VEL: 3.9 CM/SEC
BH CV ECHO MEAS - LV MAX PG: 4.1 MMHG
BH CV ECHO MEAS - LV MEAN PG: 2.38 MMHG
BH CV ECHO MEAS - LV V1 MAX: 101.1 CM/SEC
BH CV ECHO MEAS - LV V1 VTI: 27.2 CM
BH CV ECHO MEAS - LVOT AREA: 2.8 CM2
BH CV ECHO MEAS - LVOT DIAM: 1.87 CM
BH CV ECHO MEAS - MED PEAK E' VEL: 3.8 CM/SEC
BH CV ECHO MEAS - MR MAX PG: 147.9 MMHG
BH CV ECHO MEAS - MR MAX VEL: 608.1 CM/SEC
BH CV ECHO MEAS - MV A MAX VEL: 43.2 CM/SEC
BH CV ECHO MEAS - MV DEC SLOPE: 655 CM/SEC2
BH CV ECHO MEAS - MV DEC TIME: 0.2 SEC
BH CV ECHO MEAS - MV E MAX VEL: 102 CM/SEC
BH CV ECHO MEAS - MV E/A: 2.36
BH CV ECHO MEAS - MV MAX PG: 9.5 MMHG
BH CV ECHO MEAS - MV MEAN PG: 3 MMHG
BH CV ECHO MEAS - MV P1/2T: 67.4 MSEC
BH CV ECHO MEAS - MV V2 VTI: 36.3 CM
BH CV ECHO MEAS - MVA(P1/2T): 3.3 CM2
BH CV ECHO MEAS - MVA(VTI): 2.07 CM2
BH CV ECHO MEAS - RAP SYSTOLE: 8 MMHG
BH CV ECHO MEAS - RVSP: 50 MMHG
BH CV ECHO MEAS - SV(LVOT): 75.2 ML
BH CV ECHO MEAS - SVI(LVOT): 42.7 ML/M2
BH CV ECHO MEAS - TR MAX PG: 42.2 MMHG
BH CV ECHO MEAS - TR MAX VEL: 324.9 CM/SEC
BH CV ECHO MEASUREMENTS AVERAGE E/E' RATIO: 26.49
BH CV ECHO SHUNT ASSESSMENT PERFORMED (HIDDEN SCRIPTING): 1

## 2025-07-21 PROCEDURE — 25810000003 LACTATED RINGERS PER 1000 ML: Performed by: NURSE ANESTHETIST, CERTIFIED REGISTERED

## 2025-07-21 PROCEDURE — 93325 DOPPLER ECHO COLOR FLOW MAPG: CPT | Performed by: INTERNAL MEDICINE

## 2025-07-21 PROCEDURE — 25010000002 PROPOFOL 10 MG/ML EMULSION: Performed by: NURSE ANESTHETIST, CERTIFIED REGISTERED

## 2025-07-21 PROCEDURE — 93320 DOPPLER ECHO COMPLETE: CPT

## 2025-07-21 PROCEDURE — 93325 DOPPLER ECHO COLOR FLOW MAPG: CPT

## 2025-07-21 PROCEDURE — 25010000002 LIDOCAINE 2% SOLUTION: Performed by: NURSE ANESTHETIST, CERTIFIED REGISTERED

## 2025-07-21 PROCEDURE — 93320 DOPPLER ECHO COMPLETE: CPT | Performed by: INTERNAL MEDICINE

## 2025-07-21 PROCEDURE — 93312 ECHO TRANSESOPHAGEAL: CPT

## 2025-07-21 PROCEDURE — 93312 ECHO TRANSESOPHAGEAL: CPT | Performed by: INTERNAL MEDICINE

## 2025-07-21 RX ORDER — MIDAZOLAM HYDROCHLORIDE 1 MG/ML
1 INJECTION, SOLUTION INTRAMUSCULAR; INTRAVENOUS
Status: CANCELLED | OUTPATIENT
Start: 2025-07-21

## 2025-07-21 RX ORDER — PROPOFOL 10 MG/ML
VIAL (ML) INTRAVENOUS AS NEEDED
Status: DISCONTINUED | OUTPATIENT
Start: 2025-07-21 | End: 2025-07-21 | Stop reason: SURG

## 2025-07-21 RX ORDER — FENTANYL CITRATE 50 UG/ML
50 INJECTION, SOLUTION INTRAMUSCULAR; INTRAVENOUS ONCE AS NEEDED
Status: CANCELLED | OUTPATIENT
Start: 2025-07-21

## 2025-07-21 RX ORDER — ONDANSETRON 2 MG/ML
4 INJECTION INTRAMUSCULAR; INTRAVENOUS ONCE AS NEEDED
Status: DISCONTINUED | OUTPATIENT
Start: 2025-07-21 | End: 2025-07-22 | Stop reason: HOSPADM

## 2025-07-21 RX ORDER — LIDOCAINE HYDROCHLORIDE 20 MG/ML
INJECTION, SOLUTION INFILTRATION; PERINEURAL AS NEEDED
Status: DISCONTINUED | OUTPATIENT
Start: 2025-07-21 | End: 2025-07-21 | Stop reason: SURG

## 2025-07-21 RX ORDER — SODIUM CHLORIDE 0.9 % (FLUSH) 0.9 %
3-10 SYRINGE (ML) INJECTION AS NEEDED
Status: CANCELLED | OUTPATIENT
Start: 2025-07-21

## 2025-07-21 RX ORDER — PROMETHAZINE HYDROCHLORIDE 25 MG/1
25 TABLET ORAL ONCE AS NEEDED
Status: DISCONTINUED | OUTPATIENT
Start: 2025-07-21 | End: 2025-07-22 | Stop reason: HOSPADM

## 2025-07-21 RX ORDER — PROMETHAZINE HYDROCHLORIDE 25 MG/1
25 SUPPOSITORY RECTAL ONCE AS NEEDED
Status: DISCONTINUED | OUTPATIENT
Start: 2025-07-21 | End: 2025-07-22 | Stop reason: HOSPADM

## 2025-07-21 RX ORDER — DROPERIDOL 2.5 MG/ML
0.62 INJECTION, SOLUTION INTRAMUSCULAR; INTRAVENOUS
Status: DISCONTINUED | OUTPATIENT
Start: 2025-07-21 | End: 2025-07-22 | Stop reason: HOSPADM

## 2025-07-21 RX ORDER — FLUMAZENIL 0.1 MG/ML
0.2 INJECTION INTRAVENOUS AS NEEDED
Status: DISCONTINUED | OUTPATIENT
Start: 2025-07-21 | End: 2025-07-22 | Stop reason: HOSPADM

## 2025-07-21 RX ORDER — SODIUM CHLORIDE, SODIUM LACTATE, POTASSIUM CHLORIDE, CALCIUM CHLORIDE 600; 310; 30; 20 MG/100ML; MG/100ML; MG/100ML; MG/100ML
INJECTION, SOLUTION INTRAVENOUS CONTINUOUS PRN
Status: DISCONTINUED | OUTPATIENT
Start: 2025-07-21 | End: 2025-07-21 | Stop reason: SURG

## 2025-07-21 RX ORDER — SODIUM CHLORIDE 0.9 % (FLUSH) 0.9 %
3 SYRINGE (ML) INJECTION EVERY 12 HOURS SCHEDULED
Status: CANCELLED | OUTPATIENT
Start: 2025-07-21

## 2025-07-21 RX ORDER — FAMOTIDINE 10 MG/ML
20 INJECTION, SOLUTION INTRAVENOUS ONCE
Status: CANCELLED | OUTPATIENT
Start: 2025-07-21 | End: 2025-07-21

## 2025-07-21 RX ORDER — NALOXONE HCL 0.4 MG/ML
0.2 VIAL (ML) INJECTION AS NEEDED
Status: DISCONTINUED | OUTPATIENT
Start: 2025-07-21 | End: 2025-07-22 | Stop reason: HOSPADM

## 2025-07-21 RX ORDER — DIPHENHYDRAMINE HYDROCHLORIDE 50 MG/ML
12.5 INJECTION, SOLUTION INTRAMUSCULAR; INTRAVENOUS
Status: DISCONTINUED | OUTPATIENT
Start: 2025-07-21 | End: 2025-07-22 | Stop reason: HOSPADM

## 2025-07-21 RX ORDER — SODIUM CHLORIDE, SODIUM LACTATE, POTASSIUM CHLORIDE, CALCIUM CHLORIDE 600; 310; 30; 20 MG/100ML; MG/100ML; MG/100ML; MG/100ML
9 INJECTION, SOLUTION INTRAVENOUS CONTINUOUS
Status: CANCELLED | OUTPATIENT
Start: 2025-07-21 | End: 2025-07-22

## 2025-07-21 RX ORDER — LIDOCAINE HYDROCHLORIDE 10 MG/ML
0.5 INJECTION, SOLUTION INFILTRATION; PERINEURAL ONCE AS NEEDED
Status: CANCELLED | OUTPATIENT
Start: 2025-07-21

## 2025-07-21 RX ADMIN — PROPOFOL 20 MG: 10 INJECTION, EMULSION INTRAVENOUS at 07:35

## 2025-07-21 RX ADMIN — PROPOFOL 20 MG: 10 INJECTION, EMULSION INTRAVENOUS at 07:20

## 2025-07-21 RX ADMIN — PROPOFOL 30 MG: 10 INJECTION, EMULSION INTRAVENOUS at 07:26

## 2025-07-21 RX ADMIN — PROPOFOL 30 MG: 10 INJECTION, EMULSION INTRAVENOUS at 07:31

## 2025-07-21 RX ADMIN — PROPOFOL 80 MG: 10 INJECTION, EMULSION INTRAVENOUS at 07:11

## 2025-07-21 RX ADMIN — PROPOFOL 20 MG: 10 INJECTION, EMULSION INTRAVENOUS at 07:23

## 2025-07-21 RX ADMIN — PROPOFOL 30 MG: 10 INJECTION, EMULSION INTRAVENOUS at 07:29

## 2025-07-21 RX ADMIN — PROPOFOL 30 MG: 10 INJECTION, EMULSION INTRAVENOUS at 07:17

## 2025-07-21 RX ADMIN — PROPOFOL 20 MG: 10 INJECTION, EMULSION INTRAVENOUS at 07:14

## 2025-07-21 RX ADMIN — SODIUM CHLORIDE, POTASSIUM CHLORIDE, SODIUM LACTATE AND CALCIUM CHLORIDE: 600; 310; 30; 20 INJECTION, SOLUTION INTRAVENOUS at 07:06

## 2025-07-21 RX ADMIN — LIDOCAINE HYDROCHLORIDE 60 MG: 20 INJECTION, SOLUTION INFILTRATION; PERINEURAL at 07:11

## 2025-07-21 NOTE — ANESTHESIA POSTPROCEDURE EVALUATION
Patient: Re Sheldon    Procedure Summary       Date: 07/21/25 Room / Location: [facility] PACU    Anesthesia Start: 0706 Anesthesia Stop: 0742    Procedure: ADULT TRANSESOPHAGEAL ECHO (PATRICA) W/ CONT IF NECESSARY PER PROTOCOL Diagnosis:       Aortic stenosis, severe      (Valvular Disease)    Scheduled Providers: Radha Mccain MD Provider: Raj Taveras MD    Anesthesia Type: MAC ASA Status: 3            Anesthesia Type: MAC    Vitals  Vitals Value Taken Time   /94 07/21/25 08:01   Temp     Pulse 57 07/21/25 08:05   Resp 18 07/21/25 08:00   SpO2 100 % 07/21/25 08:05   Vitals shown include unfiled device data.        Post Anesthesia Care and Evaluation    Patient location during evaluation: bedside  Level of consciousness: awake  Pain management: adequate    Airway patency: patent    Cardiovascular status: acceptable  Respiratory status: acceptable    Comments: /62   Pulse 62   Temp 36.7 °C (98.1 °F) (Oral)   Resp 18   SpO2 100%

## 2025-07-21 NOTE — H&P
H&P reviewed agree with details.  Risks and benefits of procedure were explained in detail.  Questions and concerns were answered at bedside.  No contraindications to moving forward with procedure.  Follow diagnostic testing results for further treatment recommendations.        Subjective:      Encounter Date: 12/17/24        Subjective  Patient ID: Re Sheldon is a 64 y.o. female.     Chief Complaint: Moderate to severe MR, moderate aortic stenosis, atrial fibrillation, hypertension, central retinal artery occlusion  HPI:   This is a pleasant 64-year-old woman who was diagnosed with atrial fibrillation in April 2020.  In August 2020 she had a transesophageal echo which showed moderate aortic stenosis with moderate to severe MR.  She also had severe aortic plaque at that time and was placed on Livalo.  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 At that time she was anticoagulated with Eliquis for AF.  Her echo at that time was unchanged with a preserved systolic function, moderate aortic stenosis and moderate to severe MR and a PFO.  In August 22 she was admitted again with CHF.  Right and left heart catheterization showed mild nonobstructive disease of the LAD and RCA, severely elevated wedge pressure of 28 and mean PA pressure of 36.  She was diuresed.  2024- she was seen by EP given ongoing AF symptoms. A PATRICA was repeated 6/2024 to verify that MR had not worsened; it remains in the moderate range, with moderate AS, mild PH and preserved systolic EF. She ultimately underwent PVI and flutter ablation. She had a recurrence shortly thereafter of flutter and had a CV which was successful. Since then she has had intermittent tachycardia which resolves with prn metoprolol. She has no other cardiac complaints. No angina or dyspnea.         The following portions of the patient's history were reviewed and updated as appropriate:  allergies, current medications, past family history, past medical history, past social history, past surgical history and problem list.     REVIEW OF SYSTEMS:   All systems reviewed.  Pertinent positives identified in HPI.  All other systems are negative.     Medical History        Past Medical History:   Diagnosis Date    Abnormal ECG       Inferolateral ST-T changes at baseline    Anemia      Arrhythmia      Atrial fibrillation      Calcification of aortic valve     Coronary artery disease      COVID-19 2022    Diabetes mellitus      Hyperlipidemia      Hypertension      Low back pain      Mitral regurgitation      PAF (paroxysmal atrial fibrillation)      Palpitations      Premature atrial contractions      Retinal vein occlusion      Spinal stenosis      Systolic murmur      Tonsillitis      Vitamin D deficiency                    Family History   Problem Relation Age of Onset    Heart attack Other      Breast cancer Other      Heart disease Other      Heart disease Mother           Mother with MI at 63, and later  from CHF    Heart disease Father           Father  from complications after valve replacement at 73    Stroke Father      Breast cancer Sister      Anna Hyperthermia Neg Hx           Social History   Social History            Socioeconomic History    Marital status:        Spouse name: Kedar    Number of children: 2   Tobacco Use    Smoking status: Never       Passive exposure: Never    Smokeless tobacco: Never   Vaping Use    Vaping status: Never Used   Substance and Sexual Activity    Alcohol use: Yes       Comment: Caffeine use 3-4 cups daily, drinks socially    Drug use: No    Sexual activity: Defer       Partners: Male            Allergies        Allergies   Allergen Reactions    Sulfa Antibiotics Diarrhea and Rash    Augmentin [Amoxicillin-Pot Clavulanate] Diarrhea and Other (See Comments)    Bactrim [Sulfamethoxazole-Trimethoprim] Diarrhea and Other (See Comments)             Surgical History         Past Surgical History:   Procedure Laterality Date    ADENOIDECTOMY        BREAST BIOPSY        CARDIAC CATHETERIZATION N/A 08/09/2022     Procedure: Right and Left Heart Cath;  Surgeon: Josemanuel Schmitz MD;  Location:  [facility];  Service: Cardiovascular;  Laterality: N/A;    CARDIAC CATHETERIZATION N/A 08/09/2022     Procedure: Coronary angiography;  Surgeon: Josemanuel Schmitz MD;  Location:  [facility];  Service: Cardiovascular;  Laterality: N/A;    CARDIAC ELECTROPHYSIOLOGY PROCEDURE N/A 08/30/2024     Procedure: Ablation atrial fibrillation;  Surgeon: Ray Suarez MD;  Location:  [facility];  Service: Cardiovascular;  Laterality: N/A;    CARPAL TUNNEL RELEASE Bilateral      COLONOSCOPY   08/04/2014     repeat 10 years , Dr. Karen Spear    COLONOSCOPY N/A 01/22/2021     Procedure: COLONOSCOPY to cecum with cold biopsy polypectomy;  Surgeon: Ray Hairston MD;  Location:  [facility];  Service: Gastroenterology;  Laterality: N/A;  pre: screening  post: diverticulosis, polyp, internal hemorrhoids    FOOT SURGERY        HAND SURGERY        TONSILLECTOMY        TONSILLECTOMY AND ADENOIDECTOMY        TUBAL ABDOMINAL LIGATION                Procedures        Objective:      Objective   PHYSICAL EXAM:  GEN: VSS, no distress,   Eyes: normal sclera, normal lids and lashes  HENT: moist mucus membranes,   Respiratory: CTAB, no rales or wheezes  CV: RRR, 3 out of 6 systolic murmur rating to the carotids, 3 out of 6 apical blowing harsh murmur at the apex, +2 DP and 2+ carotid pulses b/l  GI: NABS, soft,  Nontender, nondistended  MSK: no edema, no scoliosis or kyphosis  Skin: no rash, warm, dry  Heme/Lymph: no bruising or bleeding  Psych: organized thought, normal behavior and affect  Neuro: Cranial nerves grossly intact, Alert and Oriented x 3.            Assessment:      Assessment    Diagnosis Plan   1. Aortic  valve stenosis, etiology of cardiac valve disease unspecified             Plan:      Plan   1.  Moderate MR by PATRICA 6/2024: Asymptomatic at this time.   2.  Moderate aortic stenosis  3.  PFO: Unclear if this was related to her CRAO.  4.  Heart failure with preserved EF: Euvolemic  5.  Hypertension: Well-controlled  6.  Atrial fibrillation: s/p PVI and flutter ablation. Continue eliquis, BID metoprolol plus prn for palps.   7.  Central retinal artery occlusion: On apixaban, Plavix  8.  Moderate carotid disease     Dr. Vital thank you very much for referring this kind patient to me. Please call me with any questions or concerns. I will see the patient again in the office in 6 months.

## 2025-07-21 NOTE — ANESTHESIA PREPROCEDURE EVALUATION
Anesthesia Evaluation     Patient summary reviewed   NPO Solid Status: > 8 hours             Airway   Mallampati: II  No difficulty expected  Dental      Pulmonary    Cardiovascular     (+) hypertension, valvular problems/murmurs, CAD, dysrhythmias Atrial Fib, PVC, hyperlipidemia      Neuro/Psych  GI/Hepatic/Renal/Endo    (+) diabetes mellitus    Musculoskeletal     Abdominal    Substance History      OB/GYN          Other                    Anesthesia Plan    ASA 3     MAC       Anesthetic plan, risks, benefits, and alternatives have been provided, discussed and informed consent has been obtained with: patient.    CODE STATUS:

## 2025-07-22 ENCOUNTER — RESULTS FOLLOW-UP (OUTPATIENT)
Age: 65
End: 2025-07-22
Payer: MEDICAID

## 2025-07-22 NOTE — TELEPHONE ENCOUNTER
Called patient to discuss results of PATRICA.  Unfortunately mitral valve had progressed from moderate to severe.  Patient currently working on getting scheduled with CT surgery and Dr. Hamilton to discuss further options for valve management.

## 2025-08-05 ENCOUNTER — TELEPHONE (OUTPATIENT)
Dept: CARDIAC SURGERY | Facility: CLINIC | Age: 65
End: 2025-08-05
Payer: MEDICAID

## 2025-08-06 DIAGNOSIS — I34.0 NONRHEUMATIC MITRAL VALVE REGURGITATION: Primary | ICD-10-CM

## 2025-08-08 ENCOUNTER — TELEPHONE (OUTPATIENT)
Dept: CARDIOLOGY | Age: 65
End: 2025-08-08
Payer: MEDICAID

## 2025-08-08 ENCOUNTER — LAB (OUTPATIENT)
Dept: LAB | Facility: HOSPITAL | Age: 65
End: 2025-08-08
Payer: MEDICAID

## 2025-08-08 PROCEDURE — 80048 BASIC METABOLIC PNL TOTAL CA: CPT | Performed by: NURSE PRACTITIONER

## 2025-08-08 PROCEDURE — 85027 COMPLETE CBC AUTOMATED: CPT | Performed by: NURSE PRACTITIONER

## 2025-08-10 ENCOUNTER — PATIENT MESSAGE (OUTPATIENT)
Age: 65
End: 2025-08-10
Payer: MEDICAID

## 2025-08-11 ENCOUNTER — TELEPHONE (OUTPATIENT)
Dept: CARDIOLOGY | Age: 65
End: 2025-08-11
Payer: MEDICAID

## 2025-08-11 ENCOUNTER — TELEPHONE (OUTPATIENT)
Age: 65
End: 2025-08-11
Payer: MEDICAID

## 2025-08-13 ENCOUNTER — HOSPITAL ENCOUNTER (OUTPATIENT)
Facility: HOSPITAL | Age: 65
Setting detail: HOSPITAL OUTPATIENT SURGERY
Discharge: HOME | End: 2025-08-13
Attending: INTERNAL MEDICINE | Admitting: INTERNAL MEDICINE
Payer: MEDICAID

## 2025-08-13 VITALS
DIASTOLIC BLOOD PRESSURE: 52 MMHG | BODY MASS INDEX: 28.35 KG/M2 | TEMPERATURE: 96.6 F | SYSTOLIC BLOOD PRESSURE: 98 MMHG | HEART RATE: 55 BPM | HEIGHT: 63 IN | RESPIRATION RATE: 16 BRPM | OXYGEN SATURATION: 97 % | WEIGHT: 160 LBS

## 2025-08-13 DIAGNOSIS — I34.0 MITRAL VALVE INSUFFICIENCY, UNSPECIFIED ETIOLOGY: Primary | ICD-10-CM

## 2025-08-13 DIAGNOSIS — I34.0 NONRHEUMATIC MITRAL VALVE REGURGITATION: ICD-10-CM

## 2025-08-13 LAB — GLUCOSE BLDC GLUCOMTR-MCNC: 94 MG/DL (ref 65–99)

## 2025-08-13 PROCEDURE — 82810 BLOOD GASES O2 SAT ONLY: CPT

## 2025-08-13 PROCEDURE — 25810000003 SODIUM CHLORIDE 0.9 % SOLUTION: Performed by: INTERNAL MEDICINE

## 2025-08-13 PROCEDURE — C1769 GUIDE WIRE: HCPCS | Performed by: INTERNAL MEDICINE

## 2025-08-13 PROCEDURE — 25010000002 FENTANYL CITRATE (PF) 50 MCG/ML SOLUTION: Performed by: INTERNAL MEDICINE

## 2025-08-13 PROCEDURE — 25010000002 MIDAZOLAM PER 1 MG: Performed by: INTERNAL MEDICINE

## 2025-08-13 PROCEDURE — 85014 HEMATOCRIT: CPT

## 2025-08-13 PROCEDURE — 25010000002 LIDOCAINE 2% SOLUTION: Performed by: INTERNAL MEDICINE

## 2025-08-13 PROCEDURE — C1887 CATHETER, GUIDING: HCPCS | Performed by: INTERNAL MEDICINE

## 2025-08-13 PROCEDURE — S0260 H&P FOR SURGERY: HCPCS | Performed by: INTERNAL MEDICINE

## 2025-08-13 PROCEDURE — 93460 R&L HRT ART/VENTRICLE ANGIO: CPT | Performed by: INTERNAL MEDICINE

## 2025-08-13 PROCEDURE — 85018 HEMOGLOBIN: CPT

## 2025-08-13 PROCEDURE — C1894 INTRO/SHEATH, NON-LASER: HCPCS | Performed by: INTERNAL MEDICINE

## 2025-08-13 PROCEDURE — 25510000001 IOPAMIDOL PER 1 ML: Performed by: INTERNAL MEDICINE

## 2025-08-13 PROCEDURE — 25010000002 HEPARIN (PORCINE) PER 1000 UNITS: Performed by: INTERNAL MEDICINE

## 2025-08-13 PROCEDURE — 82948 REAGENT STRIP/BLOOD GLUCOSE: CPT | Performed by: INTERNAL MEDICINE

## 2025-08-13 PROCEDURE — 82803 BLOOD GASES ANY COMBINATION: CPT

## 2025-08-13 RX ORDER — LIDOCAINE HYDROCHLORIDE 20 MG/ML
INJECTION, SOLUTION INFILTRATION; PERINEURAL
Status: DISCONTINUED | OUTPATIENT
Start: 2025-08-13 | End: 2025-08-13 | Stop reason: HOSPADM

## 2025-08-13 RX ORDER — IOPAMIDOL 755 MG/ML
INJECTION, SOLUTION INTRAVASCULAR
Status: DISCONTINUED | OUTPATIENT
Start: 2025-08-13 | End: 2025-08-13 | Stop reason: HOSPADM

## 2025-08-13 RX ORDER — SODIUM CHLORIDE 0.9 % (FLUSH) 0.9 %
10 SYRINGE (ML) INJECTION EVERY 12 HOURS SCHEDULED
Status: DISCONTINUED | OUTPATIENT
Start: 2025-08-13 | End: 2025-08-13 | Stop reason: HOSPADM

## 2025-08-13 RX ORDER — MIDAZOLAM HYDROCHLORIDE 1 MG/ML
INJECTION, SOLUTION INTRAMUSCULAR; INTRAVENOUS
Status: DISCONTINUED | OUTPATIENT
Start: 2025-08-13 | End: 2025-08-13 | Stop reason: HOSPADM

## 2025-08-13 RX ORDER — NITROGLYCERIN 0.4 MG/1
0.4 TABLET SUBLINGUAL
Status: DISCONTINUED | OUTPATIENT
Start: 2025-08-13 | End: 2025-08-13 | Stop reason: HOSPADM

## 2025-08-13 RX ORDER — VERAPAMIL HYDROCHLORIDE 2.5 MG/ML
INJECTION INTRAVENOUS
Status: DISCONTINUED | OUTPATIENT
Start: 2025-08-13 | End: 2025-08-13 | Stop reason: HOSPADM

## 2025-08-13 RX ORDER — SODIUM CHLORIDE 9 MG/ML
75 INJECTION, SOLUTION INTRAVENOUS CONTINUOUS
Status: DISCONTINUED | OUTPATIENT
Start: 2025-08-13 | End: 2025-08-13 | Stop reason: HOSPADM

## 2025-08-13 RX ORDER — HEPARIN SODIUM 1000 [USP'U]/ML
INJECTION, SOLUTION INTRAVENOUS; SUBCUTANEOUS
Status: DISCONTINUED | OUTPATIENT
Start: 2025-08-13 | End: 2025-08-13 | Stop reason: HOSPADM

## 2025-08-13 RX ORDER — ACETAMINOPHEN 325 MG/1
650 TABLET ORAL EVERY 4 HOURS PRN
Status: DISCONTINUED | OUTPATIENT
Start: 2025-08-13 | End: 2025-08-13 | Stop reason: HOSPADM

## 2025-08-13 RX ORDER — FUROSEMIDE 40 MG/1
80 TABLET ORAL DAILY
Qty: 90 TABLET | Refills: 3 | Status: SHIPPED | OUTPATIENT
Start: 2025-08-13

## 2025-08-13 RX ORDER — FENTANYL CITRATE 50 UG/ML
INJECTION, SOLUTION INTRAMUSCULAR; INTRAVENOUS
Status: DISCONTINUED | OUTPATIENT
Start: 2025-08-13 | End: 2025-08-13 | Stop reason: HOSPADM

## 2025-08-13 RX ORDER — METOPROLOL TARTRATE 50 MG
100 TABLET ORAL 2 TIMES DAILY
Qty: 90 TABLET | Refills: 3 | Status: SHIPPED | OUTPATIENT
Start: 2025-08-13

## 2025-08-13 RX ADMIN — SODIUM CHLORIDE 75 ML/HR: 9 INJECTION, SOLUTION INTRAVENOUS at 08:37

## 2025-08-14 ENCOUNTER — TELEPHONE (OUTPATIENT)
Dept: CARDIOLOGY | Age: 65
End: 2025-08-14
Payer: MEDICAID

## 2025-08-15 LAB
HCO3 BLDA-SCNC: 24.6 MMOL/L (ref 21–28)
HCO3 BLDA-SCNC: 28.2 MMOL/L (ref 21–28)
HCT VFR BLDA CALC: 32 % (ref 38–51)
HCT VFR BLDA CALC: 32 % (ref 38–51)
HGB BLDA-MCNC: 10.9 G/DL (ref 12–17)
HGB BLDA-MCNC: 10.9 G/DL (ref 12–17)
PCO2 BLDA: 42.9 MM HG (ref 35–45)
PCO2 BLDV: 55.1 MMHG (ref 41–51)
PH BLDA: 7.32 [PH] (ref 7.31–7.41)
PH BLDA: 7.37 PH UNITS (ref 7.35–7.45)
PO2 BLDA: 86 MMHG (ref 80–105)
PO2 BLDV: 33 MM HG (ref 35–42)
POTASSIUM BLDA-SCNC: 4.2 MMOL/L (ref 3.5–4.9)
POTASSIUM BLDA-SCNC: 4.2 MMOL/L (ref 3.5–4.9)
SAO2 % BLDA: 96 % (ref 95–98)
SAO2 % BLDCOA: 57 % (ref 45–75)

## 2025-08-19 ENCOUNTER — OFFICE VISIT (OUTPATIENT)
Dept: CARDIAC SURGERY | Facility: CLINIC | Age: 65
End: 2025-08-19
Payer: MEDICAID

## 2025-08-19 VITALS
BODY MASS INDEX: 28.35 KG/M2 | SYSTOLIC BLOOD PRESSURE: 129 MMHG | OXYGEN SATURATION: 94 % | TEMPERATURE: 97.6 F | WEIGHT: 160 LBS | HEIGHT: 63 IN | RESPIRATION RATE: 20 BRPM | DIASTOLIC BLOOD PRESSURE: 79 MMHG | HEART RATE: 74 BPM

## 2025-08-19 DIAGNOSIS — I48.19 ATRIAL FIBRILLATION, PERSISTENT: ICD-10-CM

## 2025-08-19 DIAGNOSIS — R06.09 EXERTIONAL DYSPNEA: ICD-10-CM

## 2025-08-19 DIAGNOSIS — I34.0 MITRAL VALVE INSUFFICIENCY, UNSPECIFIED ETIOLOGY: ICD-10-CM

## 2025-08-19 DIAGNOSIS — R07.89 OTHER CHEST PAIN: ICD-10-CM

## 2025-08-19 DIAGNOSIS — I35.0 AORTIC STENOSIS, MODERATE: Primary | ICD-10-CM

## 2025-08-19 PROCEDURE — 3078F DIAST BP <80 MM HG: CPT | Performed by: THORACIC SURGERY (CARDIOTHORACIC VASCULAR SURGERY)

## 2025-08-19 PROCEDURE — 3074F SYST BP LT 130 MM HG: CPT | Performed by: THORACIC SURGERY (CARDIOTHORACIC VASCULAR SURGERY)

## 2025-08-19 PROCEDURE — 1159F MED LIST DOCD IN RCRD: CPT | Performed by: THORACIC SURGERY (CARDIOTHORACIC VASCULAR SURGERY)

## 2025-08-19 PROCEDURE — 1160F RVW MEDS BY RX/DR IN RCRD: CPT | Performed by: THORACIC SURGERY (CARDIOTHORACIC VASCULAR SURGERY)

## 2025-08-19 PROCEDURE — 99024 POSTOP FOLLOW-UP VISIT: CPT | Performed by: THORACIC SURGERY (CARDIOTHORACIC VASCULAR SURGERY)

## 2025-08-20 ENCOUNTER — PREP FOR SURGERY (OUTPATIENT)
Dept: OTHER | Facility: HOSPITAL | Age: 65
End: 2025-08-20
Payer: MEDICAID

## 2025-08-20 DIAGNOSIS — I25.10 CORONARY ARTERY DISEASE: ICD-10-CM

## 2025-08-20 DIAGNOSIS — I34.0 MITRAL VALVE INSUFFICIENCY, UNSPECIFIED ETIOLOGY: Primary | ICD-10-CM

## 2025-08-20 DIAGNOSIS — I35.0 AORTIC VALVE STENOSIS: ICD-10-CM

## 2025-08-20 DIAGNOSIS — I48.91 ATRIAL FIBRILLATION: ICD-10-CM

## 2025-08-20 DIAGNOSIS — Q21.12 PFO (PATENT FORAMEN OVALE): ICD-10-CM

## 2025-08-20 RX ORDER — CHLORHEXIDINE GLUCONATE ORAL RINSE 1.2 MG/ML
15 SOLUTION DENTAL ONCE
OUTPATIENT
Start: 2025-08-20 | End: 2025-08-20

## 2025-08-20 RX ORDER — CHLORHEXIDINE GLUCONATE 500 MG/1
1 CLOTH TOPICAL EVERY 12 HOURS PRN
OUTPATIENT
Start: 2025-08-20

## 2025-08-20 RX ORDER — CHLORHEXIDINE GLUCONATE ORAL RINSE 1.2 MG/ML
15 SOLUTION DENTAL EVERY 12 HOURS
OUTPATIENT
Start: 2025-08-20 | End: 2025-08-21

## 2025-08-20 RX ORDER — METOPROLOL TARTRATE 25 MG/1
12.5 TABLET, FILM COATED ORAL
OUTPATIENT
Start: 2025-09-12 | End: 2025-09-13

## 2025-08-21 ENCOUNTER — TELEPHONE (OUTPATIENT)
Dept: CARDIAC SURGERY | Facility: CLINIC | Age: 65
End: 2025-08-21
Payer: MEDICAID

## 2025-08-25 ENCOUNTER — TELEPHONE (OUTPATIENT)
Dept: CARDIAC SURGERY | Facility: CLINIC | Age: 65
End: 2025-08-25
Payer: COMMERCIAL

## 2025-08-25 RX ORDER — METFORMIN HYDROCHLORIDE 500 MG/1
500 TABLET, EXTENDED RELEASE ORAL
Qty: 90 TABLET | Refills: 1 | Status: SHIPPED | OUTPATIENT
Start: 2025-08-25

## (undated) DEVICE — CATH DIAG IMPULSE AL1 5F 100CM

## (undated) DEVICE — SHEATH STEER ABLAT PULSESELECT/PFA FLEXCATH/CONTOUR 10F 13MM

## (undated) DEVICE — LN SMPL CO2 SHTRM SD STREAM W/M LUER

## (undated) DEVICE — KT MANIFLD CARDIAC

## (undated) DEVICE — CATH DIAG IMPULSE FR4 5F 100CM

## (undated) DEVICE — BALN PRESS WEDGE 5F 110CM

## (undated) DEVICE — GLIDESHEATH SLENDER STAINLESS STEEL KIT: Brand: GLIDESHEATH SLENDER

## (undated) DEVICE — CATH ABLAT PULSESELECT/PFA OTW 9/ELECTRD 145CM

## (undated) DEVICE — EACH LANGSTON DUAL LUMEN CATHETER IS INDICATED FOR DELIVERY OF CONTRAST MEDIUM IN ANGIOGRAPHIC STUDIES AND FOR SIMULTANEOUS PRESSURE MEASUREMENT FROM TWO SITES. THIS TYPE OF PRESSURE MEASUREMENT IS USEFUL IN DETERMINING TRANSVALVULAR, INTRAVASCULAR AND INTRAVENTRICULAR PRESSURE GRADIENTS.: Brand: LANGSTON® DUAL LUMEN CATHETER

## (undated) DEVICE — PINNACLE INTRODUCER SHEATH: Brand: PINNACLE

## (undated) DEVICE — GLIDESHEATH BASIC HYDROPHILIC COATED INTRODUCER SHEATH: Brand: GLIDESHEATH

## (undated) DEVICE — PK CATH CARD 40

## (undated) DEVICE — CABL CATH/INTERFACE ABLAT PULSESELECT/PFA 99IN 1P/U STRL

## (undated) DEVICE — SENSR O2 OXIMAX FNGR A/ 18IN NONSTR

## (undated) DEVICE — GW AMPLATZ  XSTIFF CVD .032 3MM 180CM

## (undated) DEVICE — SOUNDSTAR ECO 8F G CATHETER: Brand: SOUNDSTAR

## (undated) DEVICE — CANN O2 ETCO2 FITS ALL CONN CO2 SMPL A/ 7IN DISP LF

## (undated) DEVICE — Device

## (undated) DEVICE — Device: Brand: REFERENCE PATCH CARTO 3

## (undated) DEVICE — GW EMR FIX EXCHG J STD .035 3MM 260CM

## (undated) DEVICE — RADIFOCUS GLIDEWIRE: Brand: GLIDEWIRE

## (undated) DEVICE — LOU EP: Brand: MEDLINE INDUSTRIES, INC.

## (undated) DEVICE — Device: Brand: DECANAV

## (undated) DEVICE — THE TORRENT IRRIGATION SCOPE CONNECTOR IS USED WITH THE TORRENT IRRIGATION TUBING TO PROVIDE IRRIGATION FLUIDS SUCH AS STERILE WATER DURING GASTROINTESTINAL ENDOSCOPIC PROCEDURES WHEN USED IN CONJUNCTION WITH AN IRRIGATION PUMP (OR ELECTROSURGICAL UNIT).: Brand: TORRENT

## (undated) DEVICE — RADIFOCUS OPTITORQUE ANGIOGRAPHIC CATHETER: Brand: OPTITORQUE

## (undated) DEVICE — OCTA,PERSEID,2-2-2-2-2,F-CURVE: Brand: OCTARAY MAPPING CATHETER

## (undated) DEVICE — TUBING, SUCTION, 1/4" X 10', STRAIGHT: Brand: MEDLINE

## (undated) DEVICE — SINGLE-USE BIOPSY FORCEPS: Brand: RADIAL JAW 4

## (undated) DEVICE — DGW .035 FC J3MM 260CM TEF: Brand: EMERALD

## (undated) DEVICE — ADAPT CLN BIOGUARD AIR/H2O DISP

## (undated) DEVICE — 1 X VERSACROSS TRANSSEPTAL SHEATH (INCLUDING  1 X J-TIP GUIDEWIRE); 1 X VERSACROSS RF WIRE (INCLUDING 1 X CONNECTOR CABLE (SINGLE USE)); 1 X DISPERSIVE ELECTRODE: Brand: VERSACROSS ACCESS SOLUTION

## (undated) DEVICE — PERCLOSE™ PROSTYLE™ SUTURE-MEDIATED CLOSURE AND REPAIR SYSTEM: Brand: PERCLOSE™ PROSTYLE™

## (undated) DEVICE — CLEARSIGHT FINGER CUFF MEDIUM MULTI PACK: Brand: CLEARSIGHT

## (undated) DEVICE — KT ORCA ORCAPOD DISP STRL

## (undated) DEVICE — GW INQWIRE FC PTFE STR .035IN 150